# Patient Record
Sex: FEMALE | Race: WHITE | NOT HISPANIC OR LATINO
[De-identification: names, ages, dates, MRNs, and addresses within clinical notes are randomized per-mention and may not be internally consistent; named-entity substitution may affect disease eponyms.]

---

## 2017-01-09 ENCOUNTER — APPOINTMENT (OUTPATIENT)
Dept: CARDIOLOGY | Facility: CLINIC | Age: 82
End: 2017-01-09

## 2017-06-06 ENCOUNTER — OUTPATIENT (OUTPATIENT)
Dept: OUTPATIENT SERVICES | Facility: HOSPITAL | Age: 82
LOS: 1 days | End: 2017-06-06
Payer: MEDICARE

## 2017-06-06 VITALS
HEIGHT: 63 IN | HEART RATE: 68 BPM | RESPIRATION RATE: 16 BRPM | TEMPERATURE: 98 F | WEIGHT: 128.97 LBS | SYSTOLIC BLOOD PRESSURE: 154 MMHG | DIASTOLIC BLOOD PRESSURE: 77 MMHG

## 2017-06-06 DIAGNOSIS — Z98.890 OTHER SPECIFIED POSTPROCEDURAL STATES: Chronic | ICD-10-CM

## 2017-06-06 DIAGNOSIS — Z01.818 ENCOUNTER FOR OTHER PREPROCEDURAL EXAMINATION: ICD-10-CM

## 2017-06-06 DIAGNOSIS — M17.10 UNILATERAL PRIMARY OSTEOARTHRITIS, UNSPECIFIED KNEE: ICD-10-CM

## 2017-06-06 DIAGNOSIS — M17.12 UNILATERAL PRIMARY OSTEOARTHRITIS, LEFT KNEE: ICD-10-CM

## 2017-06-06 LAB
ANION GAP SERPL CALC-SCNC: 7 MMOL/L — SIGNIFICANT CHANGE UP (ref 5–17)
APPEARANCE UR: CLEAR — SIGNIFICANT CHANGE UP
APTT BLD: 38.4 SEC — HIGH (ref 27.5–37.4)
BILIRUB UR-MCNC: NEGATIVE — SIGNIFICANT CHANGE UP
BUN SERPL-MCNC: 9 MG/DL — SIGNIFICANT CHANGE UP (ref 7–23)
CALCIUM SERPL-MCNC: 9.3 MG/DL — SIGNIFICANT CHANGE UP (ref 8.5–10.1)
CHLORIDE SERPL-SCNC: 100 MMOL/L — SIGNIFICANT CHANGE UP (ref 96–108)
CO2 SERPL-SCNC: 30 MMOL/L — SIGNIFICANT CHANGE UP (ref 22–31)
COLOR SPEC: YELLOW — SIGNIFICANT CHANGE UP
CREAT SERPL-MCNC: 0.57 MG/DL — SIGNIFICANT CHANGE UP (ref 0.5–1.3)
DIFF PNL FLD: ABNORMAL
GLUCOSE SERPL-MCNC: 86 MG/DL — SIGNIFICANT CHANGE UP (ref 70–99)
GLUCOSE UR QL: NEGATIVE — SIGNIFICANT CHANGE UP
HCT VFR BLD CALC: 39.7 % — SIGNIFICANT CHANGE UP (ref 34.5–45)
HGB BLD-MCNC: 12.8 G/DL — SIGNIFICANT CHANGE UP (ref 11.5–15.5)
INR BLD: 1.3 RATIO — HIGH (ref 0.88–1.16)
KETONES UR-MCNC: NEGATIVE — SIGNIFICANT CHANGE UP
LEUKOCYTE ESTERASE UR-ACNC: ABNORMAL
MCHC RBC-ENTMCNC: 28.9 PG — SIGNIFICANT CHANGE UP (ref 27–34)
MCHC RBC-ENTMCNC: 32.4 GM/DL — SIGNIFICANT CHANGE UP (ref 32–36)
MCV RBC AUTO: 89.3 FL — SIGNIFICANT CHANGE UP (ref 80–100)
MRSA PCR RESULT.: SIGNIFICANT CHANGE UP
NITRITE UR-MCNC: NEGATIVE — SIGNIFICANT CHANGE UP
PH UR: 6.5 — SIGNIFICANT CHANGE UP (ref 5–8)
PLATELET # BLD AUTO: 279 K/UL — SIGNIFICANT CHANGE UP (ref 150–400)
POTASSIUM SERPL-MCNC: 4 MMOL/L — SIGNIFICANT CHANGE UP (ref 3.5–5.3)
POTASSIUM SERPL-SCNC: 4 MMOL/L — SIGNIFICANT CHANGE UP (ref 3.5–5.3)
PROT UR-MCNC: NEGATIVE — SIGNIFICANT CHANGE UP
PROTHROM AB SERPL-ACNC: 14.3 SEC — HIGH (ref 9.8–12.7)
RBC # BLD: 4.44 M/UL — SIGNIFICANT CHANGE UP (ref 3.8–5.2)
RBC # FLD: 13.7 % — SIGNIFICANT CHANGE UP (ref 10.3–14.5)
S AUREUS DNA NOSE QL NAA+PROBE: SIGNIFICANT CHANGE UP
SODIUM SERPL-SCNC: 137 MMOL/L — SIGNIFICANT CHANGE UP (ref 135–145)
SP GR SPEC: 1.01 — SIGNIFICANT CHANGE UP (ref 1.01–1.02)
UROBILINOGEN FLD QL: NEGATIVE — SIGNIFICANT CHANGE UP
WBC # BLD: 8.5 K/UL — SIGNIFICANT CHANGE UP (ref 3.8–10.5)
WBC # FLD AUTO: 8.5 K/UL — SIGNIFICANT CHANGE UP (ref 3.8–10.5)

## 2017-06-06 PROCEDURE — 71020: CPT | Mod: 26

## 2017-06-06 PROCEDURE — 73560 X-RAY EXAM OF KNEE 1 OR 2: CPT | Mod: 26,LT

## 2017-06-06 PROCEDURE — 93010 ELECTROCARDIOGRAM REPORT: CPT | Mod: NC

## 2017-06-06 NOTE — H&P PST ADULT - RS GEN PE MLT RESP DETAILS PC
airway patent/clear to auscultation bilaterally/good air movement/respirations non-labored/breath sounds equal/normal

## 2017-06-06 NOTE — H&P PST ADULT - NSANTHOSAYNRD_GEN_A_CORE
No. WILLIE screening performed.  STOP BANG Legend: 0-2 = LOW Risk; 3-4 = INTERMEDIATE Risk; 5-8 = HIGH Risk

## 2017-06-06 NOTE — H&P PST ADULT - HISTORY OF PRESENT ILLNESS
84 y/o female with c/o pain in the left knee for many years, diagnosed with osteoarthritis. Was seen by DR Harper, now in PST pre op left total knee replacement. pre op testing today.,

## 2017-06-06 NOTE — H&P PST ADULT - PSH
Cataract, Other  surgery   bilateral  H/O ovarian cystectomy  2013  H/O Shoulder Replacement  right shoulder  get clindamycin before surgery  Parathyroid  surgery  S/P Bunionectomy

## 2017-06-06 NOTE — H&P PST ADULT - PMH
Abdominal Mass    Afib    Constipation    Diverticulosis of the Colon    DJD (Degenerative Joint Disease)  shoulders, knees, cervical and lumbar spine  Hiatal Hernia    History of Osteoarthritis    HLD (hyperlipidemia)    HTN (Hypertension)    Hypercholesterolemia    MVP (Mitral Valve Prolapse)  last echo 2010  Thyroid Nodule

## 2017-06-06 NOTE — H&P PST ADULT - ASSESSMENT
82 y/o female ,PMH of Afib , HTN, hyperlipidemia with c/o pain in the left knee , osteoarthritis, scheduled for left total knee replacement. Pre op testing today. Medical and cardiac eval advised.  Was seen by Dr Campbell  last week , was told to have UTI, placed on [id number] tab, half tablet twice daily. Will do U/A and c/s to check if UTI is resolved.

## 2017-06-07 ENCOUNTER — APPOINTMENT (OUTPATIENT)
Dept: CARDIOLOGY | Facility: CLINIC | Age: 82
End: 2017-06-07

## 2017-06-07 ENCOUNTER — NON-APPOINTMENT (OUTPATIENT)
Age: 82
End: 2017-06-07

## 2017-06-07 VITALS
WEIGHT: 132 LBS | HEIGHT: 62 IN | DIASTOLIC BLOOD PRESSURE: 80 MMHG | OXYGEN SATURATION: 96 % | SYSTOLIC BLOOD PRESSURE: 130 MMHG | HEART RATE: 79 BPM | BODY MASS INDEX: 24.29 KG/M2

## 2017-06-07 LAB
CULTURE RESULTS: SIGNIFICANT CHANGE UP
SPECIMEN SOURCE: SIGNIFICANT CHANGE UP

## 2017-06-09 ENCOUNTER — APPOINTMENT (OUTPATIENT)
Dept: CARDIOLOGY | Facility: CLINIC | Age: 82
End: 2017-06-09

## 2017-06-16 RX ORDER — SODIUM CHLORIDE 9 MG/ML
1000 INJECTION, SOLUTION INTRAVENOUS
Qty: 0 | Refills: 0 | Status: DISCONTINUED | OUTPATIENT
Start: 2017-06-19 | End: 2017-06-19

## 2017-06-17 RX ORDER — ONDANSETRON 8 MG/1
4 TABLET, FILM COATED ORAL EVERY 6 HOURS
Qty: 0 | Refills: 0 | Status: DISCONTINUED | OUTPATIENT
Start: 2017-06-19 | End: 2017-06-22

## 2017-06-17 RX ORDER — ASCORBIC ACID 60 MG
500 TABLET,CHEWABLE ORAL
Qty: 0 | Refills: 0 | Status: DISCONTINUED | OUTPATIENT
Start: 2017-06-19 | End: 2017-06-22

## 2017-06-17 RX ORDER — RIVAROXABAN 15 MG-20MG
10 KIT ORAL DAILY
Qty: 0 | Refills: 0 | Status: COMPLETED | OUTPATIENT
Start: 2017-06-20 | End: 2017-06-20

## 2017-06-17 RX ORDER — FERROUS SULFATE 325(65) MG
325 TABLET ORAL
Qty: 0 | Refills: 0 | Status: DISCONTINUED | OUTPATIENT
Start: 2017-06-19 | End: 2017-06-22

## 2017-06-17 RX ORDER — DILTIAZEM HCL 120 MG
240 CAPSULE, EXT RELEASE 24 HR ORAL DAILY
Qty: 0 | Refills: 0 | Status: DISCONTINUED | OUTPATIENT
Start: 2017-06-19 | End: 2017-06-22

## 2017-06-17 RX ORDER — DOCUSATE SODIUM 100 MG
100 CAPSULE ORAL THREE TIMES A DAY
Qty: 0 | Refills: 0 | Status: DISCONTINUED | OUTPATIENT
Start: 2017-06-19 | End: 2017-06-22

## 2017-06-17 RX ORDER — ACETAMINOPHEN 500 MG
650 TABLET ORAL EVERY 8 HOURS
Qty: 0 | Refills: 0 | Status: DISCONTINUED | OUTPATIENT
Start: 2017-06-20 | End: 2017-06-22

## 2017-06-17 RX ORDER — SIMVASTATIN 20 MG/1
5 TABLET, FILM COATED ORAL AT BEDTIME
Qty: 0 | Refills: 0 | Status: DISCONTINUED | OUTPATIENT
Start: 2017-06-19 | End: 2017-06-22

## 2017-06-17 RX ORDER — CELECOXIB 200 MG/1
200 CAPSULE ORAL
Qty: 0 | Refills: 0 | Status: DISCONTINUED | OUTPATIENT
Start: 2017-06-19 | End: 2017-06-22

## 2017-06-17 RX ORDER — FOLIC ACID 0.8 MG
1 TABLET ORAL DAILY
Qty: 0 | Refills: 0 | Status: DISCONTINUED | OUTPATIENT
Start: 2017-06-19 | End: 2017-06-22

## 2017-06-17 RX ORDER — PANTOPRAZOLE SODIUM 20 MG/1
40 TABLET, DELAYED RELEASE ORAL
Qty: 0 | Refills: 0 | Status: DISCONTINUED | OUTPATIENT
Start: 2017-06-19 | End: 2017-06-22

## 2017-06-18 PROCEDURE — 86900 BLOOD TYPING SEROLOGIC ABO: CPT

## 2017-06-18 PROCEDURE — 81001 URINALYSIS AUTO W/SCOPE: CPT

## 2017-06-18 PROCEDURE — 80048 BASIC METABOLIC PNL TOTAL CA: CPT

## 2017-06-18 PROCEDURE — 85610 PROTHROMBIN TIME: CPT

## 2017-06-18 PROCEDURE — 71046 X-RAY EXAM CHEST 2 VIEWS: CPT

## 2017-06-18 PROCEDURE — G0463: CPT

## 2017-06-18 PROCEDURE — 86850 RBC ANTIBODY SCREEN: CPT

## 2017-06-18 PROCEDURE — 93005 ELECTROCARDIOGRAM TRACING: CPT

## 2017-06-18 PROCEDURE — 86920 COMPATIBILITY TEST SPIN: CPT

## 2017-06-18 PROCEDURE — 73560 X-RAY EXAM OF KNEE 1 OR 2: CPT

## 2017-06-18 PROCEDURE — 87641 MR-STAPH DNA AMP PROBE: CPT

## 2017-06-18 PROCEDURE — 87086 URINE CULTURE/COLONY COUNT: CPT

## 2017-06-18 PROCEDURE — 85730 THROMBOPLASTIN TIME PARTIAL: CPT

## 2017-06-18 PROCEDURE — 87640 STAPH A DNA AMP PROBE: CPT

## 2017-06-18 PROCEDURE — 85027 COMPLETE CBC AUTOMATED: CPT

## 2017-06-18 PROCEDURE — 86901 BLOOD TYPING SEROLOGIC RH(D): CPT

## 2017-06-18 RX ORDER — CELECOXIB 200 MG/1
2 CAPSULE ORAL
Qty: 0 | Refills: 0 | DISCHARGE
Start: 2017-06-18

## 2017-06-19 ENCOUNTER — INPATIENT (INPATIENT)
Facility: HOSPITAL | Age: 82
LOS: 2 days | Discharge: NURSING, REHAB OR LONG-TERM CARE | DRG: 470 | End: 2017-06-22
Attending: ORTHOPAEDIC SURGERY | Admitting: ORTHOPAEDIC SURGERY
Payer: MEDICARE

## 2017-06-19 ENCOUNTER — TRANSCRIPTION ENCOUNTER (OUTPATIENT)
Age: 82
End: 2017-06-19

## 2017-06-19 ENCOUNTER — RESULT REVIEW (OUTPATIENT)
Age: 82
End: 2017-06-19

## 2017-06-19 VITALS
TEMPERATURE: 98 F | HEART RATE: 73 BPM | HEIGHT: 63 IN | RESPIRATION RATE: 14 BRPM | OXYGEN SATURATION: 98 % | WEIGHT: 128.97 LBS | SYSTOLIC BLOOD PRESSURE: 151 MMHG | DIASTOLIC BLOOD PRESSURE: 73 MMHG

## 2017-06-19 DIAGNOSIS — I10 ESSENTIAL (PRIMARY) HYPERTENSION: ICD-10-CM

## 2017-06-19 DIAGNOSIS — E78.00 PURE HYPERCHOLESTEROLEMIA, UNSPECIFIED: ICD-10-CM

## 2017-06-19 DIAGNOSIS — I48.91 UNSPECIFIED ATRIAL FIBRILLATION: ICD-10-CM

## 2017-06-19 DIAGNOSIS — M17.12 UNILATERAL PRIMARY OSTEOARTHRITIS, LEFT KNEE: ICD-10-CM

## 2017-06-19 DIAGNOSIS — Z98.890 OTHER SPECIFIED POSTPROCEDURAL STATES: Chronic | ICD-10-CM

## 2017-06-19 LAB
ABO RH CONFIRMATION: SIGNIFICANT CHANGE UP
HCT VFR BLD CALC: 32.6 % — LOW (ref 34.5–45)
HGB BLD-MCNC: 11 G/DL — LOW (ref 11.5–15.5)
INR BLD: 1.04 RATIO — SIGNIFICANT CHANGE UP (ref 0.88–1.16)
PROTHROM AB SERPL-ACNC: 11.4 SEC — SIGNIFICANT CHANGE UP (ref 9.8–12.7)

## 2017-06-19 PROCEDURE — 20985 CPTR-ASST DIR MS PX: CPT | Mod: AS

## 2017-06-19 PROCEDURE — 73560 X-RAY EXAM OF KNEE 1 OR 2: CPT | Mod: 26,LT

## 2017-06-19 PROCEDURE — 27447 TOTAL KNEE ARTHROPLASTY: CPT | Mod: AS

## 2017-06-19 PROCEDURE — 88311 DECALCIFY TISSUE: CPT | Mod: 26

## 2017-06-19 PROCEDURE — 73562 X-RAY EXAM OF KNEE 3: CPT | Mod: 26,LT

## 2017-06-19 PROCEDURE — 88305 TISSUE EXAM BY PATHOLOGIST: CPT | Mod: 26

## 2017-06-19 RX ORDER — SODIUM CHLORIDE 9 MG/ML
1000 INJECTION, SOLUTION INTRAVENOUS
Qty: 0 | Refills: 0 | Status: DISCONTINUED | OUTPATIENT
Start: 2017-06-19 | End: 2017-06-19

## 2017-06-19 RX ORDER — ZALEPLON 10 MG
5 CAPSULE ORAL AT BEDTIME
Qty: 0 | Refills: 0 | Status: DISCONTINUED | OUTPATIENT
Start: 2017-06-19 | End: 2017-06-22

## 2017-06-19 RX ORDER — CEFAZOLIN SODIUM 1 G
2000 VIAL (EA) INJECTION ONCE
Qty: 0 | Refills: 0 | Status: DISCONTINUED | OUTPATIENT
Start: 2017-06-19 | End: 2017-06-19

## 2017-06-19 RX ORDER — ACETAMINOPHEN 500 MG
1000 TABLET ORAL ONCE
Qty: 0 | Refills: 0 | Status: COMPLETED | OUTPATIENT
Start: 2017-06-19 | End: 2017-06-19

## 2017-06-19 RX ORDER — HYDROMORPHONE HYDROCHLORIDE 2 MG/ML
0.5 INJECTION INTRAMUSCULAR; INTRAVENOUS; SUBCUTANEOUS
Qty: 0 | Refills: 0 | Status: DISCONTINUED | OUTPATIENT
Start: 2017-06-19 | End: 2017-06-19

## 2017-06-19 RX ORDER — SODIUM CHLORIDE 9 MG/ML
1000 INJECTION, SOLUTION INTRAVENOUS
Qty: 0 | Refills: 0 | Status: DISCONTINUED | OUTPATIENT
Start: 2017-06-19 | End: 2017-06-20

## 2017-06-19 RX ORDER — CEFAZOLIN SODIUM 1 G
1000 VIAL (EA) INJECTION EVERY 6 HOURS
Qty: 0 | Refills: 0 | Status: COMPLETED | OUTPATIENT
Start: 2017-06-19 | End: 2017-06-20

## 2017-06-19 RX ORDER — BUPIVACAINE 13.3 MG/ML
20 INJECTION, SUSPENSION, LIPOSOMAL INFILTRATION ONCE
Qty: 0 | Refills: 0 | Status: COMPLETED | OUTPATIENT
Start: 2017-06-19 | End: 2017-06-19

## 2017-06-19 RX ORDER — DIPHENHYDRAMINE HCL 50 MG
50 CAPSULE ORAL EVERY 4 HOURS
Qty: 0 | Refills: 0 | Status: DISCONTINUED | OUTPATIENT
Start: 2017-06-19 | End: 2017-06-22

## 2017-06-19 RX ORDER — BENZOCAINE AND MENTHOL 5; 1 G/100ML; G/100ML
1 LIQUID ORAL
Qty: 0 | Refills: 0 | Status: DISCONTINUED | OUTPATIENT
Start: 2017-06-19 | End: 2017-06-22

## 2017-06-19 RX ADMIN — HYDROMORPHONE HYDROCHLORIDE 0.5 MILLIGRAM(S): 2 INJECTION INTRAMUSCULAR; INTRAVENOUS; SUBCUTANEOUS at 14:15

## 2017-06-19 RX ADMIN — HYDROMORPHONE HYDROCHLORIDE 0.5 MILLIGRAM(S): 2 INJECTION INTRAMUSCULAR; INTRAVENOUS; SUBCUTANEOUS at 13:45

## 2017-06-19 RX ADMIN — SODIUM CHLORIDE 50 MILLILITER(S): 9 INJECTION, SOLUTION INTRAVENOUS at 08:54

## 2017-06-19 RX ADMIN — Medication 400 MILLIGRAM(S): at 18:50

## 2017-06-19 RX ADMIN — Medication 100 MILLIGRAM(S): at 22:15

## 2017-06-19 RX ADMIN — HYDROMORPHONE HYDROCHLORIDE 0.5 MILLIGRAM(S): 2 INJECTION INTRAMUSCULAR; INTRAVENOUS; SUBCUTANEOUS at 13:20

## 2017-06-19 RX ADMIN — SIMVASTATIN 5 MILLIGRAM(S): 20 TABLET, FILM COATED ORAL at 22:15

## 2017-06-19 RX ADMIN — HYDROMORPHONE HYDROCHLORIDE 0.5 MILLIGRAM(S): 2 INJECTION INTRAMUSCULAR; INTRAVENOUS; SUBCUTANEOUS at 14:00

## 2017-06-19 RX ADMIN — Medication 100 MILLIGRAM(S): at 16:41

## 2017-06-19 RX ADMIN — HYDROMORPHONE HYDROCHLORIDE 0.5 MILLIGRAM(S): 2 INJECTION INTRAMUSCULAR; INTRAVENOUS; SUBCUTANEOUS at 13:35

## 2017-06-19 RX ADMIN — ONDANSETRON 4 MILLIGRAM(S): 8 TABLET, FILM COATED ORAL at 17:10

## 2017-06-19 RX ADMIN — SODIUM CHLORIDE 100 MILLILITER(S): 9 INJECTION, SOLUTION INTRAVENOUS at 13:20

## 2017-06-19 NOTE — PROGRESS NOTE ADULT - SUBJECTIVE AND OBJECTIVE BOX
Post-operative check  Patient seen and examined. Pain controlled.    Physical exam  VS: Afebrile, vital signs stable  Gen: NAD  Left LE: Dressing clean, dry, and intact. +EHL/FHL/TA/GSC. SILT L3-S1. +Dorsalis pedis, capillary refill brisk. Compartments soft and nontender.

## 2017-06-19 NOTE — BRIEF OPERATIVE NOTE - PROCEDURE
Total knee arthroplasty  06/19/2017    Active  AMEGAS Total knee arthroplasty  06/19/2017  LEFT  Active  Juan Miguel Ibrahim

## 2017-06-19 NOTE — BRIEF OPERATIVE NOTE - PRE-OP DX
Osteoarthritis  06/19/2017    Active  Juan Miguel Ibrahim Osteoarthritis  06/19/2017  LEFT Knee  Active  Carla Christianson

## 2017-06-19 NOTE — CONSULT NOTE ADULT - SUBJECTIVE AND OBJECTIVE BOX
Patient is a 83y old  Female who presents with a chief complaint of I need left knee replacement (19 Jun 2017 08:41)      INTERVAL HPI/OVERNIGHT EVENTS: comfortable no distress  T(C): 36.6, Max: 36.7 (06-19 @ 08:29)  HR: 63 (55 - 73)  BP: 137/71 (127/56 - 157/67)  RR: 16 (14 - 17)  SpO2: 100% (98% - 100%)  Wt(kg): --  I&O's Summary    I & Os for current day (as of 19 Jun 2017 16:50)  =============================================  IN: 250 ml / OUT: 0 ml / NET: 250 ml      PAST MEDICAL & SURGICAL HISTORY:  HLD (hyperlipidemia)  Afib  Abdominal Mass  Diverticulosis of the Colon  History of Osteoarthritis  DJD (Degenerative Joint Disease): shoulders, knees, cervical and lumbar spine  Constipation  Thyroid Nodule  Hiatal Hernia  Hypercholesterolemia  MVP (Mitral Valve Prolapse): last echo 2010  HTN (Hypertension)  H/O ovarian cystectomy: 2013  Cataract, Other: surgery   bilateral  S/P Bunionectomy  Parathyroid: surgery  H/O Shoulder Replacement: right shoulder  get clindamycin before surgery      SOCIAL HISTORY  Alcohol: no tobacco  Tobacco: none  Illicit substance use: none      FAMILY HISTORY: non contrib      LABS:                        11.0   x     )-----------( x        ( 19 Jun 2017 13:49 )             32.6           PT/INR - ( 19 Jun 2017 08:40 )   PT: 11.4 sec;   INR: 1.04 ratio         PTT - ( 19 Jun 2017 08:40 )  PTT:34.7 sec      MEDICATIONS  (STANDING):  lactated ringers. 1000milliLiter(s) IV Continuous <Continuous>  celecoxib 200milliGRAM(s) Oral two times a day after meals  docusate sodium 100milliGRAM(s) Oral three times a day  ferrous    sulfate 325milliGRAM(s) Oral three times a day with meals  folic acid 1milliGRAM(s) Oral daily  multivitamin 1Tablet(s) Oral daily  ascorbic acid 500milliGRAM(s) Oral two times a day  diltiazem   CD 240milliGRAM(s) Oral daily  simvastatin 5milliGRAM(s) Oral at bedtime  pantoprazole    Tablet 40milliGRAM(s) Oral before breakfast  ceFAZolin   IVPB 1000milliGRAM(s) IV Intermittent every 6 hours  acetaminophen  IVPB. 1000milliGRAM(s) IV Intermittent once    MEDICATIONS  (PRN):  ondansetron Injectable 4milliGRAM(s) IV Push every 6 hours PRN Nausea and/or Vomiting      REVIEW OF SYSTEMS:  CONSTITUTIONAL: No fever, weight loss, or fatigue  EYES: No eye pain, visual disturbances, or discharge  ENMT:  No difficulty hearing, tinnitus, vertigo; No sinus or throat pain  NECK: No pain or stiffness  RESPIRATORY: No cough, wheezing, chills or hemoptysis; No shortness of breath  CARDIOVASCULAR: No chest pain, palpitations, dizziness, or leg swelling  GASTROINTESTINAL: No abdominal or epigastric pain. No nausea, vomiting, or hematemesis; No diarrhea or constipation. No melena or hematochezia.  GENITOURINARY: No dysuria, frequency, hematuria, or incontinence  NEUROLOGICAL: No headaches, memory loss, loss of strength, numbness, or tremors  SKIN: No itching, burning, rashes, or lesions   LYMPH NODES: No enlarged glands  ENDOCRINE: No heat or cold intolerance; No hair loss  MUSCULOSKELETAL: chronic knee pain; No muscle, back, or extremity pain  PSYCHIATRIC: No depression, anxiety, mood swings, or difficulty sleeping  HEME/LYMPH: No easy bruising, or bleeding gums  ALLERY AND IMMUNOLOGIC: No hives or eczema    RADIOLOGY & ADDITIONAL TESTS:    Imaging Personally Reviewed:  [x ] YES  [ ] NO    Consultant(s) Notes Reviewed:  [x ] YES  [ ] NO    PHYSICAL EXAM:  GENERAL: NAD, well-groomed, well-developed  HEAD:  Atraumatic, Normocephalic  EYES: EOMI, PERRLA, conjunctiva and sclera clear  ENMT: No tonsillar erythema, exudates, or enlargement; Moist mucous membranes, Good dentition, No lesions  NECK: Supple, No JVD, Normal thyroid  NERVOUS SYSTEM:  Alert & Oriented X3, Good concentration; Motor Strength 5/5 B/L upper and lower extremities; DTRs 2+ intact and symmetric  CHEST/LUNG: Clear to percussion bilaterally; No rales, rhonchi, wheezing, or rubs  HEART: Regular rate and rhythm; No murmurs, rubs, or gallops  ABDOMEN: Soft, Nontender, Nondistended; Bowel sounds present  EXTREMITIES:  2+ Peripheral Pulses, No clubbing, cyanosis, or edema  LYMPH: No lymphadenopathy noted  SKIN: No rashes or lesions    Care Discussed with Consultants/Other Providers [ ] YES  [ ] NO

## 2017-06-20 DIAGNOSIS — D50.0 IRON DEFICIENCY ANEMIA SECONDARY TO BLOOD LOSS (CHRONIC): ICD-10-CM

## 2017-06-20 LAB
ANION GAP SERPL CALC-SCNC: 7 MMOL/L — SIGNIFICANT CHANGE UP (ref 5–17)
BUN SERPL-MCNC: 5 MG/DL — LOW (ref 7–23)
CALCIUM SERPL-MCNC: 8.1 MG/DL — LOW (ref 8.5–10.1)
CHLORIDE SERPL-SCNC: 104 MMOL/L — SIGNIFICANT CHANGE UP (ref 96–108)
CO2 SERPL-SCNC: 31 MMOL/L — SIGNIFICANT CHANGE UP (ref 22–31)
CREAT SERPL-MCNC: 0.58 MG/DL — SIGNIFICANT CHANGE UP (ref 0.5–1.3)
GLUCOSE SERPL-MCNC: 102 MG/DL — HIGH (ref 70–99)
HCT VFR BLD CALC: 30.9 % — LOW (ref 34.5–45)
HGB BLD-MCNC: 10.3 G/DL — LOW (ref 11.5–15.5)
POTASSIUM SERPL-MCNC: 4 MMOL/L — SIGNIFICANT CHANGE UP (ref 3.5–5.3)
POTASSIUM SERPL-SCNC: 4 MMOL/L — SIGNIFICANT CHANGE UP (ref 3.5–5.3)
SODIUM SERPL-SCNC: 142 MMOL/L — SIGNIFICANT CHANGE UP (ref 135–145)

## 2017-06-20 PROCEDURE — 99223 1ST HOSP IP/OBS HIGH 75: CPT

## 2017-06-20 RX ORDER — POLYETHYLENE GLYCOL 3350 17 G/17G
17 POWDER, FOR SOLUTION ORAL DAILY
Qty: 0 | Refills: 0 | Status: DISCONTINUED | OUTPATIENT
Start: 2017-06-20 | End: 2017-06-22

## 2017-06-20 RX ORDER — HYDROMORPHONE HYDROCHLORIDE 2 MG/ML
4 INJECTION INTRAMUSCULAR; INTRAVENOUS; SUBCUTANEOUS EVERY 4 HOURS
Qty: 0 | Refills: 0 | Status: DISCONTINUED | OUTPATIENT
Start: 2017-06-20 | End: 2017-06-22

## 2017-06-20 RX ORDER — HYDROMORPHONE HYDROCHLORIDE 2 MG/ML
2 INJECTION INTRAMUSCULAR; INTRAVENOUS; SUBCUTANEOUS EVERY 4 HOURS
Qty: 0 | Refills: 0 | Status: DISCONTINUED | OUTPATIENT
Start: 2017-06-20 | End: 2017-06-22

## 2017-06-20 RX ADMIN — HYDROMORPHONE HYDROCHLORIDE 2 MILLIGRAM(S): 2 INJECTION INTRAMUSCULAR; INTRAVENOUS; SUBCUTANEOUS at 09:44

## 2017-06-20 RX ADMIN — Medication 1 MILLIGRAM(S): at 12:49

## 2017-06-20 RX ADMIN — Medication 650 MILLIGRAM(S): at 22:00

## 2017-06-20 RX ADMIN — Medication 100 MILLIGRAM(S): at 04:15

## 2017-06-20 RX ADMIN — Medication 100 MILLIGRAM(S): at 06:04

## 2017-06-20 RX ADMIN — Medication 240 MILLIGRAM(S): at 06:04

## 2017-06-20 RX ADMIN — HYDROMORPHONE HYDROCHLORIDE 2 MILLIGRAM(S): 2 INJECTION INTRAMUSCULAR; INTRAVENOUS; SUBCUTANEOUS at 10:44

## 2017-06-20 RX ADMIN — Medication 500 MILLIGRAM(S): at 17:40

## 2017-06-20 RX ADMIN — Medication 325 MILLIGRAM(S): at 08:49

## 2017-06-20 RX ADMIN — Medication 650 MILLIGRAM(S): at 06:05

## 2017-06-20 RX ADMIN — Medication 100 MILLIGRAM(S): at 14:15

## 2017-06-20 RX ADMIN — Medication 100 MILLIGRAM(S): at 22:00

## 2017-06-20 RX ADMIN — Medication 650 MILLIGRAM(S): at 06:30

## 2017-06-20 RX ADMIN — Medication 500 MILLIGRAM(S): at 06:04

## 2017-06-20 RX ADMIN — PANTOPRAZOLE SODIUM 40 MILLIGRAM(S): 20 TABLET, DELAYED RELEASE ORAL at 06:04

## 2017-06-20 RX ADMIN — Medication 325 MILLIGRAM(S): at 12:49

## 2017-06-20 RX ADMIN — SIMVASTATIN 5 MILLIGRAM(S): 20 TABLET, FILM COATED ORAL at 22:00

## 2017-06-20 RX ADMIN — Medication 325 MILLIGRAM(S): at 17:40

## 2017-06-20 RX ADMIN — CELECOXIB 200 MILLIGRAM(S): 200 CAPSULE ORAL at 17:39

## 2017-06-20 RX ADMIN — Medication 1 TABLET(S): at 12:49

## 2017-06-20 RX ADMIN — CELECOXIB 200 MILLIGRAM(S): 200 CAPSULE ORAL at 08:49

## 2017-06-20 RX ADMIN — RIVAROXABAN 10 MILLIGRAM(S): KIT at 12:49

## 2017-06-20 RX ADMIN — Medication 650 MILLIGRAM(S): at 14:15

## 2017-06-20 NOTE — PROGRESS NOTE ADULT - SUBJECTIVE AND OBJECTIVE BOX
T(C): 37.1, Max: 37.1 (06-20 @ 08:43)  HR: 71 (55 - 72)  BP: 110/64 (110/64 - 157/67)  RR: 17 (15 - 17)  SpO2: 98% (98% - 100%)  Wt(kg): --    Pt seen, doing well, no anesthesia complications or complaints noted or reported.   No Nausea  Pain well controlled

## 2017-06-20 NOTE — CONSULT NOTE ADULT - SUBJECTIVE AND OBJECTIVE BOX
[facility] - Ansley Morales, Jennifer Lopez, Imtiaz Dee  Office Number: [phone]    Initial Consult Note    CHIEF COMPLAINT: Patient is a 83y old  Female who presents with a chief complaint of I need left knee replacement (19 Jun 2017 08:41)      HPI:  82 y/o female with c/o pain in the left knee for many years, diagnosed with osteoarthritis. Was seen by DR Harper, now in PST pre op left total knee replacement. She has a history of PAF, and is on Xarelto for stroke risk reduction.  She also has a history of hyperlipidemia, hypertension, mitral valve prolapse.  She underwent knee replacement surgery, and tolerated the procedure well.  She does not have any chest pain, dyspnea, PND, orthopnea, palpitations, dizziness, or syncope She is clinically maintaining NSR.      PAST MEDICAL & SURGICAL HISTORY:  HLD (hyperlipidemia)  Afib  Abdominal Mass  Diverticulosis of the Colon  History of Osteoarthritis  DJD (Degenerative Joint Disease): shoulders, knees, cervical and lumbar spine  Constipation  Thyroid Nodule  Hiatal Hernia  Hypercholesterolemia  MVP (Mitral Valve Prolapse): last echo 2010  HTN (Hypertension)  H/O ovarian cystectomy: 2013  Cataract, Other: surgery   bilateral  S/P Bunionectomy  Parathyroid: surgery  H/O Shoulder Replacement: right shoulder  get clindamycin before surgery      SOCIAL HISTORY:  No tobacco, ethanol, or drug abuse.    FAMILY HISTORY:  No pertinent family history in first degree relatives    No family history of acute MI or sudden cardiac death.    MEDICATIONS  (STANDING):  acetaminophen   Tablet. 650milliGRAM(s) Oral every 8 hours  celecoxib 200milliGRAM(s) Oral two times a day after meals  docusate sodium 100milliGRAM(s) Oral three times a day  ferrous    sulfate 325milliGRAM(s) Oral three times a day with meals  folic acid 1milliGRAM(s) Oral daily  multivitamin 1Tablet(s) Oral daily  ascorbic acid 500milliGRAM(s) Oral two times a day  diltiazem   CD 240milliGRAM(s) Oral daily  simvastatin 5milliGRAM(s) Oral at bedtime  rivaroxaban 10milliGRAM(s) Oral daily  pantoprazole    Tablet 40milliGRAM(s) Oral before breakfast    MEDICATIONS  (PRN):  ondansetron Injectable 4milliGRAM(s) IV Push every 6 hours PRN Nausea and/or Vomiting  diphenhydrAMINE   Capsule 50milliGRAM(s) Oral every 4 hours PRN Rash and/or Itching  benzocaine 15 mG/menthol 3.6 mG Lozenge 1Lozenge Oral every 2 hours PRN Sore Throat  zaleplon 5milliGRAM(s) Oral at bedtime PRN Insomnia  HYDROmorphone   Tablet 2milliGRAM(s) Oral every 4 hours PRN Mild Pain (1 - 3)  HYDROmorphone   Tablet 4milliGRAM(s) Oral every 4 hours PRN Severe Pain (7 - 10)      Allergies    codeine (Other)  penicillin (Flushing (Skin); Hypotension; Other)        REVIEW OF SYSTEMS:  All other review of systems is negative unless indicated above    VITAL SIGNS:   Vital Signs Last 24 Hrs  T(C): 37.1, Max: 37.1 (06-20 @ 08:43)  T(F): 98.7, Max: 98.7 (06-20 @ 08:43)  HR: 71 (55 - 72)  BP: 110/64 (110/64 - 157/67)  BP(mean): --  RR: 17 (15 - 17)  SpO2: 98% (98% - 100%)    I&O's Summary    I & Os for current day (as of 20 Jun 2017 10:09)  =============================================  IN: 510 ml / OUT: 2300 ml / NET: -1790 ml      On Exam:    Constitutional: NAD, alert and oriented x 3  Lungs:  Non-labored, breath sounds are clear bilaterally, No wheezing, rales or rhonchi  Cardiovascular: RRR.  S1 and S2 positive.  2/6 systolic murmur.  No rubs, gallops or clicks  Gastrointestinal: Bowel Sounds present, soft, nontender.   Lymph: Minimal peripheral edema. No cervical lymphadenopathy.  Neurological: Alert, no focal deficits  Skin: No rashes or ulcers   Psych:  Mood & affect appropriate.    LABS: All Labs Reviewed:                        10.3   x     )-----------( x        ( 20 Jun 2017 04:46 )             30.9                         11.0   x     )-----------( x        ( 19 Jun 2017 13:49 )             32.6     20 Jun 2017 04:46    142    |  104    |  5      ----------------------------<  102    4.0     |  31     |  0.58     Ca    8.1        20 Jun 2017 04:46      PT/INR - ( 19 Jun 2017 08:40 )   PT: 11.4 sec;   INR: 1.04 ratio         PTT - ( 19 Jun 2017 08:40 )  PTT:34.7 sec        RADIOLOGY:    PROCEDURE DATE:  06/06/2017        INTERPRETATION:  Preop knee replacement.    PA lateral. Prior 11/25/2016.    Normal heart size with atherosclerotic aorta. No pleural-parenchymal   abnormality. Right shoulder arthroplasty. Degenerative change dorsal   spine with a marked dextroscoliosis.    Impression: No active EXAM:  CHEST PA & LAT                        disease. Stable exam    EKG:  NSR    Assessment/Plan:  83y Female with paroxysmal atrial fibrillation, HTN, chol, MVP s/p left total hip replacement:    - Tolerated well with no evidence of any cardiac complications  - NO clinical evidence of recurrent atrial fibrillation  - To get half dose Xarelto today.  Full dose Xarelto to resume tomorrow per orthopedics  - Continue diltiazem 240 QD  - Continue statin drug  - Monitor and replete electrolytes as needed  - TO follow with you

## 2017-06-20 NOTE — PROGRESS NOTE ADULT - SUBJECTIVE AND OBJECTIVE BOX
RUBENS BARKER 83y Female  MRN-[id number]     ORTHOPEDIC SURGERY / DR. COFFMAN    POD # 1    Vital Signs Last 24 Hrs  T(C): 36.8, Max: 37 (06-19 @ 20:01)  T(F): 98.2, Max: 98.6 (06-19 @ 20:01)  HR: 70 (55 - 73)  BP: 142/70 (127/56 - 157/67)  BP(mean): --  RR: 16 (14 - 17)  SpO2: 100% (98% - 100%)    LEFT KNEE :    DRESSING DRY AND INTACT  GOOD MOTOR TO LEFT LOWER EXTREMITY  NEURO-VASCULAR STATUS INTACT  NO CALF TENDERNESS    Hemoglobin: 10.3 (06-20 @ 04:46)  Hemoglobin: 11.0 (06-19 @ 13:49)    Hematocrit: 30.9 (06-20 @ 04:46)  Hematocrit: 32.6 (06-19 @ 13:49)    06-20    142  |  104  |  5<L>  ----------------------------<  102<H>  4.0   |  31  |  0.58    Ca    8.1<L>      20 Jun 2017 04:46      ASSESSMENT &  PLAN:  POD # 1 S/P LEFT TOTAL KNEE  REPLACEMENT    WEIGHT  BEARING AS TOLERATED, OOB AND AMBULATE, PT  DVT PROPHYLAXIS  MG PO BID  INCENTIVE SPIROMETRY   DISCHARGE PLANNING TO  REHAB RUBENS BARKER 83y Female  MRN-[id number]     ORTHOPEDIC SURGERY / DR. COFFMAN    POD # 1    Vital Signs Last 24 Hrs  T(C): 36.8, Max: 37 (06-19 @ 20:01)  T(F): 98.2, Max: 98.6 (06-19 @ 20:01)  HR: 70 (55 - 73)  BP: 142/70 (127/56 - 157/67)  BP(mean): --  RR: 16 (14 - 17)  SpO2: 100% (98% - 100%)    LEFT KNEE :    DRESSING DRY AND INTACT  GOOD MOTOR TO LEFT LOWER EXTREMITY  NEURO-VASCULAR STATUS INTACT  NO CALF TENDERNESS    Hemoglobin: 10.3 (06-20 @ 04:46)  Hemoglobin: 11.0 (06-19 @ 13:49)    Hematocrit: 30.9 (06-20 @ 04:46)  Hematocrit: 32.6 (06-19 @ 13:49)    06-20    142  |  104  |  5<L>  ----------------------------<  102<H>  4.0   |  31  |  0.58    Ca    8.1<L>      20 Jun 2017 04:46      ASSESSMENT &  PLAN:  POD # 1 S/P LEFT TOTAL KNEE  REPLACEMENT    WEIGHT  BEARING AS TOLERATED, OOB AND AMBULATE, PT  DVT PROPHYLAXIS  INCENTIVE SPIROMETRY    H/O AFIB ON XARELTO  XARELTO 10 MG PO TODAY      DISCHARGE PLANNING TO  REHAB

## 2017-06-20 NOTE — GOALS OF CARE CONVERSATION - PERSONAL ADVANCE DIRECTIVE - CONVERSATION DETAILS
met pt, no hcp, pt daughter Maricruz present, hcp form completed, family aware of pt directives. contact # given.

## 2017-06-20 NOTE — PROGRESS NOTE ADULT - SUBJECTIVE AND OBJECTIVE BOX
Patient is a 83y old  Female who presents with a chief complaint of I need left knee replacement (19 Jun 2017 08:41)      INTERVAL HPI/OVERNIGHT EVENTS: no complaints    MEDICATIONS  (STANDING):  acetaminophen   Tablet. 650milliGRAM(s) Oral every 8 hours  celecoxib 200milliGRAM(s) Oral two times a day after meals  docusate sodium 100milliGRAM(s) Oral three times a day  ferrous    sulfate 325milliGRAM(s) Oral three times a day with meals  folic acid 1milliGRAM(s) Oral daily  multivitamin 1Tablet(s) Oral daily  ascorbic acid 500milliGRAM(s) Oral two times a day  diltiazem   CD 240milliGRAM(s) Oral daily  simvastatin 5milliGRAM(s) Oral at bedtime  pantoprazole    Tablet 40milliGRAM(s) Oral before breakfast    MEDICATIONS  (PRN):  ondansetron Injectable 4milliGRAM(s) IV Push every 6 hours PRN Nausea and/or Vomiting  diphenhydrAMINE   Capsule 50milliGRAM(s) Oral every 4 hours PRN Rash and/or Itching  benzocaine 15 mG/menthol 3.6 mG Lozenge 1Lozenge Oral every 2 hours PRN Sore Throat  zaleplon 5milliGRAM(s) Oral at bedtime PRN Insomnia  HYDROmorphone   Tablet 2milliGRAM(s) Oral every 4 hours PRN Mild Pain (1 - 3)  HYDROmorphone   Tablet 4milliGRAM(s) Oral every 4 hours PRN Severe Pain (7 - 10)      Allergies    codeine (Other)  penicillin (Flushing (Skin); Hypotension; Other)    Intolerances        REVIEW OF SYSTEMS:  CONSTITUTIONAL: No fever, No chills,No fatigue,No myalgia,No Body ache  EYES: No eye pain, visual disturbances, or discharge  ENMT:  No ear pain, No nose bleed, No vertigo; No sinus or throat pain  NECK: No pain, No stiffness  RESPIRATORY: No cough, wheezing, No  hemoptysis; No shortness of breath  CARDIOVASCULAR: No chest pain, palpitations, leg swelling  GASTROINTESTINAL: No abdominal or epigastric pain. No nausea, No vomiting; No diarrhea or constipation. [ ] BM  GENITOURINARY: No dysuria, No frequency, No urgency, No hematuria, or incontinence  NEUROLOGICAL: alert and oriented x 3,  No headaches, No dizziness, No numbness,  SKIN:   No itching, burning, rashes, or lesions   MUSCULOSKELETAL: No joint pain or swelling; No muscle pain, No back pain, pain left knee  PSYCHIATRIC: No depression, anxiety, mood swings, or difficulty sleeping  ROS  [ ] Unable to obtain   REST OF REVIEW Of SYSTEM - [ ] Normal     Height (cm): 160 (06-19 @ 08:29), 160 (06-06 @ 12:12)  Weight (kg): 58.5 (06-19 @ 08:29), 58.5 (06-06 @ 12:12)  BMI (kg/m2): 22.9 (06-19 @ 08:29), 22.9 (06-06 @ 12:12)  BSA (m2): 1.6 (06-19 @ 08:29), 1.6 (06-06 @ 12:12)  Vital Signs Last 24 Hrs  T(C): 36.7, Max: 37.1 (06-20 @ 08:43)  T(F): 98.1, Max: 98.7 (06-20 @ 08:43)  HR: 83 (58 - 83)  BP: 105/61 (105/61 - 157/67)  BP(mean): --  RR: 15 (15 - 17)  SpO2: 97% (97% - 100%)  [ x] room air   [ ] 02    PHYSICAL EXAM:  GENERAL:  No acute distresss,  [ ] Agitated, [ ] Lethargy, [ ] confused   HEAD:  normal  ENMT: normal  NECK:  normal    NERVOUS SYSTEM:  Alert & Oriented X3, no focal deficits [ ]Confusion  [ ] Encephalopathic [ ] Sedated [ ] Other  CHEST/LUNG: Clear to auscultation bilaterally,  [ ] decreased breath sounds at bases  [ ] wheezing   [ ] rhonchi  [ ] crackles  HEART:  Regular rate and rhythm, No murmurs, rubs, or gallops,  [ ] irregular   ABDOMEN:  soft, nontender, nondistended, positive bowel sounds   [ ] obese  EXTREMITIES: No clubbing, cyanosis or edema dressing to left knee intact no calf pain  SKIN: [ ] venous stasis skin changes    LABS:                        10.3   x     )-----------( x        ( 20 Jun 2017 04:46 )             30.9     20 Jun 2017 04:46    142    |  104    |  5      ----------------------------<  102    4.0     |  31     |  0.58     Ca    8.1        20 Jun 2017 04:46      PT/INR - ( 19 Jun 2017 08:40 )   PT: 11.4 sec;   INR: 1.04 ratio         PTT - ( 19 Jun 2017 08:40 )  PTT:34.7 sec      CAPILLARY BLOOD GLUCOSE    Cultures          RADIOLOGY & ADDITIONAL TESTS:      Care Discussed with [X] Consultants  [ ] Patient  [ ] Family  [X]   /   [ ] Other; RN  DVT prophylaxis [ ] lovenox   [ ] subq heparin  [ ] coumadin  [ ] venodynes [ ] ambulating frequently at how risk for vte and no pharm         or  mechanical prophylaxis required    [ ] other   Advanced directive:    [ ]pt has hcp     [ ] pt declined to assign hcp  Discussed with pt @ bedside Patient is a 83y old  Female who presents with a chief complaint of I need left knee replacement (19 Jun 2017 08:41)      INTERVAL HPI/OVERNIGHT EVENTS: no complaints    MEDICATIONS  (STANDING):  acetaminophen   Tablet. 650milliGRAM(s) Oral every 8 hours  celecoxib 200milliGRAM(s) Oral two times a day after meals  docusate sodium 100milliGRAM(s) Oral three times a day  ferrous    sulfate 325milliGRAM(s) Oral three times a day with meals  folic acid 1milliGRAM(s) Oral daily  multivitamin 1Tablet(s) Oral daily  ascorbic acid 500milliGRAM(s) Oral two times a day  diltiazem   CD 240milliGRAM(s) Oral daily  simvastatin 5milliGRAM(s) Oral at bedtime  pantoprazole    Tablet 40milliGRAM(s) Oral before breakfast    MEDICATIONS  (PRN):  ondansetron Injectable 4milliGRAM(s) IV Push every 6 hours PRN Nausea and/or Vomiting  diphenhydrAMINE   Capsule 50milliGRAM(s) Oral every 4 hours PRN Rash and/or Itching  benzocaine 15 mG/menthol 3.6 mG Lozenge 1Lozenge Oral every 2 hours PRN Sore Throat  zaleplon 5milliGRAM(s) Oral at bedtime PRN Insomnia  HYDROmorphone   Tablet 2milliGRAM(s) Oral every 4 hours PRN Mild Pain (1 - 3)  HYDROmorphone   Tablet 4milliGRAM(s) Oral every 4 hours PRN Severe Pain (7 - 10)      Allergies    codeine (Other)  penicillin (Flushing (Skin); Hypotension; Other)    Intolerances        REVIEW OF SYSTEMS:  CONSTITUTIONAL: No fever, No chills,No fatigue,No myalgia,No Body ache  EYES: No eye pain, visual disturbances, or discharge  ENMT:  No ear pain, No nose bleed, No vertigo; No sinus or throat pain  NECK: No pain, No stiffness  RESPIRATORY: No cough, wheezing, No  hemoptysis; No shortness of breath  CARDIOVASCULAR: No chest pain, palpitations, leg swelling  GASTROINTESTINAL: No abdominal or epigastric pain. No nausea, No vomiting; No diarrhea or constipation. [ ] BM  GENITOURINARY: No dysuria, No frequency, No urgency, No hematuria, or incontinence  NEUROLOGICAL: alert and oriented x 3,  No headaches, No dizziness, No numbness,  SKIN:   No itching, burning, rashes, or lesions   MUSCULOSKELETAL: No joint pain or swelling; No muscle pain, No back pain, pain left knee  PSYCHIATRIC: No depression, anxiety, mood swings, or difficulty sleeping  ROS  [ ] Unable to obtain   REST OF REVIEW Of SYSTEM - [ ] Normal     Height (cm): 160 (06-19 @ 08:29), 160 (06-06 @ 12:12)  Weight (kg): 58.5 (06-19 @ 08:29), 58.5 (06-06 @ 12:12)  BMI (kg/m2): 22.9 (06-19 @ 08:29), 22.9 (06-06 @ 12:12)  BSA (m2): 1.6 (06-19 @ 08:29), 1.6 (06-06 @ 12:12)  Vital Signs Last 24 Hrs  T(C): 36.7, Max: 37.1 (06-20 @ 08:43)  T(F): 98.1, Max: 98.7 (06-20 @ 08:43)  HR: 83 (58 - 83)  BP: 105/61 (105/61 - 157/67)  BP(mean): --  RR: 15 (15 - 17)  SpO2: 97% (97% - 100%)  [ x] room air   [ ] 02    PHYSICAL EXAM:  GENERAL:  No acute distresss,  [ ] Agitated, [ ] Lethargy, [ ] confused   HEAD:  normal  ENMT: normal  NECK:  normal    NERVOUS SYSTEM:  Alert & Oriented X3, no focal deficits [ ]Confusion  [ ] Encephalopathic [ ] Sedated [ ] Other  CHEST/LUNG: Clear to auscultation bilaterally,  [ ] decreased breath sounds at bases  [ ] wheezing   [ ] rhonchi  [ ] crackles  HEART:  Regular rate and rhythm, No murmurs, rubs, or gallops,  [ ] irregular   ABDOMEN:  soft, nontender, nondistended, positive bowel sounds   [ ] obese  EXTREMITIES: No clubbing, cyanosis or edema dressing to left knee intact no calf pain  SKIN: [ ] venous stasis skin changes    LABS:                        10.3   x     )-----------( x        ( 20 Jun 2017 04:46 )             30.9     20 Jun 2017 04:46    142    |  104    |  5      ----------------------------<  102    4.0     |  31     |  0.58     Ca    8.1        20 Jun 2017 04:46      PT/INR - ( 19 Jun 2017 08:40 )   PT: 11.4 sec;   INR: 1.04 ratio         PTT - ( 19 Jun 2017 08:40 )  PTT:34.7 sec      CAPILLARY BLOOD GLUCOSE    Cultures          RADIOLOGY & ADDITIONAL TESTS:      Care Discussed with [X] Consultants  [ ] Patient  [ ] Family  [X]   /   [ ] Other; RN  DVT prophylaxis [ ] lovenox   [ ] subq heparin  [ ] coumadin  [ ] venodynes [ ] ambulating frequently at how risk for vte and no pharm         or  mechanical prophylaxis required    [ ] other   Advanced directive:    [ x]pt has hcp     [ ] pt declined to assign hcp  Discussed with pt @ bedside Patient is a 83y old  Female who presents with a chief complaint of I need left knee replacement (19 Jun 2017 08:41)      INTERVAL HPI/OVERNIGHT EVENTS: no complaints    MEDICATIONS  (STANDING):  acetaminophen   Tablet. 650milliGRAM(s) Oral every 8 hours  celecoxib 200milliGRAM(s) Oral two times a day after meals  docusate sodium 100milliGRAM(s) Oral three times a day  ferrous    sulfate 325milliGRAM(s) Oral three times a day with meals  folic acid 1milliGRAM(s) Oral daily  multivitamin 1Tablet(s) Oral daily  ascorbic acid 500milliGRAM(s) Oral two times a day  diltiazem   CD 240milliGRAM(s) Oral daily  simvastatin 5milliGRAM(s) Oral at bedtime  pantoprazole    Tablet 40milliGRAM(s) Oral before breakfast    MEDICATIONS  (PRN):  ondansetron Injectable 4milliGRAM(s) IV Push every 6 hours PRN Nausea and/or Vomiting  diphenhydrAMINE   Capsule 50milliGRAM(s) Oral every 4 hours PRN Rash and/or Itching  benzocaine 15 mG/menthol 3.6 mG Lozenge 1Lozenge Oral every 2 hours PRN Sore Throat  zaleplon 5milliGRAM(s) Oral at bedtime PRN Insomnia  HYDROmorphone   Tablet 2milliGRAM(s) Oral every 4 hours PRN Mild Pain (1 - 3)  HYDROmorphone   Tablet 4milliGRAM(s) Oral every 4 hours PRN Severe Pain (7 - 10)      Allergies    codeine (Other)  penicillin (Flushing (Skin); Hypotension; Other)    Intolerances        REVIEW OF SYSTEMS:  CONSTITUTIONAL: No fever, No chills,No fatigue,No myalgia,No Body ache  EYES: No eye pain, visual disturbances, or discharge  ENMT:  No ear pain, No nose bleed, No vertigo; No sinus or throat pain  NECK: No pain, No stiffness  RESPIRATORY: No cough, wheezing, No  hemoptysis; No shortness of breath  CARDIOVASCULAR: No chest pain, palpitations, leg swelling  GASTROINTESTINAL: No abdominal or epigastric pain. No nausea, No vomiting; No diarrhea or constipation. [ ] BM  GENITOURINARY: No dysuria, No frequency, No urgency, No hematuria, or incontinence  NEUROLOGICAL: alert and oriented x 3,  No headaches, No dizziness, No numbness,  SKIN:   No itching, burning, rashes, or lesions   MUSCULOSKELETAL: No joint pain or swelling; No muscle pain, No back pain, pain left knee  PSYCHIATRIC: No depression, anxiety, mood swings, or difficulty sleeping  ROS  [ ] Unable to obtain   REST OF REVIEW Of SYSTEM - [ ] Normal     Height (cm): 160 (06-19 @ 08:29), 160 (06-06 @ 12:12)  Weight (kg): 58.5 (06-19 @ 08:29), 58.5 (06-06 @ 12:12)  BMI (kg/m2): 22.9 (06-19 @ 08:29), 22.9 (06-06 @ 12:12)  BSA (m2): 1.6 (06-19 @ 08:29), 1.6 (06-06 @ 12:12)  Vital Signs Last 24 Hrs  T(C): 36.7, Max: 37.1 (06-20 @ 08:43)  T(F): 98.1, Max: 98.7 (06-20 @ 08:43)  HR: 83 (58 - 83)  BP: 105/61 (105/61 - 157/67)  BP(mean): --  RR: 15 (15 - 17)  SpO2: 97% (97% - 100%)  [ x] room air   [ ] 02    PHYSICAL EXAM:  GENERAL:  No acute distresss,  [ ] Agitated, [ ] Lethargy, [ ] confused   HEAD:  normal  ENMT: normal  NECK:  normal    NERVOUS SYSTEM:  Alert & Oriented X3, no focal deficits [ ]Confusion  [ ] Encephalopathic [ ] Sedated [ ] Other  CHEST/LUNG: Clear to auscultation bilaterally,  [ ] decreased breath sounds at bases  [ ] wheezing   [ ] rhonchi  [ ] crackles  HEART:  Regular rate and rhythm, No murmurs, rubs, or gallops,  [ ] irregular   ABDOMEN:  soft, nontender, nondistended, positive bowel sounds   [ ] obese  EXTREMITIES: No clubbing, cyanosis or edema dressing to left knee intact no calf pain  SKIN: [ ] venous stasis skin changes    LABS:                        10.3   x     )-----------( x        ( 20 Jun 2017 04:46 )             30.9     20 Jun 2017 04:46    142    |  104    |  5      ----------------------------<  102    4.0     |  31     |  0.58     Ca    8.1        20 Jun 2017 04:46      PT/INR - ( 19 Jun 2017 08:40 )   PT: 11.4 sec;   INR: 1.04 ratio         PTT - ( 19 Jun 2017 08:40 )  PTT:34.7 sec      CAPILLARY BLOOD GLUCOSE    Cultures          RADIOLOGY & ADDITIONAL TESTS:      Care Discussed with [X] Consultants  [ ] Patient  [ ] Family  [X]   /   [ ] Other; RN  DVT prophylaxis [ ] lovenox   [ ] subq heparin  [ ] coumadin  [ ] venodynes [ ] ambulating frequently at how risk for vte and no pharm         or  mechanical prophylaxis required    [ ] other xarelto  Advanced directive:    [ x]pt has hcp     [ ] pt declined to assign hcp  Discussed with pt @ bedside

## 2017-06-21 LAB
HCT VFR BLD CALC: 27.4 % — LOW (ref 34.5–45)
HGB BLD-MCNC: 9.1 G/DL — LOW (ref 11.5–15.5)
SURGICAL PATHOLOGY FINAL REPORT - CH: SIGNIFICANT CHANGE UP

## 2017-06-21 PROCEDURE — 99232 SBSQ HOSP IP/OBS MODERATE 35: CPT

## 2017-06-21 RX ORDER — LACTULOSE 10 G/15ML
10 SOLUTION ORAL
Qty: 0 | Refills: 0 | Status: DISCONTINUED | OUTPATIENT
Start: 2017-06-21 | End: 2017-06-22

## 2017-06-21 RX ORDER — RIVAROXABAN 15 MG-20MG
20 KIT ORAL
Qty: 0 | Refills: 0 | Status: DISCONTINUED | OUTPATIENT
Start: 2017-06-21 | End: 2017-06-22

## 2017-06-21 RX ORDER — RIVAROXABAN 15 MG-20MG
20 KIT ORAL DAILY
Qty: 0 | Refills: 0 | Status: DISCONTINUED | OUTPATIENT
Start: 2017-06-21 | End: 2017-06-21

## 2017-06-21 RX ORDER — PETROLATUM,WHITE
1 JELLY (GRAM) TOPICAL
Qty: 0 | Refills: 0 | Status: DISCONTINUED | OUTPATIENT
Start: 2017-06-21 | End: 2017-06-22

## 2017-06-21 RX ADMIN — Medication 100 MILLIGRAM(S): at 22:15

## 2017-06-21 RX ADMIN — Medication 100 MILLIGRAM(S): at 06:05

## 2017-06-21 RX ADMIN — SIMVASTATIN 5 MILLIGRAM(S): 20 TABLET, FILM COATED ORAL at 22:15

## 2017-06-21 RX ADMIN — Medication 100 MILLIGRAM(S): at 14:56

## 2017-06-21 RX ADMIN — LACTULOSE 10 GRAM(S): 10 SOLUTION ORAL at 14:54

## 2017-06-21 RX ADMIN — Medication 650 MILLIGRAM(S): at 23:00

## 2017-06-21 RX ADMIN — CELECOXIB 200 MILLIGRAM(S): 200 CAPSULE ORAL at 09:40

## 2017-06-21 RX ADMIN — Medication 240 MILLIGRAM(S): at 06:05

## 2017-06-21 RX ADMIN — Medication 1 MILLIGRAM(S): at 13:19

## 2017-06-21 RX ADMIN — RIVAROXABAN 20 MILLIGRAM(S): KIT at 17:53

## 2017-06-21 RX ADMIN — Medication 325 MILLIGRAM(S): at 13:20

## 2017-06-21 RX ADMIN — CELECOXIB 200 MILLIGRAM(S): 200 CAPSULE ORAL at 10:00

## 2017-06-21 RX ADMIN — Medication 500 MILLIGRAM(S): at 17:53

## 2017-06-21 RX ADMIN — HYDROMORPHONE HYDROCHLORIDE 2 MILLIGRAM(S): 2 INJECTION INTRAMUSCULAR; INTRAVENOUS; SUBCUTANEOUS at 09:40

## 2017-06-21 RX ADMIN — CELECOXIB 200 MILLIGRAM(S): 200 CAPSULE ORAL at 17:53

## 2017-06-21 RX ADMIN — Medication 500 MILLIGRAM(S): at 06:05

## 2017-06-21 RX ADMIN — Medication 1 TABLET(S): at 13:20

## 2017-06-21 RX ADMIN — Medication 650 MILLIGRAM(S): at 22:15

## 2017-06-21 RX ADMIN — PANTOPRAZOLE SODIUM 40 MILLIGRAM(S): 20 TABLET, DELAYED RELEASE ORAL at 06:05

## 2017-06-21 RX ADMIN — HYDROMORPHONE HYDROCHLORIDE 4 MILLIGRAM(S): 2 INJECTION INTRAMUSCULAR; INTRAVENOUS; SUBCUTANEOUS at 14:48

## 2017-06-21 RX ADMIN — Medication 650 MILLIGRAM(S): at 06:05

## 2017-06-21 RX ADMIN — Medication 650 MILLIGRAM(S): at 14:56

## 2017-06-21 RX ADMIN — LACTULOSE 10 GRAM(S): 10 SOLUTION ORAL at 22:15

## 2017-06-21 RX ADMIN — POLYETHYLENE GLYCOL 3350 17 GRAM(S): 17 POWDER, FOR SOLUTION ORAL at 06:26

## 2017-06-21 NOTE — DISCHARGE NOTE ADULT - HOSPITAL COURSE
THIS IS A CASE OF A 84 YO FEMALE EVALUATED IN THE OFFICE DUE TO  LEFT KNEE PAIN.    PAST MEDICAL HISTORY: AFIB, HTN, HYPERLIPIDEMIA, OA, HIATAL HERNIA, MVP    HOSPITAL COURSE: AFTER THE RISK AND BENEFITS OF SURGICAL INTERVENTION IN DETAILS WERE DISCUSSED WITH THE PATIENT, A CONSENT WAS OBTAINED. AFTER OBTAINING MEDICAL CLEARANCE AND PREOPERATIVE EVALUATION, THE PATIENT WAS TAKEN TO THE OPERATING ROOM ON 06/19/2017 AND THE PATIENT UNDERWENT A  LEFT TOTAL KNEE REPLACEMENT. POSTOPERATIVE PHASE, THE PATIENT WAS ANTICOAGULATED WITH XARELTO  AND WAS GIVEN 24 HOURS OF IV ANTIBIOTICS. A SOCIAL SERVICE CONSULT WAS OBTAINED FOR DISCHARGE PLANNING. A PHYSICAL THERAPY CONSULT WAS OBTAINED FOR  WEIGHT BEARING AS TOLERATED.   DUE TO ANEMIA OF ACUTE BLOOD LOSS POST OP  IRON SUPPLEMENT GIVEN.     DISPOSITION : REHAB AND FOLLOW UP WITH DR. COFFMAN AS OUTPATIENT.

## 2017-06-21 NOTE — DISCHARGE NOTE ADULT - MEDICATION SUMMARY - MEDICATIONS TO STOP TAKING
I will STOP taking the medications listed below when I get home from the hospital:    Percocet 5/325 325 mg-5 mg oral tablet  -- 1 tab(s) by mouth every 6 hours, As Needed MDD:4  -- Caution federal law prohibits the transfer of this drug to any person other  than the person for whom it was prescribed.  May cause drowsiness.  Alcohol may intensify this effect.  Use care when operating dangerous machinery.  This prescription cannot be refilled.  This product contains acetaminophen.  Do not use  with any other product containing acetaminophen to prevent possible liver damage.  Using more of this medication than prescribed may cause serious breathing problems.

## 2017-06-21 NOTE — PROGRESS NOTE ADULT - SUBJECTIVE AND OBJECTIVE BOX
[facility]    Ansley Morales, Jennifer Lopez, Imtiaz Dee      [phone]    CHIEF COMPLAINT: Patient is a 83y old  Female who presents with a chief complaint of I need left knee replacement (19 Jun 2017 08:41)      Follow Up: paf, s/p tkr    Interim history: The patient reports no new symptoms.  Denies chest discomfort and shortness of breath.  No abdominal pain.  No new neurologic symptoms.      MEDICATIONS  (STANDING):  acetaminophen   Tablet. 650milliGRAM(s) Oral every 8 hours  celecoxib 200milliGRAM(s) Oral two times a day after meals  docusate sodium 100milliGRAM(s) Oral three times a day  ferrous    sulfate 325milliGRAM(s) Oral three times a day with meals  folic acid 1milliGRAM(s) Oral daily  multivitamin 1Tablet(s) Oral daily  ascorbic acid 500milliGRAM(s) Oral two times a day  diltiazem   CD 240milliGRAM(s) Oral daily  simvastatin 5milliGRAM(s) Oral at bedtime  pantoprazole    Tablet 40milliGRAM(s) Oral before breakfast  rivaroxaban 20milliGRAM(s) Oral with dinner    MEDICATIONS  (PRN):  ondansetron Injectable 4milliGRAM(s) IV Push every 6 hours PRN Nausea and/or Vomiting  diphenhydrAMINE   Capsule 50milliGRAM(s) Oral every 4 hours PRN Rash and/or Itching  benzocaine 15 mG/menthol 3.6 mG Lozenge 1Lozenge Oral every 2 hours PRN Sore Throat  zaleplon 5milliGRAM(s) Oral at bedtime PRN Insomnia  HYDROmorphone   Tablet 2milliGRAM(s) Oral every 4 hours PRN Mild Pain (1 - 3)  HYDROmorphone   Tablet 4milliGRAM(s) Oral every 4 hours PRN Severe Pain (7 - 10)  polyethylene glycol 3350 17Gram(s) Oral daily PRN Constipation  petrolatum white Ointment 1Application(s) Topical every 3 hours PRN chapped lips  lactulose Syrup 10Gram(s) Oral four times a day PRN constipation      REVIEW OF SYSTEMS:  eye, ent, GI, , allergic, dermatologic, musculoskeletal and neurologic are negative except as described above    Vital Signs Last 24 Hrs  T(C): 36.8, Max: 36.9 (06-20 @ 16:38)  T(F): 98.2, Max: 98.5 (06-20 @ 16:38)  HR: 73 (69 - 79)  BP: 126/67 (108/67 - 126/67)  BP(mean): --  RR: 17 (15 - 17)  SpO2: 99% (97% - 99%)    I&O's Summary      Telemetry past 24h: off    PHYSICAL EXAM:    Constitutional: well-nourished, well-developed, NAD   HEENT:  MMM, sclerae anicteric, conjunctivae clear, no oral cyanosis.  Pulmonary: Non-labored, breath sounds are clear bilaterally, No wheezing, rales or rhonchi  Cardiovascular: Regular, S1 and S2.  2/6 ant syst murmur.  No rubs, gallops or clicks  Gastrointestinal: Bowel Sounds present, soft, nontender.   Lymph: No peripheral edema.   Neurological: Alert, no focal deficits  Skin: No rashes.  Psych:  Mood & affect appropriate    LABS: All Labs Reviewed:                        9.1    x     )-----------( x        ( 21 Jun 2017 04:40 )             27.4                         10.3   x     )-----------( x        ( 20 Jun 2017 04:46 )             30.9                         11.0   x     )-----------( x        ( 19 Jun 2017 13:49 )             32.6     20 Jun 2017 04:46    142    |  104    |  5      ----------------------------<  102    4.0     |  31     |  0.58     Ca    8.1        20 Jun 2017 04:46            Blood Culture:         RADIOLOGY:    EKG:    Echo:

## 2017-06-21 NOTE — DISCHARGE NOTE ADULT - CARE PROVIDER_API CALL
Dudley Harper), Orthopaedic Surgery  [address] [place] [address]  Phone: [phone]  Fax: [phone]

## 2017-06-21 NOTE — PROGRESS NOTE ADULT - ATTENDING COMMENTS
Pt seen, agree with plan
Pt seen, agree with plan
all of above authored by me in entirety  will follow

## 2017-06-21 NOTE — DISCHARGE NOTE ADULT - PATIENT PORTAL LINK FT
“You can access the Follow[facility] Patient Portal, offered by [facility], by registering with the following website: [email or web address]”

## 2017-06-21 NOTE — PROGRESS NOTE ADULT - SUBJECTIVE AND OBJECTIVE BOX
RUBENS BARKER 83y Female  MRN-[id number]       ORTHOPEDIC SURGERY / DR. COFFMAN    POD # 2    Vital Signs Last 24 Hrs  T(C): 36.8, Max: 37.1 (06-20 @ 08:43)  T(F): 98.3, Max: 98.7 (06-20 @ 08:43)  HR: 69 (69 - 83)  BP: 110/70 (105/61 - 140/72)  BP(mean): --  RR: 15 (15 - 17)  SpO2: 99% (97% - 100%)      LEFT KNEE :    DRESSING DRY AND INTACT  SOME EDEMA  GOOD MOTOR TO LEFT LOWER EXTREMITY  NEURO-VASCULAR STATUS INTACT  NO CALF TENDERNESS    Hemoglobin: 9.1 (06-21 @ 04:40)  Hemoglobin: 10.3 (06-20 @ 04:46)  Hemoglobin: 11.0 (06-19 @ 13:49)    Hematocrit: 27.4 (06-21 @ 04:40)  Hematocrit: 30.9 (06-20 @ 04:46)  Hematocrit: 32.6 (06-19 @ 13:49)    06-20    142  |  104  |  5<L>  ----------------------------<  102<H>  4.0   |  31  |  0.58    Ca    8.1<L>      20 Jun 2017 04:46      ASSESSMENT &  PLAN:  POD # 2  S/P LEFT TOTAL KNEE  REPLACEMENT    WEIGHT  BEARING AS TOLERATED, OOB AND AMBULATE, PT  INCENTIVE SPIROMETRY     H/O AFIB ON XARELTO,   XARELTO 10 MG GIVEN YESTERDAY  WILL RESUME FULL DOSE TODAY XARELTO  20 MG DAILY     DISCHARGE PLANNING TO REHAB RUBENS BARKER 83y Female  MRN-[id number]     ORTHOPEDIC SURGERY / DR. COFFMAN    POD # 2    Vital Signs Last 24 Hrs  T(C): 36.8, Max: 37.1 (06-20 @ 08:43)  T(F): 98.3, Max: 98.7 (06-20 @ 08:43)  HR: 69 (69 - 83)  BP: 110/70 (105/61 - 140/72)  BP(mean): --  RR: 15 (15 - 17)  SpO2: 99% (97% - 100%)    LEFT KNEE :    DRESSING DRY AND INTACT  SOME EDEMA  GOOD MOTOR TO LEFT LOWER EXTREMITY  NEURO-VASCULAR STATUS INTACT  NO CALF TENDERNESS    Hemoglobin: 9.1 (06-21 @ 04:40)  Hemoglobin: 10.3 (06-20 @ 04:46)  Hemoglobin: 11.0 (06-19 @ 13:49)    Hematocrit: 27.4 (06-21 @ 04:40)  Hematocrit: 30.9 (06-20 @ 04:46)  Hematocrit: 32.6 (06-19 @ 13:49)    06-20    142  |  104  |  5<L>  ----------------------------<  102<H>  4.0   |  31  |  0.58    Ca    8.1<L>      20 Jun 2017 04:46      ASSESSMENT &  PLAN:  POD # 2  S/P LEFT TOTAL KNEE  REPLACEMENT    WEIGHT  BEARING AS TOLERATED, OOB AND AMBULATE, PT  INCENTIVE SPIROMETRY     H/O AFIB ON XARELTO,   XARELTO 10 MG GIVEN YESTERDAY  WILL RESUME FULL DOSE TODAY XARELTO  20 MG DAILY     DISCHARGE PLANNING TO REHAB IN AM

## 2017-06-21 NOTE — DISCHARGE NOTE ADULT - PLAN OF CARE
PHYSICAL THERAPY WEIGHT BEARING AS TOLERATED.  FOLLOW UP WITH DR. COFFMAN AFTER REHAB CALL [phone] FOR AN APPOINTMENT.  KEEP KNEE AQUACEL  DRESSING  ON DRY AND CLEAN, MAY REMOVE AFTER 06/30/2017

## 2017-06-21 NOTE — DISCHARGE NOTE ADULT - CARE PLAN
Principal Discharge DX:	Osteoarthritis of left knee, unspecified osteoarthritis type  Goal:	PHYSICAL THERAPY  Instructions for follow-up, activity and diet:	WEIGHT BEARING AS TOLERATED.  FOLLOW UP WITH DR. COFFMAN AFTER REHAB CALL [phone] FOR AN APPOINTMENT.  KEEP KNEE AQUACEL  DRESSING  ON DRY AND CLEAN, MAY REMOVE AFTER 06/30/2017  Secondary Diagnosis:	Afib  Secondary Diagnosis:	HLD (hyperlipidemia)  Secondary Diagnosis:	MVP (mitral valve prolapse)  Secondary Diagnosis:	HTN (Hypertension) Principal Discharge DX:	Osteoarthritis of left knee, unspecified osteoarthritis type  Goal:	PHYSICAL THERAPY  Instructions for follow-up, activity and diet:	WEIGHT BEARING AS TOLERATED.  FOLLOW UP WITH DR. COFFMAN AFTER REHAB CALL [phone] FOR AN APPOINTMENT.  KEEP KNEE AQUACEL  DRESSING  ON DRY AND CLEAN, MAY REMOVE AFTER 06/30/2017  Secondary Diagnosis:	Afib  Secondary Diagnosis:	HLD (hyperlipidemia)  Secondary Diagnosis:	MVP (mitral valve prolapse)  Secondary Diagnosis:	HTN (Hypertension) Principal Discharge DX:	Osteoarthritis of left knee, unspecified osteoarthritis type  Goal:	PHYSICAL THERAPY  Instructions for follow-up, activity and diet:	WEIGHT BEARING AS TOLERATED.  FOLLOW UP WITH DR. COFFMAN AFTER REHAB CALL [phone] FOR AN APPOINTMENT.  KEEP KNEE AQUACEL  DRESSING  ON DRY AND CLEAN, MAY REMOVE AFTER 06/30/2017  Secondary Diagnosis:	Afib  Secondary Diagnosis:	HLD (hyperlipidemia)  Secondary Diagnosis:	MVP (mitral valve prolapse)  Secondary Diagnosis:	HTN (Hypertension) Principal Discharge DX:	Osteoarthritis of left knee, unspecified osteoarthritis type  Goal:	PHYSICAL THERAPY  Instructions for follow-up, activity and diet:	WEIGHT BEARING AS TOLERATED.  FOLLOW UP WITH DR. COFFMAN AFTER REHAB CALL [phone] FOR AN APPOINTMENT.  KEEP KNEE AQUACEL  DRESSING  ON DRY AND CLEAN, MAY REMOVE AFTER 06/30/2017  Secondary Diagnosis:	Afib  Secondary Diagnosis:	HLD (hyperlipidemia)  Secondary Diagnosis:	MVP (mitral valve prolapse)  Secondary Diagnosis:	HTN (Hypertension)

## 2017-06-21 NOTE — PROGRESS NOTE ADULT - ASSESSMENT
83y Female with paroxysmal atrial fibrillation, HTN, chol, MVP s/p left total hip replacement:    - Tolerated well with no evidence of any cardiac complications  - NO clinical evidence of recurrent atrial fibrillation  - back on ac  - Continue diltiazem 240 QD  - Continue statin drug  - Monitor and replete electrolytes as needed  - TO follow with you  - dc planning to [facility]

## 2017-06-21 NOTE — DISCHARGE NOTE ADULT - MEDICATION SUMMARY - MEDICATIONS TO TAKE
I will START or STAY ON the medications listed below when I get home from the hospital:    CeleBREX 200 mg oral capsule  -- 2  by mouth once a day (at bedtime)  -- Indication: For PAIN    HYDROmorphone 2 mg oral tablet  -- 1 tab(s) by mouth every 4 hours, As needed, Mild Pain (1 - 3)  -- Indication: For PAIN    HYDROmorphone 4 mg oral tablet  -- 1 tab(s) by mouth every 4 hours, As needed, Severe Pain (7 - 10)  -- Indication: For PAIN    DilTIAZem Hydrochloride  mg/24 hours oral capsule, extended release  -- 1 cap(s) by mouth once a day  -- Indication: For HOME MEDICATION     Xarelto 20 mg oral tablet  -- 1 tab(s) by mouth once a day (in the evening)  -- Indication: For AFIB, ALSO DVT PROPHYLAXIS POST TKR,  HOME MEDICATION     simvastatin 5 mg oral tablet  -- 1 tab(s) by mouth once a day (at bedtime)  -- Indication: For HOME MEDICATION     cranberry oral tablet  --  by mouth once daily  -- Indication: For HOME MEDICATION     FeroSul 325 mg (65 mg elemental iron) oral tablet  -- 1 tab(s) by mouth 2 times a day  -- Indication: For POST OP ANEMIA     docusate sodium 100 mg oral capsule  -- 1 cap(s) by mouth 3 times a day  -- Indication: For STOOL SOFTNER     magnesium gluconate 250 mg oral tablet  --  by mouth once daily  -- Indication: For HOME MEDICATION     pantoprazole 40 mg oral delayed release tablet  -- 1 tab(s) by mouth once a day (before a meal)  -- Indication: For GI PROTECTOR  POST TKR     methenamine mandelate 1 g oral tablet  -- 1/2 tablet twice daily.  -- Indication: For HOME MEDICATION     Centrum oral tablet  -- 1 tab(s) by mouth once a day  -- Indication: For HOME MEDICATION     Calcium 600+D 600 mg-200 intl units oral tablet  --  by mouth 2 tabs daily  -- Indication: For HOME MEDICATION     Multiple Vitamins oral tablet  -- 1 tab(s) by mouth once a day  -- Indication: For VITAMIN    ascorbic acid 500 mg oral tablet  -- 1 tab(s) by mouth 2 times a day  -- Indication: For VITAMIN    folic acid 1 mg oral tablet  -- 1 tab(s) by mouth once a day  -- Indication: For VITAMIN

## 2017-06-22 VITALS
HEART RATE: 70 BPM | DIASTOLIC BLOOD PRESSURE: 70 MMHG | OXYGEN SATURATION: 99 % | TEMPERATURE: 98 F | RESPIRATION RATE: 16 BRPM | SYSTOLIC BLOOD PRESSURE: 115 MMHG

## 2017-06-22 PROCEDURE — 86920 COMPATIBILITY TEST SPIN: CPT

## 2017-06-22 PROCEDURE — 99232 SBSQ HOSP IP/OBS MODERATE 35: CPT

## 2017-06-22 PROCEDURE — C1713: CPT

## 2017-06-22 PROCEDURE — 85610 PROTHROMBIN TIME: CPT

## 2017-06-22 PROCEDURE — 80048 BASIC METABOLIC PNL TOTAL CA: CPT

## 2017-06-22 PROCEDURE — 88311 DECALCIFY TISSUE: CPT

## 2017-06-22 PROCEDURE — C1889: CPT

## 2017-06-22 PROCEDURE — 97530 THERAPEUTIC ACTIVITIES: CPT

## 2017-06-22 PROCEDURE — C1776: CPT

## 2017-06-22 PROCEDURE — 88305 TISSUE EXAM BY PATHOLOGIST: CPT

## 2017-06-22 PROCEDURE — 85018 HEMOGLOBIN: CPT

## 2017-06-22 PROCEDURE — 85730 THROMBOPLASTIN TIME PARTIAL: CPT

## 2017-06-22 PROCEDURE — 73562 X-RAY EXAM OF KNEE 3: CPT

## 2017-06-22 PROCEDURE — 97116 GAIT TRAINING THERAPY: CPT

## 2017-06-22 PROCEDURE — 97162 PT EVAL MOD COMPLEX 30 MIN: CPT

## 2017-06-22 RX ORDER — HYDROMORPHONE HYDROCHLORIDE 2 MG/ML
1 INJECTION INTRAMUSCULAR; INTRAVENOUS; SUBCUTANEOUS
Qty: 0 | Refills: 0 | DISCHARGE
Start: 2017-06-22

## 2017-06-22 RX ORDER — FOLIC ACID 0.8 MG
1 TABLET ORAL
Qty: 0 | Refills: 0 | DISCHARGE
Start: 2017-06-22

## 2017-06-22 RX ORDER — FERROUS SULFATE 325(65) MG
1 TABLET ORAL
Qty: 0 | Refills: 0 | DISCHARGE
Start: 2017-06-22

## 2017-06-22 RX ORDER — DOCUSATE SODIUM 100 MG
1 CAPSULE ORAL
Qty: 0 | Refills: 0 | DISCHARGE
Start: 2017-06-22

## 2017-06-22 RX ORDER — ASCORBIC ACID 60 MG
1 TABLET,CHEWABLE ORAL
Qty: 0 | Refills: 0 | DISCHARGE
Start: 2017-06-22

## 2017-06-22 RX ORDER — PANTOPRAZOLE SODIUM 20 MG/1
1 TABLET, DELAYED RELEASE ORAL
Qty: 0 | Refills: 0 | DISCHARGE
Start: 2017-06-22

## 2017-06-22 RX ADMIN — CELECOXIB 200 MILLIGRAM(S): 200 CAPSULE ORAL at 08:52

## 2017-06-22 RX ADMIN — Medication 650 MILLIGRAM(S): at 07:03

## 2017-06-22 RX ADMIN — Medication 500 MILLIGRAM(S): at 06:10

## 2017-06-22 RX ADMIN — Medication 1 TABLET(S): at 12:12

## 2017-06-22 RX ADMIN — PANTOPRAZOLE SODIUM 40 MILLIGRAM(S): 20 TABLET, DELAYED RELEASE ORAL at 06:10

## 2017-06-22 RX ADMIN — Medication 650 MILLIGRAM(S): at 06:10

## 2017-06-22 RX ADMIN — Medication 100 MILLIGRAM(S): at 06:10

## 2017-06-22 RX ADMIN — LACTULOSE 10 GRAM(S): 10 SOLUTION ORAL at 10:52

## 2017-06-22 RX ADMIN — Medication 325 MILLIGRAM(S): at 08:52

## 2017-06-22 RX ADMIN — CELECOXIB 200 MILLIGRAM(S): 200 CAPSULE ORAL at 09:30

## 2017-06-22 RX ADMIN — Medication 240 MILLIGRAM(S): at 06:10

## 2017-06-22 RX ADMIN — Medication 325 MILLIGRAM(S): at 12:12

## 2017-06-22 RX ADMIN — Medication 1 MILLIGRAM(S): at 12:12

## 2017-06-22 NOTE — PROGRESS NOTE ADULT - PROBLEM SELECTOR PLAN 1
left knee oa  s/p left tkr continue pt, cont celebrex, pain meds prn  dc planning
left knee oa  s/p left tkr continue pt, cont celebrex, pain meds prn  dc planning

## 2017-06-22 NOTE — PROGRESS NOTE ADULT - SUBJECTIVE AND OBJECTIVE BOX
[facility] -- Ansley Morales Grossman, Wachsman, Pannella, Patel      Follow Up:  PAF    Subjective/Observations: Patient seen and examined. Events noted. Resting comfortably in bed. No complaints of chest pain, dyspnea, or palpitations reported. +costipated      REVIEW OF SYSTEMS: All other review of systems is negative unless indicated above    PAST MEDICAL & SURGICAL HISTORY:  HLD (hyperlipidemia)  Afib  Abdominal Mass  Diverticulosis of the Colon  History of Osteoarthritis  DJD (Degenerative Joint Disease): shoulders, knees, cervical and lumbar spine  Constipation  Thyroid Nodule  Hiatal Hernia  Hypercholesterolemia  MVP (Mitral Valve Prolapse): last echo 2010  HTN (Hypertension)  H/O ovarian cystectomy: 2013  Cataract, Other: surgery   bilateral  S/P Bunionectomy  Parathyroid: surgery  H/O Shoulder Replacement: right shoulder  get clindamycin before surgery      MEDICATIONS  (STANDING):  acetaminophen   Tablet. 650milliGRAM(s) Oral every 8 hours  celecoxib 200milliGRAM(s) Oral two times a day after meals  docusate sodium 100milliGRAM(s) Oral three times a day  ferrous    sulfate 325milliGRAM(s) Oral three times a day with meals  folic acid 1milliGRAM(s) Oral daily  multivitamin 1Tablet(s) Oral daily  ascorbic acid 500milliGRAM(s) Oral two times a day  diltiazem   CD 240milliGRAM(s) Oral daily  simvastatin 5milliGRAM(s) Oral at bedtime  pantoprazole    Tablet 40milliGRAM(s) Oral before breakfast  rivaroxaban 20milliGRAM(s) Oral with dinner    MEDICATIONS  (PRN):  ondansetron Injectable 4milliGRAM(s) IV Push every 6 hours PRN Nausea and/or Vomiting  diphenhydrAMINE   Capsule 50milliGRAM(s) Oral every 4 hours PRN Rash and/or Itching  benzocaine 15 mG/menthol 3.6 mG Lozenge 1Lozenge Oral every 2 hours PRN Sore Throat  zaleplon 5milliGRAM(s) Oral at bedtime PRN Insomnia  HYDROmorphone   Tablet 2milliGRAM(s) Oral every 4 hours PRN Mild Pain (1 - 3)  HYDROmorphone   Tablet 4milliGRAM(s) Oral every 4 hours PRN Severe Pain (7 - 10)  polyethylene glycol 3350 17Gram(s) Oral daily PRN Constipation  petrolatum white Ointment 1Application(s) Topical every 3 hours PRN chapped lips  lactulose Syrup 10Gram(s) Oral four times a day PRN constipation      Allergies    codeine (Other)  penicillin (Flushing (Skin); Hypotension; Other)    Intolerances            Vital Signs Last 24 Hrs  T(C): 36.7, Max: 36.8 (06-21 @ 17:31)  T(F): 98, Max: 98.2 (06-21 @ 17:31)  HR: 70 (68 - 78)  BP: 115/70 (112/67 - 147/76)  BP(mean): --  RR: 16 (16 - 17)  SpO2: 99% (97% - 99%)    I&O's Summary        PHYSICAL EXAM:     Constitutional: NAD, awake and alert, well-developed  HEENT: Moist Mucous Membranes, Anicteric  Pulmonary: Non-labored, breath sounds are clear bilaterally, No wheezing, rales or rhonchi  Cardiovascular: Regular, S1 and S2, No murmurs, rubs, gallops or clicks  Gastrointestinal: Bowel Sounds present, soft, nontender.   Lymph: nonpitting peripheral edema. No lymphadenopathy.  Skin: No visible rashes or ulcers.  Psych:  Mood & affect appropriate    LABS: All Labs Reviewed:                        9.1    x     )-----------( x        ( 21 Jun 2017 04:40 )             27.4                         10.3   x     )-----------( x        ( 20 Jun 2017 04:46 )             30.9                         11.0   x     )-----------( x        ( 19 Jun 2017 13:49 )             32.6     20 Jun 2017 04:46    142    |  104    |  5      ----------------------------<  102    4.0     |  31     |  0.58     Ca    8.1        20 Jun 2017 04:46

## 2017-06-22 NOTE — PROGRESS NOTE ADULT - SUBJECTIVE AND OBJECTIVE BOX
Patient is a 83y old  Female who presents with a chief complaint of LEFT KNEE PAIN  LEFT TOTAL KNEE REPLACEMENT (21 Jun 2017 18:40)      INTERVAL HPI/OVERNIGHT EVENTS: no distress, dc planning in progess  T(C): 36.7, Max: 36.8 (06-21 @ 17:31)  HR: 70 (68 - 78)  BP: 115/70 (112/67 - 147/76)  RR: 16 (16 - 17)  SpO2: 99% (97% - 99%)  Wt(kg): --  I&O's Summary      LABS:                        9.1    x     )-----------( x        ( 21 Jun 2017 04:40 )             27.4                 MEDICATIONS  (STANDING):  acetaminophen   Tablet. 650milliGRAM(s) Oral every 8 hours  celecoxib 200milliGRAM(s) Oral two times a day after meals  docusate sodium 100milliGRAM(s) Oral three times a day  ferrous    sulfate 325milliGRAM(s) Oral three times a day with meals  folic acid 1milliGRAM(s) Oral daily  multivitamin 1Tablet(s) Oral daily  ascorbic acid 500milliGRAM(s) Oral two times a day  diltiazem   CD 240milliGRAM(s) Oral daily  simvastatin 5milliGRAM(s) Oral at bedtime  pantoprazole    Tablet 40milliGRAM(s) Oral before breakfast  rivaroxaban 20milliGRAM(s) Oral with dinner    MEDICATIONS  (PRN):  ondansetron Injectable 4milliGRAM(s) IV Push every 6 hours PRN Nausea and/or Vomiting  diphenhydrAMINE   Capsule 50milliGRAM(s) Oral every 4 hours PRN Rash and/or Itching  benzocaine 15 mG/menthol 3.6 mG Lozenge 1Lozenge Oral every 2 hours PRN Sore Throat  zaleplon 5milliGRAM(s) Oral at bedtime PRN Insomnia  HYDROmorphone   Tablet 2milliGRAM(s) Oral every 4 hours PRN Mild Pain (1 - 3)  HYDROmorphone   Tablet 4milliGRAM(s) Oral every 4 hours PRN Severe Pain (7 - 10)  polyethylene glycol 3350 17Gram(s) Oral daily PRN Constipation  petrolatum white Ointment 1Application(s) Topical every 3 hours PRN chapped lips  lactulose Syrup 10Gram(s) Oral four times a day PRN constipation      REVIEW OF SYSTEMS:  CONSTITUTIONAL: No fever, weight loss, or fatigue  EYES: No eye pain, visual disturbances, or discharge  ENMT:  No difficulty hearing, tinnitus, vertigo; No sinus or throat pain  NECK: No pain or stiffness  RESPIRATORY: No cough, wheezing, chills or hemoptysis; No shortness of breath  CARDIOVASCULAR: No chest pain, palpitations, dizziness, or leg swelling  GASTROINTESTINAL: No abdominal or epigastric pain. No nausea, vomiting, or hematemesis; No diarrhea or constipation. No melena or hematochezia.  GENITOURINARY: No dysuria, frequency, hematuria, or incontinence  NEUROLOGICAL: No headaches, memory loss, loss of strength, numbness, or tremors  SKIN: No itching, burning, rashes, or lesions   LYMPH NODES: No enlarged glands  ENDOCRINE: No heat or cold intolerance; No hair loss  MUSCULOSKELETAL: le pain much improved.  denies calf pain  PSYCHIATRIC: No depression, anxiety, mood swings, or difficulty sleeping  HEME/LYMPH: No easy bruising, or bleeding gums  ALLERY AND IMMUNOLOGIC: No hives or eczema    RADIOLOGY & ADDITIONAL TESTS:    Imaging Personally Reviewed:  [ ] YES  [ ] NO    Consultant(s) Notes Reviewed:  [x ] YES  [ ] NO    PHYSICAL EXAM:  GENERAL: NAD, well-groomed, well-developed  HEAD:  Atraumatic, Normocephalic  EYES: EOMI, PERRLA, conjunctiva and sclera clear  ENMT: No tonsillar erythema, exudates, or enlargement; Moist mucous membranes, Good dentition, No lesions  NECK: Supple, No JVD, Normal thyroid  NERVOUS SYSTEM:  Alert & Oriented X3, Good concentration; Motor Strength 5/5 B/L upper and lower extremities; DTRs 2+ intact and symmetric  CHEST/LUNG: Clear to percussion bilaterally; No rales, rhonchi, wheezing, or rubs  HEART: Regular rate and rhythm; No murmurs, rubs, or gallops  ABDOMEN: Soft, Nontender, Nondistended; Bowel sounds present  EXTREMITIES:  2+ Peripheral Pulses, No clubbing, cyanosis, no calf tenderness to palp  LYMPH: No lymphadenopathy noted  SKIN: No rashes or lesions    Care Discussed with Consultants/Other Providers [x ] YES  [ ] NO

## 2017-06-22 NOTE — PROGRESS NOTE ADULT - ASSESSMENT
83y Female with paroxysmal atrial fibrillation, HTN, chol, MVP s/p left total hip replacement:  - Tolerated well with no evidence of any cardiac complications  - NO clinical evidence of recurrent atrial fibrillation  - back on ac  - Continue diltiazem 240 QD  - Continue statin drug  - Monitor and replete electrolytes as needed  - constipation rx  - TO follow with you  - dc planning to [facility]

## 2017-06-24 DIAGNOSIS — M17.12 UNILATERAL PRIMARY OSTEOARTHRITIS, LEFT KNEE: ICD-10-CM

## 2017-06-24 DIAGNOSIS — D62 ACUTE POSTHEMORRHAGIC ANEMIA: ICD-10-CM

## 2017-06-24 DIAGNOSIS — E78.5 HYPERLIPIDEMIA, UNSPECIFIED: ICD-10-CM

## 2017-06-24 DIAGNOSIS — K44.9 DIAPHRAGMATIC HERNIA WITHOUT OBSTRUCTION OR GANGRENE: ICD-10-CM

## 2017-06-24 DIAGNOSIS — Z79.01 LONG TERM (CURRENT) USE OF ANTICOAGULANTS: ICD-10-CM

## 2017-06-24 DIAGNOSIS — Z88.0 ALLERGY STATUS TO PENICILLIN: ICD-10-CM

## 2017-06-24 DIAGNOSIS — K59.00 CONSTIPATION, UNSPECIFIED: ICD-10-CM

## 2017-06-24 DIAGNOSIS — I10 ESSENTIAL (PRIMARY) HYPERTENSION: ICD-10-CM

## 2017-06-24 DIAGNOSIS — I48.91 UNSPECIFIED ATRIAL FIBRILLATION: ICD-10-CM

## 2017-09-27 ENCOUNTER — APPOINTMENT (OUTPATIENT)
Dept: CARDIOLOGY | Facility: CLINIC | Age: 82
End: 2017-09-27
Payer: MEDICARE

## 2017-09-27 VITALS
HEART RATE: 79 BPM | DIASTOLIC BLOOD PRESSURE: 76 MMHG | BODY MASS INDEX: 23.19 KG/M2 | OXYGEN SATURATION: 96 % | WEIGHT: 126 LBS | HEIGHT: 62 IN | SYSTOLIC BLOOD PRESSURE: 130 MMHG

## 2017-09-27 PROCEDURE — 99214 OFFICE O/P EST MOD 30 MIN: CPT

## 2017-10-26 ENCOUNTER — MEDICATION RENEWAL (OUTPATIENT)
Age: 82
End: 2017-10-26

## 2017-10-27 ENCOUNTER — RX RENEWAL (OUTPATIENT)
Age: 82
End: 2017-10-27

## 2017-11-20 NOTE — DISCHARGE NOTE ADULT - NS AS DC AMI YN
POD # 5    U/O  550 cc last shift    flatus +   no BM  tolerating diet  patient thinks she is distended  no N/V    Exam:  soft abd  slightly distended  incision ok  legs ok    IMP:  satisfactory progress post right colon resection  given stroke, cardiac issues, and now colon resection, I've delayed discharge   patient and daughter concerned about no BM   with a right colon there is chance she is not ready to go     I don't want her coming back if possible    PLAN:  delay discharge  mag citrate.        Vitals:    11/20/17 1533   BP: 126/60   Pulse:    Resp:    Temp:       Wt Readings from Last 1 Encounters:   11/20/17 75 kg                    CBC  WBC (K/mcL)   Date Value   11/19/2017 10.5     HGB (g/dL)   Date Value   11/19/2017 9.0 (L)      HCT (%)   Date Value   11/19/2017 27.5 (L)    PLT   Date Value   11/19/2017 305 K/mcL   03/08/2012 425 K/mcL   02/06/2012 371 K/uL        BMP  Sodium (mmol/L)   Date Value   11/19/2017 136         Potassium (mmol/L)   Date Value   11/19/2017 4.0      Chloride (mmol/L)   Date Value   11/19/2017 99         CO2 (mmol/L)   Date Value   08/24/2012 31     Carbon Dioxide (mmol/L)   Date Value   11/19/2017 28        BUN (mg/dL)   Date Value   11/19/2017 18         Creatinine (mg/dL)   Date Value   11/19/2017 0.84        Glucose (mg/dL)   Date Value   11/19/2017 160 (H)         CALCIUM (mg/dL)   Date Value   11/19/2017 9.0   07/18/2012 9.7          MAGNESIUM (mg/dL)   Date Value   11/20/2017 2.1        no

## 2017-11-27 ENCOUNTER — TRANSCRIPTION ENCOUNTER (OUTPATIENT)
Age: 82
End: 2017-11-27

## 2018-01-03 ENCOUNTER — TRANSCRIPTION ENCOUNTER (OUTPATIENT)
Age: 83
End: 2018-01-03

## 2018-01-08 ENCOUNTER — APPOINTMENT (OUTPATIENT)
Dept: CARDIOLOGY | Facility: CLINIC | Age: 83
End: 2018-01-08
Payer: MEDICARE

## 2018-01-08 VITALS
BODY MASS INDEX: 23.74 KG/M2 | SYSTOLIC BLOOD PRESSURE: 155 MMHG | OXYGEN SATURATION: 100 % | HEART RATE: 71 BPM | WEIGHT: 129 LBS | HEIGHT: 62 IN | DIASTOLIC BLOOD PRESSURE: 82 MMHG

## 2018-01-08 PROCEDURE — 99214 OFFICE O/P EST MOD 30 MIN: CPT

## 2018-02-05 ENCOUNTER — APPOINTMENT (OUTPATIENT)
Dept: CARDIOLOGY | Facility: CLINIC | Age: 83
End: 2018-02-05
Payer: MEDICARE

## 2018-02-05 PROCEDURE — 93306 TTE W/DOPPLER COMPLETE: CPT

## 2018-04-09 ENCOUNTER — APPOINTMENT (OUTPATIENT)
Dept: CARDIOLOGY | Facility: CLINIC | Age: 83
End: 2018-04-09
Payer: MEDICARE

## 2018-04-09 VITALS
HEIGHT: 62 IN | OXYGEN SATURATION: 98 % | BODY MASS INDEX: 22.82 KG/M2 | DIASTOLIC BLOOD PRESSURE: 80 MMHG | SYSTOLIC BLOOD PRESSURE: 135 MMHG | HEART RATE: 90 BPM | WEIGHT: 124 LBS

## 2018-04-09 PROCEDURE — 99214 OFFICE O/P EST MOD 30 MIN: CPT

## 2018-04-20 ENCOUNTER — MEDICATION RENEWAL (OUTPATIENT)
Age: 83
End: 2018-04-20

## 2018-09-10 ENCOUNTER — NON-APPOINTMENT (OUTPATIENT)
Age: 83
End: 2018-09-10

## 2018-09-10 ENCOUNTER — APPOINTMENT (OUTPATIENT)
Dept: CARDIOLOGY | Facility: CLINIC | Age: 83
End: 2018-09-10
Payer: MEDICARE

## 2018-09-10 VITALS
DIASTOLIC BLOOD PRESSURE: 83 MMHG | SYSTOLIC BLOOD PRESSURE: 152 MMHG | HEART RATE: 70 BPM | OXYGEN SATURATION: 99 % | BODY MASS INDEX: 23.19 KG/M2 | HEIGHT: 62 IN | WEIGHT: 126 LBS

## 2018-09-10 PROCEDURE — 93000 ELECTROCARDIOGRAM COMPLETE: CPT

## 2018-09-10 PROCEDURE — 99214 OFFICE O/P EST MOD 30 MIN: CPT

## 2018-09-25 ENCOUNTER — TRANSCRIPTION ENCOUNTER (OUTPATIENT)
Age: 83
End: 2018-09-25

## 2019-03-04 ENCOUNTER — APPOINTMENT (OUTPATIENT)
Dept: CARDIOLOGY | Facility: CLINIC | Age: 84
End: 2019-03-04
Payer: MEDICARE

## 2019-03-04 VITALS
DIASTOLIC BLOOD PRESSURE: 85 MMHG | SYSTOLIC BLOOD PRESSURE: 154 MMHG | OXYGEN SATURATION: 99 % | HEART RATE: 80 BPM | BODY MASS INDEX: 22.82 KG/M2 | HEIGHT: 62 IN | WEIGHT: 124 LBS

## 2019-03-04 PROCEDURE — 99214 OFFICE O/P EST MOD 30 MIN: CPT

## 2019-03-04 NOTE — PHYSICAL EXAM
[General Appearance - Well Developed] : well developed [Normal Appearance] : normal appearance [Well Groomed] : well groomed [General Appearance - Well Nourished] : well nourished [No Deformities] : no deformities [General Appearance - In No Acute Distress] : no acute distress [Normal Conjunctiva] : the conjunctiva exhibited no abnormalities [Normal Oral Mucosa] : normal oral mucosa [Normal Jugular Venous A Waves Present] : normal jugular venous A waves present [Normal Jugular Venous V Waves Present] : normal jugular venous V waves present [No Jugular Venous Moran A Waves] : no jugular venous moran A waves [Respiration, Rhythm And Depth] : normal respiratory rhythm and effort [Exaggerated Use Of Accessory Muscles For Inspiration] : no accessory muscle use [Auscultation Breath Sounds / Voice Sounds] : lungs were clear to auscultation bilaterally [Bowel Sounds] : normal bowel sounds [Abdomen Soft] : soft [Abdomen Tenderness] : non-tender [Abnormal Walk] : normal gait [Gait - Sufficient For Exercise Testing] : the gait was sufficient for exercise testing [Nail Clubbing] : no clubbing of the fingernails [Cyanosis, Localized] : no localized cyanosis [Skin Color & Pigmentation] : normal skin color and pigmentation [Skin Turgor] : normal skin turgor [] : no rash [Oriented To Time, Place, And Person] : oriented to person, place, and time [Impaired Insight] : insight and judgment were intact [No Anxiety] : not feeling anxious [Normal Rate] : normal [Normal S1] : normal S1 [Normal S2] : normal S2 [Click] : a ~M click was heard [II] : a grade 2 [2+] : left 2+ [No Abnormalities] : the abdominal aorta was not enlarged and no bruit was heard [___ +] : [unfilled]U+ pitting edema to L ankle [S3] : no S3 [S4] : no S4 [Right Carotid Bruit] : no bruit heard over the right carotid [Left Carotid Bruit] : no bruit heard over the left carotid [Right Femoral Bruit] : no bruit heard over the right femoral artery [Left Femoral Bruit] : no bruit heard over the left femoral artery

## 2019-03-04 NOTE — REVIEW OF SYSTEMS
[Palpitations] : palpitations [Joint Pain] : joint pain [Knee Pain] : knee pain [Negative] : Genitourinary [Fever] : no fever [Headache] : no headache [Chills] : no chills [Feeling Fatigued] : not feeling fatigued [Blurry Vision] : no blurred vision [Seeing Double (Diplopia)] : no diplopia [Earache] : no earache [Sore Throat] : no sore throat [Sinus Pressure] : no sinus pressure [Shortness Of Breath] : no shortness of breath [Chest Pain] : no chest pain [Cough] : no cough [Wheezing] : no wheezing [Skin: A Rash] : no rash: [Dizziness] : no dizziness [Depression] : no depression [Anxiety] : no anxiety [Excessive Thirst] : no polydipsia [Easy Bleeding] : no tendency for easy bleeding [Easy Bruising] : no tendency for easy bruising [FreeTextEntry2] : chronic constipation

## 2019-03-04 NOTE — HISTORY OF PRESENT ILLNESS
[FreeTextEntry1] : I saw Elsa Brunner in the office today for followup visit,. She is an 84-year-old white female with mitral valve prolapse. Echo performed 11/14 shows MAC with mild to moderate MR. The posterior mitral leaflet was not well visualized. She was in the emergency room in October 2013 with a rapid heartbeat. This seemed to occur associated with pain in her knee. She was in rapid atrial fibrillation and converted to sinus rhythm with IV Cardizem. She now is on Cardizem 180 mg once a day. Her losartan 100 mg once a day which she was taking for hypertension was discontinued. Occasionally she gets a palpitation which relieved with belching.She was started on Prilosec in case she is having heartburn. She remains on Xarelto.\par \par The patient had a stress test in 2010 that showed no ischemia.  Recent blood work off crestor  demonstrates a total cholesterol of 244, triglycerides 75, HDL 80, and   The patient had stopped the Crestor because she felt she was having some muscle pain. She has also been on and off simvastatin without a change in her aches and pains. Most recent blood work demonstrates a total cholesterol of 234, triglycerides 78, HDL 86, and . She is now on simvastatin 10 mg once a day.\par \par Patient is now taking Xarelto 20 mg once a day. She has some minimal bright red blood per rectum which has been a chronic problem. She underwent a GI evaluation Dr. Galeana. Everything was normal except the small bowel camera did show a potential small ulceration.. Patient's had no signs of bleeding. As per Dr. Galeana she is off the omeprazole.\par \par The patient had a repeat echocardiogram 2/18. This demonstrated an ejection fraction of 70%. There is mild to moderate MR, moderate left ear, and mild TR without pulmonary hypertension.. Most recent carotid Doppler performed 5/16 showed mild plaque.\par \par She presented to the emergency room at [facility] 11/25/16 with severe left shoulder pain and palpitation. She was in rapid atrial fibrillation. She was given IV Cardizem and converted to sinus rhythm. She got a cortisone shot in her shoulder and the pain is better. She has been doing well but did have a slight palpitation yesterday. The atrial fibrillation seems to be precipitated by pain.\par \par She still gets minor episodes which she perceives as atrial fibrillation only lasting for a few minutes. These do not result in any hemodynamic compromise.She had no additional episodes of falling.\par \par She brought a copy of blood work dated 1/19. Chemistries were normal. Cholesterol was 194 with an HDL of 98 and LDL of 80.\par \par A resting 12 electrocardiogram demonstrates sinus rhythm with one APC. There are no acute changes.\par \par Patient continues to fall. She is now going for balance therapy.

## 2019-03-04 NOTE — REASON FOR VISIT
[Follow-Up - Clinic] : a clinic follow-up of [Atrial Fibrillation] : atrial fibrillation [Hyperlipidemia] : hyperlipidemia [Mitral Regurgitation] : mitral regurgitation

## 2019-03-04 NOTE — DISCUSSION/SUMMARY
[FreeTextEntry1] : The patient is doing well. She has no signs or symptoms of active heart disease. On her medications her blood pressure and cholesterol are well controlled and clinically she is having no significant atrial fibrillation. No bleeding episodes on anticoagulant.\par \par She'll stay on her present medication. She'll schedule an echocardiogram. If all is well I will see her in 6 months

## 2019-05-06 ENCOUNTER — APPOINTMENT (OUTPATIENT)
Dept: CARDIOLOGY | Facility: CLINIC | Age: 84
End: 2019-05-06
Payer: MEDICARE

## 2019-05-06 PROCEDURE — 93306 TTE W/DOPPLER COMPLETE: CPT

## 2019-05-13 ENCOUNTER — RX RENEWAL (OUTPATIENT)
Age: 84
End: 2019-05-13

## 2019-07-25 ENCOUNTER — TRANSCRIPTION ENCOUNTER (OUTPATIENT)
Age: 84
End: 2019-07-25

## 2019-07-26 ENCOUNTER — RESULT REVIEW (OUTPATIENT)
Age: 84
End: 2019-07-26

## 2019-07-26 ENCOUNTER — OUTPATIENT (OUTPATIENT)
Dept: OUTPATIENT SERVICES | Facility: HOSPITAL | Age: 84
LOS: 1 days | End: 2019-07-26
Payer: MEDICARE

## 2019-07-26 DIAGNOSIS — Z98.890 OTHER SPECIFIED POSTPROCEDURAL STATES: Chronic | ICD-10-CM

## 2019-07-26 DIAGNOSIS — R19.5 OTHER FECAL ABNORMALITIES: ICD-10-CM

## 2019-07-26 DIAGNOSIS — Z80.0 FAMILY HISTORY OF MALIGNANT NEOPLASM OF DIGESTIVE ORGANS: ICD-10-CM

## 2019-07-26 PROCEDURE — 45385 COLONOSCOPY W/LESION REMOVAL: CPT

## 2019-07-26 PROCEDURE — C1889: CPT

## 2019-07-26 PROCEDURE — 88305 TISSUE EXAM BY PATHOLOGIST: CPT

## 2019-07-26 PROCEDURE — 88305 TISSUE EXAM BY PATHOLOGIST: CPT | Mod: 26

## 2019-07-30 LAB — SURGICAL PATHOLOGY STUDY: SIGNIFICANT CHANGE UP

## 2019-10-14 ENCOUNTER — NON-APPOINTMENT (OUTPATIENT)
Age: 84
End: 2019-10-14

## 2019-10-14 ENCOUNTER — APPOINTMENT (OUTPATIENT)
Dept: CARDIOLOGY | Facility: CLINIC | Age: 84
End: 2019-10-14
Payer: MEDICARE

## 2019-10-14 VITALS
BODY MASS INDEX: 22.08 KG/M2 | WEIGHT: 120 LBS | HEART RATE: 77 BPM | DIASTOLIC BLOOD PRESSURE: 80 MMHG | SYSTOLIC BLOOD PRESSURE: 156 MMHG | OXYGEN SATURATION: 99 % | HEIGHT: 62 IN

## 2019-10-14 PROCEDURE — 93000 ELECTROCARDIOGRAM COMPLETE: CPT

## 2019-10-14 PROCEDURE — 99214 OFFICE O/P EST MOD 30 MIN: CPT

## 2019-10-14 NOTE — DISCUSSION/SUMMARY
[FreeTextEntry1] : The patient is doing well. She has lost a little bit of weight and overall feeling well. I'm not certain what is causing a tight feeling in her chest but it happens very infrequently and actually feels better when she walks. Most likely this is not cardiac. If it changes the patient will be no.\par \par Her systolic blood pressure is mildly elevated today and with ahervalvular disease this could be a problem. She has an appointment to see you this Thursday. If blood pressure still elevated we may have to put her on additional blood pressure medication.\par \par If you do blood work I will appreciate your sending a copy to my office.\par \par We did discuss her echo which is unchanged. If she has additional symptoms or problems she will call me. Pending the above I would see her again in about 3 months

## 2019-10-14 NOTE — REVIEW OF SYSTEMS
[Palpitations] : palpitations [Joint Pain] : joint pain [Knee Pain] : knee pain [Negative] : Genitourinary [Headache] : no headache [Fever] : no fever [Feeling Fatigued] : not feeling fatigued [Chills] : no chills [Blurry Vision] : no blurred vision [Seeing Double (Diplopia)] : no diplopia [Sore Throat] : no sore throat [Earache] : no earache [Shortness Of Breath] : no shortness of breath [Sinus Pressure] : no sinus pressure [Cough] : no cough [Chest Pain] : no chest pain [Wheezing] : no wheezing [Skin: A Rash] : no rash: [Dizziness] : no dizziness [Depression] : no depression [Anxiety] : no anxiety [Easy Bruising] : no tendency for easy bruising [Easy Bleeding] : no tendency for easy bleeding [Excessive Thirst] : no polydipsia [FreeTextEntry2] : chronic constipation

## 2019-10-14 NOTE — PHYSICAL EXAM
We have scheduled the patient for the STAT US that Dr. Malcolm had ordered for tomorrow, 05/29/19. Should the patient be held or is she able to leave once the exam has been completed? Also should the results be placed into Epic or called to provider directly?   [General Appearance - Well Developed] : well developed [Normal Appearance] : normal appearance [Well Groomed] : well groomed [General Appearance - Well Nourished] : well nourished [No Deformities] : no deformities [General Appearance - In No Acute Distress] : no acute distress [Normal Conjunctiva] : the conjunctiva exhibited no abnormalities [Normal Oral Mucosa] : normal oral mucosa [Normal Jugular Venous A Waves Present] : normal jugular venous A waves present [Normal Jugular Venous V Waves Present] : normal jugular venous V waves present [No Jugular Venous Moran A Waves] : no jugular venous moran A waves [Respiration, Rhythm And Depth] : normal respiratory rhythm and effort [Exaggerated Use Of Accessory Muscles For Inspiration] : no accessory muscle use [Auscultation Breath Sounds / Voice Sounds] : lungs were clear to auscultation bilaterally [Bowel Sounds] : normal bowel sounds [Abdomen Soft] : soft [Abdomen Tenderness] : non-tender [Abnormal Walk] : normal gait [Gait - Sufficient For Exercise Testing] : the gait was sufficient for exercise testing [Nail Clubbing] : no clubbing of the fingernails [Cyanosis, Localized] : no localized cyanosis [Skin Color & Pigmentation] : normal skin color and pigmentation [Skin Turgor] : normal skin turgor [] : no rash [Oriented To Time, Place, And Person] : oriented to person, place, and time [Impaired Insight] : insight and judgment were intact [No Anxiety] : not feeling anxious [Normal Rate] : normal [Normal S1] : normal S1 [Normal S2] : normal S2 [Click] : a ~M click was heard [II] : a grade 2 [2+] : left 2+ [No Abnormalities] : the abdominal aorta was not enlarged and no bruit was heard [___ +] : [unfilled]U+ pitting edema to L ankle [S3] : no S3 [Right Carotid Bruit] : no bruit heard over the right carotid [Left Carotid Bruit] : no bruit heard over the left carotid [S4] : no S4 [Right Femoral Bruit] : no bruit heard over the right femoral artery [Left Femoral Bruit] : no bruit heard over the left femoral artery

## 2019-10-14 NOTE — HISTORY OF PRESENT ILLNESS
[FreeTextEntry1] : I saw Elsa Brunner in the office today for followup visit,. She is an 85-year-old white female with mitral valve prolapse. Echo performed 11/14 shows MAC with mild to moderate MR. The posterior mitral leaflet was not well visualized. She was in the emergency room in October 2013 with a rapid heartbeat. This seemed to occur associated with pain in her knee. She was in rapid atrial fibrillation and converted to sinus rhythm with IV Cardizem. She now is on Cardizem 180 mg once a day. Her losartan 100 mg once a day which she was taking for hypertension was discontinued. Occasionally she gets a palpitation which relieved with belching.She was started on Prilosec in case she is having heartburn. She remains on Xarelto.\par \par The patient had a stress test in 2010 that showed no ischemia.  Recent blood work off crestor  demonstrates a total cholesterol of 244, triglycerides 75, HDL 80, and   The patient had stopped the Crestor because she felt she was having some muscle pain. She has also been on and off simvastatin without a change in her aches and pains. Most recent blood work demonstrates a total cholesterol of 234, triglycerides 78, HDL 86, and . She is now on simvastatin 10 mg once a day.\par \par Patient is now taking Xarelto 20 mg once a day. She has some minimal bright red blood per rectum which has been a chronic problem. She underwent a GI evaluation Dr. Galeana. Everything was normal except the small bowel camera did show a potential small ulceration.. Patient's had no signs of bleeding. As per Dr. Galeana she is off the omeprazole.\par \par Repeat echocardiogram before 5/19 demonstrated an ejection fraction of 65%. There was mild to moderate MR, moderate left ear, with a peak gradient of 41 and a valve area 1.3 cm. There was mild TR with PA pressure of 30 and stage I diastolic dysfunction... Most recent carotid Doppler performed 5/16 showed mild plaque.\par \par She presented to the emergency room at [place] [facility] 11/25/16 with severe left shoulder pain and palpitation. She was in rapid atrial fibrillation. She was given IV Cardizem and converted to sinus rhythm. She got a cortisone shot in her shoulder and the pain is better. She has been doing well but did have a slight palpitation yesterday. The atrial fibrillation seems to be precipitated by pain.\par \par She still gets minor episodes which she perceives as atrial fibrillation only lasting for a few minutes. These do not result in any hemodynamic compromise.She had no additional episodes of falling.\par \par Blood work dated 1/19. Chemistries were normal. Cholesterol was 194 with an HDL of 98 and LDL of 80.\par \par A resting 12 electrocardiogram demonstrates sinus rhythm. There are no acute changes.\par \par The patient walks about 15 or 20 minutes at a time and has no symptoms. She notes that at rest, especially when she talks on the telephone and is under stress she feels slight tightness in her chest. Thiscan last one or 2 minutes and she feels better when she gets up and walks around. There are no associated shortness of breath or any other symptoms. This happens very infrequently.

## 2019-12-06 NOTE — PROGRESS NOTE ADULT - ASSESSMENT
83 female w hx afib adm for now s/p left tkr Cell[employer] Counseling:  I discussed with the patient the risks of mycophenolate mofetil including but not limited to infection/immunosuppression, GI upset, hypokalemia, hypercholesterolemia, bone marrow suppression, lymphoproliferative disorders, malignancy, GI ulceration/bleed/perforation, colitis, interstitial lung disease, kidney failure, progressive multifocal leukoencephalopathy, and birth defects.  The patient understands that monitoring is required including a baseline creatinine and regular CBC testing. In addition, patient must alert us immediately if symptoms of infection or other concerning signs are noted.

## 2019-12-24 ENCOUNTER — TRANSCRIPTION ENCOUNTER (OUTPATIENT)
Age: 84
End: 2019-12-24

## 2020-01-20 ENCOUNTER — APPOINTMENT (OUTPATIENT)
Dept: CARDIOLOGY | Facility: CLINIC | Age: 85
End: 2020-01-20
Payer: MEDICARE

## 2020-01-20 VITALS
OXYGEN SATURATION: 98 % | DIASTOLIC BLOOD PRESSURE: 82 MMHG | HEIGHT: 62 IN | BODY MASS INDEX: 21.9 KG/M2 | HEART RATE: 84 BPM | SYSTOLIC BLOOD PRESSURE: 170 MMHG | WEIGHT: 119 LBS

## 2020-01-20 PROCEDURE — 99214 OFFICE O/P EST MOD 30 MIN: CPT

## 2020-01-20 NOTE — HISTORY OF PRESENT ILLNESS
[FreeTextEntry1] : I saw Elsa Brunner in the office today for followup visit,. She is an 85-year-old white female with mitral valve prolapse. Echo performed 11/14 shows MAC with mild to moderate MR. The posterior mitral leaflet was not well visualized. She was in the emergency room in October 2013 with a rapid heartbeat. This seemed to occur associated with pain in her knee. She was in rapid atrial fibrillation and converted to sinus rhythm with IV Cardizem. She now is on Cardizem 180 mg once a day. Her losartan 100 mg once a day which she was taking for hypertension was discontinued. Occasionally she gets a palpitation which relieved with belching.She was started on Prilosec in case she is having heartburn. She remains on Xarelto.\par \par Blood work dated 1/19 demonstrates a cholesterol of 194, triglycerides 78, HDL 98, and LDL 80.. She is now on simvastatin 10 mg once a day.\par \par Patient is now taking Xarelto 20 mg once a day. She has some minimal bright red blood per rectum which has been a chronic problem. She underwent a GI evaluation Dr. Galeana. Everything was normal except the small bowel camera did show a potential small ulceration.. Patient's had no signs of bleeding. As per Dr. Galeana she is off the omeprazole.\par \par Repeat echocardiogram before 5/19 demonstrated an ejection fraction of 65%. There was mild to moderate MR, moderate left ear, with a peak gradient of 41 and a valve area 1.3 cm. There was mild TR with PA pressure of 30 and stage I diastolic dysfunction... Most recent carotid Doppler performed 5/16 showed mild plaque.\par \par She presented to the emergency room at [place] 11/25/16 with severe left shoulder pain and palpitation. She was in rapid atrial fibrillation. She was given IV Cardizem and converted to sinus rhythm. She got a cortisone shot in her shoulder and the pain is better. She has been doing well but did have a slight palpitation yesterday. The atrial fibrillation seems to be precipitated by pain.\par \par She still gets minor episodes which she perceives as atrial fibrillation only lasting for a few minutes. These do not result in any hemodynamic compromise.She had no additional episodes of falling.\par \par A resting 12 electrocardiogram demonstrates sinus rhythm. There are no acute changes.\par \par The patient walks about 15 or 20 minutes at a time and has no symptoms. She notes that at rest, especially when she talks on the telephone and is under stress she feels slight tightness in her chest. This can last one or 2 minutes and she feels better when she gets up and walks around. There are no associated shortness of breath or any other symptoms. This happens very infrequently.

## 2020-01-20 NOTE — DISCUSSION/SUMMARY
[FreeTextEntry1] : The patient is doing well without any exertional chest pain, shortness of breath, or palpitation. On her medication her blood pressure is well controlled.\par \par She'll stay on her present medication. Would appreciate your sending a copy of her most recent blood work for my review.\par \par Did discuss her carotid Doppler from 2016 and her echocardiogram from 2019. Both these tests will be repeated in May. Assuming the tests look stable and she feels well I would see her in 6 months. She knows to call the office if any problems arise.

## 2020-01-20 NOTE — PHYSICAL EXAM
[General Appearance - Well Developed] : well developed [Well Groomed] : well groomed [Normal Appearance] : normal appearance [No Deformities] : no deformities [General Appearance - Well Nourished] : well nourished [Normal Conjunctiva] : the conjunctiva exhibited no abnormalities [General Appearance - In No Acute Distress] : no acute distress [Normal Jugular Venous A Waves Present] : normal jugular venous A waves present [Normal Oral Mucosa] : normal oral mucosa [No Jugular Venous Moran A Waves] : no jugular venous moran A waves [Normal Jugular Venous V Waves Present] : normal jugular venous V waves present [Exaggerated Use Of Accessory Muscles For Inspiration] : no accessory muscle use [Respiration, Rhythm And Depth] : normal respiratory rhythm and effort [Auscultation Breath Sounds / Voice Sounds] : lungs were clear to auscultation bilaterally [Bowel Sounds] : normal bowel sounds [Abdomen Soft] : soft [Abdomen Tenderness] : non-tender [Gait - Sufficient For Exercise Testing] : the gait was sufficient for exercise testing [Abnormal Walk] : normal gait [Nail Clubbing] : no clubbing of the fingernails [Cyanosis, Localized] : no localized cyanosis [Skin Turgor] : normal skin turgor [Skin Color & Pigmentation] : normal skin color and pigmentation [] : no rash [Oriented To Time, Place, And Person] : oriented to person, place, and time [No Anxiety] : not feeling anxious [Impaired Insight] : insight and judgment were intact [Normal Rate] : normal [Normal S1] : normal S1 [Normal S2] : normal S2 [Click] : a ~M click was heard [II] : a grade 2 [No Abnormalities] : the abdominal aorta was not enlarged and no bruit was heard [2+] : left 2+ [___ +] : [unfilled]U+ pitting edema to L ankle [S3] : no S3 [Right Carotid Bruit] : no bruit heard over the right carotid [S4] : no S4 [Left Carotid Bruit] : no bruit heard over the left carotid [Right Femoral Bruit] : no bruit heard over the right femoral artery [Left Femoral Bruit] : no bruit heard over the left femoral artery

## 2020-01-20 NOTE — REASON FOR VISIT
[Follow-Up - Clinic] : a clinic follow-up of [Aortic Stenosis] : aortic stenosis [Atrial Fibrillation] : atrial fibrillation [Hyperlipidemia] : hyperlipidemia [Mitral Regurgitation] : mitral regurgitation

## 2020-01-20 NOTE — REVIEW OF SYSTEMS
[Palpitations] : palpitations [Joint Pain] : joint pain [Knee Pain] : knee pain [Negative] : Genitourinary [Chills] : no chills [Fever] : no fever [Headache] : no headache [Blurry Vision] : no blurred vision [Seeing Double (Diplopia)] : no diplopia [Feeling Fatigued] : not feeling fatigued [Sinus Pressure] : no sinus pressure [Earache] : no earache [Sore Throat] : no sore throat [Chest Pain] : no chest pain [Shortness Of Breath] : no shortness of breath [Wheezing] : no wheezing [Skin: A Rash] : no rash: [Cough] : no cough [Anxiety] : no anxiety [Dizziness] : no dizziness [Depression] : no depression [Excessive Thirst] : no polydipsia [Easy Bleeding] : no tendency for easy bleeding [Easy Bruising] : no tendency for easy bruising [FreeTextEntry2] : chronic constipation

## 2020-05-06 ENCOUNTER — RX RENEWAL (OUTPATIENT)
Age: 85
End: 2020-05-06

## 2020-06-04 ENCOUNTER — APPOINTMENT (OUTPATIENT)
Dept: CARDIOLOGY | Facility: CLINIC | Age: 85
End: 2020-06-04
Payer: MEDICARE

## 2020-06-04 PROCEDURE — 93306 TTE W/DOPPLER COMPLETE: CPT

## 2020-06-11 NOTE — H&P PST ADULT - NS PRO TALK SOMEONE YN
The pharmacy lost power yesterday so the rx that was sent electronically failed.    rx fill date and bridge prepared below please e sign.   no

## 2020-06-18 ENCOUNTER — APPOINTMENT (OUTPATIENT)
Dept: CARDIOLOGY | Facility: CLINIC | Age: 85
End: 2020-06-18
Payer: MEDICARE

## 2020-06-18 ENCOUNTER — NON-APPOINTMENT (OUTPATIENT)
Age: 85
End: 2020-06-18

## 2020-06-18 VITALS
DIASTOLIC BLOOD PRESSURE: 76 MMHG | BODY MASS INDEX: 21.71 KG/M2 | HEART RATE: 77 BPM | OXYGEN SATURATION: 99 % | HEIGHT: 62 IN | SYSTOLIC BLOOD PRESSURE: 125 MMHG | WEIGHT: 118 LBS

## 2020-06-18 PROCEDURE — 93000 ELECTROCARDIOGRAM COMPLETE: CPT

## 2020-06-18 PROCEDURE — 99214 OFFICE O/P EST MOD 30 MIN: CPT

## 2020-06-18 NOTE — HISTORY OF PRESENT ILLNESS
[FreeTextEntry1] : I saw Elsa Brunner in the office today for followup visit,. She is an 86-year-old white female with mitral valve prolapse. Echo performed 11/14 shows MAC with mild to moderate MR. The posterior mitral leaflet was not well visualized. She was in the emergency room in October 2013 with a rapid heartbeat. This seemed to occur associated with pain in her knee. She was in rapid atrial fibrillation and converted to sinus rhythm with IV Cardizem. She now is on Cardizem 180 mg once a day. Her losartan 100 mg once a day which she was taking for hypertension was discontinued. Occasionally she gets a palpitation which relieved with belching.She was started on Prilosec in case she is having heartburn. She remains on Xarelto.\par \par Blood work dated 6/20 demonstrates a cholesterol of 193, triglycerides 62, HDL 91, and LDL 90.. She is now on simvastatin 10 mg once a day.\par \par Patient is now taking Xarelto 20 mg once a day. She has some minimal bright red blood per rectum which has been a chronic problem. She underwent a GI evaluation Dr. Galeana. Everything was normal except the small bowel camera did show a potential small ulceration.. Patient's had no signs of bleeding. As per Dr. Galeana she is off the omeprazole.\par \par Echocardiogram 6/20 demonstrates an ejection fraction of 66%. There is mild to moderate MR and TR with PA pressure of 31. There is severe aortic stenosis with a peak gradient of 49, and a valve area of 0.9 cm². There is stage I diastolic dysfunction... Most recent carotid Doppler performed 5/16 showed mild plaque.\par \par She presented to the emergency room at [place] 11/25/16 with severe left shoulder pain and palpitation. She was in rapid atrial fibrillation. She was given IV Cardizem and converted to sinus rhythm. She got a cortisone shot in her shoulder and the pain is better. She has been doing well but did have a slight palpitation yesterday. The atrial fibrillation seems to be precipitated by pain.\par \par She still gets minor episodes which she perceives as atrial fibrillation only lasting for a few minutes. These do not result in any hemodynamic compromise.She had no additional episodes of falling.\par \par A resting 12 electrocardiogram demonstrates sinus rhythm. There are no acute changes.\par \par The patient walks about 15 or 20 minutes at a time and has no symptoms. She notes that at rest, especially when she talks on the telephone and is under stress she feels slight tightness in her chest. This can last one or 2 minutes and she feels better when she gets up and walks around. There are no associated shortness of breath or any other symptoms. This happens very infrequently.

## 2020-06-18 NOTE — PHYSICAL EXAM
[General Appearance - Well Developed] : well developed [Normal Appearance] : normal appearance [Well Groomed] : well groomed [General Appearance - In No Acute Distress] : no acute distress [No Deformities] : no deformities [General Appearance - Well Nourished] : well nourished [Normal Oral Mucosa] : normal oral mucosa [Normal Jugular Venous A Waves Present] : normal jugular venous A waves present [Normal Conjunctiva] : the conjunctiva exhibited no abnormalities [No Jugular Venous Moran A Waves] : no jugular venous moran A waves [Normal Jugular Venous V Waves Present] : normal jugular venous V waves present [Auscultation Breath Sounds / Voice Sounds] : lungs were clear to auscultation bilaterally [Respiration, Rhythm And Depth] : normal respiratory rhythm and effort [Exaggerated Use Of Accessory Muscles For Inspiration] : no accessory muscle use [Abdomen Tenderness] : non-tender [Abdomen Soft] : soft [Bowel Sounds] : normal bowel sounds [Abnormal Walk] : normal gait [Nail Clubbing] : no clubbing of the fingernails [Gait - Sufficient For Exercise Testing] : the gait was sufficient for exercise testing [Cyanosis, Localized] : no localized cyanosis [Skin Turgor] : normal skin turgor [Skin Color & Pigmentation] : normal skin color and pigmentation [No Anxiety] : not feeling anxious [] : no rash [Oriented To Time, Place, And Person] : oriented to person, place, and time [Impaired Insight] : insight and judgment were intact [Normal S1] : normal S1 [Normal Rate] : normal [Normal S2] : normal S2 [II] : a grade 2 [Click] : a ~M click was heard [2+] : left 2+ [___ +] : [unfilled]U+ pitting edema to L ankle [No Abnormalities] : the abdominal aorta was not enlarged and no bruit was heard [S3] : no S3 [S4] : no S4 [Left Carotid Bruit] : no bruit heard over the left carotid [Right Femoral Bruit] : no bruit heard over the right femoral artery [Right Carotid Bruit] : no bruit heard over the right carotid [Left Femoral Bruit] : no bruit heard over the left femoral artery

## 2020-06-18 NOTE — DISCUSSION/SUMMARY
[FreeTextEntry1] : The patient is doing well. She still walks on a regular basis has no chest pain, shortness of breath, lightheadedness, palpitation. We did discuss the results of the echo and went over the cardinal symptoms of aortic stenosis.\par \par Her blood pressure is good as well as her cholesterol. She'll come back for a carotid Doppler.\par \par She has any symptoms or problems she will call me. Otherwise repeat her echocardiogram in 6 months I would see her.I answered all of her questions

## 2020-06-18 NOTE — REVIEW OF SYSTEMS
[Palpitations] : palpitations [Knee Pain] : knee pain [Joint Pain] : joint pain [Negative] : Genitourinary [Headache] : no headache [Fever] : no fever [Chills] : no chills [Blurry Vision] : no blurred vision [Feeling Fatigued] : not feeling fatigued [Sore Throat] : no sore throat [Seeing Double (Diplopia)] : no diplopia [Earache] : no earache [Sinus Pressure] : no sinus pressure [Chest Pain] : no chest pain [Shortness Of Breath] : no shortness of breath [Skin: A Rash] : no rash: [Cough] : no cough [Wheezing] : no wheezing [Dizziness] : no dizziness [Depression] : no depression [Anxiety] : no anxiety [Easy Bleeding] : no tendency for easy bleeding [Easy Bruising] : no tendency for easy bruising [Excessive Thirst] : no polydipsia [FreeTextEntry2] : chronic constipation

## 2020-07-06 ENCOUNTER — APPOINTMENT (OUTPATIENT)
Dept: CARDIOLOGY | Facility: CLINIC | Age: 85
End: 2020-07-06
Payer: MEDICARE

## 2020-07-06 PROCEDURE — 93880 EXTRACRANIAL BILAT STUDY: CPT

## 2020-11-20 ENCOUNTER — TRANSCRIPTION ENCOUNTER (OUTPATIENT)
Age: 85
End: 2020-11-20

## 2020-12-31 ENCOUNTER — TRANSCRIPTION ENCOUNTER (OUTPATIENT)
Age: 85
End: 2020-12-31

## 2021-01-06 ENCOUNTER — APPOINTMENT (OUTPATIENT)
Dept: CARDIOLOGY | Facility: CLINIC | Age: 86
End: 2021-01-06
Payer: MEDICARE

## 2021-01-06 PROCEDURE — 93306 TTE W/DOPPLER COMPLETE: CPT

## 2021-01-25 ENCOUNTER — APPOINTMENT (OUTPATIENT)
Dept: CARDIOLOGY | Facility: CLINIC | Age: 86
End: 2021-01-25
Payer: MEDICARE

## 2021-01-25 VITALS
OXYGEN SATURATION: 97 % | HEIGHT: 62 IN | HEART RATE: 92 BPM | WEIGHT: 118 LBS | BODY MASS INDEX: 21.71 KG/M2 | DIASTOLIC BLOOD PRESSURE: 80 MMHG | SYSTOLIC BLOOD PRESSURE: 173 MMHG

## 2021-01-25 PROCEDURE — 99215 OFFICE O/P EST HI 40 MIN: CPT

## 2021-01-25 NOTE — HISTORY OF PRESENT ILLNESS
[FreeTextEntry1] : I saw Elsa Brunner in the office today for followup visit,. She is an 86-year-old white female with mitral valve prolapse. Echo performed 11/14 shows MAC with mild to moderate MR. The posterior mitral leaflet was not well visualized. She was in the emergency room in October 2013 with a rapid heartbeat. This seemed to occur associated with pain in her knee. She was in rapid atrial fibrillation and converted to sinus rhythm with IV Cardizem. She now is on Cardizem 180 mg once a day. Her losartan 100 mg once a day which she was taking for hypertension was discontinued. Occasionally she gets a palpitation which relieved with belching.She was started on Prilosec in case she is having heartburn. She remains on Xarelto.\par \par Blood work dated 6/20 demonstrates a cholesterol of 193, triglycerides 62, HDL 91, and LDL 90.. She is now on simvastatin 10 mg once a day.\par \par Patient is now taking Xarelto 20 mg once a day. She has some minimal bright red blood per rectum which has been a chronic problem. She underwent a GI evaluation Dr. Galeana. Everything was normal except the small bowel camera did show a potential small ulceration.. Patient's had no signs of bleeding. As per Dr. Galeana she is off the omeprazole.\par \par Echocardiogram 1/21 demonstrates an ejection fraction of 65-70%. There is mild MR and mild-mod TR with PA pressure of 44. There is severe aortic stenosis with a peak gradient of 71, and a valve area of 0.7 cm². There is stage I diastolic dysfunction... Most recent carotid Doppler performed 7/20 showed mild plaque.\par \par She presented to the emergency room at [place] 11/25/16 with severe left shoulder pain and palpitation. She was in rapid atrial fibrillation. She was given IV Cardizem and converted to sinus rhythm. She got a cortisone shot in her shoulder and the pain is better. She has been doing well but did have a slight palpitation yesterday. The atrial fibrillation seems to be precipitated by pain.\par \par She still gets minor episodes which she perceives as atrial fibrillation only lasting for a few minutes. These do not result in any hemodynamic compromise.She had no additional episodes of falling.\par \par The patient walks about 15 or 20 minutes at a time and has no symptoms. She notes that at rest, especially when she talks on the telephone and is under stress she feels slight tightness in her chest. This can last one or 2 minutes and she feels better when she gets up and walks around. There are no associated shortness of breath or any other symptoms. This happens very infrequently.

## 2021-01-25 NOTE — REASON FOR VISIT
[Follow-Up - Clinic] : a clinic follow-up of [Aortic Stenosis] : aortic stenosis [Atrial Fibrillation] : atrial fibrillation [Mitral Regurgitation] : mitral regurgitation [Hyperlipidemia] : hyperlipidemia

## 2021-01-25 NOTE — DISCUSSION/SUMMARY
[FreeTextEntry1] : The patient remains asymptomatic. I discussed the echo findings with the patient and her son. The fact that the gradient has increased significantly over a six-month period  and now there is significant LVH is a sign of potential trouble. I think that she should be evaluated for possible TAVR. We discussed the necessity of doing a cardiac catheterization and a CT scan to measure the size of the valve. We discussed the risks and benefits of the procedure and I answered all of their questions.\par \par Despite the lack of symptoms the fact that the echo has progressed would warrant further evaluation. They agree and I will have her seen by the [facility] at [place] will discuss the case with the doctor is. If they have any additional questions they will call me.

## 2021-01-25 NOTE — PHYSICAL EXAM
[General Appearance - Well Developed] : well developed [Normal Appearance] : normal appearance [Well Groomed] : well groomed [No Deformities] : no deformities [General Appearance - Well Nourished] : well nourished [General Appearance - In No Acute Distress] : no acute distress [Normal Conjunctiva] : the conjunctiva exhibited no abnormalities [Normal Oral Mucosa] : normal oral mucosa [Normal Jugular Venous A Waves Present] : normal jugular venous A waves present [Normal Jugular Venous V Waves Present] : normal jugular venous V waves present [No Jugular Venous Moran A Waves] : no jugular venous moran A waves [Respiration, Rhythm And Depth] : normal respiratory rhythm and effort [Exaggerated Use Of Accessory Muscles For Inspiration] : no accessory muscle use [Auscultation Breath Sounds / Voice Sounds] : lungs were clear to auscultation bilaterally [Bowel Sounds] : normal bowel sounds [Abdomen Soft] : soft [Abdomen Tenderness] : non-tender [Gait - Sufficient For Exercise Testing] : the gait was sufficient for exercise testing [Abnormal Walk] : normal gait [Nail Clubbing] : no clubbing of the fingernails [Cyanosis, Localized] : no localized cyanosis [Skin Color & Pigmentation] : normal skin color and pigmentation [Skin Turgor] : normal skin turgor [] : no rash [Oriented To Time, Place, And Person] : oriented to person, place, and time [No Anxiety] : not feeling anxious [Impaired Insight] : insight and judgment were intact [Normal Rate] : normal [Normal S1] : normal S1 [Normal S2] : normal S2 [Click] : a ~M click was heard [II] : a grade 2 [2+] : left 2+ [No Abnormalities] : the abdominal aorta was not enlarged and no bruit was heard [___ +] : [unfilled]U+ pitting edema to L ankle [S3] : no S3 [S4] : no S4 [Right Carotid Bruit] : no bruit heard over the right carotid [Left Carotid Bruit] : no bruit heard over the left carotid [Right Femoral Bruit] : no bruit heard over the right femoral artery [Left Femoral Bruit] : no bruit heard over the left femoral artery

## 2021-01-25 NOTE — REVIEW OF SYSTEMS
[Palpitations] : palpitations [Joint Pain] : joint pain [Knee Pain] : knee pain [Negative] : Gastrointestinal [Fever] : no fever [Headache] : no headache [Chills] : no chills [Feeling Fatigued] : not feeling fatigued [Blurry Vision] : no blurred vision [Seeing Double (Diplopia)] : no diplopia [Earache] : no earache [Sore Throat] : no sore throat [Sinus Pressure] : no sinus pressure [Shortness Of Breath] : no shortness of breath [Chest Pain] : no chest pain [Cough] : no cough [Wheezing] : no wheezing [Skin: A Rash] : no rash: [Dizziness] : no dizziness [Depression] : no depression [Anxiety] : no anxiety [Excessive Thirst] : no polydipsia [Easy Bleeding] : no tendency for easy bleeding [Easy Bruising] : no tendency for easy bruising [FreeTextEntry2] : chronic constipation

## 2021-02-09 ENCOUNTER — APPOINTMENT (OUTPATIENT)
Dept: CARDIOTHORACIC SURGERY | Facility: CLINIC | Age: 86
End: 2021-02-09
Payer: MEDICARE

## 2021-02-09 ENCOUNTER — NON-APPOINTMENT (OUTPATIENT)
Age: 86
End: 2021-02-09

## 2021-02-09 VITALS
HEIGHT: 62 IN | BODY MASS INDEX: 21.71 KG/M2 | OXYGEN SATURATION: 98 % | DIASTOLIC BLOOD PRESSURE: 67 MMHG | RESPIRATION RATE: 15 BRPM | SYSTOLIC BLOOD PRESSURE: 132 MMHG | TEMPERATURE: 97.8 F | WEIGHT: 118 LBS

## 2021-02-09 DIAGNOSIS — R09.89 OTHER SPECIFIED SYMPTOMS AND SIGNS INVOLVING THE CIRCULATORY AND RESPIRATORY SYSTEMS: ICD-10-CM

## 2021-02-09 DIAGNOSIS — R60.0 LOCALIZED EDEMA: ICD-10-CM

## 2021-02-09 PROCEDURE — 99211 OFF/OP EST MAY X REQ PHY/QHP: CPT

## 2021-02-09 PROCEDURE — 99205 OFFICE O/P NEW HI 60 MIN: CPT

## 2021-02-09 PROCEDURE — 93000 ELECTROCARDIOGRAM COMPLETE: CPT

## 2021-02-09 RX ORDER — DENOSUMAB 60 MG/ML
60 INJECTION SUBCUTANEOUS
Qty: 1 | Refills: 0 | Status: ACTIVE | COMMUNITY
Start: 2021-02-09

## 2021-02-09 NOTE — HISTORY OF PRESENT ILLNESS
[FreeTextEntry1] : Ms. Murray is an 86 year old female, referred by Dr. Morales for evaluation of aortic stenosis, with progression of AS and LVH on her most recent echocardiogram.  She is feeling well, walking daily with no decline in stamina. She reports occasional exertional dyspnea, and intermittent angina that she has been attributing to anxiety (NYHA II). She has no dyspnea, PND, or orthopnea. Her past medical history includes anemia, HTN, HLD, pAFib on Xarelto, and arthritis. She lives alone, maintains independence in all ADLs. Her appetite it good, she has occasional constipation. She has bilateral lower extremity edema for several years, worse since having a TKR, and was seen by vascular (compression stockings were recommended, she was told "my veins are good"). She has been unable to wear the compression stockings as she finds them uncomfortable.

## 2021-02-09 NOTE — PHYSICAL EXAM
[General Appearance - Alert] : alert [Sclera] : the sclera and conjunctiva were normal [General Appearance - In No Acute Distress] : in no acute distress [PERRL With Normal Accommodation] : pupils were equal in size, round, and reactive to light [Extraocular Movements] : extraocular movements were intact [Outer Ear] : the ears and nose were normal in appearance [Oropharynx] : the oropharynx was normal [Jugular Venous Distention Increased] : there was no jugular-venous distention [Respiration, Rhythm And Depth] : normal respiratory rhythm and effort [Auscultation Breath Sounds / Voice Sounds] : lungs were clear to auscultation bilaterally [II] : a grade 2 [III] : a grade 3 [___ +] : bilateral [unfilled]U+ pretibial pitting edema [Breast Appearance] : normal in appearance [Bowel Sounds] : normal bowel sounds [Abdomen Soft] : soft [Cervical Lymph Nodes Enlarged Posterior Bilaterally] : posterior cervical [Cervical Lymph Nodes Enlarged Anterior Bilaterally] : anterior cervical [No CVA Tenderness] : no ~M costovertebral angle tenderness [Involuntary Movements] : no involuntary movements were seen [No Focal Deficits] : no focal deficits [Oriented To Time, Place, And Person] : oriented to person, place, and time [Right Carotid Bruit] : no bruit heard over the right carotid [Left Carotid Bruit] : no bruit heard over the left carotid [FreeTextEntry1] : Left lower extremity wound

## 2021-02-09 NOTE — REASON FOR VISIT
[Initial Evaluation] : an initial evaluation [Family Member] : family member [FreeTextEntry1] : aortic valve stenosis

## 2021-02-09 NOTE — CONSULT LETTER
[Dear  ___] : Dear ~NATHALIE, [Courtesy Letter:] : I had the pleasure of seeing your patient, [unfilled], in my office today. [Please see my note below.] : Please see my note below. [Consult Closing:] : Thank you very much for allowing me to participate in the care of this patient.  If you have any questions, please do not hesitate to contact me. [Sincerely,] : Sincerely, [FreeTextEntry2] : Nate Morales MD \par [address] [place] [address] Dr WHITT\par [place] [id number] [FreeTextEntry3] : Francesco Wagner MD\par \par Cardiovascular & Thoracic Surgery\par Professor\par [facility]\par \par

## 2021-02-09 NOTE — DISCUSSION/SUMMARY
[Severe Aortic Stenosis] : severe aortic stenosis [Deteriorating] : deteriorating [None] : none [Aortic Valve Replacement] : aortic valve replacement [Patient] : the patient [Family] : the patient's family [FreeTextEntry1] : Elsa has severe AS with recently progressive symptoms of exertional dyspnea and possible angina. As such, she will require angiography, which is being scheduled. Her TTE demonstrates a LA abnormality of unclear etiology, originating near the pulmonary vein, which may represent a mass or thrombus (she is on Xarelto and is compliant, per her report). A cardiac structural CT may also shed some clarity on this. Once completed, all imaging will be reviewed by the Heart Team and an optimal treatment strategy recommended.

## 2021-02-09 NOTE — HISTORY OF PRESENT ILLNESS
[FreeTextEntry1] : Mrs. Murray is an 86 year old female with past medical history that includes anemia, HTN, HLD, pAFib on Xarelto, and arthritis. She was referred by Dr. Morales for evaluation of aortic stenosis with progression of LVH on her most recent echocardiogram. Echo: 01/06/2021 JOSESITO 0.7, mGr 40, pGr 72, AoV 4.2 LVEF 65-70%.  She is feeling well, walking daily with no decline in stamina. She has no dyspnea, PND,  or orthopnea. She has chronic lower extremity edema. Her son reports she was seen by vascular and support stockings were recommended. She does report intermittent chest discomfort not associated with activity that she attributes to anxiety. She lives alone and independent in all her ADLs. \par \par

## 2021-02-09 NOTE — REVIEW OF SYSTEMS
[Dyspnea on exertion] : dyspnea during exertion [Chest  Pressure] : chest pressure [Lower Ext Edema] : lower extremity edema [Memory Lapses Or Loss] : memory lapses or loss [Easy Bleeding] : a tendency for easy bleeding [Easy Bruising] : a tendency for easy bruising [Negative] : Endocrine [Fever] : no fever [Chills] : no chills [Feeling Fatigued] : not feeling fatigued [Abdominal Pain] : no abdominal pain [Nausea] : no nausea [Change in Appetite] : no change in appetite [Dizziness] : no dizziness

## 2021-02-09 NOTE — PHYSICAL EXAM
[General Appearance - Well Developed] : well developed [Normal Appearance] : normal appearance [General Appearance - Well Nourished] : well nourished [Normal Conjunctiva] : the conjunctiva exhibited no abnormalities [Normal Oral Mucosa] : normal oral mucosa [Normal Jugular Venous A Waves Present] : normal jugular venous A waves present [Normal Jugular Venous V Waves Present] : normal jugular venous V waves present [No Jugular Venous Moran A Waves] : no jugular venous moran A waves [Normal Rate] : normal [Irregularly Irregular] : irregularly irregular [II] : a grade 2 [III] : a grade 3 [___ +] : bilateral [unfilled]U+ pretibial pitting edema [Respiration, Rhythm And Depth] : normal respiratory rhythm and effort [Auscultation Breath Sounds / Voice Sounds] : lungs were clear to auscultation bilaterally [Bowel Sounds] : normal bowel sounds [Abdomen Soft] : soft [Abdomen Tenderness] : non-tender [Abnormal Walk] : normal gait [Nail Clubbing] : no clubbing of the fingernails [Skin Turgor] : normal skin turgor [Single Crust] : a single [Location: ___] : [unfilled] [] : on the left leg [Circular Shape] : circularly shaped [Oriented To Time, Place, And Person] : oriented to person, place, and time [Impaired Insight] : insight and judgment were intact [Right Carotid Bruit] : no bruit heard over the right carotid [Left Carotid Bruit] : no bruit heard over the left carotid [Rt] : no varicose veins of the right leg [Lt] : no varicose veins of the left leg [Indurated] : non-indurated

## 2021-02-09 NOTE — ASSESSMENT
[FreeTextEntry1] : Mrs. Barker is an 86 year old female with past medical history that includes anemia, HTN, HLD, pAFib on Xarelto, and arthritis. She was referred by Dr. Morales for evaluation of aortic stenosis with progression of LVH on her most recent echocardiogram. Echo: 01/06/2021 JOSESITO 0.7, mGr 40, pGr 72, AoV 4.2 LVEF 65-70%.  She is feeling well, walking daily with no decline in stamina. She has no dyspnea, PND,  or orthopnea. She has chronic lower extremity edema. Her son reports she was seen by vascular and support stockings were recommended. She does report intermittent chest discomfort not associated with activity that she attributes to anxiety. She lives alone and independent in all her ADLs. Echo also shows a mobile mass in the left atrium c/w thrombus.  \par \par I had the pleasure of evaluating your patient, Ms. RUBENS BARKER who is an 86 year old female with heart failure symptoms of fatigue and lower extremity edema (NYHA class II).\par \par Ms. RUBENS BARKER has severe AS with pG 72 mmHg and mG 40 mmHg, AoV 4.2 m/sec and an JOSESITO of 0.7 cm²). She is a low risk for surgical aortic valve replacement.  The risks and benefits of both SAVR and CHARLETTE have been explained and the patient would like to proceed with further workup and consideration for CHARLETTE by the structural heart team.  I explained the risks of vascular complication, pacemaker requirement and possible paravalvular leakage. She will require CT scan and catheterization before finalizing plans for the procedure. The patient was also made aware of the possibility of using conscious sedation or general anesthesia during the procedure.  Once completed, all imaging will be reviewed by the Heart Team and an optimal treatment strategy will be recommended.\par \par Plan:\par 1) Lab work scheduled for today CBC, CMP and Pro BNP\par 2) Structural CT scan without coronaries \par 3) Cardiac Catherization to evaluate coronary anatomy \par 4) JARROD to evaluate LA mass\par

## 2021-02-09 NOTE — REVIEW OF SYSTEMS
[Feeling Tired] : feeling tired [Lower Ext Edema] : lower extremity edema [SOB on Exertion] : shortness of breath during exertion [Joint Pain] : joint pain [Skin Lesions] : skin lesion [Easy Bleeding] : a tendency for easy bleeding [Negative] : Endocrine [Dizziness] : no dizziness

## 2021-02-14 DIAGNOSIS — Z01.818 ENCOUNTER FOR OTHER PREPROCEDURAL EXAMINATION: ICD-10-CM

## 2021-02-15 ENCOUNTER — APPOINTMENT (OUTPATIENT)
Dept: DISASTER EMERGENCY | Facility: CLINIC | Age: 86
End: 2021-02-15

## 2021-02-16 LAB — SARS-COV-2 N GENE NPH QL NAA+PROBE: NOT DETECTED

## 2021-02-18 ENCOUNTER — OUTPATIENT (OUTPATIENT)
Dept: OUTPATIENT SERVICES | Facility: HOSPITAL | Age: 86
LOS: 1 days | End: 2021-02-18
Payer: MEDICARE

## 2021-02-18 ENCOUNTER — APPOINTMENT (OUTPATIENT)
Dept: CV DIAGNOSITCS | Facility: HOSPITAL | Age: 86
End: 2021-02-18

## 2021-02-18 DIAGNOSIS — Z98.890 OTHER SPECIFIED POSTPROCEDURAL STATES: Chronic | ICD-10-CM

## 2021-02-18 DIAGNOSIS — I35.0 NONRHEUMATIC AORTIC (VALVE) STENOSIS: ICD-10-CM

## 2021-02-18 PROCEDURE — 93306 TTE W/DOPPLER COMPLETE: CPT

## 2021-02-18 PROCEDURE — 93312 ECHO TRANSESOPHAGEAL: CPT | Mod: 26

## 2021-02-18 PROCEDURE — 93306 TTE W/DOPPLER COMPLETE: CPT | Mod: 26

## 2021-02-18 PROCEDURE — 76377 3D RENDER W/INTRP POSTPROCES: CPT | Mod: 26

## 2021-02-18 PROCEDURE — 76377 3D RENDER W/INTRP POSTPROCES: CPT

## 2021-02-18 PROCEDURE — 93312 ECHO TRANSESOPHAGEAL: CPT

## 2021-02-22 ENCOUNTER — RESULT REVIEW (OUTPATIENT)
Age: 86
End: 2021-02-22

## 2021-02-22 ENCOUNTER — APPOINTMENT (OUTPATIENT)
Dept: CARDIOLOGY | Facility: CLINIC | Age: 86
End: 2021-02-22

## 2021-02-22 ENCOUNTER — OUTPATIENT (OUTPATIENT)
Dept: OUTPATIENT SERVICES | Facility: HOSPITAL | Age: 86
LOS: 1 days | End: 2021-02-22
Payer: MEDICARE

## 2021-02-22 DIAGNOSIS — Z98.890 OTHER SPECIFIED POSTPROCEDURAL STATES: Chronic | ICD-10-CM

## 2021-02-22 DIAGNOSIS — Z00.00 ENCOUNTER FOR GENERAL ADULT MEDICAL EXAMINATION WITHOUT ABNORMAL FINDINGS: ICD-10-CM

## 2021-02-22 DIAGNOSIS — I35.0 NONRHEUMATIC AORTIC (VALVE) STENOSIS: ICD-10-CM

## 2021-02-22 PROCEDURE — 75572 CT HRT W/3D IMAGE: CPT

## 2021-02-22 PROCEDURE — 75572 CT HRT W/3D IMAGE: CPT | Mod: 26,ME

## 2021-02-22 PROCEDURE — G1004: CPT

## 2021-02-23 ENCOUNTER — APPOINTMENT (OUTPATIENT)
Dept: DISASTER EMERGENCY | Facility: CLINIC | Age: 86
End: 2021-02-23

## 2021-02-24 LAB — SARS-COV-2 N GENE NPH QL NAA+PROBE: NOT DETECTED

## 2021-02-26 ENCOUNTER — OUTPATIENT (OUTPATIENT)
Dept: OUTPATIENT SERVICES | Facility: HOSPITAL | Age: 86
LOS: 1 days | End: 2021-02-26
Payer: MEDICARE

## 2021-02-26 VITALS
OXYGEN SATURATION: 97 % | DIASTOLIC BLOOD PRESSURE: 61 MMHG | RESPIRATION RATE: 16 BRPM | HEART RATE: 77 BPM | SYSTOLIC BLOOD PRESSURE: 127 MMHG

## 2021-02-26 VITALS
HEART RATE: 77 BPM | TEMPERATURE: 99 F | DIASTOLIC BLOOD PRESSURE: 65 MMHG | WEIGHT: 117.07 LBS | RESPIRATION RATE: 16 BRPM | OXYGEN SATURATION: 100 % | SYSTOLIC BLOOD PRESSURE: 136 MMHG | HEIGHT: 63 IN

## 2021-02-26 DIAGNOSIS — Z01.818 ENCOUNTER FOR OTHER PREPROCEDURAL EXAMINATION: ICD-10-CM

## 2021-02-26 DIAGNOSIS — Z98.890 OTHER SPECIFIED POSTPROCEDURAL STATES: Chronic | ICD-10-CM

## 2021-02-26 LAB
ALBUMIN SERPL ELPH-MCNC: 4.8 G/DL — SIGNIFICANT CHANGE UP (ref 3.3–5)
ALP SERPL-CCNC: 140 U/L — HIGH (ref 40–120)
ALT FLD-CCNC: 21 U/L — SIGNIFICANT CHANGE UP (ref 10–45)
ANION GAP SERPL CALC-SCNC: 13 MMOL/L — SIGNIFICANT CHANGE UP (ref 5–17)
AST SERPL-CCNC: 20 U/L — SIGNIFICANT CHANGE UP (ref 10–40)
BILIRUB SERPL-MCNC: 0.3 MG/DL — SIGNIFICANT CHANGE UP (ref 0.2–1.2)
BUN SERPL-MCNC: 16 MG/DL — SIGNIFICANT CHANGE UP (ref 7–23)
CALCIUM SERPL-MCNC: 9.4 MG/DL — SIGNIFICANT CHANGE UP (ref 8.4–10.5)
CHLORIDE SERPL-SCNC: 99 MMOL/L — SIGNIFICANT CHANGE UP (ref 96–108)
CO2 SERPL-SCNC: 25 MMOL/L — SIGNIFICANT CHANGE UP (ref 22–31)
CREAT SERPL-MCNC: 0.65 MG/DL — SIGNIFICANT CHANGE UP (ref 0.5–1.3)
GLUCOSE SERPL-MCNC: 106 MG/DL — HIGH (ref 70–99)
HCT VFR BLD CALC: 38.6 % — SIGNIFICANT CHANGE UP (ref 34.5–45)
HGB BLD-MCNC: 12.2 G/DL — SIGNIFICANT CHANGE UP (ref 11.5–15.5)
MCHC RBC-ENTMCNC: 28.4 PG — SIGNIFICANT CHANGE UP (ref 27–34)
MCHC RBC-ENTMCNC: 31.6 GM/DL — LOW (ref 32–36)
MCV RBC AUTO: 90 FL — SIGNIFICANT CHANGE UP (ref 80–100)
NRBC # BLD: 0 /100 WBCS — SIGNIFICANT CHANGE UP (ref 0–0)
PLATELET # BLD AUTO: 274 K/UL — SIGNIFICANT CHANGE UP (ref 150–400)
POTASSIUM SERPL-MCNC: 3.8 MMOL/L — SIGNIFICANT CHANGE UP (ref 3.5–5.3)
POTASSIUM SERPL-SCNC: 3.8 MMOL/L — SIGNIFICANT CHANGE UP (ref 3.5–5.3)
PROT SERPL-MCNC: 7.4 G/DL — SIGNIFICANT CHANGE UP (ref 6–8.3)
RBC # BLD: 4.29 M/UL — SIGNIFICANT CHANGE UP (ref 3.8–5.2)
RBC # FLD: 14.1 % — SIGNIFICANT CHANGE UP (ref 10.3–14.5)
SARS-COV-2 RNA SPEC QL NAA+PROBE: SIGNIFICANT CHANGE UP
SODIUM SERPL-SCNC: 137 MMOL/L — SIGNIFICANT CHANGE UP (ref 135–145)
WBC # BLD: 7.85 K/UL — SIGNIFICANT CHANGE UP (ref 3.8–10.5)
WBC # FLD AUTO: 7.85 K/UL — SIGNIFICANT CHANGE UP (ref 3.8–10.5)

## 2021-02-26 PROCEDURE — 99152 MOD SED SAME PHYS/QHP 5/>YRS: CPT

## 2021-02-26 PROCEDURE — 93454 CORONARY ARTERY ANGIO S&I: CPT | Mod: 26

## 2021-02-26 PROCEDURE — 93010 ELECTROCARDIOGRAM REPORT: CPT

## 2021-02-26 RX ORDER — METHYLPREDNISOLONE 4 MG
0 TABLET ORAL
Qty: 0 | Refills: 0 | DISCHARGE

## 2021-02-26 RX ORDER — CLOPIDOGREL BISULFATE 75 MG/1
1 TABLET, FILM COATED ORAL
Qty: 0 | Refills: 0 | DISCHARGE
Start: 2021-02-26

## 2021-02-26 RX ORDER — SODIUM CHLORIDE 9 MG/ML
3 INJECTION INTRAMUSCULAR; INTRAVENOUS; SUBCUTANEOUS EVERY 8 HOURS
Refills: 0 | Status: DISCONTINUED | OUTPATIENT
Start: 2021-02-26 | End: 2021-03-12

## 2021-02-26 RX ORDER — ASPIRIN/CALCIUM CARB/MAGNESIUM 324 MG
1 TABLET ORAL
Qty: 0 | Refills: 0 | DISCHARGE
Start: 2021-02-26

## 2021-02-26 RX ORDER — METHENAMINE MANDELATE 1 G
1 TABLET ORAL
Qty: 0 | Refills: 0 | DISCHARGE

## 2021-02-26 RX ORDER — CLOPIDOGREL BISULFATE 75 MG/1
75 TABLET, FILM COATED ORAL DAILY
Refills: 0 | Status: DISCONTINUED | OUTPATIENT
Start: 2021-02-27 | End: 2021-03-12

## 2021-02-26 RX ORDER — ASPIRIN/CALCIUM CARB/MAGNESIUM 324 MG
81 TABLET ORAL DAILY
Refills: 0 | Status: DISCONTINUED | OUTPATIENT
Start: 2021-02-26 | End: 2021-03-12

## 2021-02-26 RX ORDER — CLOPIDOGREL BISULFATE 75 MG/1
600 TABLET, FILM COATED ORAL ONCE
Refills: 0 | Status: COMPLETED | OUTPATIENT
Start: 2021-02-26 | End: 2021-02-26

## 2021-02-26 RX ADMIN — CLOPIDOGREL BISULFATE 600 MILLIGRAM(S): 75 TABLET, FILM COATED ORAL at 14:21

## 2021-02-26 RX ADMIN — Medication 81 MILLIGRAM(S): at 14:21

## 2021-02-26 NOTE — H&P CARDIOLOGY - PSH
Cataract, Other  surgery   bilateral  H/O knee surgery    H/O ovarian cystectomy  2013  H/O Shoulder Replacement  right shoulder  get clindamycin before surgery  Parathyroid  surgery  S/P Bunionectomy

## 2021-02-26 NOTE — H&P CARDIOLOGY - PMH
Abdominal Mass    Afib    Aortic valve stenosis    Constipation    Diverticulosis of the Colon    DJD (Degenerative Joint Disease)  shoulders, knees, cervical and lumbar spine  Hiatal Hernia    History of Osteoarthritis    HLD (hyperlipidemia)    HTN (Hypertension)    Hypercholesterolemia    MVP (Mitral Valve Prolapse)  last echo 2010  Thyroid Nodule

## 2021-02-26 NOTE — ASU DISCHARGE PLAN (ADULT/PEDIATRIC) - ASU DC SPECIAL INSTRUCTIONSFT
Wound Care:     The day AFTER your procedure remove bandage GENTLY, and clean using  mild soap and gentle warm, water stream, pat dry. leave OPEN to air. YOU MAY SHOWER   DO NOT apply lotions, creams, ointments, powder, perfumes to your incision site  DO NOT SOAK your site for 1 week (no baths, no pools, no tubs, etc...)  Check  your groin and /or wrist daily. A small amount of bruising, and soreness are normal    ACTIVITY: for 24 hours   - DO NOT DRIVE  - DO NOT make any important decisions or sign legal documents   - DO NOT operate heavy machineries   - you may resume sexual activity in 48 hours, unless otherwise instructed by your cardiologist     Your procedure was done through the [facility]: for the NEXT 5 DAYS  - Limit climbing stairs, DO NOT soak in bathtub or pool  - no strenuous activities, pushing, pulling, straining  - Do not lift anything 10lbs or heavier     MEDICATION:   Take your medications as explained (see discharge paperwork)   If you received a STENT, you will be taking antiplatelet medications to KEEP YOUR STENT OPEN (eg: Aspirin, Plavix, Brilinta, Effient, etc).  Take as prescribed DO NOT STOP taking them without consulting with your cardiologist first.     Follow heart healthy diet recommended by your doctor, if you smoke STOP SMOKING (may call [phone] for center of tobacco control if you need assistance)     CALL your doctor to make appointment in 2 WEEKS     ***CALL YOUR DOCTOR***  if you experience: fever, chills, body aches, or severe pain, swelling, redness, heat or yellow discharge at incision site  If you experience Bleeding or excruciating pain at the procedural site, swelling (golf ball size) at your procedural site  If you experience CHEST PAIN  If you experience extremity numbness, tingling, temperature change (of your procedural site)   If you are unable to reach your doctor, you may contact:   -Cardiology Office at [facility] at [phone] or   - [facility] [phone]  - [facility] [phone]

## 2021-02-26 NOTE — ASU DISCHARGE PLAN (ADULT/PEDIATRIC) - CARE PROVIDER_API CALL
Viktor Lopez (MD)  Cardiovascular Disease; Interventional Cardiology  [address] [place] [address]  Phone: [phone]  Fax: [phone]  Scheduled Appointment: 03/01/2021 06:00 AM

## 2021-02-26 NOTE — H&P CARDIOLOGY - HISTORY OF PRESENT ILLNESS
86 year old female with PMHX of Afib on Xarelto (last dose on 2/23), MR, AS and HLD, arthritis, osteoporosis presents for cardiac cath. Pt reports she has been f/u with Dr. Morales, and referred to Valve Clinic for aortic stenosis, with progression of AS and LVH on her most recent echocardiogram. Pt reports occasional exertional dyspnea, and intermittent angina that she has been attributing to anxiety (NYHA II). She has no dyspnea, PND, or orthopnea.  She is independence in all ADLs and has bilateral lower extremity edema for several years, worse since having a TKR, and was seen by vascular (compression stockings were recommended, she was told "my veins are good"). She has been unable to wear the compression stockings as she finds them uncomfortable.

## 2021-02-27 ENCOUNTER — TRANSCRIPTION ENCOUNTER (OUTPATIENT)
Age: 86
End: 2021-02-27

## 2021-02-27 PROBLEM — I35.0 NONRHEUMATIC AORTIC (VALVE) STENOSIS: Chronic | Status: ACTIVE | Noted: 2021-02-26

## 2021-03-01 ENCOUNTER — INPATIENT (INPATIENT)
Facility: HOSPITAL | Age: 86
LOS: 0 days | Discharge: HOME | DRG: 247 | End: 2021-03-01
Attending: INTERNAL MEDICINE | Admitting: INTERNAL MEDICINE
Payer: MEDICARE

## 2021-03-01 VITALS
OXYGEN SATURATION: 100 % | HEART RATE: 79 BPM | TEMPERATURE: 98 F | SYSTOLIC BLOOD PRESSURE: 147 MMHG | RESPIRATION RATE: 16 BRPM | DIASTOLIC BLOOD PRESSURE: 71 MMHG

## 2021-03-01 VITALS — HEIGHT: 63.5 IN | WEIGHT: 117.95 LBS

## 2021-03-01 DIAGNOSIS — Z98.890 OTHER SPECIFIED POSTPROCEDURAL STATES: Chronic | ICD-10-CM

## 2021-03-01 DIAGNOSIS — I35.0 NONRHEUMATIC AORTIC (VALVE) STENOSIS: ICD-10-CM

## 2021-03-01 LAB
ANION GAP SERPL CALC-SCNC: 13 MMOL/L — SIGNIFICANT CHANGE UP (ref 5–17)
BUN SERPL-MCNC: 20 MG/DL — SIGNIFICANT CHANGE UP (ref 7–23)
CALCIUM SERPL-MCNC: 9.2 MG/DL — SIGNIFICANT CHANGE UP (ref 8.4–10.5)
CHLORIDE SERPL-SCNC: 101 MMOL/L — SIGNIFICANT CHANGE UP (ref 96–108)
CO2 SERPL-SCNC: 25 MMOL/L — SIGNIFICANT CHANGE UP (ref 22–31)
CREAT SERPL-MCNC: 0.73 MG/DL — SIGNIFICANT CHANGE UP (ref 0.5–1.3)
GLUCOSE SERPL-MCNC: 107 MG/DL — HIGH (ref 70–99)
HCT VFR BLD CALC: 35 % — SIGNIFICANT CHANGE UP (ref 34.5–45)
HGB BLD-MCNC: 11.1 G/DL — LOW (ref 11.5–15.5)
MCHC RBC-ENTMCNC: 28.2 PG — SIGNIFICANT CHANGE UP (ref 27–34)
MCHC RBC-ENTMCNC: 31.7 GM/DL — LOW (ref 32–36)
MCV RBC AUTO: 89.1 FL — SIGNIFICANT CHANGE UP (ref 80–100)
NRBC # BLD: 0 /100 WBCS — SIGNIFICANT CHANGE UP (ref 0–0)
PLATELET # BLD AUTO: 237 K/UL — SIGNIFICANT CHANGE UP (ref 150–400)
POTASSIUM SERPL-MCNC: 4.1 MMOL/L — SIGNIFICANT CHANGE UP (ref 3.5–5.3)
POTASSIUM SERPL-SCNC: 4.1 MMOL/L — SIGNIFICANT CHANGE UP (ref 3.5–5.3)
RBC # BLD: 3.93 M/UL — SIGNIFICANT CHANGE UP (ref 3.8–5.2)
RBC # FLD: 14.1 % — SIGNIFICANT CHANGE UP (ref 10.3–14.5)
SODIUM SERPL-SCNC: 139 MMOL/L — SIGNIFICANT CHANGE UP (ref 135–145)
WBC # BLD: 5.97 K/UL — SIGNIFICANT CHANGE UP (ref 3.8–10.5)
WBC # FLD AUTO: 5.97 K/UL — SIGNIFICANT CHANGE UP (ref 3.8–10.5)

## 2021-03-01 PROCEDURE — 92933 PRQ TRLML C ATHRC ST ANGIOP1: CPT | Mod: LD

## 2021-03-01 PROCEDURE — 93010 ELECTROCARDIOGRAM REPORT: CPT | Mod: 76

## 2021-03-01 PROCEDURE — 99152 MOD SED SAME PHYS/QHP 5/>YRS: CPT

## 2021-03-01 RX ORDER — SODIUM CHLORIDE 9 MG/ML
3 INJECTION INTRAMUSCULAR; INTRAVENOUS; SUBCUTANEOUS EVERY 8 HOURS
Refills: 0 | Status: DISCONTINUED | OUTPATIENT
Start: 2021-03-01 | End: 2021-03-01

## 2021-03-01 RX ORDER — SIMVASTATIN 20 MG/1
5 TABLET, FILM COATED ORAL AT BEDTIME
Refills: 0 | Status: DISCONTINUED | OUTPATIENT
Start: 2021-03-01 | End: 2021-03-01

## 2021-03-01 RX ORDER — DILTIAZEM HCL 120 MG
1 CAPSULE, EXT RELEASE 24 HR ORAL
Qty: 0 | Refills: 0 | DISCHARGE

## 2021-03-01 RX ORDER — DILTIAZEM HCL 120 MG
240 CAPSULE, EXT RELEASE 24 HR ORAL DAILY
Refills: 0 | Status: DISCONTINUED | OUTPATIENT
Start: 2021-03-01 | End: 2021-03-01

## 2021-03-01 RX ORDER — CLOPIDOGREL BISULFATE 75 MG/1
1 TABLET, FILM COATED ORAL
Qty: 90 | Refills: 3
Start: 2021-03-01 | End: 2022-02-23

## 2021-03-01 RX ORDER — CLOPIDOGREL BISULFATE 75 MG/1
75 TABLET, FILM COATED ORAL DAILY
Refills: 0 | Status: DISCONTINUED | OUTPATIENT
Start: 2021-03-01 | End: 2021-03-01

## 2021-03-01 RX ORDER — RIVAROXABAN 15 MG-20MG
1 KIT ORAL
Qty: 0 | Refills: 0 | DISCHARGE

## 2021-03-01 RX ORDER — DILTIAZEM HCL 120 MG
1 CAPSULE, EXT RELEASE 24 HR ORAL
Qty: 0 | Refills: 0 | DISCHARGE
Start: 2021-03-01

## 2021-03-01 RX ORDER — ASPIRIN/CALCIUM CARB/MAGNESIUM 324 MG
81 TABLET ORAL DAILY
Refills: 0 | Status: DISCONTINUED | OUTPATIENT
Start: 2021-03-01 | End: 2021-03-01

## 2021-03-01 RX ADMIN — Medication 81 MILLIGRAM(S): at 07:51

## 2021-03-01 RX ADMIN — SODIUM CHLORIDE 3 MILLILITER(S): 9 INJECTION INTRAMUSCULAR; INTRAVENOUS; SUBCUTANEOUS at 13:04

## 2021-03-01 RX ADMIN — CLOPIDOGREL BISULFATE 75 MILLIGRAM(S): 75 TABLET, FILM COATED ORAL at 07:51

## 2021-03-01 NOTE — ASU PATIENT PROFILE, ADULT - BRADEN SCORE (IF 18 OR LESS ACTIVATE SKIN INJURY RISK INCREASED GUIDELINE), MLM
Bariatric Follow Up Nutrition Note    Preop  Preop Program    Type of surgery  Preop  Surgery Date: TBD  Surgeon: Dr John Guadarrama  48 y o   male  Wt 128 kg (281 lb 11 2 oz)   BMI 39 29 kg/m²     [facility]  Link Medal Equation:  2652 kcal/day= weight maintenance  1652 kcal/day= 2 lb/wk wt loss  Weight on Day of Weight Loss Surgery: 5%=14 8#= Day of surgery goal of 281 3#  Wt with BMI of 25: 178 6lbs  Pre-Op Excess Wt: 117 5lbs  14  3# wt loss since last office visit    0 4# wt loss until day of surgery goal     Review of History and Medications   Past Medical History:   Diagnosis Date    [facility]     Continuous positive airway pressure dependence     Heart disease     Hypercholesterolemia     Morbid obesity ([facility] )     NSTEMI (non-ST elevation myocardial infarction) ([facility] )     Sleep apnea     uses c pap    Umbilical hernia     Resolved 2015     Ventral hernia     Resolved 2015      Past Surgical History:   Procedure Laterality Date    SKIN SURGERY      TONSILLECTOMY      TOOTH EXTRACTION       Social History     Socioeconomic History    Marital status: /Civil Union     Spouse name: Not on file    Number of children: Not on file    Years of education: Not on file    Highest education level: Not on file   Occupational History    Not on file   Social Needs    Financial resource strain: Not on file    Food insecurity:     Worry: Not on file     Inability: Not on file    Transportation needs:     Medical: Not on file     Non-medical: Not on file   Tobacco Use    Smoking status: Former Smoker     Last attempt to quit:      Years since quittin 6    Smokeless tobacco: Former User     Quit date: 2019    Tobacco comment: cigs   Substance and Sexual Activity    Alcohol use: Not Currently     Frequency: Never    Drug use: Never    Sexual activity: Not on file   Lifestyle    Physical activity:     Days per week: Not on file     Minutes per session: Not on file    Stress: Not on file   Relationships    Social connections:     Talks on phone: Not on file     Gets together: Not on file     Attends Jewish service: Not on file     Active member of club or organization: Not on file     Attends meetings of clubs or organizations: Not on file     Relationship status: Not on file    Intimate partner violence:     Fear of current or ex partner: Not on file     Emotionally abused: Not on file     Physically abused: Not on file     Forced sexual activity: Not on file   Other Topics Concern    Not on file   Social History Narrative    Former smoker - As per [employer]    Full-time employment       Current Outpatient Medications:     aspirin (ECOTRIN LOW STRENGTH) 81 mg EC tablet, Take 1 tablet (81 mg total) by mouth daily, Disp: 30 tablet, Rfl: 0    atorvastatin (LIPITOR) 40 mg tablet, Take 1 tablet (40 mg total) by mouth daily, Disp: 90 tablet, Rfl: 3    butalbital-acetaminophen-caffeine-codeine (FIORICET WITH CODEINE) -69-30 MG per capsule, Take 1 capsule by mouth every 4 (four) hours as needed for headaches, Disp: , Rfl:     clonazePAM (KlonoPIN) 1 mg tablet, 1 mg 3 (three) times a day , Disp: , Rfl:     ergocalciferol (VITAMIN D2) 50,000 units, Take 50,000 Units by mouth once a week, Disp: , Rfl: 0    famotidine (PEPCID) 40 MG tablet, Take 1 tablet (40 mg total) by mouth daily, Disp: 30 tablet, Rfl: 3    isosorbide mononitrate (IMDUR) 30 mg 24 hr tablet, Take 1 tablet (30 mg total) by mouth daily, Disp: 30 tablet, Rfl: 3    meclizine (ANTIVERT) 25 mg tablet, Take 1 tablet (25 mg total) by mouth 3 (three) times a day as needed for dizziness, Disp: 30 tablet, Rfl: 1    metoprolol succinate (TOPROL-XL) 25 mg 24 hr tablet, Take 1 tablet (25 mg total) by mouth daily, Disp: 90 tablet, Rfl: 1    naproxen sodium (ANAPROX) 550 mg tablet, TAKE 1 TABLET BY MOUTH ONCE DAILY AS NEEDED FOR HEADACHE (MAY REPEAT 1 TIME AFTER 4 HOURS), Disp: , Rfl: 3    OXcarbazepine (TRILEPTAL) 600 mg tablet, Take 300 mg by mouth 2 (two) times a day, Disp: , Rfl:     paliperidone (INVEGA) 9 MG 24 hr tablet, Take 6 mg by mouth 2 (two) times a day , Disp: , Rfl:     rizatriptan (MAXALT) 10 MG tablet, Take 10 mg by mouth once as needed for migraine May repeat in 2 hours if needed, Disp: , Rfl:     traMADol (ULTRAM) 50 mg tablet, Take 1 tablet (50 mg total) by mouth every 6 (six) hours as needed for moderate pain, Disp: 20 tablet, Rfl: 0    traZODone (DESYREL) 300 MG tablet, Take 300 mg by mouth daily at bedtime, Disp: , Rfl:     venlafaxine (EFFEXOR-XR) 75 mg 24 hr capsule, Take 150 mg by mouth daily , Disp: , Rfl: 3    Food Intake and Lifestyle Assessment   Food Intake Assessment completed via usual diet recall  Breakfast: 9:00am:  Protein drink  Snack: 11:00am: Peanut butter and jelly sandwich on rye break  Works 3rd shift  Goes to bed at noon  Lunch: 11:00pm: banana  Snack: 3:00am: home made chili  Dinner: 5:00am:  Peaches or nectarines  Snack: none  Beverage intake: water  Diet texture/stage: regular  Protein supplement: Protein drink daily  Estimated protein intake per day: 60-90g  Estimated fluid intake per day: Pt reports he drinks three to four 40-oz water bottles at work and more fluids at home   80-100oz+ daily  Meals eaten away from home: rare  Typical meal pattern: 2 meals per day and 3 snacks per day  Eating Behaviors: Appropriate portion sizes, Does not drink with meals and waits 30-minutes after meal before resuming drinking and Frequent snacking/ grazing    Food allergies or intolerances: none  Cultural or Adventism considerations: no red meat, no added salt    Physical Assessment  Physical Activity  Types of exercise: work is very physically demanding, sedentary at home  Current physical limitations: none     Psychosocial Assessment   Support systems: spouse friend(s)  Socioeconomic factors: 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      Nutrition Diagnosis- Continued/improving  Diagnosis: Overweight / Obesity (NC-3 3) and Excessive energy intake (NI-1 5)  Related to: Physical inactivity and Excessive energy intake  As Evidenced by: BMI >25, Excessive energy intake and Unintentional weight gain     Interventions and Teaching   Patient educated on post-op nutrition guidelines  Patient educated and handouts provided  Surgical changes to stomach / GI  Capacity of post-surgery stomach  Diet progression  Adequate hydration  Sugar and fat restriction to decrease "dumping syndrome"  Expected weight loss  Exercise  Suggestions for pre-op diet  Nutrition considerations after surgery  Protein supplements  Meal planning and preparation  Appropriate carbohydrate, protein, and fat intake, and food/fluid choices to maximize safe weight loss, nutrient intake, and tolerance   Dietary and lifestyle changes  Possible problems with poor eating habits  Techniques for self monitoring and keeping daily food journal  Potential for food intolerance after surgery, and ways to deal with them including: lactose intolerance, nausea, reflux, vomiting, diarrhea, food intolerance, appetite changes, gas    Education provided to: patient    Barriers to learning: No barriers identified    Readiness to change: action    Comprehension: demonstrated understanding and verbalizes understanding     Expected Compliance: excellent    Evaluation/Monitoring   Eating pattern as discussed Tolerance of nutrition prescription Body weight Lab values Physical activity    Goals  Food journal, Exercise 30 minutes 5 times per week, Complete lession plans 1-6 and Eat 3 meals per day    Workflow:  1) Nicotine test:  Ordered for pt today  2) EGD: will be scheduled at surgeon consult this afternoon      Time Spent:   30 Minutes 22

## 2021-03-01 NOTE — ASU DISCHARGE PLAN (ADULT/PEDIATRIC) - CARE PROVIDER_API CALL
Nate Morales)  Cardiovascular Disease; Internal Medicine  [address] [place] [address]  Phone: [phone]  Fax: [phone]  Follow Up Time:

## 2021-03-01 NOTE — H&P CARDIOLOGY - PSH
Cataract, Other  surgery   bilateral  H/O knee surgery    H/O ovarian cystectomy  2013  H/O Shoulder Replacement  right shoulder  get clindamycin before surgery  Parathyroid  surgery  S/P Bunionectomy     Cataract, Other  surgery   bilateral  H/O knee surgery    H/O ovarian cystectomy  2013  H/O Shoulder Replacement  right shoulder  get clindamycin before surgery  Parathyroid  surgery  S/P Bunionectomy    S/P cardiac catheterization  2/26/21

## 2021-03-01 NOTE — ASU DISCHARGE PLAN (ADULT/PEDIATRIC) - ASU DC SPECIAL INSTRUCTIONSFT

## 2021-03-01 NOTE — H&P CARDIOLOGY - HISTORY OF PRESENT ILLNESS
86 year old female with PMHX of Afib on Xarelto (last dose on 2/23), MR, AS and HLD, arthritis, osteoporosis presents for cardiac cath. Pt reports she has been f/u with Dr. Morales, and referred to Valve Clinic for aortic stenosis, with progression of AS and LVH on her most recent echocardiogram. Pt reports occasional exertional dyspnea, and intermittent angina that she has been attributing to anxiety (NYHA II). She has no dyspnea, PND, or orthopnea.  She is independence in all ADLs and has bilateral lower extremity edema for several years, worse since having a TKR, and was seen by vascular (compression stockings were recommended, she was told "my veins are good"). She has been unable to wear the compression stockings as she finds them uncomfortable.  87 y/o female with pmh of HTN, HLD, CAD s/p diagnostic LHC via RFA with hematoma during end of procedure with pLAD 60%, mLAD 80% (heavily calcified), pCx 20%; oRCA 50%, Afib on Xarelto (last dose on 2/24), MR, severe AS, venous insufficiency, arthritis, osteoporosis, and diverticulosis followed by Dr. Morales and Dr. Jacobo, Structural heart presents for PCI of her LAD in preparation for her TAVR scheduled on 2/18/21.  Her right groin has small hematoma with old ecchymosis at the site.  She denies sob, orthopnea, palpitations with some intermittent chest tightening randomly since her cath on 2/26/21.  Her PCI was delayed 2/2 the groin complication at the end of the case and on examination  her right groin has small dime size hematoma with old ecchymosis at the site.  She denies sick contacts, fevers, chills or implantable devices and COVID PCR on 2/26.

## 2021-03-01 NOTE — ASU PATIENT PROFILE, ADULT - PRO ARRIVE FROM
I will SWITCH the dose or number of times a day I take the medications listed below when I get home from the hospital:  None home

## 2021-03-01 NOTE — H&P CARDIOLOGY - PMH
Abdominal Mass    Afib    Aortic valve stenosis    Constipation    Diverticulosis of the Colon    DJD (Degenerative Joint Disease)  shoulders, knees, cervical and lumbar spine  Hiatal Hernia    History of Osteoarthritis    HLD (hyperlipidemia)    HTN (Hypertension)    Hypercholesterolemia    MVP (Mitral Valve Prolapse)  last echo 2010  Thyroid Nodule     Abdominal Mass    Afib    Aortic valve stenosis    CAD (coronary artery disease)    Constipation    Diverticulosis of the Colon    DJD (Degenerative Joint Disease)  shoulders, knees, cervical and lumbar spine  Hiatal Hernia    History of Osteoarthritis    HLD (hyperlipidemia)    HTN (Hypertension)    Hypercholesterolemia    MVP (Mitral Valve Prolapse)  last echo 2010  Thyroid Nodule

## 2021-03-04 ENCOUNTER — EMERGENCY (EMERGENCY)
Facility: HOSPITAL | Age: 86
LOS: 1 days | Discharge: HOME | End: 2021-03-04
Payer: MEDICARE

## 2021-03-04 ENCOUNTER — TRANSCRIPTION ENCOUNTER (OUTPATIENT)
Age: 86
End: 2021-03-04

## 2021-03-04 VITALS
TEMPERATURE: 98 F | DIASTOLIC BLOOD PRESSURE: 74 MMHG | OXYGEN SATURATION: 99 % | RESPIRATION RATE: 18 BRPM | SYSTOLIC BLOOD PRESSURE: 148 MMHG | HEART RATE: 85 BPM

## 2021-03-04 VITALS
TEMPERATURE: 98 F | WEIGHT: 117.95 LBS | RESPIRATION RATE: 18 BRPM | DIASTOLIC BLOOD PRESSURE: 83 MMHG | SYSTOLIC BLOOD PRESSURE: 148 MMHG | OXYGEN SATURATION: 97 % | HEIGHT: 63 IN | HEART RATE: 87 BPM

## 2021-03-04 DIAGNOSIS — Z98.890 OTHER SPECIFIED POSTPROCEDURAL STATES: Chronic | ICD-10-CM

## 2021-03-04 PROBLEM — I25.10 ATHEROSCLEROTIC HEART DISEASE OF NATIVE CORONARY ARTERY WITHOUT ANGINA PECTORIS: Chronic | Status: ACTIVE | Noted: 2021-03-01

## 2021-03-04 PROCEDURE — 99283 EMERGENCY DEPT VISIT LOW MDM: CPT | Mod: 25

## 2021-03-04 PROCEDURE — 30901 CONTROL OF NOSEBLEED: CPT

## 2021-03-04 PROCEDURE — 30903 CONTROL OF NOSEBLEED: CPT

## 2021-03-04 NOTE — ED PROVIDER NOTE - CLINICAL SUMMARY MEDICAL DECISION MAKING FREE TEXT BOX
Pt on asa, Plavix and Xarelto refereed by urgent care for epistaxis, packing placed by UC, replace packing with rapid rhino Pt on asa, Plavix and Xarelto referred by urgent care for epistaxis, packing placed by UC Plan: replace packing with rapid rhino

## 2021-03-04 NOTE — ED PROVIDER NOTE - PATIENT PORTAL LINK FT
You can access the FollowMyHealth Patient Portal offered by [facility] by registering at the following website: [email or web address]. By joining [facility]’s FollowMyHealth portal, you will also be able to view your health information using other applications (apps) compatible with our system.

## 2021-03-04 NOTE — ED ADULT NURSE NOTE - PSH
Cataract, Other  surgery   bilateral  H/O knee surgery    H/O ovarian cystectomy  2013  H/O Shoulder Replacement  right shoulder  get clindamycin before surgery  Parathyroid  surgery  S/P Bunionectomy    S/P cardiac catheterization  2/26/21

## 2021-03-04 NOTE — ED ADULT NURSE NOTE - OBJECTIVE STATEMENT
87 yo female presents to the ED with complaints of a nose bleed , reports being on ASA, plavix and xarelto due to her cardiac procedure, bleeding started this morning pt tried to resolve bleeding on her but then bleeding continued profusely so her son took her to  which then sent her to ED as they are unable to stop bleeding. Light bleeding noted at this time, will do tongue blade maneuver to help with bleeding and continue to monitor.

## 2021-03-04 NOTE — ED ADULT NURSE NOTE - PMH
Abdominal Mass    Afib    Aortic valve stenosis    CAD (coronary artery disease)    Constipation    Diverticulosis of the Colon    DJD (Degenerative Joint Disease)  shoulders, knees, cervical and lumbar spine  Hiatal Hernia    History of Osteoarthritis    HLD (hyperlipidemia)    HTN (Hypertension)    Hypercholesterolemia    MVP (Mitral Valve Prolapse)  last echo 2010  Thyroid Nodule

## 2021-03-04 NOTE — ED PROVIDER NOTE - CARDIAC, MLM
Normal rate, regular rhythm.  Heart sounds S1, S2. Normal rate, regular rhythm.  Heart sounds S1, S2

## 2021-03-04 NOTE — ED PROVIDER NOTE - PROVIDER TOKENS
PROVIDER:[TOKEN:[[id number]],FOLLOWUP:[1-3 Days]],PROVIDER:[TOKEN:[[id number]],FOLLOWUP:[1-3 Days]],PROVIDER:[TOKEN:[[id number]],FOLLOWUP:[1-3 Days]],PROVIDER:[TOKEN:[[id number]],FOLLOWUP:[1-3 Days]],PROVIDER:[TOKEN:[[id number]],FOLLOWUP:[1-3 Days]]

## 2021-03-04 NOTE — CHART NOTE - NSCHARTNOTEFT_GEN_A_CORE
pt called to [facility] c/o nose bleeding since this am.   Right groin site: denies tenderness or palpating lump or hard feeling  Pt is on ASA, Plavix, Xarelto   s/p diagnostic LHC prior to TAVR, then f/b CSI and Stenting of the LAD disease.  STOP Aspirin and continue Plavix and Xarelto instructed after discussion with Dr. Lopez and recommended to go urgent care for nose bleeding.     Rec to f/u with Dr. Morales in 1-2 weeks.   pt understood and verbalized to go urgent care and seeing Dr. Morales. pt called to [facility] c/o nose bleeding since this am.   Right groin site: denies tenderness or palpating lump or hard feeling  Pt is on ASA, Plavix, Xarelto   s/p diagnostic LHC prior to TAVR, then f/b CSI and Stenting of the LAD disease on 3/2/21.   STOP Aspirin and continue Plavix and Xarelto instructed after discussion with Dr. Lopez and recommended to go urgent care for nose bleeding.     Rec to f/u with Dr. Morales in 1-2 weeks.   pt understood and verbalized to go urgent care and seeing Dr. Morales. pt called to [facility] c/o nose bleeding since this am.   Right groin site: denies tenderness or palpating lump or hard feeling  Pt is on ASA, Plavix, Xarelto for A-fib   s/p diagnostic LHC prior to TAVR, then f/b CSI and Stenting of the LAD disease on 3/2/21.   STOP Aspirin and continue Plavix and Xarelto instructed after discussion with Dr. Lopez and recommended to go urgent care for nose bleeding.     Rec to f/u with Dr. Morales in 1-2 weeks.   pt understood and verbalized to go urgent care and seeing Dr. Morales. pt called to [facility] c/o nose bleeding since this am.   Right groin site: denies tenderness or palpating lump or hard feeling  Pt is on ASA, Plavix, Xarelto for A-fib   s/p diagnostic LHC prior to TAVR, then f/b CSI and Stenting of the LAD disease on 3/2/21.   STOP Aspirin and continue Plavix and Xarelto instructed after discussion with Dr. Lopez and recommended to go urgent care for nose bleeding.     Rec to f/u with Dr. Morales in 1-2 weeks.   pt understood and verbalized to go urgent care and seeing Dr. Morales.    Called pt later pm now 16:55 for update.   pt's son Willie reports pt went to [place] [facility] to have nose bleeding treatment because urgent care was not able to stop bleeding.   Now pt has package on her nose has a f/u appointment with ENT on Saturday (3/6).   Advised to take Tylenol 2 tabs prn for discomfort.   pt contacted with Dr. Morales as well.

## 2021-03-04 NOTE — ED PROVIDER NOTE - CARE PROVIDER_API CALL
Derian Reid)  Facial Plastic and Reconstructive Surgery; Otolaryngology  [address] [place] [address]  Phone: [phone]  Fax: [phone]  Follow Up Time: 1-3 Days    Chicho Shelton  OTOLARYNGOLOGY  [address] [place] [address], [address]  [place] [address]  Phone: [phone]  Fax: [phone]  Follow Up Time: 1-3 Days    Malvin Samuel  OTOLARYNGOLOGY  [address]  [place] [address]  Phone: [phone]  Fax: [phone]  Follow Up Time: 1-3 Days    Devon Hanley)  Otolaryngology  [address], [address]  [place] [address]  Phone: [phone]  Fax: [phone]  Follow Up Time: 1-3 Days    Familia Aguero  OTOLARYNGOLOGY  [address] [place] [address], [address]  [place], [place] [id number]  Phone: [phone]  Fax: [phone]  Follow Up Time: 1-3 Days

## 2021-03-04 NOTE — ED PROVIDER NOTE - ENMT, MLM
Airway patent, Nasal mucosa clear. Mouth with normal mucosa. Throat has no vesicles, no oropharyngeal exudates and uvula is midline. Airway patent. + mersalyl packing right nostril, no blood left nostril. Mouth with normal mucosa. Throat has no vesicles, no oropharyngeal exudates and uvula is midline. Airway patent. + Merocel packing right nostril, no blood left nostril. Mouth with normal mucosa. Throat has no vesicles, no oropharyngeal exudates and uvula is midline.

## 2021-03-04 NOTE — ED ADULT TRIAGE NOTE - CHIEF COMPLAINT QUOTE
Patient presents complaining of nose bleed since this morning. Patient recently had stent placed and was put on asa and plavix after procedure. Patient was seen at urgent care and was sent to ED.

## 2021-03-04 NOTE — ED PROVIDER NOTE - OBJECTIVE STATEMENT
86 year old female with a PMhx of CAD s/p stents, on ASA, Xarelto and Plavix, recent stent placement 3 days ago, Afib, HTN, HLD presents to the ED for nose bleed from right nostril since this morning. Pt states that the bleeding started with a little blood, but has not stopped bleeding. Pt was seen at urgent care PTA, had a packing placed but the nose continued to bleed so pt was told to come in to the ED. Denies chest pain, SOB, dizziness, n/v. 86 year old female with a PMhx of CAD s/p stents, on ASA, Xarelto and Plavix, recent stent placement 3 days ago, Afib, HTN, HLD presents to the ED for nose bleed from right nostril since this morning. Pt states that the bleeding started with a little blood, but has not stopped bleeding. Pt was seen at urgent care PTA, had a packing place, bleeding initially stopped, but then the nose began to bleed again so pt was told to come in to the ED. Denies chest pain, SOB, dizziness, n/v. pt relates bleeding stopped PTA in ER.  ENT - none

## 2021-03-04 NOTE — ED PROVIDER NOTE - SKIN, MLM
Skin normal color for race, warm, dry and intact. No evidence of rash. Skin normal color for race, warm, dry

## 2021-03-04 NOTE — ED ADULT NURSE NOTE - NSIMPLEMENTINTERV_GEN_ALL_ED
Implemented All Fall with Harm Risk Interventions:  [place] to call system. Call bell, personal items and telephone within reach. Instruct patient to call for assistance. Room bathroom lighting operational. Non-slip footwear when patient is off stretcher. Physically safe environment: no spills, clutter or unnecessary equipment. Stretcher in lowest position, wheels locked, appropriate side rails in place. Provide visual cue, wrist band, yellow gown, etc. Monitor gait and stability. Monitor for mental status changes and reorient to person, place, and time. Review medications for side effects contributing to fall risk. Reinforce activity limits and safety measures with patient and family. Provide visual clues: red socks.

## 2021-03-08 ENCOUNTER — NON-APPOINTMENT (OUTPATIENT)
Age: 86
End: 2021-03-08

## 2021-03-16 ENCOUNTER — INPATIENT (INPATIENT)
Facility: HOSPITAL | Age: 86
LOS: 9 days | Discharge: HOME | DRG: 266 | End: 2021-03-26
Attending: THORACIC SURGERY (CARDIOTHORACIC VASCULAR SURGERY) | Admitting: THORACIC SURGERY (CARDIOTHORACIC VASCULAR SURGERY)
Payer: MEDICARE

## 2021-03-16 ENCOUNTER — OUTPATIENT (OUTPATIENT)
Dept: OUTPATIENT SERVICES | Facility: HOSPITAL | Age: 86
LOS: 1 days | End: 2021-03-16

## 2021-03-16 ENCOUNTER — APPOINTMENT (OUTPATIENT)
Dept: ULTRASOUND IMAGING | Facility: HOSPITAL | Age: 86
End: 2021-03-16

## 2021-03-16 ENCOUNTER — OUTPATIENT (OUTPATIENT)
Dept: OUTPATIENT SERVICES | Facility: HOSPITAL | Age: 86
LOS: 1 days | End: 2021-03-16
Payer: MEDICARE

## 2021-03-16 ENCOUNTER — RESULT REVIEW (OUTPATIENT)
Age: 86
End: 2021-03-16

## 2021-03-16 VITALS
SYSTOLIC BLOOD PRESSURE: 137 MMHG | DIASTOLIC BLOOD PRESSURE: 64 MMHG | OXYGEN SATURATION: 100 % | HEART RATE: 84 BPM | WEIGHT: 123.02 LBS | HEIGHT: 63 IN | RESPIRATION RATE: 18 BRPM | TEMPERATURE: 98 F

## 2021-03-16 VITALS
SYSTOLIC BLOOD PRESSURE: 134 MMHG | HEIGHT: 63 IN | HEART RATE: 79 BPM | RESPIRATION RATE: 14 BRPM | OXYGEN SATURATION: 100 % | TEMPERATURE: 98 F | DIASTOLIC BLOOD PRESSURE: 73 MMHG | WEIGHT: 119.93 LBS

## 2021-03-16 DIAGNOSIS — Z98.890 OTHER SPECIFIED POSTPROCEDURAL STATES: Chronic | ICD-10-CM

## 2021-03-16 DIAGNOSIS — I35.0 NONRHEUMATIC AORTIC (VALVE) STENOSIS: ICD-10-CM

## 2021-03-16 DIAGNOSIS — Z29.9 ENCOUNTER FOR PROPHYLACTIC MEASURES, UNSPECIFIED: ICD-10-CM

## 2021-03-16 DIAGNOSIS — Z11.52 ENCOUNTER FOR SCREENING FOR COVID-19: ICD-10-CM

## 2021-03-16 LAB
ANION GAP SERPL CALC-SCNC: 11 MMOL/L — SIGNIFICANT CHANGE UP (ref 5–17)
APPEARANCE UR: ABNORMAL
BACTERIA # UR AUTO: ABNORMAL
BILIRUB UR-MCNC: NEGATIVE — SIGNIFICANT CHANGE UP
BUN SERPL-MCNC: 20 MG/DL — SIGNIFICANT CHANGE UP (ref 7–23)
CALCIUM SERPL-MCNC: 9.4 MG/DL — SIGNIFICANT CHANGE UP (ref 8.4–10.5)
CHLORIDE SERPL-SCNC: 102 MMOL/L — SIGNIFICANT CHANGE UP (ref 96–108)
CO2 SERPL-SCNC: 24 MMOL/L — SIGNIFICANT CHANGE UP (ref 22–31)
COLOR SPEC: SIGNIFICANT CHANGE UP
CREAT SERPL-MCNC: 0.68 MG/DL — SIGNIFICANT CHANGE UP (ref 0.5–1.3)
DIFF PNL FLD: ABNORMAL
EPI CELLS # UR: 0 /HPF — SIGNIFICANT CHANGE UP
GLUCOSE SERPL-MCNC: 92 MG/DL — SIGNIFICANT CHANGE UP (ref 70–99)
GLUCOSE UR QL: NEGATIVE — SIGNIFICANT CHANGE UP
HCT VFR BLD CALC: 21.6 % — LOW (ref 34.5–45)
HGB BLD-MCNC: 6.8 G/DL — CRITICAL LOW (ref 11.5–15.5)
HYALINE CASTS # UR AUTO: 1 /LPF — SIGNIFICANT CHANGE UP (ref 0–2)
KETONES UR-MCNC: NEGATIVE — SIGNIFICANT CHANGE UP
LEUKOCYTE ESTERASE UR-ACNC: ABNORMAL
MCHC RBC-ENTMCNC: 29.8 PG — SIGNIFICANT CHANGE UP (ref 27–34)
MCHC RBC-ENTMCNC: 31.5 GM/DL — LOW (ref 32–36)
MCV RBC AUTO: 94.7 FL — SIGNIFICANT CHANGE UP (ref 80–100)
MRSA PCR RESULT.: SIGNIFICANT CHANGE UP
NITRITE UR-MCNC: POSITIVE
NRBC # BLD: 0 /100 WBCS — SIGNIFICANT CHANGE UP (ref 0–0)
PH UR: 6.5 — SIGNIFICANT CHANGE UP (ref 5–8)
PLATELET # BLD AUTO: 294 K/UL — SIGNIFICANT CHANGE UP (ref 150–400)
POTASSIUM SERPL-MCNC: 4 MMOL/L — SIGNIFICANT CHANGE UP (ref 3.5–5.3)
POTASSIUM SERPL-SCNC: 4 MMOL/L — SIGNIFICANT CHANGE UP (ref 3.5–5.3)
PROT UR-MCNC: NEGATIVE — SIGNIFICANT CHANGE UP
RBC # BLD: 2.28 M/UL — LOW (ref 3.8–5.2)
RBC # FLD: 17.2 % — HIGH (ref 10.3–14.5)
RBC CASTS # UR COMP ASSIST: 2 /HPF — SIGNIFICANT CHANGE UP (ref 0–4)
S AUREUS DNA NOSE QL NAA+PROBE: SIGNIFICANT CHANGE UP
SARS-COV-2 RNA SPEC QL NAA+PROBE: SIGNIFICANT CHANGE UP
SODIUM SERPL-SCNC: 137 MMOL/L — SIGNIFICANT CHANGE UP (ref 135–145)
SP GR SPEC: 1.01 — SIGNIFICANT CHANGE UP (ref 1.01–1.02)
UROBILINOGEN FLD QL: NEGATIVE — SIGNIFICANT CHANGE UP
WBC # BLD: 8.34 K/UL — SIGNIFICANT CHANGE UP (ref 3.8–10.5)
WBC # FLD AUTO: 8.34 K/UL — SIGNIFICANT CHANGE UP (ref 3.8–10.5)
WBC UR QL: 193 /HPF — HIGH (ref 0–5)

## 2021-03-16 PROCEDURE — C9602: CPT

## 2021-03-16 PROCEDURE — 93460 R&L HRT ART/VENTRICLE ANGIO: CPT

## 2021-03-16 PROCEDURE — 74176 CT ABD & PELVIS W/O CONTRAST: CPT | Mod: 26

## 2021-03-16 PROCEDURE — U0003: CPT

## 2021-03-16 PROCEDURE — C1874: CPT

## 2021-03-16 PROCEDURE — 99153 MOD SED SAME PHYS/QHP EA: CPT

## 2021-03-16 PROCEDURE — C1769: CPT

## 2021-03-16 PROCEDURE — 93005 ELECTROCARDIOGRAM TRACING: CPT

## 2021-03-16 PROCEDURE — C1760: CPT

## 2021-03-16 PROCEDURE — 93010 ELECTROCARDIOGRAM REPORT: CPT

## 2021-03-16 PROCEDURE — 80053 COMPREHEN METABOLIC PANEL: CPT

## 2021-03-16 PROCEDURE — 99222 1ST HOSP IP/OBS MODERATE 55: CPT

## 2021-03-16 PROCEDURE — C9603: CPT

## 2021-03-16 PROCEDURE — 85027 COMPLETE CBC AUTOMATED: CPT

## 2021-03-16 PROCEDURE — C1887: CPT

## 2021-03-16 PROCEDURE — C1894: CPT

## 2021-03-16 PROCEDURE — 80048 BASIC METABOLIC PNL TOTAL CA: CPT

## 2021-03-16 PROCEDURE — U0005: CPT

## 2021-03-16 PROCEDURE — C1724: CPT

## 2021-03-16 PROCEDURE — 99152 MOD SED SAME PHYS/QHP 5/>YRS: CPT

## 2021-03-16 PROCEDURE — 93880 EXTRACRANIAL BILAT STUDY: CPT | Mod: 26

## 2021-03-16 PROCEDURE — 93454 CORONARY ARTERY ANGIO S&I: CPT

## 2021-03-16 PROCEDURE — C1725: CPT

## 2021-03-16 RX ORDER — DILTIAZEM HCL 120 MG
240 CAPSULE, EXT RELEASE 24 HR ORAL DAILY
Refills: 0 | Status: DISCONTINUED | OUTPATIENT
Start: 2021-03-16 | End: 2021-03-24

## 2021-03-16 RX ORDER — PANTOPRAZOLE SODIUM 20 MG/1
40 TABLET, DELAYED RELEASE ORAL
Refills: 0 | Status: DISCONTINUED | OUTPATIENT
Start: 2021-03-16 | End: 2021-03-19

## 2021-03-16 RX ORDER — ATORVASTATIN CALCIUM 80 MG/1
10 TABLET, FILM COATED ORAL AT BEDTIME
Refills: 0 | Status: DISCONTINUED | OUTPATIENT
Start: 2021-03-16 | End: 2021-03-24

## 2021-03-16 RX ORDER — CLOPIDOGREL BISULFATE 75 MG/1
75 TABLET, FILM COATED ORAL DAILY
Refills: 0 | Status: DISCONTINUED | OUTPATIENT
Start: 2021-03-16 | End: 2021-03-24

## 2021-03-16 RX ORDER — RIVAROXABAN 15 MG-20MG
1 KIT ORAL
Qty: 0 | Refills: 0 | DISCHARGE

## 2021-03-16 RX ADMIN — ATORVASTATIN CALCIUM 10 MILLIGRAM(S): 80 TABLET, FILM COATED ORAL at 21:16

## 2021-03-16 NOTE — H&P PST ADULT - PULMONARY EMBOLUS
A1C goal: <7%  Fasting/premeal blood glucose goal:   2 hour post-meal blood glucose goal: less than 180      Discussed starting topical metformin. However, pt is reluctant because of GI s/e in the past on it.   Increase glipizide to 5 mg before breakfast and 5 mg before dinner.   Discussed risk of hypoglycemia with glipizide. Skip glipizide if not eating a meal.   Monitor glucose at least 2x/day - fasting and again 2 hours after a meal.   Follow up in 3 months with labs prior.    no

## 2021-03-16 NOTE — H&P PST ADULT - ASSESSMENT
DARIUSZI VTE 2.0 SCORE [CLOT updated 2019]    AGE RELATED RISK FACTORS                                                       MOBILITY RELATED FACTORS  [ ] Age 41-60 years                                            (1 Point)                    [ ] Bed rest                                                        (1 Point)  [ ] Age: 61-74 years                                           (2 Points)                  [ ] Plaster cast                                                   (2 Points)  [ ] Age= 75 years                                              (3 Points)                    [ ] Bed bound for more than 72 hours                 (2 Points)    DISEASE RELATED RISK FACTORS                                               GENDER SPECIFIC FACTORS  [ ] Edema in the lower extremities                       (1 Point)              [ ] Pregnancy                                                     (1 Point)  [ ] Varicose veins                                               (1 Point)                     [ ] Post-partum < 6 weeks                                   (1 Point)             [ ] BMI > 25 Kg/m2                                            (1 Point)                     [ ] Hormonal therapy  or oral contraception          (1 Point)                 [ ] Sepsis (in the previous month)                        (1 Point)               [ ] History of pregnancy complications                 (1 point)  [ ] Pneumonia or serious lung disease                                               [ ] Unexplained or recurrent                     (1 Point)           (in the previous month)                               (1 Point)  [ ] Abnormal pulmonary function test                     (1 Point)                 SURGERY RELATED RISK FACTORS  [ ] Acute myocardial infarction                              (1 Point)               [ ]  Section                                             (1 Point)  [ ] Congestive heart failure (in the previous month)  (1 Point)      [ ] Minor surgery                                                  (1 Point)   [ ] Inflammatory bowel disease                             (1 Point)               [ ] Arthroscopic surgery                                        (2 Points)  [ ] Central venous access                                      (2 Points)                [ ] General surgery lasting more than 45 minutes (2 points)  [ ] Malignancy- Present or previous                   (2 Points)                [ ] Elective arthroplasty                                         (5 points)    [ ] Stroke (in the previous month)                          (5 Points)                                                                                                                                                           HEMATOLOGY RELATED FACTORS                                                 TRAUMA RELATED RISK FACTORS  [ ] Prior episodes of VTE                                     (3 Points)                [ ] Fracture of the hip, pelvis, or leg                       (5 Points)  [ ] Positive family history for VTE                         (3 Points)             [ ] Acute spinal cord injury (in the previous month)  (5 Points)  [ ] Prothrombin 94573 A                                     (3 Points)               [ ] Paralysis  (less than 1 month)                             (5 Points)  [ ] Factor V Leiden                                             (3 Points)                  [ ] Multiple Trauma within 1 month                        (5 Points)  [ ] Lupus anticoagulants                                     (3 Points)                                                           [ ] Anticardiolipin antibodies                               (3 Points)                                                       [ ] High homocysteine in the blood                      (3 Points)                                             [ ] Other congenital or acquired thrombophilia      (3 Points)                                                [ ] Heparin induced thrombocytopenia                  (3 Points)                                     Total Score [     6     ]

## 2021-03-16 NOTE — H&P PST ADULT - HISTORY OF PRESENT ILLNESS
85 y/o female with PMHx of HTN, HLD, CAD, Afib on Xarelto (last taken 3/14/21), MR, severe AS, venous insufficiency, arthritis, osteoporosis, and diverticulosis.  S/p cardiac cath on 3/1/21 with stent placement. Scheduled for replacement of aortic valva perq femoral artery approach on 3/18/21. No new symptoms reported today.   Advised to take ASA after stent on 3/1/21. "Stopped it 2/2 nose bleed as per cardiologist"  Denies Covid illness, sick contact or recent travel.     Covid swab done today  Covid vaccine x 1 taken and due for 2nd dose today     87 y/o female with PMHx of HTN, HLD, CAD, Afib on Xarelto (last taken 3/14/21), MR, severe AS, venous insufficiency, arthritis, osteoporosis, and diverticulosis.  S/p cardiac cath on 3/1/21 with stent placement. Scheduled for replacement of aortic valva perq femoral artery approach on 3/18/21. No new symptoms reported today.   Advised to take ASA after stent on 3/1/21. "Stopped it 2/2 nose bleed as per cardiologist"  Denies Covid illness, sick contact or recent travel.     Covid swab done today  Covid vaccine x 1 taken and due for 2nd dose today    ** lab called with critical value H&H 6.8/21.6. BONIFACIO Hu made aware, he will discuss with the TAVR team and treat as needed.       85 y/o female with PMHx of HTN, HLD, CAD, Afib on Xarelto (last taken 3/14/21), MR, severe AS, venous insufficiency, arthritis, osteoporosis, and diverticulosis.  S/p cardiac cath on 3/1/21 with stent placement. Scheduled for replacement of aortic valva perq femoral artery approach on 3/18/21. No new symptoms reported today.   Advised to take ASA after stent on 3/1/21. "Stopped it 2/2 nose bleed as per cardiologist"  PST done today> Hgb 6.8, decision was made to admit patient and obtain CT abd/pelvis  R/O  RP bleed, Serial CBC, poss transfusion

## 2021-03-16 NOTE — H&P PST ADULT - RESPIRATORY
10/13/2020    TELEHEALTH EVALUATION -- Audio/Visual (During [facility]-04 public health emergency)    HPI: Pain    Caffie Free (:  1980) has requested an audio/video evaluation for the following concern(s):    Neuropathic pain continues but is controlled by Lyrica. Increasing her physical activity gradually without problem    Review of Systems   Constitutional: Negative for appetite change, fatigue and unexpected weight change. HENT: Negative for congestion, ear pain, hearing loss, sinus pain, sore throat and trouble swallowing. Eyes: Negative for photophobia, pain, discharge and redness. Respiratory: Negative for cough, chest tightness and shortness of breath. Cardiovascular: Negative for chest pain, palpitations and leg swelling. Gastrointestinal: Negative for abdominal pain, blood in stool, diarrhea, nausea and vomiting. Endocrine: Negative. Genitourinary: Negative for dysuria, flank pain, frequency and hematuria. Musculoskeletal: Negative for arthralgias, back pain, joint swelling and myalgias. Skin: Negative. Allergic/Immunologic: Negative. Neurological: Positive for numbness. Negative for dizziness, seizures, syncope, weakness, light-headedness and headaches. Hematological: Negative for adenopathy. Does not bruise/bleed easily. Psychiatric/Behavioral: The patient is nervous/anxious. Prior to Visit Medications    Medication Sig Taking?  Authorizing Provider   topiramate (TOPAMAX) 100 MG tablet TAKE 1 TABLET BY MOUTH THREE TIMES A DAY Yes Historical Provider, MD   cloNIDine (CATAPRES) 0.1 MG tablet QD HS Yes Historical Provider, MD   AMITIZA 24 MCG capsule TAKE 1 CAPSULE BY MOUTH TWICE A DAY Yes Historical Provider, MD   hydrOXYzine (VISTARIL) 50 MG capsule TAKE 1 CAPSULE BY MOUTH TOD AS NEEDED Yes Historical Provider, MD   ARIPiprazole (ABILIFY) 10 MG tablet Take 10 mg by mouth daily Yes Historical Provider, MD   pregabalin (LYRICA) 50 MG capsule Take 1 capsule by gait with no signs of ataxia         [x] Normal range of motion of neck        [] Abnormal-       Neurological:        [x] No Facial Asymmetry (Cranial nerve 7 motor function) (limited exam to video visit)          [x] No gaze palsy        [] Abnormal-         Skin:        [x] No significant exanthematous lesions or discoloration noted on facial skin         [] Abnormal-            Psychiatric:       [x] Normal Affect [x] No Hallucinations        [] Abnormal-     Other pertinent observable physical exam findings-     ASSESSMENT/PLAN:  1. Pain in both lower extremities  - pregabalin (LYRICA) 50 MG capsule; Take 1 capsule by mouth 2 times daily for 180 days. Dispense: 180 capsule; Refill: 1    2. Generalized anxiety disorder  Well controlled with current medications    3. Major depressive disorder, recurrent episode, mild (HCC)  Stable      Return in about 5 months (around 3/13/2021). Selina Titus is a 44 y.o. female being evaluated by a Virtual Visit (video visit) encounter to address concerns as mentioned above. A caregiver was present when appropriate. Due to this being a TeleHealth encounter (During [facility], [place]-08 public health emergency), evaluation of the following organ systems was limited: Vitals/Constitutional/EENT/Resp/CV/GI//MS/Neuro/Skin/Heme-Lymph-Imm. Pursuant to the emergency declaration under the [address] [place] [address] authority and the [employer] Act, this Virtual Visit was conducted with patient's (and/or legal guardian's) consent, to reduce the patient's risk of exposure to COVID-19 and provide necessary medical care. The patient (and/or legal guardian) has also been advised to contact this office for worsening conditions or problems, and seek emergency medical treatment and/or call 911 if deemed necessary.      Patient identification was verified at the start of the visit: Yes    Total time spent on this encounter: Not billed by time    Services were provided through a video synchronous discussion virtually to substitute for in-person clinic visit. Patient and provider were located at their individual homes. --Mandy Macdonald DO on 10/13/2020 at 12:18 PM    An electronic signature was used to authenticate this note. Breath Sounds equal & clear to percussion & auscultation, no accessory muscle use

## 2021-03-16 NOTE — H&P PST ADULT - NSICDXPASTSURGICALHX_GEN_ALL_CORE_FT
PAST SURGICAL HISTORY:  Cataract, Other surgery   bilateral    H/O knee surgery     H/O ovarian cystectomy 2013    H/O Shoulder Replacement right shoulder  get clindamycin before surgery    Parathyroid surgery    S/P Bunionectomy     S/P cardiac catheterization 2/26/21

## 2021-03-16 NOTE — H&P PST ADULT - NSICDXPASTMEDICALHX_GEN_ALL_CORE_FT
PAST MEDICAL HISTORY:  Abdominal Mass     Afib     Aortic valve stenosis     CAD (coronary artery disease)     Constipation     Diverticulosis of the Colon     DJD (Degenerative Joint Disease) shoulders, knees, cervical and lumbar spine    Hiatal Hernia     History of Osteoarthritis     HLD (hyperlipidemia)     HTN (Hypertension)     Hypercholesterolemia     MVP (Mitral Valve Prolapse) last echo 2010    Osteopenia     Thyroid Nodule

## 2021-03-16 NOTE — H&P PST ADULT - NSICDXPROBLEM_GEN_ALL_CORE_FT
PROBLEM DIAGNOSES  Problem: Need for prophylactic measure  Assessment and Plan: The Caprini score indicates that this patient is at high risk for a VTE event (score 6 or greater). Surgical patients in this group will benefit from both pharmacologic prophylaxis and intermittent compression devices.  The surgical team will determine the balance between VTE risk and bleeding risk, and other clinical considerations     Problem: Nonrheumatic aortic valve stenosis  Assessment and Plan: Scheduled for Aortic valve replacement on 3/18.  Xarelto (last taken 3/14/21), instructed to continue with Plavix

## 2021-03-17 DIAGNOSIS — I35.0 NONRHEUMATIC AORTIC (VALVE) STENOSIS: ICD-10-CM

## 2021-03-17 DIAGNOSIS — D64.9 ANEMIA, UNSPECIFIED: ICD-10-CM

## 2021-03-17 LAB
A1C WITH ESTIMATED AVERAGE GLUCOSE RESULT: 4.7 % — SIGNIFICANT CHANGE UP (ref 4–5.6)
A1C WITH ESTIMATED AVERAGE GLUCOSE RESULT: 4.7 % — SIGNIFICANT CHANGE UP (ref 4–5.6)
ALBUMIN SERPL ELPH-MCNC: 3.2 G/DL — LOW (ref 3.3–5)
ALP SERPL-CCNC: 99 U/L — SIGNIFICANT CHANGE UP (ref 40–120)
ALT FLD-CCNC: 14 U/L — SIGNIFICANT CHANGE UP (ref 10–45)
ANION GAP SERPL CALC-SCNC: 10 MMOL/L — SIGNIFICANT CHANGE UP (ref 5–17)
AST SERPL-CCNC: 17 U/L — SIGNIFICANT CHANGE UP (ref 10–40)
BILIRUB SERPL-MCNC: 0.4 MG/DL — SIGNIFICANT CHANGE UP (ref 0.2–1.2)
BLD GP AB SCN SERPL QL: NEGATIVE — SIGNIFICANT CHANGE UP
BUN SERPL-MCNC: 14 MG/DL — SIGNIFICANT CHANGE UP (ref 7–23)
CALCIUM SERPL-MCNC: 8.8 MG/DL — SIGNIFICANT CHANGE UP (ref 8.4–10.5)
CHLORIDE SERPL-SCNC: 108 MMOL/L — SIGNIFICANT CHANGE UP (ref 96–108)
CO2 SERPL-SCNC: 23 MMOL/L — SIGNIFICANT CHANGE UP (ref 22–31)
CREAT SERPL-MCNC: 0.68 MG/DL — SIGNIFICANT CHANGE UP (ref 0.5–1.3)
ESTIMATED AVERAGE GLUCOSE: 88 MG/DL — SIGNIFICANT CHANGE UP (ref 68–114)
ESTIMATED AVERAGE GLUCOSE: 88 MG/DL — SIGNIFICANT CHANGE UP (ref 68–114)
GLUCOSE SERPL-MCNC: 93 MG/DL — SIGNIFICANT CHANGE UP (ref 70–99)
HCT VFR BLD CALC: 19.6 % — CRITICAL LOW (ref 34.5–45)
HCT VFR BLD CALC: 25.4 % — LOW (ref 34.5–45)
HGB BLD-MCNC: 6.1 G/DL — CRITICAL LOW (ref 11.5–15.5)
HGB BLD-MCNC: 8.1 G/DL — LOW (ref 11.5–15.5)
MCHC RBC-ENTMCNC: 29.1 PG — SIGNIFICANT CHANGE UP (ref 27–34)
MCHC RBC-ENTMCNC: 29.5 PG — SIGNIFICANT CHANGE UP (ref 27–34)
MCHC RBC-ENTMCNC: 31.1 GM/DL — LOW (ref 32–36)
MCHC RBC-ENTMCNC: 31.9 GM/DL — LOW (ref 32–36)
MCV RBC AUTO: 91.4 FL — SIGNIFICANT CHANGE UP (ref 80–100)
MCV RBC AUTO: 94.7 FL — SIGNIFICANT CHANGE UP (ref 80–100)
NRBC # BLD: 0 /100 WBCS — SIGNIFICANT CHANGE UP (ref 0–0)
NRBC # BLD: 0 /100 WBCS — SIGNIFICANT CHANGE UP (ref 0–0)
PLATELET # BLD AUTO: 217 K/UL — SIGNIFICANT CHANGE UP (ref 150–400)
PLATELET # BLD AUTO: 247 K/UL — SIGNIFICANT CHANGE UP (ref 150–400)
POTASSIUM SERPL-MCNC: 4.2 MMOL/L — SIGNIFICANT CHANGE UP (ref 3.5–5.3)
POTASSIUM SERPL-SCNC: 4.2 MMOL/L — SIGNIFICANT CHANGE UP (ref 3.5–5.3)
PROT SERPL-MCNC: 5.1 G/DL — LOW (ref 6–8.3)
RBC # BLD: 2.07 M/UL — LOW (ref 3.8–5.2)
RBC # BLD: 2.78 M/UL — LOW (ref 3.8–5.2)
RBC # FLD: 16 % — HIGH (ref 10.3–14.5)
RBC # FLD: 17.1 % — HIGH (ref 10.3–14.5)
RH IG SCN BLD-IMP: POSITIVE — SIGNIFICANT CHANGE UP
SODIUM SERPL-SCNC: 141 MMOL/L — SIGNIFICANT CHANGE UP (ref 135–145)
TSH SERPL-MCNC: 1.78 UIU/ML — SIGNIFICANT CHANGE UP (ref 0.27–4.2)
WBC # BLD: 7.5 K/UL — SIGNIFICANT CHANGE UP (ref 3.8–10.5)
WBC # BLD: 7.93 K/UL — SIGNIFICANT CHANGE UP (ref 3.8–10.5)
WBC # FLD AUTO: 7.5 K/UL — SIGNIFICANT CHANGE UP (ref 3.8–10.5)
WBC # FLD AUTO: 7.93 K/UL — SIGNIFICANT CHANGE UP (ref 3.8–10.5)

## 2021-03-17 PROCEDURE — 99223 1ST HOSP IP/OBS HIGH 75: CPT

## 2021-03-17 PROCEDURE — 71045 X-RAY EXAM CHEST 1 VIEW: CPT | Mod: 26

## 2021-03-17 PROCEDURE — 99222 1ST HOSP IP/OBS MODERATE 55: CPT

## 2021-03-17 PROCEDURE — 99232 SBSQ HOSP IP/OBS MODERATE 35: CPT

## 2021-03-17 RX ORDER — FERROUS SULFATE 325(65) MG
325 TABLET ORAL DAILY
Refills: 0 | Status: DISCONTINUED | OUTPATIENT
Start: 2021-03-17 | End: 2021-03-24

## 2021-03-17 RX ORDER — POLYETHYLENE GLYCOL 3350 17 G/17G
17 POWDER, FOR SOLUTION ORAL ONCE
Refills: 0 | Status: COMPLETED | OUTPATIENT
Start: 2021-03-17 | End: 2021-03-17

## 2021-03-17 RX ORDER — THIAMINE MONONITRATE (VIT B1) 100 MG
100 TABLET ORAL DAILY
Refills: 0 | Status: DISCONTINUED | OUTPATIENT
Start: 2021-03-17 | End: 2021-03-24

## 2021-03-17 RX ORDER — ASCORBIC ACID 60 MG
500 TABLET,CHEWABLE ORAL DAILY
Refills: 0 | Status: DISCONTINUED | OUTPATIENT
Start: 2021-03-17 | End: 2021-03-24

## 2021-03-17 RX ORDER — CIPROFLOXACIN LACTATE 400MG/40ML
250 VIAL (ML) INTRAVENOUS
Refills: 0 | Status: COMPLETED | OUTPATIENT
Start: 2021-03-17 | End: 2021-03-20

## 2021-03-17 RX ORDER — FOLIC ACID 0.8 MG
1 TABLET ORAL DAILY
Refills: 0 | Status: DISCONTINUED | OUTPATIENT
Start: 2021-03-17 | End: 2021-03-24

## 2021-03-17 RX ADMIN — ATORVASTATIN CALCIUM 10 MILLIGRAM(S): 80 TABLET, FILM COATED ORAL at 20:56

## 2021-03-17 RX ADMIN — Medication 240 MILLIGRAM(S): at 06:46

## 2021-03-17 RX ADMIN — Medication 1 MILLIGRAM(S): at 12:46

## 2021-03-17 RX ADMIN — Medication 500 MILLIGRAM(S): at 12:46

## 2021-03-17 RX ADMIN — Medication 250 MILLIGRAM(S): at 17:58

## 2021-03-17 RX ADMIN — Medication 325 MILLIGRAM(S): at 12:46

## 2021-03-17 RX ADMIN — POLYETHYLENE GLYCOL 3350 17 GRAM(S): 17 POWDER, FOR SOLUTION ORAL at 10:02

## 2021-03-17 RX ADMIN — CLOPIDOGREL BISULFATE 75 MILLIGRAM(S): 75 TABLET, FILM COATED ORAL at 12:46

## 2021-03-17 RX ADMIN — Medication 100 MILLIGRAM(S): at 12:47

## 2021-03-17 RX ADMIN — PANTOPRAZOLE SODIUM 40 MILLIGRAM(S): 20 TABLET, DELAYED RELEASE ORAL at 06:47

## 2021-03-17 RX ADMIN — Medication 250 MILLIGRAM(S): at 06:46

## 2021-03-17 NOTE — CONSULT NOTE ADULT - ASSESSMENT
86f seen in our office by Dr. Morales.  She has hx of HTN, HLD, CAD, paf on Xarelto (last taken 3/14/21), severe AS, venous insufficiency, arthritis, osteoporosis, and diverticulosis.  S/p cardiac cath on 3/1/21 with pci of the lad performed.  She was taking asa, plavix and Xarelto post pci, and was planned for TAVR via femoral artery approach on 3/18/21. About 1 week ago she was told that she should not take asa because of severe epistaxis.  She has been taking her other meds as prescribed.  Though the severe epistaxis resolved she has not been certain that the bleeding has stopped.  She may sometimes spit blood, in small amounts.  She was at [facility] yesterday and was called back because of severe anemia. CT revealed no rpb. She was admitted.    The patient reports no new symptoms.  Denies chest discomfort and shortness of breath.  No abdominal pain.  No new neurologic symptoms.        -there is no evidence of acute ischemia.  -s/p recent pci lad  -cont plavix, and ideally would remain on dapt  -reasonable to hold given severe life threatening anemia  -cont statin    -there is no evidence of significant arrhythmia.  -known paf in sr  -cont diltiazem  -ac on hold    -there is no evidence for meaningful  volume overload.     -severe anemia, unclear source  -await fobt  -cont ppi  -may have gi or ent source and may be worth investigating both  -further mgmt per cts/structural heart

## 2021-03-17 NOTE — CONSULT NOTE ADULT - SUBJECTIVE AND OBJECTIVE BOX
[facility]         Ansley Morales, Jessica, Jennifer, Luiz, Imtiaz, Albino        [phone] (office)    CHIEF COMPLAINT: Patient is a 86y old  Female who presents with a chief complaint of Replacement of Aortic valve (16 Mar 2021 11:11)      HPI:  86f seen in our office by Dr. Morales.  She has hx of HTN, HLD, CAD, paf on Xarelto (last taken 3/14/21), MR, severe AS, venous insufficiency, arthritis, osteoporosis, and diverticulosis.  S/p cardiac cath on 3/1/21 with pci of the lad performed.  She was taking asa, plavix and Xarelto post pci, and was planned for TAVR via femoral artery approach on 3/18/21. About 1 week ago she was told that she should not take asa because of severe epistaxis.  She has been taking her other meds as prescribed.  Though the severe epistaxis resolved she has not been certain that the bleeding has stopped.  She may sometimes spit blood, in small amounts.  She was at [facility] yesterday and was called back because of severe anemia. CT revealed no rpb. She was admitted.    The patient reports no new symptoms.  Denies chest discomfort and shortness of breath.  No abdominal pain.  No new neurologic symptoms.            PAST MEDICAL & SURGICAL HISTORY:  Osteopenia    CAD (coronary artery disease)    Aortic valve stenosis    HLD (hyperlipidemia)    Afib    Abdominal Mass    Diverticulosis of the Colon    History of Osteoarthritis    DJD (Degenerative Joint Disease)  shoulders, knees, cervical and lumbar spine    Constipation    Thyroid Nodule    Hiatal Hernia    Hypercholesterolemia    MVP (Mitral Valve Prolapse)  last echo     HTN (Hypertension)    S/P cardiac catheterization  21    H/O knee surgery    H/O ovarian cystectomy      Cataract, Other  surgery   bilateral    S/P Bunionectomy    Parathyroid  surgery    H/O Shoulder Replacement  right shoulder  get clindamycin before surgery        SOCIAL HISTORY: No active tobacco, alcohol or illicit drug use    FAMILY HISTORY:   No pertinent family history of CAD    Outpatient medications:    MEDICATIONS  (STANDING):  ascorbic acid 500 milliGRAM(s) Oral daily  atorvastatin 10 milliGRAM(s) Oral at bedtime  ciprofloxacin     Tablet 250 milliGRAM(s) Oral two times a day  clopidogrel Tablet 75 milliGRAM(s) Oral daily  diltiazem    milliGRAM(s) Oral daily  ferrous    sulfate 325 milliGRAM(s) Oral daily  folic acid 1 milliGRAM(s) Oral daily  pantoprazole    Tablet 40 milliGRAM(s) Oral before breakfast  thiamine 100 milliGRAM(s) Oral daily    MEDICATIONS  (PRN):      Allergies    codeine (Other)  penicillin (Flushing; Hypotension; Other)    Intolerances        REVIEW OF SYSTEMS: Is negative for eye, ENT, GI, , allergic, dermatologic, musculoskeletal and neurologic, except as described above.    VITAL SIGNS:   Vital Signs Last 24 Hrs  T(C): 36.6 (17 Mar 2021 10:28), Max: 36.7 (16 Mar 2021 11:11)  T(F): 97.8 (17 Mar 2021 10:), Max: 98 (16 Mar 2021 11:11)  HR: 84 (17 Mar 2021 10:) (76 - 84)  BP: 108/48 (17 Mar 2021 10:28) (108/48 - 137/64)  BP(mean): 88 (16 Mar 2021 17:55) (88 - 93)  RR: 18 (17 Mar 2021 10:) (14 - 18)  SpO2: 94% (17 Mar 2021 10:28) (94% - 100%)    I&O's Summary    16 Mar 2021 07:  -  17 Mar 2021 07:00  --------------------------------------------------------  IN: 0 mL / OUT: 600 mL / NET: -600 mL    17 Mar 2021 07:01  -  17 Mar 2021 10:43  --------------------------------------------------------  IN: 0 mL / OUT: 350 mL / NET: -350 mL        PHYSICAL EXAM:    Constitutional: NAD, awake and alert, well-developed  Eyes:  EOMI, no oral cyanosis, conjunctivae clear, anicteric.  Pulmonary: Non-labored, breath sounds are clear bilaterally, no wheezing, rales or rhonchi  Cardiovascular:  regular S1 and S2. 3/6 sys murmur.  No rubs, gallops or clicks  Gastrointestinal: Bowel Sounds present, soft, nontender.   Lymph: No peripheral edema.   Neurological: Alert, strength and sensitivity are grossly intact  Skin: No obvious lesions/rashes.   Psych:  Mood & affect appropriate .    LABS: All Labs Reviewed:                        6.1    7.93  )-----------( 247      ( 17 Mar 2021 06:13 )             19.6                         6.8    8.34  )-----------( 294      ( 16 Mar 2021 12:49 )             21.6     17 Mar 2021 06:17    141    |  108    |  14     ----------------------------<  93     4.2     |  23     |  0.68   16 Mar 2021 12:49    137    |  102    |  20     ----------------------------<  92     4.0     |  24     |  0.68     Ca    8.8        17 Mar 2021 06:17  Ca    9.4        16 Mar 2021 12:49    TPro  5.1    /  Alb  3.2    /  TBili  0.4    /  DBili  x      /  AST  17     /  ALT  14     /  AlkPhos  99     17 Mar 2021 06:17          Blood Culture:         RADIOLOGY:    < from: Transesophageal Echocardiogram (21 @ 09:07) >    Patient name: RUBENS BARKER  YOB: 1934   Age: 86 (F)   MR#: [id number]  Study Date: 2021  Location: O/PSonographer: Shweta Wallace M.D.  Study quality: Technically good  Referring Physician: Jp Jacobo MD / Francesco Wagner MD  Blood Pressure: 125/70 mmHg  Height: 160 cm  Weight: 54 kg  BSA: 1.6 m2  Heart Rate: 75 mmHg  ------------------------------------------------------------------------  PROCEDURE: Transesophageal and transthoracic  echocardiograms with 2-D, M-Mode and complete spectral and  color flow Doppler were performed.  Informed consent was  first obtained for JARROD. The patient was sedated - see  anesthesia record.  The procedure was monitored with  automatic blood pressure monitoring, ECG tracings and pulse  oximetry.  The transesophageal probe was placed in the  esophagus posterior to the heart without complications.  Real-time and reconstructed 3-dimensional imaging was  performed with Workstation. Color Doppler analysis was  carried out using both 2D and 3D mapping.  INDICATION: Nonrheumatic aortic (valve) stenosis (I35.0)  ------------------------------------------------------------------------  Dimensions:    Normal Values:  LA:            2.0 - 4.0 cm  Ao:     3.2    2.0 - 3.8 cm  SEPTUM:   0.6 - 1.2 cm  PWT:           0.6 - 1.1 cm  LVIDd:         3.0 - 5.6 cm  LVIDs:         1.8 - 4.0 cm  EF (Visual Estimate): 60-65 %  Doppler Peak Velocity (m/sec): AoV=3.8  ------------------------------------------------------------------------  Observations:  Mitral Valve: There is posterior mitral annular  calcification. Mild mitral regurgitation.  Aortic Valve/Aorta: Severely calcified trileaflet aortic  valve with reduced systolic excursion.  The JOSESITO by 3D  reconstruction= 0.96cm2. Peak transaortic valve gradient  equals 58 mm Hg, mean transaortic valve gradient equals 35  mm Hg, estimated aortic valve area equals 0.95 sqcm (by  continuity equation), aortic valve velocity time integral  equals 95 cm, DVI= 0.21, consistent with severe aortic  stenosis. No aortic valve regurgitation seen. Peak left  ventricular outflow tract gradient equals 3 mm Hg, LVOT  velocity time integral equals 20 cm.  Normal size aortic root: 3.2 cm.  Normal size proximal ascending aorta: 3.2 cm.  The more distal ascending thoracic aorta= 3.7 by 3.9cm  (mildly dilated)  There is grade 3 atheromatous disease in the visualized  portions of the descending thoracic aorta and aortic arch.  Left Atrium: No left atrial or left atrial appendage  thrombus.  There is a very prominent hypermobile coumadin  ridge which is a normal variant.  Left Ventricle: Normal left ventricular systolic function.  No segmental wall motion abnormalities.  The LVEF= 60-65%.  Normal left ventricular size.  Right Heart: There is nothrombus in the right atrium.  Normal right ventricular size and function. Normal  tricuspid valve. Minimal tricuspid regurgitation. Normal  pulmonic valve. Minimal pulmonic regurgitation.  Pericardium/Pleura: There is a trace pericardial effusion  with fluid in the transverse sinus.  Hemodynamic: Contrast injection demonstrates no evidence of  a patent foramen ovale.  There is lipomatous hypertrophy of the inter-atrial septum.  ------------------------------------------------------------------------  Conclusions:  1. No left atrial or left atrial appendage thrombus.  There  is a very prominent and hypermobile coumadin ridge which is  a normal variant.  2. Severe aortic stenosis.  ------------------------------------------------------------------------  Confirmed on  2021 - 10:24:36 by Shweta Wallace M.D.  ------------------------------------------------------------------------    < end of copied text >    EKG: sr st abn      < from: Cardiac Cath Lab - Adult (21 @ 07:58) >    [facility]  Department of Cardiology  [address] [place] [id number]  [phone]  Cath Lab Report -- Comprehensive Report  Patient: RUBENS BARKER  Study date: 2021  Account number: [id number]  MR number: [id number]  : 1934  Gender: Female  Race: W  Case Physician(s):  Viktor Lopez M.D.  Fellow:  Chip Dowd M.D.  Referring Physician:  VICTOR HUGO Pinon M.D.  INDICATIONS: Unstable angina - CCS3. Severe Aortic valve stenosis. Pre-TAVR  HISTORY: Aortic valve: history of severe stenosis. The patient has  hypertension and medication-treated dyslipidemia. PRIOR CARDIOVASCULAR  PROCEDURES: None.  PROCEDURE:  --  Sonosite - Interventional.  --  Coronary Orbital Atherectomy.  --  Hemostasis with Angioseal-Intervention.  --  Intervention on mid LAD: rotational atherectomy, balloon angioplasty,  stent.  TECHNIQUE: The risks and alternatives of the procedures and conscious  sedation were explained to the patient andinformed consent was obtained.  Coronary intervention performed electively.  Local anesthetic given. Left femoral artery access. A 6FR SHEATH PINNACLE  was inserted in the vessel. RADIATION EXPOSURE: 26.8 min. A rotational  atherectomy with balloon angioplasty and stent was performed on the 80 %  lesion in the mid LAD. Following intervention there was an excellent  angiographic appearance with a 1 % residual stenosis. Vessel setup was  performed. A 6FR JL4 LAUNCHER guiding catheter was used to intubate the  vessel. Vessel setup was performed. A ANGE [id number] wire was used to  cross the lesion. Balloon angioplasty was performed, using a 3.0 X 15  EUPHORA balloon, with 3 inflations and a maximum inflation pressure of 12  kirk. A Atherectomy wasperformed. Device used: CSI. A 3.50 X 38 CARLEE  drug-eluting stent was placed across the lesion and deployed at a maximum  inflation pressure of 14 kirk. A 3.50 X 15 CARLEE drug-eluting stent was  placed across the lesion and deployed at a maximum inflation pressure of 9  kirk. Balloon angioplasty was performed, using a 3.0 X 12 EUPHORA balloon,  with 2 inflations and a maximum inflation pressure of 12 kirk. Balloon  angioplasty was performed, using a 3.50 X 20 EUPHORA NC balloon, with 3  inflations and a maximum inflation pressure of 25 kirk. Sonosite -  Interventional. Coronary Orbital Atherectomy. Hemostasis with  Angioseal-Intervention.  CONTRAST GIVEN: Omnipaque 82 ml.  MEDICATIONS GIVEN: Midazolam, 0.5 mg, IV. Fentanyl, 25 mcg, IV.  Nitroglycerin, 100 mcg, intracoronary. Nicardipine (Cardene), 250 mcg,  intracoronary. Nitroglycerin, 100 mcg, intracoronary. Nicardipine  (Cardene), 250 mcg, intracoronary. Heparin, 5000 units, IV.  VENTRICLES: No left ventriculogram was performed.  VALVES: AORTIC VALVE: The aortic valve leaflets exhibited severe  calcification.  CORONARY VESSELS:  LAD:   --  Mid LAD: There was a diffuse 80 % stenosis.  COMPLICATIONS: There were no complications.  INTERVENTIONAL IMPRESSIONS: Successful CSI and Stenting of the LAD disease.  INTERVENTIONAL RECOMMENDATIONS: Aspirin Plavix and Xarelto. May stop the  aspirin after 2 weeks.  Prepared and signed by  Viktor Lopez M.D.  Signed 2021 16:04:43  HEMODYNAMIC TABLES  Pressures:  Fluid Challenge  Pressures:  -HR: 65  Pressures:  - Rhythm:  Pressures:  -- Aortic Pressure (S/D/M): 134/56/84  Pressures:  Intervention  Pressures:  - HR: 55  Pressures:  - Rhythm:  Pressures:  -- Aortic Pressure (S/D/M): 114/52/65  Outputs:  Baseline  Outputs:  -- CALCULATIONS[age]  Outputs:  -- CALCULATIONS: Body Surface Area: 1.54  Outputs:  -- CALCULATIONS: Height in cm: 160.00  Outputs:  -- CALCULATIONS: Sex: Female  Outputs:  -- CALCULATIONS: Weight in k.50    < end of copied text >

## 2021-03-17 NOTE — PROGRESS NOTE ADULT - ASSESSMENT
85 y/o female with PMHx of HTN, HLD, CAD, Afib on Xarelto (last taken 3/14/21), MR, severe AS, venous insufficiency, arthritis, osteoporosis, and diverticulosis.  S/p cardiac cath on 3/1/21 with stent placement. Scheduled for replacement of aortic valva perq femoral artery approach on 3/18/21. No new symptoms reported today.   Advised to take ASA after stent on 3/1/21. "Stopped it 2/2 nose bleed as per cardiologist"  PST done today> Hgb 6.8, decision was made to admit patient and obtain CT abd/pelvis  R/O  RP bleed, Serial CBC, poss transfusion  3/17  PRBC x 2 units  GI consulted Digital unsuccessful> no stool vault, will obtain w/ BM  TAVR cancelled for 3/18

## 2021-03-18 LAB
ALBUMIN SERPL ELPH-MCNC: 3.2 G/DL — LOW (ref 3.3–5)
ALP SERPL-CCNC: 118 U/L — SIGNIFICANT CHANGE UP (ref 40–120)
ALT FLD-CCNC: 18 U/L — SIGNIFICANT CHANGE UP (ref 10–45)
ANION GAP SERPL CALC-SCNC: 10 MMOL/L — SIGNIFICANT CHANGE UP (ref 5–17)
AST SERPL-CCNC: 20 U/L — SIGNIFICANT CHANGE UP (ref 10–40)
BILIRUB SERPL-MCNC: 0.3 MG/DL — SIGNIFICANT CHANGE UP (ref 0.2–1.2)
BUN SERPL-MCNC: 16 MG/DL — SIGNIFICANT CHANGE UP (ref 7–23)
CALCIUM SERPL-MCNC: 8.4 MG/DL — SIGNIFICANT CHANGE UP (ref 8.4–10.5)
CHLORIDE SERPL-SCNC: 107 MMOL/L — SIGNIFICANT CHANGE UP (ref 96–108)
CO2 SERPL-SCNC: 21 MMOL/L — LOW (ref 22–31)
CREAT SERPL-MCNC: 0.73 MG/DL — SIGNIFICANT CHANGE UP (ref 0.5–1.3)
GLUCOSE SERPL-MCNC: 85 MG/DL — SIGNIFICANT CHANGE UP (ref 70–99)
HCT VFR BLD CALC: 27.7 % — LOW (ref 34.5–45)
HGB BLD-MCNC: 8.7 G/DL — LOW (ref 11.5–15.5)
MCHC RBC-ENTMCNC: 29 PG — SIGNIFICANT CHANGE UP (ref 27–34)
MCHC RBC-ENTMCNC: 31.4 GM/DL — LOW (ref 32–36)
MCV RBC AUTO: 92.3 FL — SIGNIFICANT CHANGE UP (ref 80–100)
NRBC # BLD: 0 /100 WBCS — SIGNIFICANT CHANGE UP (ref 0–0)
OB PNL STL: POSITIVE
PLATELET # BLD AUTO: 227 K/UL — SIGNIFICANT CHANGE UP (ref 150–400)
POTASSIUM SERPL-MCNC: 4 MMOL/L — SIGNIFICANT CHANGE UP (ref 3.5–5.3)
POTASSIUM SERPL-SCNC: 4 MMOL/L — SIGNIFICANT CHANGE UP (ref 3.5–5.3)
PROT SERPL-MCNC: 5.2 G/DL — LOW (ref 6–8.3)
RBC # BLD: 3 M/UL — LOW (ref 3.8–5.2)
RBC # FLD: 16.5 % — HIGH (ref 10.3–14.5)
SODIUM SERPL-SCNC: 138 MMOL/L — SIGNIFICANT CHANGE UP (ref 135–145)
WBC # BLD: 7.12 K/UL — SIGNIFICANT CHANGE UP (ref 3.8–10.5)
WBC # FLD AUTO: 7.12 K/UL — SIGNIFICANT CHANGE UP (ref 3.8–10.5)

## 2021-03-18 PROCEDURE — 99232 SBSQ HOSP IP/OBS MODERATE 35: CPT

## 2021-03-18 PROCEDURE — 99233 SBSQ HOSP IP/OBS HIGH 50: CPT

## 2021-03-18 RX ORDER — SORBITOL SOLUTION 70 %
15 SOLUTION, ORAL MISCELLANEOUS ONCE
Refills: 0 | Status: COMPLETED | OUTPATIENT
Start: 2021-03-18 | End: 2021-03-18

## 2021-03-18 RX ADMIN — Medication 250 MILLIGRAM(S): at 05:37

## 2021-03-18 RX ADMIN — Medication 1 MILLIGRAM(S): at 12:35

## 2021-03-18 RX ADMIN — Medication 240 MILLIGRAM(S): at 05:37

## 2021-03-18 RX ADMIN — Medication 500 MILLIGRAM(S): at 12:35

## 2021-03-18 RX ADMIN — Medication 100 MILLIGRAM(S): at 12:34

## 2021-03-18 RX ADMIN — ATORVASTATIN CALCIUM 10 MILLIGRAM(S): 80 TABLET, FILM COATED ORAL at 20:29

## 2021-03-18 RX ADMIN — Medication 250 MILLIGRAM(S): at 17:05

## 2021-03-18 RX ADMIN — CLOPIDOGREL BISULFATE 75 MILLIGRAM(S): 75 TABLET, FILM COATED ORAL at 12:35

## 2021-03-18 RX ADMIN — Medication 15 MILLILITER(S): at 10:44

## 2021-03-18 RX ADMIN — Medication 325 MILLIGRAM(S): at 12:35

## 2021-03-18 RX ADMIN — PANTOPRAZOLE SODIUM 40 MILLIGRAM(S): 20 TABLET, DELAYED RELEASE ORAL at 05:38

## 2021-03-18 NOTE — CONSULT NOTE ADULT - ASSESSMENT
anemia    occult positive  having brown stool   proton pump inhibitor bid  regular diet  if hgb remains stable on proton pump inhibitor then proceed with TAVR  will follow

## 2021-03-18 NOTE — PROGRESS NOTE ADULT - ASSESSMENT
86f seen in our office by Dr. Morales.  She has hx of HTN, HLD, CAD, paf on Xarelto (last taken 3/14/21), severe AS, venous insufficiency, arthritis, osteoporosis, and diverticulosis.  S/p cardiac cath on 3/1/21 with pci of the lad performed.  She was taking asa, plavix and Xarelto post pci, and was planned for TAVR via femoral artery approach on 3/18/21. About 1 week ago she was told that she should not take asa because of severe epistaxis.  She has been taking her other meds as prescribed.  Though the severe epistaxis resolved she has not been certain that the bleeding has stopped.  She may sometimes spit blood, in small amounts.  She was at [facility] yesterday and was called back because of severe anemia. CT revealed no rpb. She was admitted.    The patient reports no new symptoms.  Denies chest discomfort and shortness of breath.  No abdominal pain.  No new neurologic symptoms.        -there is no evidence of acute ischemia.  -s/p recent pci lad  -cont plavix, and ideally would remain on dapt  -reasonable to hold given severe life threatening anemia  -cont statin    -there is no evidence of significant arrhythmia.  -known paf in sr  -cont diltiazem  -ac on hold given anemia    -there is no evidence for meaningful  volume overload.     -severe anemia, unclear source  -await fobt  -cont ppi  - would get gi eval  - transfuse  prbcs as needed   - monitor for vol ol  -further mgmt per cts/structural heart

## 2021-03-18 NOTE — PROGRESS NOTE ADULT - ASSESSMENT
85 y/o female with PMHx of HTN, HLD, CAD, Afib on Xarelto (last taken 3/14/21), MR, severe AS, venous insufficiency, arthritis, osteoporosis, and diverticulosis.  S/p cardiac cath on 3/1/21 with stent placement. Scheduled for replacement of aortic valva perq femoral artery approach on 3/18/21. No new symptoms reported today.   Advised to take ASA after stent on 3/1/21. "Stopped it 2/2 nose bleed as per cardiologist"  PST done today> Hgb 6.8, decision was made to admit patient and obtain CT abd/pelvis  R/O  RP bleed, Serial CBC, poss transfusion  3/17  PRBC x 2 units  GI consulted Digital unsuccessful> no stool vault, will obtain w/ BM  TAVR cancelled for 3/18  3/19 hct 27.7 this am - vss - pt reports black stool at home d/t "drinking prune juice everyday".  will attempt to obtain guaiac sample - no stool in vault yesterday

## 2021-03-18 NOTE — CONSULT NOTE ADULT - SUBJECTIVE AND OBJECTIVE BOX
[facility]  Heber Collins PA-C  [address] [place] [address]  [phone]      Chief Complaint:  Patient is a 86y old  Female who presents with a chief complaint of anemia (18 Mar 2021 12:39)      HPI:86f seen in our office by Dr. Morales.  She has hx of HTN, HLD, CAD, paf on Xarelto (last taken 3/14/21), MR, severe AS, venous insufficiency, arthritis, osteoporosis, and diverticulosis.  S/p cardiac cath on 3/1/21 with pci of the lad performed.  She was taking asa, plavix and Xarelto post pci, and was planned for TAVR via femoral artery approach on 3/18/21. About 1 week ago she was told that she should not take asa because of severe epistaxis.  She has been taking her other meds as prescribed.  Though the severe epistaxis resolved she has not been certain that the bleeding has stopped.  She may sometimes spit blood, in small amounts.  She was at [facility] yesterday and was called back because of severe anemia. CT revealed no rpb. She was admitted.    she noted dark stool in recent past, attributed to prunes  last egd/colon was 3 years ago, philip black, reportedly normal    Allergies:  codeine (Other)  penicillin (Flushing; Hypotension; Other)      Medications:  ascorbic acid 500 milliGRAM(s) Oral daily  atorvastatin 10 milliGRAM(s) Oral at bedtime  ciprofloxacin     Tablet 250 milliGRAM(s) Oral two times a day  clopidogrel Tablet 75 milliGRAM(s) Oral daily  diltiazem    milliGRAM(s) Oral daily  ferrous    sulfate 325 milliGRAM(s) Oral daily  folic acid 1 milliGRAM(s) Oral daily  pantoprazole    Tablet 40 milliGRAM(s) Oral before breakfast  thiamine 100 milliGRAM(s) Oral daily      PMHX/PSHX:  Osteopenia    CAD (coronary artery disease)    Aortic valve stenosis    HLD (hyperlipidemia)    Afib    Abdominal Mass    Diverticulosis of the Colon    History of Osteoarthritis    DJD (Degenerative Joint Disease)    Constipation    Thyroid Nodule    Hiatal Hernia    Hypercholesterolemia    MVP (Mitral Valve Prolapse)    HTN (Hypertension)    S/P cardiac catheterization    H/O knee surgery    H/O ovarian cystectomy    Cataract, Other    S/P Bunionectomy    Parathyroid    H/O Shoulder Replacement        Family history:  No pertinent family history in first degree relatives        Social History:     ROS:     General:  No wt loss, fevers, chills, night sweats, fatigue,   Eyes:  Good vision, no reported pain  ENT:  No sore throat, pain, runny nose, dysphagia  CV:  No pain, palpitations, hypo/hypertension  Resp:  No dyspnea, cough, tachypnea, wheezing  GI:  No pain, No nausea, No vomiting, No diarrhea, No constipation, No weight loss, No fever, No pruritis, No rectal bleeding, No tarry stools, No dysphagia,  :  No pain, bleeding, incontinence, nocturia  Muscle:  No pain, weakness  Neuro:  No weakness, tingling, memory problems  Psych:  No fatigue, insomnia, mood problems, depression  Endocrine:  No polyuria, polydipsia, cold/heat intolerance  Heme:  No petechiae, ecchymosis, easy bruisability  Skin:  No rash, tattoos, scars, edema      PHYSICAL EXAM:   Vital Signs:  Vital Signs Last 24 Hrs  T(C): 37 (18 Mar 2021 14:33), Max: 37 (18 Mar 2021 14:33)  T(F): 98.6 (18 Mar 2021 14:33), Max: 98.6 (18 Mar 2021 14:33)  HR: 74 (18 Mar 2021 14:33) (72 - 82)  BP: 113/69 (18 Mar 2021 14:33) (113/69 - 118/63)  BP(mean): --  RR: 18 (18 Mar 2021 14:33) (17 - 18)  SpO2: 98% (18 Mar 2021 14:33) (97% - 98%)  Daily     Daily Weight in k.8 (18 Mar 2021 08:40)    GENERAL:  Appears stated age,   HEENT:  NC/AT,    CHEST:  Full & symmetric excursion,   HEART:  Regular rhythm  ABDOMEN:  Soft, non-tender, non-distended,   EXTEREMITIES:  no cyanosis,clubbing or edema  SKIN:  No rash  NEURO:  Alert,    LABS:                        8.7    7.12  )-----------( 227      ( 18 Mar 2021 07:20 )             27.7     03-18    138  |  107  |  16  ----------------------------<  85  4.0   |  21<L>  |  0.73    Ca    8.4      18 Mar 2021 07:13    TPro  5.2<L>  /  Alb  3.2<L>  /  TBili  0.3  /  DBili  x   /  AST  20  /  ALT  18  /  AlkPhos  118  03-18    LIVER FUNCTIONS - ( 18 Mar 2021 07:13 )  Alb: 3.2 g/dL / Pro: 5.2 g/dL / ALK PHOS: 118 U/L / ALT: 18 U/L / AST: 20 U/L / GGT: x             Urinalysis Basic - ( 16 Mar 2021 21:34 )    Color: Light Yellow / Appearance: Slightly Turbid / S.010 / pH: x  Gluc: x / Ketone: Negative  / Bili: Negative / Urobili: Negative   Blood: x / Protein: Negative / Nitrite: Positive   Leuk Esterase: Large / RBC: 2 /hpf /  /HPF   Sq Epi: x / Non Sq Epi: 0 /hpf / Bacteria: Many          Imaging:

## 2021-03-19 PROCEDURE — 99233 SBSQ HOSP IP/OBS HIGH 50: CPT

## 2021-03-19 PROCEDURE — 99232 SBSQ HOSP IP/OBS MODERATE 35: CPT

## 2021-03-19 PROCEDURE — 93010 ELECTROCARDIOGRAM REPORT: CPT

## 2021-03-19 RX ORDER — DILTIAZEM HCL 120 MG
2.5 CAPSULE, EXT RELEASE 24 HR ORAL ONCE
Refills: 0 | Status: COMPLETED | OUTPATIENT
Start: 2021-03-19 | End: 2021-03-19

## 2021-03-19 RX ORDER — PANTOPRAZOLE SODIUM 20 MG/1
40 TABLET, DELAYED RELEASE ORAL
Refills: 0 | Status: DISCONTINUED | OUTPATIENT
Start: 2021-03-19 | End: 2021-03-24

## 2021-03-19 RX ADMIN — CLOPIDOGREL BISULFATE 75 MILLIGRAM(S): 75 TABLET, FILM COATED ORAL at 12:53

## 2021-03-19 RX ADMIN — Medication 2.5 MILLIGRAM(S): at 08:50

## 2021-03-19 RX ADMIN — Medication 240 MILLIGRAM(S): at 05:12

## 2021-03-19 RX ADMIN — Medication 325 MILLIGRAM(S): at 12:54

## 2021-03-19 RX ADMIN — PANTOPRAZOLE SODIUM 40 MILLIGRAM(S): 20 TABLET, DELAYED RELEASE ORAL at 05:11

## 2021-03-19 RX ADMIN — Medication 250 MILLIGRAM(S): at 05:12

## 2021-03-19 RX ADMIN — ATORVASTATIN CALCIUM 10 MILLIGRAM(S): 80 TABLET, FILM COATED ORAL at 22:34

## 2021-03-19 RX ADMIN — Medication 100 MILLIGRAM(S): at 12:54

## 2021-03-19 RX ADMIN — Medication 250 MILLIGRAM(S): at 17:41

## 2021-03-19 RX ADMIN — Medication 500 MILLIGRAM(S): at 12:53

## 2021-03-19 RX ADMIN — Medication 1 MILLIGRAM(S): at 12:53

## 2021-03-19 RX ADMIN — PANTOPRAZOLE SODIUM 40 MILLIGRAM(S): 20 TABLET, DELAYED RELEASE ORAL at 17:41

## 2021-03-19 NOTE — PROGRESS NOTE ADULT - ASSESSMENT
85 y/o female with PMHx of HTN, HLD, CAD, Afib on Xarelto (last taken 3/14/21), MR, severe AS, venous insufficiency, arthritis, osteoporosis, and diverticulosis.  S/p cardiac cath on 3/1/21 with stent placement. Scheduled for replacement of aortic valva perq femoral artery approach on 3/18/21. No new symptoms reported today.   Advised to take ASA after stent on 3/1/21. "Stopped it 2/2 nose bleed as per cardiologist"  PST done today> Hgb 6.8, decision was made to admit patient and obtain CT abd/pelvis  R/O  RP bleed, Serial CBC, poss transfusion  3/17  PRBC x 2 units  GI consulted Digital unsuccessful> no stool vault, will obtain w/ BM  TAVR cancelled for 3/18  3/19 hct 27.7 this am - vss - pt reports black stool at home d/t "drinking prune juice everyday".  will attempt to obtain guaiac sample - no stool in vault yesterday  3/19 Cardizem 5IV given for DEMIAN 130's H/H stable not acute distress

## 2021-03-19 NOTE — PROVIDER CONTACT NOTE (CHANGE IN STATUS NOTIFICATION) - BACKGROUND
Patient is a TAVR workup, H/H 6.8 on 3/16 with 2 units of PRBC, GI Consult, + UTI (Cipro), 3/18 Occult Blood (+), PMH: HTN, HLD, CAD, Afib in past controlled with Xarelto, Arthritis and venous insufficiency

## 2021-03-19 NOTE — PROGRESS NOTE ADULT - ASSESSMENT
86f seen in our office by Dr. Morales.  She has hx of HTN, HLD, CAD, paf on Xarelto (last taken 3/14/21), severe AS, venous insufficiency, arthritis, osteoporosis, and diverticulosis.  S/p cardiac cath on 3/1/21 with pci of the lad performed.  She was taking asa, plavix and Xarelto post pci, and was planned for TAVR via femoral artery approach on 3/18/21. About 1 week ago she was told that she should not take asa because of severe epistaxis.  She has been taking her other meds as prescribed.  Though the severe epistaxis resolved she has not been certain that the bleeding has stopped.  She may sometimes spit blood, in small amounts.  She was at [facility] yesterday and was called back because of severe anemia. CT revealed no rpb. She was admitted.    The patient reports no new symptoms.  Denies chest discomfort and shortness of breath.  No abdominal pain.  No new neurologic symptoms.        -there is no evidence of acute ischemia.  -s/p recent pci lad  -cont plavix, and ideally would remain on dapt  -reasonable to hold given severe life threatening anemia  -cont statin    -there is no evidence of significant arrhythmia.  -known paf in sr  -cont diltiazem  -ac on hold given anemia    -there is no evidence for meaningful  volume overload.     -severe anemia, unclear source  -fobt neg.  brown stool.  gi eval noted  -cont ppi  - transfuse  prbcs as needed   - monitor for vol ol  -further mgmt per cts/structural heart

## 2021-03-19 NOTE — PROVIDER CONTACT NOTE (CHANGE IN STATUS NOTIFICATION) - ACTION/TREATMENT ORDERED:
Dawson Lowe NP ordered 2.5mg Cardizem IVP. Re-check VS after administration. Will continue to monitor.

## 2021-03-20 LAB
ALBUMIN SERPL ELPH-MCNC: 3.3 G/DL — SIGNIFICANT CHANGE UP (ref 3.3–5)
ALP SERPL-CCNC: 154 U/L — HIGH (ref 40–120)
ALT FLD-CCNC: 21 U/L — SIGNIFICANT CHANGE UP (ref 10–45)
ANION GAP SERPL CALC-SCNC: 10 MMOL/L — SIGNIFICANT CHANGE UP (ref 5–17)
AST SERPL-CCNC: 22 U/L — SIGNIFICANT CHANGE UP (ref 10–40)
BILIRUB SERPL-MCNC: 0.3 MG/DL — SIGNIFICANT CHANGE UP (ref 0.2–1.2)
BLD GP AB SCN SERPL QL: NEGATIVE — SIGNIFICANT CHANGE UP
BUN SERPL-MCNC: 14 MG/DL — SIGNIFICANT CHANGE UP (ref 7–23)
CALCIUM SERPL-MCNC: 8.2 MG/DL — LOW (ref 8.4–10.5)
CHLORIDE SERPL-SCNC: 109 MMOL/L — HIGH (ref 96–108)
CO2 SERPL-SCNC: 21 MMOL/L — LOW (ref 22–31)
CREAT SERPL-MCNC: 0.62 MG/DL — SIGNIFICANT CHANGE UP (ref 0.5–1.3)
GLUCOSE SERPL-MCNC: 95 MG/DL — SIGNIFICANT CHANGE UP (ref 70–99)
HCT VFR BLD CALC: 30.5 % — LOW (ref 34.5–45)
HGB BLD-MCNC: 9.5 G/DL — LOW (ref 11.5–15.5)
MCHC RBC-ENTMCNC: 28.9 PG — SIGNIFICANT CHANGE UP (ref 27–34)
MCHC RBC-ENTMCNC: 31.1 GM/DL — LOW (ref 32–36)
MCV RBC AUTO: 92.7 FL — SIGNIFICANT CHANGE UP (ref 80–100)
NRBC # BLD: 0 /100 WBCS — SIGNIFICANT CHANGE UP (ref 0–0)
PLATELET # BLD AUTO: 225 K/UL — SIGNIFICANT CHANGE UP (ref 150–400)
POTASSIUM SERPL-MCNC: 3.8 MMOL/L — SIGNIFICANT CHANGE UP (ref 3.5–5.3)
POTASSIUM SERPL-SCNC: 3.8 MMOL/L — SIGNIFICANT CHANGE UP (ref 3.5–5.3)
PROT SERPL-MCNC: 5.2 G/DL — LOW (ref 6–8.3)
RBC # BLD: 3.29 M/UL — LOW (ref 3.8–5.2)
RBC # FLD: 16.4 % — HIGH (ref 10.3–14.5)
RH IG SCN BLD-IMP: POSITIVE — SIGNIFICANT CHANGE UP
SODIUM SERPL-SCNC: 140 MMOL/L — SIGNIFICANT CHANGE UP (ref 135–145)
WBC # BLD: 6 K/UL — SIGNIFICANT CHANGE UP (ref 3.8–10.5)
WBC # FLD AUTO: 6 K/UL — SIGNIFICANT CHANGE UP (ref 3.8–10.5)

## 2021-03-20 PROCEDURE — 99232 SBSQ HOSP IP/OBS MODERATE 35: CPT

## 2021-03-20 RX ADMIN — ATORVASTATIN CALCIUM 10 MILLIGRAM(S): 80 TABLET, FILM COATED ORAL at 22:23

## 2021-03-20 RX ADMIN — Medication 325 MILLIGRAM(S): at 12:36

## 2021-03-20 RX ADMIN — Medication 250 MILLIGRAM(S): at 06:28

## 2021-03-20 RX ADMIN — Medication 100 MILLIGRAM(S): at 12:37

## 2021-03-20 RX ADMIN — PANTOPRAZOLE SODIUM 40 MILLIGRAM(S): 20 TABLET, DELAYED RELEASE ORAL at 06:27

## 2021-03-20 RX ADMIN — Medication 1 MILLIGRAM(S): at 12:36

## 2021-03-20 RX ADMIN — Medication 500 MILLIGRAM(S): at 12:37

## 2021-03-20 RX ADMIN — PANTOPRAZOLE SODIUM 40 MILLIGRAM(S): 20 TABLET, DELAYED RELEASE ORAL at 17:26

## 2021-03-20 RX ADMIN — Medication 240 MILLIGRAM(S): at 06:28

## 2021-03-20 RX ADMIN — CLOPIDOGREL BISULFATE 75 MILLIGRAM(S): 75 TABLET, FILM COATED ORAL at 12:37

## 2021-03-20 NOTE — PROGRESS NOTE ADULT - ASSESSMENT
·	anemia  ·	occult positive      having brown stool   proton pump inhibitor bid  regular diet  if hgb remains stable on proton pump inhibitor then proceed with TAVR    Advanced care planning was discussed with patient and family.  Advanced care planning forms were reviewed and discussed.  Risks, benefits and alternatives of gastroenterologic procedures were discussed in detail and all questions were answered.

## 2021-03-20 NOTE — PROGRESS NOTE ADULT - ASSESSMENT
85 y/o female with PMHx of HTN, HLD, CAD, Afib on Xarelto (last taken 3/14/21), MR, severe AS, venous insufficiency, arthritis, osteoporosis, and diverticulosis.  S/p cardiac cath on 3/1/21 with stent placement. Scheduled for replacement of aortic valva perq femoral artery approach on 3/18/21. No new symptoms reported today.   Advised to take ASA after stent on 3/1/21. "Stopped it 2/2 nose bleed as per cardiologist"  PST done today> Hgb 6.8, decision was made to admit patient and obtain CT abd/pelvis  R/O  RP bleed, Serial CBC, poss transfusion.     Hospital Course:   3/17  PRBC x 2 units  GI consulted Digital unsuccessful> no stool vault, will obtain w/ BM  TAVR cancelled for 3/18  3/19 hct 27.7 this am - vss - pt reports black stool at home d/t "drinking prune juice everyday".  will attempt to obtain guaiac sample - no stool in vault yesterday  3/19 Cardizem 5 IV given for DEMIAN 130's H/H stable no acute distress.   3/20 VVS; H&H 9/30. Continue with current medication regimen.  Plan for TAVR next week tentatively for Wednesday 3/24/21 with Dr. Wagner.

## 2021-03-21 LAB
ALBUMIN SERPL ELPH-MCNC: 3.3 G/DL — SIGNIFICANT CHANGE UP (ref 3.3–5)
ALP SERPL-CCNC: 151 U/L — HIGH (ref 40–120)
ALT FLD-CCNC: 23 U/L — SIGNIFICANT CHANGE UP (ref 10–45)
ANION GAP SERPL CALC-SCNC: 10 MMOL/L — SIGNIFICANT CHANGE UP (ref 5–17)
AST SERPL-CCNC: 24 U/L — SIGNIFICANT CHANGE UP (ref 10–40)
BILIRUB SERPL-MCNC: 0.2 MG/DL — SIGNIFICANT CHANGE UP (ref 0.2–1.2)
BUN SERPL-MCNC: 15 MG/DL — SIGNIFICANT CHANGE UP (ref 7–23)
CALCIUM SERPL-MCNC: 8.2 MG/DL — LOW (ref 8.4–10.5)
CHLORIDE SERPL-SCNC: 109 MMOL/L — HIGH (ref 96–108)
CO2 SERPL-SCNC: 21 MMOL/L — LOW (ref 22–31)
CREAT SERPL-MCNC: 0.71 MG/DL — SIGNIFICANT CHANGE UP (ref 0.5–1.3)
GLUCOSE BLDC GLUCOMTR-MCNC: 91 MG/DL — SIGNIFICANT CHANGE UP (ref 70–99)
GLUCOSE SERPL-MCNC: 88 MG/DL — SIGNIFICANT CHANGE UP (ref 70–99)
HCT VFR BLD CALC: 29.9 % — LOW (ref 34.5–45)
HGB BLD-MCNC: 9.3 G/DL — LOW (ref 11.5–15.5)
MCHC RBC-ENTMCNC: 28.9 PG — SIGNIFICANT CHANGE UP (ref 27–34)
MCHC RBC-ENTMCNC: 31.1 GM/DL — LOW (ref 32–36)
MCV RBC AUTO: 92.9 FL — SIGNIFICANT CHANGE UP (ref 80–100)
NRBC # BLD: 0 /100 WBCS — SIGNIFICANT CHANGE UP (ref 0–0)
PLATELET # BLD AUTO: 231 K/UL — SIGNIFICANT CHANGE UP (ref 150–400)
POTASSIUM SERPL-MCNC: 3.9 MMOL/L — SIGNIFICANT CHANGE UP (ref 3.5–5.3)
POTASSIUM SERPL-SCNC: 3.9 MMOL/L — SIGNIFICANT CHANGE UP (ref 3.5–5.3)
PROT SERPL-MCNC: 5.1 G/DL — LOW (ref 6–8.3)
RBC # BLD: 3.22 M/UL — LOW (ref 3.8–5.2)
RBC # FLD: 16.2 % — HIGH (ref 10.3–14.5)
SODIUM SERPL-SCNC: 140 MMOL/L — SIGNIFICANT CHANGE UP (ref 135–145)
WBC # BLD: 6 K/UL — SIGNIFICANT CHANGE UP (ref 3.8–10.5)
WBC # FLD AUTO: 6 K/UL — SIGNIFICANT CHANGE UP (ref 3.8–10.5)

## 2021-03-21 PROCEDURE — 99232 SBSQ HOSP IP/OBS MODERATE 35: CPT

## 2021-03-21 RX ADMIN — Medication 1 MILLIGRAM(S): at 09:23

## 2021-03-21 RX ADMIN — CLOPIDOGREL BISULFATE 75 MILLIGRAM(S): 75 TABLET, FILM COATED ORAL at 09:24

## 2021-03-21 RX ADMIN — Medication 100 MILLIGRAM(S): at 09:23

## 2021-03-21 RX ADMIN — Medication 500 MILLIGRAM(S): at 09:23

## 2021-03-21 RX ADMIN — PANTOPRAZOLE SODIUM 40 MILLIGRAM(S): 20 TABLET, DELAYED RELEASE ORAL at 17:40

## 2021-03-21 RX ADMIN — Medication 325 MILLIGRAM(S): at 09:23

## 2021-03-21 RX ADMIN — ATORVASTATIN CALCIUM 10 MILLIGRAM(S): 80 TABLET, FILM COATED ORAL at 21:09

## 2021-03-21 RX ADMIN — PANTOPRAZOLE SODIUM 40 MILLIGRAM(S): 20 TABLET, DELAYED RELEASE ORAL at 05:08

## 2021-03-21 RX ADMIN — Medication 240 MILLIGRAM(S): at 05:08

## 2021-03-21 NOTE — PROGRESS NOTE ADULT - ASSESSMENT
85 y/o female with PMHx of HTN, HLD, CAD, Afib on Xarelto (last taken 3/14/21), MR, severe AS, venous insufficiency, arthritis, osteoporosis, and diverticulosis.  S/p cardiac cath on 3/1/21 with stent placement. Scheduled for replacement of aortic valva perq femoral artery approach on 3/18/21. No new symptoms reported today.   Advised to take ASA after stent on 3/1/21. "Stopped it 2/2 nose bleed as per cardiologist"  PST done today> Hgb 6.8, decision was made to admit patient and obtain CT abd/pelvis  R/O  RP bleed, Serial CBC, poss transfusion.     Hospital Course:   3/17  PRBC x 2 units  GI consulted Digital unsuccessful> no stool vault, will obtain w/ BM  TAVR cancelled for 3/18  3/19 hct 27.7 this am - vss - pt reports black stool at home d/t "drinking prune juice everyday".  will attempt to obtain guaiac sample - no stool in vault yesterday  3/19 Cardizem 5 IV given for DEMIAN 130's H/H stable no acute distress.   3/20 VVS; H&H 9/30. Continue with current medication regimen.  Plan for TAVR next week tentatively for Wednesday 3/24/21 with Dr. Wagner.   3/21 VSS stable h/h  for TAVR on Wednesday.

## 2021-03-22 LAB
ANION GAP SERPL CALC-SCNC: 11 MMOL/L — SIGNIFICANT CHANGE UP (ref 5–17)
BUN SERPL-MCNC: 14 MG/DL — SIGNIFICANT CHANGE UP (ref 7–23)
CALCIUM SERPL-MCNC: 8.3 MG/DL — LOW (ref 8.4–10.5)
CHLORIDE SERPL-SCNC: 108 MMOL/L — SIGNIFICANT CHANGE UP (ref 96–108)
CO2 SERPL-SCNC: 21 MMOL/L — LOW (ref 22–31)
CREAT SERPL-MCNC: 0.62 MG/DL — SIGNIFICANT CHANGE UP (ref 0.5–1.3)
GLUCOSE SERPL-MCNC: 94 MG/DL — SIGNIFICANT CHANGE UP (ref 70–99)
HCT VFR BLD CALC: 31.2 % — LOW (ref 34.5–45)
HGB BLD-MCNC: 9.5 G/DL — LOW (ref 11.5–15.5)
MCHC RBC-ENTMCNC: 28.3 PG — SIGNIFICANT CHANGE UP (ref 27–34)
MCHC RBC-ENTMCNC: 30.4 GM/DL — LOW (ref 32–36)
MCV RBC AUTO: 92.9 FL — SIGNIFICANT CHANGE UP (ref 80–100)
NRBC # BLD: 0 /100 WBCS — SIGNIFICANT CHANGE UP (ref 0–0)
PLATELET # BLD AUTO: 217 K/UL — SIGNIFICANT CHANGE UP (ref 150–400)
POTASSIUM SERPL-MCNC: 3.8 MMOL/L — SIGNIFICANT CHANGE UP (ref 3.5–5.3)
POTASSIUM SERPL-SCNC: 3.8 MMOL/L — SIGNIFICANT CHANGE UP (ref 3.5–5.3)
RBC # BLD: 3.36 M/UL — LOW (ref 3.8–5.2)
RBC # FLD: 16.1 % — HIGH (ref 10.3–14.5)
SODIUM SERPL-SCNC: 140 MMOL/L — SIGNIFICANT CHANGE UP (ref 135–145)
WBC # BLD: 5.54 K/UL — SIGNIFICANT CHANGE UP (ref 3.8–10.5)
WBC # FLD AUTO: 5.54 K/UL — SIGNIFICANT CHANGE UP (ref 3.8–10.5)

## 2021-03-22 PROCEDURE — 99232 SBSQ HOSP IP/OBS MODERATE 35: CPT

## 2021-03-22 PROCEDURE — 99233 SBSQ HOSP IP/OBS HIGH 50: CPT

## 2021-03-22 RX ADMIN — CLOPIDOGREL BISULFATE 75 MILLIGRAM(S): 75 TABLET, FILM COATED ORAL at 08:21

## 2021-03-22 RX ADMIN — ATORVASTATIN CALCIUM 10 MILLIGRAM(S): 80 TABLET, FILM COATED ORAL at 21:13

## 2021-03-22 RX ADMIN — PANTOPRAZOLE SODIUM 40 MILLIGRAM(S): 20 TABLET, DELAYED RELEASE ORAL at 05:43

## 2021-03-22 RX ADMIN — Medication 500 MILLIGRAM(S): at 08:21

## 2021-03-22 RX ADMIN — PANTOPRAZOLE SODIUM 40 MILLIGRAM(S): 20 TABLET, DELAYED RELEASE ORAL at 17:00

## 2021-03-22 RX ADMIN — Medication 100 MILLIGRAM(S): at 08:21

## 2021-03-22 RX ADMIN — Medication 240 MILLIGRAM(S): at 05:42

## 2021-03-22 RX ADMIN — Medication 1 MILLIGRAM(S): at 08:21

## 2021-03-22 RX ADMIN — Medication 325 MILLIGRAM(S): at 08:21

## 2021-03-22 NOTE — DIETITIAN INITIAL EVALUATION ADULT. - ADD RECOMMEND
Recommend continue current regular liberalized diet to promote optimal po intake. WIll add 4oz. Mightyshake to each meal for additional 660 kcal and 18 grams protein, stewed prunes on dinner trays to promote bowel regularity. Food preferences obtained and honored on menu.

## 2021-03-22 NOTE — DIETITIAN INITIAL EVALUATION ADULT. - NS FNS CHANGE IN WEIGHT
Loss I personally performed the service described in the documentation recorded by the scribe in my presence, and it accurately and completely records my words and actions.

## 2021-03-22 NOTE — DIETITIAN INITIAL EVALUATION ADULT. - ORAL INTAKE PTA/DIET HISTORY
Pt reports good po intake/appetite PTA. Typical breakfast intake of oatmeal and toast. Lunch often a turkey sandwich. Dinner often prepared by pt chicken cutlet with sweet potato and vegetable. She takes a vitamin D and 1 a day MVI at home. Denies food allergies or intolerance.

## 2021-03-22 NOTE — DIETITIAN NUTRITION RISK NOTIFICATION - TREATMENT: THE FOLLOWING DIET HAS BEEN RECOMMENDED
Diet, Regular (03-22-21 @ 08:25) [Active]  Diet, NPO:   Except Medications  With Ice Chips/Sips of Water     Special Instructions for Nursing:  Except Medications, With Ice Chips/Sips of Water (03-16-21 @ 12:27) [Hold]

## 2021-03-22 NOTE — PROGRESS NOTE ADULT - ASSESSMENT
85 y/o female with PMHx of HTN, HLD, CAD, Afib on Xarelto (last taken 3/14/21), MR, severe AS, venous insufficiency, arthritis, osteoporosis, and diverticulosis.  S/p cardiac cath on 3/1/21 with stent placement. Scheduled for replacement of aortic valva perq femoral artery approach on 3/18/21. No new symptoms reported today.   Advised to take ASA after stent on 3/1/21. "Stopped it 2/2 nose bleed as per cardiologist"  PST done today> Hgb 6.8, decision was made to admit patient and obtain CT abd/pelvis  R/O  RP bleed, Serial CBC, poss transfusion.     Hospital Course:   3/17  PRBC x 2 units  GI consulted Digital unsuccessful> no stool vault, will obtain w/ BM  TAVR cancelled for 3/18  3/19 hct 27.7 this am - vss - pt reports black stool at home d/t "drinking prune juice everyday".  will attempt to obtain guaiac sample - no stool in vault yesterday  3/19 Cardizem 5 IV given for DEMIAN 130's H/H stable no acute distress.   3/20 VVS; H&H 9/30. Continue with current medication regimen.  Plan for TAVR next week tentatively for Wednesday 3/24/21 with Dr. Wagner.   3/21 VSS stable h/h  for TAVR on Wednesday.  3/22 HD stable, offer no complaints, TAVR for wednesday, Cleared by GI for TAVR h/h stable 9/31. Type and cross and COVID ordered for AM     PLAN: TAVR then resume low dose xarelto in conjunction with plavix

## 2021-03-22 NOTE — PROGRESS NOTE ADULT - ASSESSMENT
86 year old female with cardiac dysfunction and anemia.   ·	anemia  ·	occult positive  ·	CAD      -No evidence of bleeding   - Continue proton pump inhibitor bid  regular diet  -Proceed with TAVR as per CT team     Advanced care planning was discussed with patient and family.  Advanced care planning forms were reviewed and discussed.  Risks, benefits and alternatives of gastroenterologic procedures were discussed in detail and all questions were answered.    I was physically present for the key portions of the evaluation and management (E/M) service provided.  The patient was personally seen and examined at bedside.  I have edited the note as appropriate.   Thank you for your consultation and allowing  me to participate in the care of your patients. If you have further questions please contact me at [phone].     Sam Rios M.D.       _________________________________________________________________________________________________  [facility]  [address] [place] [address]  Office: [phone]    Heber Collins PA-C  ___________________________________________________________________________________________________

## 2021-03-22 NOTE — DIETITIAN INITIAL EVALUATION ADULT. - CHIEF COMPLAINT
Pt is an 86 year old female with PMHx of HTN, HLD, CAD, Afib on Xarelto, MR, severe AS, venous insufficiency, arthritis, osteoporosis, and diverticulosis. S/p cardiac cath on 3/1/21 with stent placement. TAVR planned for Wednesday 3/34/21. Skin: No pressure injuries noted.

## 2021-03-22 NOTE — DIETITIAN INITIAL EVALUATION ADULT. - OTHER INFO
Pt reports good po intake/appetite at this time. Denies GI distress no N/V/diarrhea; admits to chronic constipation, she usually used prune juice at home, requests stewed prunes in-house. She denies recent changes in appetite. Endorses weight has been stable at around 120-125 pounds, however current weight is 117 today.

## 2021-03-22 NOTE — PROGRESS NOTE ADULT - ASSESSMENT
86f seen in our office by Dr. Morales.  She has hx of HTN, HLD, CAD, paf on Xarelto (last taken 3/14/21), severe AS, venous insufficiency, arthritis, osteoporosis, and diverticulosis.  S/p cardiac cath on 3/1/21 with pci of the lad performed.  She was taking asa, plavix and Xarelto post pci, and was planned for TAVR via femoral artery approach on 3/18/21. About 1 week ago she was told that she should not take asa because of severe epistaxis.  She has been taking her other meds as prescribed.  Though the severe epistaxis resolved she has not been certain that the bleeding has stopped.  She may sometimes spit blood, in small amounts.  She was at [facility] yesterday and was called back because of severe anemia. CT revealed no rpb. She was admitted.    The patient reports no new symptoms.  Denies chest discomfort and shortness of breath.  No abdominal pain.  No new neurologic symptoms.        -there is no evidence of acute ischemia.  -s/p recent pci lad  -cont plavix, and ideally would remain on dapt  -reasonable to hold given severe life threatening anemia  -cont statin    -there is no evidence of significant arrhythmia.  -known paf in sr  -cont diltiazem  -ac on hold given anemia    -there is no evidence for meaningful  volume overload.     -severe anemia, unclear source  -fobt neg.  brown stool.  gi eval noted  -cont ppi  - transfuse  prbcs as needed   - monitor for vol ol  -tentative plan for TAVR wednesday

## 2021-03-23 PROBLEM — M85.80 OTHER SPECIFIED DISORDERS OF BONE DENSITY AND STRUCTURE, UNSPECIFIED SITE: Chronic | Status: ACTIVE | Noted: 2021-03-16

## 2021-03-23 LAB — SARS-COV-2 RNA SPEC QL NAA+PROBE: SIGNIFICANT CHANGE UP

## 2021-03-23 PROCEDURE — 99233 SBSQ HOSP IP/OBS HIGH 50: CPT

## 2021-03-23 PROCEDURE — 99232 SBSQ HOSP IP/OBS MODERATE 35: CPT

## 2021-03-23 RX ORDER — CHLORHEXIDINE GLUCONATE 213 G/1000ML
1 SOLUTION TOPICAL
Refills: 0 | Status: COMPLETED | OUTPATIENT
Start: 2021-03-23 | End: 2021-03-24

## 2021-03-23 RX ORDER — VANCOMYCIN HCL 1 G
750 VIAL (EA) INTRAVENOUS ONCE
Refills: 0 | Status: DISCONTINUED | OUTPATIENT
Start: 2021-03-23 | End: 2021-03-24

## 2021-03-23 RX ORDER — CHLORHEXIDINE GLUCONATE 213 G/1000ML
15 SOLUTION TOPICAL
Refills: 0 | Status: COMPLETED | OUTPATIENT
Start: 2021-03-23 | End: 2021-03-24

## 2021-03-23 RX ADMIN — ATORVASTATIN CALCIUM 10 MILLIGRAM(S): 80 TABLET, FILM COATED ORAL at 21:32

## 2021-03-23 RX ADMIN — PANTOPRAZOLE SODIUM 40 MILLIGRAM(S): 20 TABLET, DELAYED RELEASE ORAL at 17:32

## 2021-03-23 RX ADMIN — Medication 1 MILLIGRAM(S): at 11:59

## 2021-03-23 RX ADMIN — Medication 240 MILLIGRAM(S): at 05:19

## 2021-03-23 RX ADMIN — PANTOPRAZOLE SODIUM 40 MILLIGRAM(S): 20 TABLET, DELAYED RELEASE ORAL at 06:05

## 2021-03-23 RX ADMIN — Medication 100 MILLIGRAM(S): at 12:03

## 2021-03-23 RX ADMIN — Medication 325 MILLIGRAM(S): at 11:59

## 2021-03-23 RX ADMIN — Medication 500 MILLIGRAM(S): at 11:59

## 2021-03-23 RX ADMIN — CLOPIDOGREL BISULFATE 75 MILLIGRAM(S): 75 TABLET, FILM COATED ORAL at 11:59

## 2021-03-23 RX ADMIN — CHLORHEXIDINE GLUCONATE 15 MILLILITER(S): 213 SOLUTION TOPICAL at 20:35

## 2021-03-23 NOTE — PROGRESS NOTE ADULT - ASSESSMENT
86 year old female with cardiac dysfunction and anemia.   ·	anemia  ·	occult positive  ·	CAD      -No evidence of bleeding   - Continue proton pump inhibitor bid  regular diet  -Proceed with TAVR as per CT team     Advanced care planning was discussed with patient and family.  Advanced care planning forms were reviewed and discussed.  Risks, benefits and alternatives of gastroenterologic procedures were discussed in detail and all questions were answered.    I was physically present for the key portions of the evaluation and management (E/M) service provided.  The patient was personally seen and examined at bedside.  I have edited the note as appropriate.   Thank you for your consultation and allowing  me to participate in the care of your patients. If you have further questions please contact me at [phone].     Sam Rios M.D.       _________________________________________________________________________________________________  [facility]  [address] [place] [address]  Office: [phone]    Heber Collins PA-C  ___________________________________________________________________________________________________

## 2021-03-23 NOTE — PROGRESS NOTE ADULT - ATTENDING COMMENTS
Physical Therapy Screening:  Services are not indicated at this time. An InBasket screening referral was triggered for physical therapy based on results obtained during the nursing admission assessment. The patients chart was reviewed and the patient is not appropriate for a skilled therapy evaluation at this time. Please consult physical therapy if any therapy needs arise. Thank you.     Karli Hester, PT
All questions and concerns of the patient were addressed.    I was physically present for the key portions of the evaluation and management (E/M) service provided.  I agree with the above history, physical and plan which I have reviewed and edited where appropriate.    65 minutes spent on total encounter; more than 50% of the visit was spent counseling and/or coordinating care by the attending physician    Plan discussed with medicine team, renal team cardiology, cardiac surgery and structural heart team.
Reviewed with patient laboratory results.  Discussed with patient indications and details of the TAVR procedure.  Benefits and risks were gone over.      All questions and concerns were addressed.
Discussed with patient GI recommendations  Will continue to monitor CBC  Based upon CBC and clinical status (signs/symptoms) will determine appropriate timing of TAVR procedure    All questions and concerns were addressed
Will need to closely monitor the patients H&H and assess for signs or symptom of bleeding.  She is not going to the bathroom since yesterday.  She has converted into atrial fibrillation/flutter for which she was previously on Xarelto.  Patient is aware that she is an increased risk for thromboembolic event while she is off of anticoagulation therapy.  Endoscopy/colonoscopy as per GI service.  Currently scheduled for TAVR next week/Wednesday    EKG, laboratory studies and imaging studies were personally reviewed.    All questions and concerns of the patient were addressed.    I was physically present for the key portions of the evaluation and management service provided.  I agree with the above history, physical and plan which I reviewed and edited where appropriate.    35 minutes mental encounter: More than 50% of the visit was spent counseling and/or coordinating care by the attending physician.    Plan discussed with cardiac surgery and structural heart team.
Details, indications and pros/cons of the TAVR procedure were gone over.      All questions and concerns were addressed.

## 2021-03-23 NOTE — PROGRESS NOTE ADULT - ASSESSMENT
87 y/o female with PMHx of HTN, HLD, CAD, Afib on Xarelto (last taken 3/14/21), MR, severe AS, venous insufficiency, arthritis, osteoporosis, and diverticulosis.  S/p cardiac cath on 3/1/21 with stent placement. Scheduled for replacement of aortic valva perq femoral artery approach on 3/18/21. No new symptoms reported today.   Advised to take ASA after stent on 3/1/21. "Stopped it 2/2 nose bleed as per cardiologist"  PST done today> Hgb 6.8, decision was made to admit patient and obtain CT abd/pelvis  R/O  RP bleed, Serial CBC, poss transfusion.     Hospital Course:   3/17  PRBC x 2 units  GI consulted Digital unsuccessful> no stool vault, will obtain w/ BM  TAVR cancelled for 3/18  3/19 hct 27.7 this am - vss - pt reports black stool at home d/t "drinking prune juice everyday".  will attempt to obtain guaiac sample - no stool in vault yesterday  3/19 Cardizem 5 IV given for DEMIAN 130's H/H stable no acute distress.   3/20 VVS; H&H 9/30. Continue with current medication regimen.  Plan for TAVR next week tentatively for Wednesday 3/24/21 with Dr. Wagnre.   3/21 VSS stable h/h  for TAVR on Wednesday.  3/22 HD stable, offer no complaints, TAVR for wednesday, Cleared by GI for TAVR h/h stable 9/31. Type and cross and COVID ordered for AM     PLAN: TAVR then resume low dose xarelto in conjunction with plavix

## 2021-03-23 NOTE — PROGRESS NOTE ADULT - ASSESSMENT
86f seen in our office by Dr. Morales.  She has hx of HTN, HLD, CAD, paf on Xarelto (last taken 3/14/21), severe AS, venous insufficiency, arthritis, osteoporosis, and diverticulosis.  S/p cardiac cath on 3/1/21 with pci of the lad performed.  She was taking asa, plavix and Xarelto post pci, and was planned for TAVR via femoral artery approach on 3/18/21. About 1 week ago she was told that she should not take asa because of severe epistaxis.  She has been taking her other meds as prescribed.  Though the severe epistaxis resolved she has not been certain that the bleeding has stopped.  She may sometimes spit blood, in small amounts.  She was at [facility] yesterday and was called back because of severe anemia. CT revealed no rpb. She was admitted.    The patient reports no new symptoms.  Denies chest discomfort and shortness of breath.  No abdominal pain.  No new neurologic symptoms.        -there is no evidence of acute ischemia.  -s/p recent pci lad  -cont plavix, and ideally would remain on dapt  -reasonable to hold given severe life threatening anemia  -cont statin    -there is no evidence of significant arrhythmia.  -known paf in sr  -cont diltiazem  -ac on hold given anemia    -there is no evidence for meaningful  volume overload.     -severe anemia, unclear source  -fobt neg.  brown stool.  gi eval noted  -hgb stable  -cont ppi  - transfuse  prbcs as needed   - monitor for vol ol  -tentative plan for TAVR 3/24, pending clinical course

## 2021-03-24 ENCOUNTER — APPOINTMENT (OUTPATIENT)
Dept: CARDIOTHORACIC SURGERY | Facility: HOSPITAL | Age: 86
End: 2021-03-24

## 2021-03-24 LAB
ANION GAP SERPL CALC-SCNC: 10 MMOL/L — SIGNIFICANT CHANGE UP (ref 5–17)
APTT BLD: 35.2 SEC — SIGNIFICANT CHANGE UP (ref 27.5–35.5)
BLD GP AB SCN SERPL QL: NEGATIVE — SIGNIFICANT CHANGE UP
BUN SERPL-MCNC: 17 MG/DL — SIGNIFICANT CHANGE UP (ref 7–23)
CALCIUM SERPL-MCNC: 7.9 MG/DL — LOW (ref 8.4–10.5)
CHLORIDE SERPL-SCNC: 108 MMOL/L — SIGNIFICANT CHANGE UP (ref 96–108)
CO2 SERPL-SCNC: 24 MMOL/L — SIGNIFICANT CHANGE UP (ref 22–31)
CREAT SERPL-MCNC: 0.54 MG/DL — SIGNIFICANT CHANGE UP (ref 0.5–1.3)
GLUCOSE SERPL-MCNC: 106 MG/DL — HIGH (ref 70–99)
HCT VFR BLD CALC: 28.6 % — LOW (ref 34.5–45)
HGB BLD-MCNC: 9 G/DL — LOW (ref 11.5–15.5)
INR BLD: 1.03 RATIO — SIGNIFICANT CHANGE UP (ref 0.88–1.16)
MCHC RBC-ENTMCNC: 29 PG — SIGNIFICANT CHANGE UP (ref 27–34)
MCHC RBC-ENTMCNC: 31.5 GM/DL — LOW (ref 32–36)
MCV RBC AUTO: 92.3 FL — SIGNIFICANT CHANGE UP (ref 80–100)
NRBC # BLD: 0 /100 WBCS — SIGNIFICANT CHANGE UP (ref 0–0)
PLATELET # BLD AUTO: 175 K/UL — SIGNIFICANT CHANGE UP (ref 150–400)
POTASSIUM SERPL-MCNC: 3.9 MMOL/L — SIGNIFICANT CHANGE UP (ref 3.5–5.3)
POTASSIUM SERPL-SCNC: 3.9 MMOL/L — SIGNIFICANT CHANGE UP (ref 3.5–5.3)
PROTHROM AB SERPL-ACNC: 12.3 SEC — SIGNIFICANT CHANGE UP (ref 10.6–13.6)
RBC # BLD: 3.1 M/UL — LOW (ref 3.8–5.2)
RBC # FLD: 16 % — HIGH (ref 10.3–14.5)
RH IG SCN BLD-IMP: POSITIVE — SIGNIFICANT CHANGE UP
SODIUM SERPL-SCNC: 142 MMOL/L — SIGNIFICANT CHANGE UP (ref 135–145)
WBC # BLD: 5.19 K/UL — SIGNIFICANT CHANGE UP (ref 3.8–10.5)
WBC # FLD AUTO: 5.19 K/UL — SIGNIFICANT CHANGE UP (ref 3.8–10.5)

## 2021-03-24 PROCEDURE — 93306 TTE W/DOPPLER COMPLETE: CPT | Mod: 26

## 2021-03-24 PROCEDURE — 93010 ELECTROCARDIOGRAM REPORT: CPT

## 2021-03-24 PROCEDURE — 33361 REPLACE AORTIC VALVE PERQ: CPT | Mod: Q0,62

## 2021-03-24 PROCEDURE — 33361 REPLACE AORTIC VALVE PERQ: CPT | Mod: 62,Q0

## 2021-03-24 RX ORDER — CEFUROXIME AXETIL 250 MG
1500 TABLET ORAL EVERY 8 HOURS
Refills: 0 | Status: COMPLETED | OUTPATIENT
Start: 2021-03-24 | End: 2021-03-24

## 2021-03-24 RX ORDER — ASPIRIN/CALCIUM CARB/MAGNESIUM 324 MG
81 TABLET ORAL DAILY
Refills: 0 | Status: DISCONTINUED | OUTPATIENT
Start: 2021-03-24 | End: 2021-03-24

## 2021-03-24 RX ORDER — CLOPIDOGREL BISULFATE 75 MG/1
75 TABLET, FILM COATED ORAL DAILY
Refills: 0 | Status: DISCONTINUED | OUTPATIENT
Start: 2021-03-24 | End: 2021-03-26

## 2021-03-24 RX ADMIN — Medication 240 MILLIGRAM(S): at 05:34

## 2021-03-24 RX ADMIN — Medication 100 MILLIGRAM(S): at 15:55

## 2021-03-24 RX ADMIN — CLOPIDOGREL BISULFATE 75 MILLIGRAM(S): 75 TABLET, FILM COATED ORAL at 15:54

## 2021-03-24 RX ADMIN — Medication 100 MILLIGRAM(S): at 21:49

## 2021-03-24 RX ADMIN — CHLORHEXIDINE GLUCONATE 15 MILLILITER(S): 213 SOLUTION TOPICAL at 05:33

## 2021-03-24 RX ADMIN — PANTOPRAZOLE SODIUM 40 MILLIGRAM(S): 20 TABLET, DELAYED RELEASE ORAL at 05:34

## 2021-03-24 RX ADMIN — CHLORHEXIDINE GLUCONATE 1 APPLICATION(S): 213 SOLUTION TOPICAL at 05:31

## 2021-03-24 NOTE — PROGRESS NOTE ADULT - PROBLEM SELECTOR PLAN 2
PRBC x 2 units  ck Guaic w/ BM  Supplements  GI consulted
PRBC x 2 units  ck Guaic w/ BM  Supplements  GI consulted  cleared by GI  h/h stable   no obv gi bleed
PRBC x 2 units  ck Guaic w/ BM  Supplements  GI consulted  cleared by GI  h/h stable   no obv gi bleed
PRBC x 2 units  Guaic w/ BM  Supplements  GI consulted
PRBC x 2 units  ck Guaic w/ BM  Supplements  GI consulted
PRBC x 2 units  ck Guaic w/ BM  Supplements  GI consulted  cleared by GI  h/h stable   no obv gi bleed

## 2021-03-24 NOTE — PROGRESS NOTE ADULT - ASSESSMENT
87 y/o female with PMHx of HTN, HLD, CAD, Afib on Xarelto (last taken 3/14/21), MR, severe AS, venous insufficiency, arthritis, osteoporosis, and diverticulosis.  S/p cardiac cath on 3/1/21 with stent placement. Scheduled for replacement of aortic valva perq femoral artery approach on 3/18/21. No new symptoms reported today.   Advised to take ASA after stent on 3/1/21. "Stopped it 2/2 nose bleed as per cardiologist"  PST done today> Hgb 6.8, decision was made to admit patient and obtain CT abd/pelvis  R/O  RP bleed, Serial CBC, poss transfusion.     Hospital Course:   3/17  PRBC x 2 units  GI consulted Digital unsuccessful> no stool vault, will obtain w/ BM  TAVR cancelled for 3/18  3/19 hct 27.7 this am - vss - pt reports black stool at home d/t "drinking prune juice everyday".  will attempt to obtain guaiac sample - no stool in vault yesterday  3/19 Cardizem 5 IV given for DEMIAN 130's H/H stable no acute distress.   3/20 VVS; H&H 9/30. Continue with current medication regimen.  Plan for TAVR next week tentatively for Wednesday 3/24/21 with Dr. Wagner.   3/21 VSS stable h/h  for TAVR on Wednesday.  3/22 HD stable, offer no complaints, TAVR for wednesday, Cleared by GI for TAVR h/h stable 9/31. Type and cross and COVID ordered for AM   3/24 R transfemoral TAVR #26 John performed. No evidence of arrythmia. On plavix.    PLAN: TAVR then resume low dose xarelto in conjunction with plavix

## 2021-03-24 NOTE — PROGRESS NOTE ADULT - PROBLEM SELECTOR PROBLEM 1
Aortic valve stenosis

## 2021-03-24 NOTE — PROGRESS NOTE ADULT - ASSESSMENT
86 year old female with cardiac dysfunction and anemia.   ·	anemia  ·	occult positive  ·	CAD      -No evidence of bleeding   - Continue proton pump inhibitor bid  regular diet  -Proceed with TAVR as per CT team     Advanced care planning was discussed with patient and family.  Advanced care planning forms were reviewed and discussed.  Risks, benefits and alternatives of gastroenterologic procedures were discussed in detail and all questions were answered.    I was physically present for the key portions of the evaluation and management (E/M) service provided.  The patient was personally seen and examined at bedside.  I have edited the note as appropriate.   Thank you for your consultation and allowing  me to participate in the care of your patients. If you have further questions please contact me at [phone].     Sam Rios M.D.       _________________________________________________________________________________________________  [facility]  [address] [place] [address]  Office: [phone]    Heber Collins PA-C  ___________________________________________________________________________________________________

## 2021-03-24 NOTE — PROGRESS NOTE ADULT - PROBLEM SELECTOR PLAN 1
Preop complete for TAVR  Delayed w/ new anemia  Hold ASA,xarelto  Maintain Plavix w/ recent stent
Preop complete for TAVR  Delayed w/ new anemia  Hold ASA and Xarelto  Maintain Plavix w/ recent stent.  Plan for TAVR next week tentatively for Wednesday 3/24/21 with Dr. Wagner
NPO after midnight for TAVR  Delayed w/ new anemia  Hold ASA and Xarelto  Maintain Plavix w/ recent stent.  Plan for TAVR next week tentatively for Wednesday 3/24/21 with Dr. Wagner
Preop complete for TAVR  Delayed w/ new anemia  Hold ASA and Xarelto  Maintain Plavix w/ recent stent.  Plan for TAVR next week tentatively for Wednesday 3/24/21 with Dr. Wagner
Preop complete for TAVR  Delayed w/ new anemia  Hold ASA and Xarelto  Maintain Plavix w/ recent stent.  Plan for TAVR next week tentatively for Wednesday 3/24/21 with Dr. Wagner
Preop complete for TAVR  Delayed w/ new anemia  Hold ASA,xarelto  Maintain Plavix w/ recent stent
Preop complete for TAVR  Delayed w/ new anemia  Hold ASA,xarelto  Maintain Plavix w/ recent stent
NPO after midnight for TAVR  Delayed w/ new anemia  Hold ASA and Xarelto  Maintain Plavix w/ recent stent.  Plan for TAVR next week tentatively for Wednesday 3/24/21 with Dr. Wagner

## 2021-03-25 ENCOUNTER — FORM ENCOUNTER (OUTPATIENT)
Age: 86
End: 2021-03-25

## 2021-03-25 LAB
ALBUMIN SERPL ELPH-MCNC: 3.5 G/DL — SIGNIFICANT CHANGE UP (ref 3.3–5)
ALP SERPL-CCNC: 174 U/L — HIGH (ref 40–120)
ALT FLD-CCNC: 31 U/L — SIGNIFICANT CHANGE UP (ref 10–45)
ANION GAP SERPL CALC-SCNC: 11 MMOL/L — SIGNIFICANT CHANGE UP (ref 5–17)
AST SERPL-CCNC: 35 U/L — SIGNIFICANT CHANGE UP (ref 10–40)
BASOPHILS # BLD AUTO: 0.03 K/UL — SIGNIFICANT CHANGE UP (ref 0–0.2)
BASOPHILS NFR BLD AUTO: 0.4 % — SIGNIFICANT CHANGE UP (ref 0–2)
BILIRUB SERPL-MCNC: 0.3 MG/DL — SIGNIFICANT CHANGE UP (ref 0.2–1.2)
BUN SERPL-MCNC: 16 MG/DL — SIGNIFICANT CHANGE UP (ref 7–23)
CALCIUM SERPL-MCNC: 8.3 MG/DL — LOW (ref 8.4–10.5)
CHLORIDE SERPL-SCNC: 107 MMOL/L — SIGNIFICANT CHANGE UP (ref 96–108)
CO2 SERPL-SCNC: 22 MMOL/L — SIGNIFICANT CHANGE UP (ref 22–31)
CREAT SERPL-MCNC: 0.53 MG/DL — SIGNIFICANT CHANGE UP (ref 0.5–1.3)
EOSINOPHIL # BLD AUTO: 0.16 K/UL — SIGNIFICANT CHANGE UP (ref 0–0.5)
EOSINOPHIL NFR BLD AUTO: 2.1 % — SIGNIFICANT CHANGE UP (ref 0–6)
GLUCOSE SERPL-MCNC: 93 MG/DL — SIGNIFICANT CHANGE UP (ref 70–99)
HCT VFR BLD CALC: 30.8 % — LOW (ref 34.5–45)
HGB BLD-MCNC: 9.4 G/DL — LOW (ref 11.5–15.5)
IMM GRANULOCYTES NFR BLD AUTO: 0.4 % — SIGNIFICANT CHANGE UP (ref 0–1.5)
LYMPHOCYTES # BLD AUTO: 0.66 K/UL — LOW (ref 1–3.3)
LYMPHOCYTES # BLD AUTO: 8.8 % — LOW (ref 13–44)
MCHC RBC-ENTMCNC: 28.8 PG — SIGNIFICANT CHANGE UP (ref 27–34)
MCHC RBC-ENTMCNC: 30.5 GM/DL — LOW (ref 32–36)
MCV RBC AUTO: 94.5 FL — SIGNIFICANT CHANGE UP (ref 80–100)
MONOCYTES # BLD AUTO: 0.75 K/UL — SIGNIFICANT CHANGE UP (ref 0–0.9)
MONOCYTES NFR BLD AUTO: 10 % — SIGNIFICANT CHANGE UP (ref 2–14)
NEUTROPHILS # BLD AUTO: 5.9 K/UL — SIGNIFICANT CHANGE UP (ref 1.8–7.4)
NEUTROPHILS NFR BLD AUTO: 78.3 % — HIGH (ref 43–77)
NRBC # BLD: 0 /100 WBCS — SIGNIFICANT CHANGE UP (ref 0–0)
PLATELET # BLD AUTO: 189 K/UL — SIGNIFICANT CHANGE UP (ref 150–400)
POTASSIUM SERPL-MCNC: 3.8 MMOL/L — SIGNIFICANT CHANGE UP (ref 3.5–5.3)
POTASSIUM SERPL-SCNC: 3.8 MMOL/L — SIGNIFICANT CHANGE UP (ref 3.5–5.3)
PROT SERPL-MCNC: 5.5 G/DL — LOW (ref 6–8.3)
RBC # BLD: 3.26 M/UL — LOW (ref 3.8–5.2)
RBC # FLD: 15.9 % — HIGH (ref 10.3–14.5)
SODIUM SERPL-SCNC: 140 MMOL/L — SIGNIFICANT CHANGE UP (ref 135–145)
WBC # BLD: 7.53 K/UL — SIGNIFICANT CHANGE UP (ref 3.8–10.5)
WBC # FLD AUTO: 7.53 K/UL — SIGNIFICANT CHANGE UP (ref 3.8–10.5)

## 2021-03-25 PROCEDURE — 99232 SBSQ HOSP IP/OBS MODERATE 35: CPT

## 2021-03-25 PROCEDURE — 93010 ELECTROCARDIOGRAM REPORT: CPT

## 2021-03-25 PROCEDURE — 93306 TTE W/DOPPLER COMPLETE: CPT | Mod: 26

## 2021-03-25 RX ORDER — PANTOPRAZOLE SODIUM 20 MG/1
40 TABLET, DELAYED RELEASE ORAL EVERY 12 HOURS
Refills: 0 | Status: DISCONTINUED | OUTPATIENT
Start: 2021-03-25 | End: 2021-03-26

## 2021-03-25 RX ORDER — DILTIAZEM HCL 120 MG
240 CAPSULE, EXT RELEASE 24 HR ORAL DAILY
Refills: 0 | Status: DISCONTINUED | OUTPATIENT
Start: 2021-03-25 | End: 2021-03-25

## 2021-03-25 RX ORDER — POTASSIUM CHLORIDE 20 MEQ
40 PACKET (EA) ORAL ONCE
Refills: 0 | Status: COMPLETED | OUTPATIENT
Start: 2021-03-25 | End: 2021-03-25

## 2021-03-25 RX ORDER — DILTIAZEM HCL 120 MG
10 CAPSULE, EXT RELEASE 24 HR ORAL ONCE
Refills: 0 | Status: COMPLETED | OUTPATIENT
Start: 2021-03-25 | End: 2021-03-25

## 2021-03-25 RX ORDER — DILTIAZEM HCL 120 MG
240 CAPSULE, EXT RELEASE 24 HR ORAL DAILY
Refills: 0 | Status: DISCONTINUED | OUTPATIENT
Start: 2021-03-26 | End: 2021-03-26

## 2021-03-25 RX ADMIN — PANTOPRAZOLE SODIUM 40 MILLIGRAM(S): 20 TABLET, DELAYED RELEASE ORAL at 11:03

## 2021-03-25 RX ADMIN — Medication 40 MILLIEQUIVALENT(S): at 12:15

## 2021-03-25 RX ADMIN — CLOPIDOGREL BISULFATE 75 MILLIGRAM(S): 75 TABLET, FILM COATED ORAL at 11:03

## 2021-03-25 RX ADMIN — PANTOPRAZOLE SODIUM 40 MILLIGRAM(S): 20 TABLET, DELAYED RELEASE ORAL at 18:07

## 2021-03-25 RX ADMIN — Medication 10 MILLIGRAM(S): at 16:24

## 2021-03-25 NOTE — PROGRESS NOTE ADULT - ASSESSMENT
86f seen in our office by Dr. Morales.  She has hx of HTN, HLD, CAD, paf on Xarelto (last taken 3/14/21), severe AS, venous insufficiency, arthritis, osteoporosis, and diverticulosis.  S/p cardiac cath on 3/1/21 with pci of the lad performed.  She was taking asa, plavix and Xarelto post pci, and was planned for TAVR via femoral artery approach on 3/18/21. About 1 week ago she was told that she should not take asa because of severe epistaxis.  She has been taking her other meds as prescribed.  Though the severe epistaxis resolved she has not been certain that the bleeding has stopped.  She may sometimes spit blood, in small amounts.  She was at [facility] yesterday and was called back because of severe anemia. CT revealed no rpb.     She is s/p TAVR yesterday and feeling well this morning.    - there is no evidence of acute ischemia.  - s/p recent pci lad  - cont plavix  - restart statin    -brief paf overnight, now in sr  -can restart diltiazem  -xarelto has been on hold in setting of anemia    -there is no evidence for meaningful volume overload.   -post procedure echo pending  -mild chronic edema    -will follow with you

## 2021-03-25 NOTE — PROGRESS NOTE ADULT - ASSESSMENT
87 y/o female with PMHx of HTN, HLD, CAD, Afib on Xarelto (last taken 3/14/21), MR, severe AS, venous insufficiency, arthritis, osteoporosis, and diverticulosis.  S/p cardiac cath on 3/1/21 with stent placement.  Advised to take ASA after stent on 3/1/21. "Stopped it 2/2 nose bleed as per cardiologist".  Has also had a GIB in the past.  Brought in electively for TAVR 3/18/21.  Pre-op labs showed Hgb 6.8, decision was made to admit patient and obtain CT abd/pelvis to R/O RP bleed and to trend CBC.      Hospital Course:   3/17  PRBC x 2 units  GI consulted Digital unsuccessful> no stool vault, will obtain w/ BM  TAVR cancelled for 3/18  3/19 hct 27.7 this am - vss - pt reports black stool at home d/t "drinking prune juice everyday".  will attempt to obtain guaiac sample - no stool in vault yesterday  3/19 Cardizem 5 IV given for DEMIAN 130's H/H stable no acute distress.   3/20 VVS; H&H 9/30. Continue with current medication regimen.  Plan for TAVR next week tentatively for Wednesday 3/24/21 with Dr. Wagner.   3/21 VSS stable h/h  for TAVR on Wednesday.  3/22 HD stable, offer no complaints, TAVR for wednesday, Cleared by GI for TAVR h/h stable 9/31. Type and cross and COVID ordered for AM   3/24 R transfemoral TAVR #26 John performed. No evidence of arrythmia. On plavix.  3/25 Protonix started BID per GI.  Plavix only due to GIB & epistaxis.  No ASA, no Xarelto.  ?Resume home Cardizem 240, awaiting SHD recs.  Pt to F/U with GI out-pt       Nutritional Assessment:  · Nutritional Assessment	This patient has been assessed with a concern for Malnutrition and has been determined to have a diagnosis/diagnoses of Severe protein-calorie malnutrition.    This patient is being managed with:   Diet NPO-  Entered: Mar 24 2021 10:06AM     Problem/Plan - 1:  ·  Problem: Aortic valve stenosis.    POD 1 from TF TAVR, John valve.   Initial procedure delayed a few days due to anemia/GI work-up.    Holding ASA and Xarelto  Maintain Plavix w/ recent stent.  Echo done today, results above.    Home Friday.      Problem/Plan - 2:  ·  Problem: Anemia.  Plan: PRBC x 2 units  ck Guaic w/ BM  Supplements  GI consulted- rec BID Protonix (ordered)  h/h stable today at 9/30.   no overt signs of GI bleed or epistaxis.      Home tomorrow.  Ambulate and IS today.

## 2021-03-25 NOTE — PROGRESS NOTE ADULT - NUTRITIONAL ASSESSMENT
This patient has been assessed with a concern for Malnutrition and has been determined to have a diagnosis/diagnoses of Severe protein-calorie malnutrition.    This patient is being managed with:   Diet Regular-  Entered: Mar 24 2021 12:18PM    
This patient has been assessed with a concern for Malnutrition and has been determined to have a diagnosis/diagnoses of Severe protein-calorie malnutrition.    This patient is being managed with:   Diet NPO-  Entered: Mar 24 2021 10:06AM    
This patient has been assessed with a concern for Malnutrition and has been determined to have a diagnosis/diagnoses of Severe protein-calorie malnutrition.    This patient is being managed with:   Diet Regular-  Entered: Mar 24 2021 12:18PM    
This patient has been assessed with a concern for Malnutrition and has been determined to have a diagnosis/diagnoses of Severe protein-calorie malnutrition.    This patient is being managed with:   Diet NPO-  Entered: Mar 24 2021 10:06AM    
This patient has been assessed with a concern for Malnutrition and has been determined to have a diagnosis/diagnoses of Severe protein-calorie malnutrition.    This patient is being managed with:   Diet NPO after Midnight-     NPO Start Date: 23-Mar-2021   NPO Start Time: 23:59  Entered: Mar 23 2021  7:44AM    Diet Regular-  Entered: Mar 22 2021  8:25AM

## 2021-03-25 NOTE — PROGRESS NOTE ADULT - REASON FOR ADMISSION
anemia
anemia
severe AS, anemia
AS
AS
AS  Anemia
Severe AS
sob
sob
bleed and aortic valve stenosis
AS
SOB/ANEMIA
sob

## 2021-03-25 NOTE — CHART NOTE - NSCHARTNOTEFT_GEN_A_CORE
Patient's HR up to 130's-150's (as high as 170 at one point), 's/70's.  Pt takes 240mg Cardizem PO daily at home.  SHD just cleared her to resume her home dose, but will give her Cardizem 10mg IV now in light of her elevated HR.  Will start the 240mg PO tomorrow.

## 2021-03-25 NOTE — PROGRESS NOTE ADULT - ASSESSMENT
86 year old female with cardiac dysfunction and anemia.   ·	anemia  ·	occult positive  ·	CAD      -No evidence of bleeding , CBC stable  - Continue proton pump inhibitor bid  regular diet  -Proceed with TAVR as per CT team               _________________________________________________________________________________________________  [facility]  [address] [place] [address]  Office: [phone]    Heber Collins PA-C  ___________________________________________________________________________________________________     86 year old female with cardiac dysfunction and anemia.   ·	anemia  ·	occult positive  ·	CAD  ·	s/p TAVR 3/24      -No evidence of bleeding , CBC stable  - Continue proton pump inhibitor bid  - advance to regular diet              _________________________________________________________________________________________________  [facility]  [address] [facility] [address]  Office: [phone]    Heber Collins PA-C  ___________________________________________________________________________________________________

## 2021-03-25 NOTE — CHART NOTE - NSCHARTNOTEFT_GEN_A_CORE
Nutrition Follow Up Note  Patient seen for: initial malnutrition follow up. Chart reviewed and events noted.     85 y/o female with PMHx of HTN, HLD, CAD, Afib on Xarelto (last taken 3/14/21), MR, severe AS, venous insufficiency, arthritis, osteoporosis, and diverticulosis.  S/p cardiac cath on 3/1/21 with stent placement.  Advised to take ASA after stent on 3/1/21. "Stopped it 2/2 nose bleed as per cardiologist".  Has also had a GIB in the past. s/p R transfemoral TAVR 3/24.     Source:   Medical record and pt    Diet :   Regular (21 @ 12:18)    Patient reports: that appetite and PO intake are good; pt eating >75% meals. Pt is taking about 1/2 of Mighty Shakes at each meal. Pt with no known recent N/V, constipation, or diarrhea. Last BM 3/24. RD reinforced need for adequate protein-energy intake. Provided verbal education on heart healthy nutrition. Emphasis on lean protein sources; limiting sodium intake; limiting saturated fat + red meat intake. Pt made aware RD to remain available for any questions.      PO intake :  >75% meals     Source for PO intake:  Pt    Daily Weight in k.8 (), 53.4 (-), 56 ()  Dosing wt 55.8 kG  Wt fluctuations noted and likely related to interoperative fluid shifts. RD will continue to trend as new wts available/able.     Pertinent Medications: MEDICATIONS  (STANDING):  clopidogrel Tablet 75 milliGRAM(s) Oral daily  pantoprazole    Tablet 40 milliGRAM(s) Oral every 12 hours    Pertinent Labs:  @ 06:49: Na 140, BUN 16, Cr 0.53, BG 93, K+ 3.8, Alk Phos 174<H>, ALT/SGPT 31, AST/SGOT 35    Skin per nursing documentation: No pressure injuries noted.  Edema per nursing documentation: +3 Campbell. leg    Estimated Needs:   [x] no change since previous assessment  Based on Upper IBW wt 57.6 kG  Estimated Energy Needs: (25-30 kcals/kG) 1028-8840 kcals  Estimated Protein Needs: (1.2-1.4 gm/kG) 69-81 gm  Defer fluid needs to medical team    Previous Nutrition Diagnosis: Malnutrition Severe chronic  Nutrition Diagnosis is: ongoing, care plan in place, being addressed with encouraged PO intake and recommended nutrition supplements.     New Nutrition Diagnosis: N/A    Recommend  1) Continue current Regular diet to promote optimal PO intake 2/2 malnutrition. RD remains available for diet changes as needed/able.   2) RD to continue to provide Mighty Shakes x3 daily to optimize nutrient intake.  3) Reinforce nutrition education as able.     Monitoring and Evaluation:   Continue to monitor Nutritional intake, Tolerance to diet prescription, weights, labs, skin integrity    RD remains available upon request and will follow up per protocol  Huma Hubbard, MS, RD, CDN Pager #[phone]

## 2021-03-26 ENCOUNTER — TRANSCRIPTION ENCOUNTER (OUTPATIENT)
Age: 86
End: 2021-03-26

## 2021-03-26 VITALS — WEIGHT: 117.51 LBS

## 2021-03-26 LAB
ANION GAP SERPL CALC-SCNC: 11 MMOL/L — SIGNIFICANT CHANGE UP (ref 5–17)
BUN SERPL-MCNC: 18 MG/DL — SIGNIFICANT CHANGE UP (ref 7–23)
CALCIUM SERPL-MCNC: 8.6 MG/DL — SIGNIFICANT CHANGE UP (ref 8.4–10.5)
CHLORIDE SERPL-SCNC: 107 MMOL/L — SIGNIFICANT CHANGE UP (ref 96–108)
CO2 SERPL-SCNC: 22 MMOL/L — SIGNIFICANT CHANGE UP (ref 22–31)
CREAT SERPL-MCNC: 0.51 MG/DL — SIGNIFICANT CHANGE UP (ref 0.5–1.3)
GLUCOSE SERPL-MCNC: 92 MG/DL — SIGNIFICANT CHANGE UP (ref 70–99)
HCT VFR BLD CALC: 30.4 % — LOW (ref 34.5–45)
HGB BLD-MCNC: 9.3 G/DL — LOW (ref 11.5–15.5)
MCHC RBC-ENTMCNC: 28.5 PG — SIGNIFICANT CHANGE UP (ref 27–34)
MCHC RBC-ENTMCNC: 30.6 GM/DL — LOW (ref 32–36)
MCV RBC AUTO: 93.3 FL — SIGNIFICANT CHANGE UP (ref 80–100)
NRBC # BLD: 0 /100 WBCS — SIGNIFICANT CHANGE UP (ref 0–0)
PLATELET # BLD AUTO: 164 K/UL — SIGNIFICANT CHANGE UP (ref 150–400)
POTASSIUM SERPL-MCNC: 4 MMOL/L — SIGNIFICANT CHANGE UP (ref 3.5–5.3)
POTASSIUM SERPL-SCNC: 4 MMOL/L — SIGNIFICANT CHANGE UP (ref 3.5–5.3)
RBC # BLD: 3.26 M/UL — LOW (ref 3.8–5.2)
RBC # FLD: 15.9 % — HIGH (ref 10.3–14.5)
SODIUM SERPL-SCNC: 140 MMOL/L — SIGNIFICANT CHANGE UP (ref 135–145)
WBC # BLD: 6.62 K/UL — SIGNIFICANT CHANGE UP (ref 3.8–10.5)
WBC # FLD AUTO: 6.62 K/UL — SIGNIFICANT CHANGE UP (ref 3.8–10.5)

## 2021-03-26 PROCEDURE — 93306 TTE W/DOPPLER COMPLETE: CPT

## 2021-03-26 PROCEDURE — 87640 STAPH A DNA AMP PROBE: CPT

## 2021-03-26 PROCEDURE — 84443 ASSAY THYROID STIM HORMONE: CPT

## 2021-03-26 PROCEDURE — C1760: CPT

## 2021-03-26 PROCEDURE — 99232 SBSQ HOSP IP/OBS MODERATE 35: CPT

## 2021-03-26 PROCEDURE — C1769: CPT

## 2021-03-26 PROCEDURE — 80048 BASIC METABOLIC PNL TOTAL CA: CPT

## 2021-03-26 PROCEDURE — 85610 PROTHROMBIN TIME: CPT

## 2021-03-26 PROCEDURE — 83036 HEMOGLOBIN GLYCOSYLATED A1C: CPT

## 2021-03-26 PROCEDURE — 85730 THROMBOPLASTIN TIME PARTIAL: CPT

## 2021-03-26 PROCEDURE — 86850 RBC ANTIBODY SCREEN: CPT

## 2021-03-26 PROCEDURE — P9016: CPT

## 2021-03-26 PROCEDURE — P9045: CPT

## 2021-03-26 PROCEDURE — 74176 CT ABD & PELVIS W/O CONTRAST: CPT

## 2021-03-26 PROCEDURE — C1889: CPT

## 2021-03-26 PROCEDURE — G0463: CPT

## 2021-03-26 PROCEDURE — 85025 COMPLETE CBC W/AUTO DIFF WBC: CPT

## 2021-03-26 PROCEDURE — U0003: CPT

## 2021-03-26 PROCEDURE — 87641 MR-STAPH DNA AMP PROBE: CPT

## 2021-03-26 PROCEDURE — 80053 COMPREHEN METABOLIC PANEL: CPT

## 2021-03-26 PROCEDURE — 86900 BLOOD TYPING SEROLOGIC ABO: CPT

## 2021-03-26 PROCEDURE — 82962 GLUCOSE BLOOD TEST: CPT

## 2021-03-26 PROCEDURE — 93010 ELECTROCARDIOGRAM REPORT: CPT

## 2021-03-26 PROCEDURE — C9803: CPT

## 2021-03-26 PROCEDURE — C1887: CPT

## 2021-03-26 PROCEDURE — C1894: CPT

## 2021-03-26 PROCEDURE — 71045 X-RAY EXAM CHEST 1 VIEW: CPT

## 2021-03-26 PROCEDURE — 82272 OCCULT BLD FECES 1-3 TESTS: CPT

## 2021-03-26 PROCEDURE — 76000 FLUOROSCOPY <1 HR PHYS/QHP: CPT

## 2021-03-26 PROCEDURE — 86901 BLOOD TYPING SEROLOGIC RH(D): CPT

## 2021-03-26 PROCEDURE — U0005: CPT

## 2021-03-26 PROCEDURE — 36430 TRANSFUSION BLD/BLD COMPNT: CPT

## 2021-03-26 PROCEDURE — 99238 HOSP IP/OBS DSCHRG MGMT 30/<: CPT

## 2021-03-26 PROCEDURE — 85027 COMPLETE CBC AUTOMATED: CPT

## 2021-03-26 PROCEDURE — 81001 URINALYSIS AUTO W/SCOPE: CPT

## 2021-03-26 PROCEDURE — 86923 COMPATIBILITY TEST ELECTRIC: CPT

## 2021-03-26 PROCEDURE — 93880 EXTRACRANIAL BILAT STUDY: CPT

## 2021-03-26 PROCEDURE — 93005 ELECTROCARDIOGRAM TRACING: CPT

## 2021-03-26 RX ORDER — PANTOPRAZOLE SODIUM 20 MG/1
1 TABLET, DELAYED RELEASE ORAL
Qty: 60 | Refills: 0
Start: 2021-03-26 | End: 2021-04-24

## 2021-03-26 RX ORDER — CLOPIDOGREL BISULFATE 75 MG/1
1 TABLET, FILM COATED ORAL
Qty: 30 | Refills: 0
Start: 2021-03-26 | End: 2021-04-24

## 2021-03-26 RX ADMIN — PANTOPRAZOLE SODIUM 40 MILLIGRAM(S): 20 TABLET, DELAYED RELEASE ORAL at 05:54

## 2021-03-26 RX ADMIN — CLOPIDOGREL BISULFATE 75 MILLIGRAM(S): 75 TABLET, FILM COATED ORAL at 12:42

## 2021-03-26 RX ADMIN — Medication 240 MILLIGRAM(S): at 05:54

## 2021-03-26 NOTE — DISCHARGE NOTE PROVIDER - HOSPITAL COURSE
85 y/o female with PMHx of HTN, HLD, CAD, Afib on Xarelto (last taken 3/14/21), MR, severe AS, venous insufficiency, arthritis, osteoporosis, and diverticulosis.  S/p cardiac cath on 3/1/21 with stent placement.  Advised to take ASA after stent on 3/1/21. "Stopped it 2/2 nose bleed as per cardiologist".  Has also had a GIB in the past.  Brought in electively for TAVR 3/18/21.  Pre-op labs showed Hgb 6.8, decision was made to admit patient and obtain CT abd/pelvis to R/O RP bleed and to trend CBC.      Hospital Course:   3/17  PRBC x 2 units  GI consulted Digital unsuccessful> no stool vault, will obtain w/ BM  TAVR cancelled for 3/18  3/19 hct 27.7 this am - vss - pt reports black stool at home d/t "drinking prune juice everyday".  will attempt to obtain guaiac sample - no stool in vault yesterday  3/19 Cardizem 5 IV given for DEMIAN 130's H/H stable no acute distress.   3/20 VVS; H&H 9/30. Continue with current medication regimen.  Plan for TAVR next week tentatively for Wednesday 3/24/21 with Dr. Wagner.   3/21 VSS stable h/h  for TAVR on Wednesday.  3/22 HD stable, offer no complaints, TAVR for wednesday, Cleared by GI for TAVR h/h stable 9/31. Type and cross and COVID ordered for AM   3/24 R transfemoral TAVR #26 John performed. No evidence of arrythmia. On plavix.  3/25 Protonix started BID per GI.  Plavix only due to GIB & epistaxis.  No ASA, no Xarelto.  Resume home Cardizem 240,  Pt to F/U with GI out-pt; hold xarelto due to recent GI bleed   3/26 VSS: EKG RSR 80's - reviewed in am rounds; discharge pt home with [facility] today as per Dr. Wagner

## 2021-03-26 NOTE — DISCHARGE NOTE PROVIDER - NSDCCPCAREPLAN_GEN_ALL_CORE_FT
PRINCIPAL DISCHARGE DIAGNOSIS  Diagnosis: S/P TAVR (transcatheter aortic valve replacement)  Assessment and Plan of Treatment: shower daily  weigh yourself daily  continue current prescriptions as ordered  increase activity as tolerated   no added salt; low fat; low cholesterol, low salt diet.   follow up with Cardiologist, Dr. Rodriguez, in 1-2 weeks. Call to schedule appointment.  follow up with cardiac surgeon, Dr. Wagner on Tuesday March 20th  follow up in 30 days at Dr. Wagner's office for repeat echocardiogram and results of cardiac monitor ( [facility])   antibiotic prophylaxis prior to any invasive procedure including dental work          PRINCIPAL DISCHARGE DIAGNOSIS  Diagnosis: S/P TAVR (transcatheter aortic valve replacement)  Assessment and Plan of Treatment: shower daily  weigh yourself daily  continue current prescriptions as ordered  increase activity as tolerated   no added salt; low fat; low cholesterol, low salt diet.   follow up with Cardiologist, Dr. Rodriguez, in 1-2 weeks. Call to schedule appointment.  follow up with cardiac surgeon, Dr. Wagner on Tuesday March 30th 8:00 am. Call to confirm appointment. [phone]  follow up in 30 days at Dr. Wagner's office for repeat echocardiogram and results of cardiac monitor ( [facility])   antibiotic prophylaxis prior to any invasive procedure including dental work

## 2021-03-26 NOTE — DISCHARGE NOTE PROVIDER - DETAILS OF MALNUTRITION DIAGNOSIS/DIAGNOSES
This patient has been assessed with a concern for Malnutrition and was treated during this hospitalization for the following Nutrition diagnosis/diagnoses:     -  03/22/2021: Severe protein-calorie malnutrition   This patient has been assessed with a concern for Malnutrition and was treated during this hospitalization for the following Nutrition diagnosis/diagnoses:     -  03/22/2021: Severe protein-calorie malnutrition    This patient has been assessed with a concern for Malnutrition and was treated during this hospitalization for the following Nutrition diagnosis/diagnoses:     -  03/22/2021: Severe protein-calorie malnutrition

## 2021-03-26 NOTE — DISCHARGE NOTE PROVIDER - NSDCFUADDINST_GEN_ALL_CORE_FT
call Dr. Wagner for fever > 101  follow up echocardiogram and cardiac monitor results ([facility]) in 30 days with Dr. Wagner  antibiotic prophylaxis prior to any invasive procedure including dental work

## 2021-03-26 NOTE — PROGRESS NOTE ADULT - ASSESSMENT
86f seen in our office by Dr. Morales.  She has hx of HTN, HLD, CAD, paf on Xarelto (last taken 3/14/21), severe AS, venous insufficiency, arthritis, osteoporosis, and diverticulosis.  S/p cardiac cath on 3/1/21 with pci of the lad performed.  She was taking asa, plavix and Xarelto post pci, and was planned for TAVR via femoral artery approach on 3/18/21. About 1 week ago she was told that she should not take asa because of severe epistaxis.  She has been taking her other meds as prescribed.  Though the severe epistaxis resolved she has not been certain that the bleeding has stopped.  She may sometimes spit blood, in small amounts.  She was at [facility] yesterday and was called back because of severe anemia. CT revealed no rpb.     She is s/p TAVR yesterday and feeling well this morning.    - there is no evidence of acute ischemia.  - s/p recent pci lad  - cont plavix  - restart statin    -brief paf overnight, now in sr  -may consider increasing dilt to 360  -xarelto has been on hold in setting of anemia    -there is no evidence for meaningful volume overload.   -post procedure echo with hyperdynamic lv with well seated tavr and mild para leak   -mild chronic edema    -will follow with you  - DC planning per primary team.

## 2021-03-26 NOTE — DISCHARGE NOTE PROVIDER - PROVIDER TOKENS
PROVIDER:[TOKEN:[[id number]]],PROVIDER:[TOKEN:[[id number]]],PROVIDER:[TOKEN:[[id number]]]

## 2021-03-26 NOTE — DISCHARGE NOTE PROVIDER - NSDCMRMEDTOKEN_GEN_ALL_CORE_FT
Calcium 600+D 600 mg-200 intl units oral tablet:  orally 2 tabs daily  Centrum oral tablet: 1 tab(s) orally once a day  clopidogrel 75 mg oral tablet: 1 tab(s) orally once a day  dilTIAZem 240 mg/24 hours oral capsule, extended release: 1 cap(s) orally once a day  methenamine hippurate 1 g oral tablet: 1 tab(s) orally once a day  Prolia 60 mg/mL subcutaneous solution: 1 dose(s) subcutaneous every 6 months  Protonix 40 mg oral delayed release tablet: 1 tab(s) orally 2 times a day   simvastatin 10 mg oral tablet: 1 tab(s) orally once a day (at bedtime)

## 2021-03-26 NOTE — DISCHARGE NOTE PROVIDER - NSDCPNSUBOBJ_GEN_ALL_CORE
VSS  tele: RSR 70-90  audible S1 S2  lungs clear, RR easy unlabored  +bs nt nd + bm; neg n/v/d  LE: neg calf tenderness, neg edema, ppp bilaterally, bilateral groin sites cdi maryuri - neg hematoma    Discharge pt home today 3/26 as per Dr. Wagner - EKG reviewed in am rounds- RSR 80s

## 2021-03-26 NOTE — PROGRESS NOTE ADULT - PROVIDER SPECIALTY LIST ADULT
Cardiology
Gastroenterology
Structural Heart
Structural Heart
CT Surgery
CT Surgery
Cardiology
Gastroenterology
Structural Heart
Cardiology
Cardiology
Gastroenterology
Structural Heart
CT Surgery

## 2021-03-26 NOTE — DISCHARGE NOTE NURSING/CASE MANAGEMENT/SOCIAL WORK - NSDCPETBCESMAN_GEN_ALL_CORE
If you are a smoker, it is important for your health to stop smoking. Please be aware that second hand smoke is also harmful.
oriented to person, place, time and situation

## 2021-03-26 NOTE — DISCHARGE NOTE NURSING/CASE MANAGEMENT/SOCIAL WORK - PATIENT PORTAL LINK FT
You can access the FollowMyHealth Patient Portal offered by [facility] by registering at the following website: [email or web address]. By joining [employer]’s FollowMyHealth portal, you will also be able to view your health information using other applications (apps) compatible with our system.

## 2021-03-26 NOTE — DISCHARGE NOTE PROVIDER - CARE PROVIDER_API CALL
Francesco Wagner (MD)  Thoracic and Cardiac Surgery  [address] [place] [address]  Phone: [phone]  Fax: [phone]  Follow Up Time:     Phillip Rodriguez)  Cardiovascular Disease  [address] [place] [address]  Phone: [phone]  Fax: [phone]  Follow Up Time:     Germain Reece (DO)  Gastroenterology; Internal Medicine  [address] [place] [address]  Phone: [phone]  Fax: [phone]  Follow Up Time:

## 2021-03-26 NOTE — PROGRESS NOTE ADULT - ASSESSMENT
86 year old female with cardiac dysfunction and anemia.   ·	anemia  ·	occult positive  ·	CAD  ·	s/p TAVR 3/24      -No evidence of bleeding , CBC stable  - Continue proton pump inhibitor bid  - advance to regular diet  -Discharge planning           _________________________________________________________________________________________________  [facility]  [address] [place] [address]  Office: [phone]    Heber Collins PA-C  ___________________________________________________________________________________________________

## 2021-03-26 NOTE — PROGRESS NOTE ADULT - SUBJECTIVE AND OBJECTIVE BOX
[facility]  Heber Collins [place] [address]  [phone]      INTERVAL HPI/OVERNIGHT EVENTS:    no new events     MEDICATIONS  (STANDING):  ascorbic acid 500 milliGRAM(s) Oral daily  atorvastatin 10 milliGRAM(s) Oral at bedtime  ciprofloxacin     Tablet 250 milliGRAM(s) Oral two times a day  clopidogrel Tablet 75 milliGRAM(s) Oral daily  diltiazem    milliGRAM(s) Oral daily  ferrous    sulfate 325 milliGRAM(s) Oral daily  folic acid 1 milliGRAM(s) Oral daily  pantoprazole    Tablet 40 milliGRAM(s) Oral two times a day  thiamine 100 milliGRAM(s) Oral daily    MEDICATIONS  (PRN):      Allergies    codeine (Other)  penicillin (Flushing; Hypotension; Other)    Intolerances        ROS:   General:  No wt loss, fevers, chills, night sweats, fatigue,   Eyes:  Good vision, no reported pain  ENT:  No sore throat, pain, runny nose, dysphagia  CV:  No pain, palpitations, hypo/hypertension  Resp:  No dyspnea, cough, tachypnea, wheezing  GI:  No pain, No nausea, No vomiting, No diarrhea, No constipation, No weight loss, No fever, No pruritis, No rectal bleeding, No tarry stools, No dysphagia,  :  No pain, bleeding, incontinence, nocturia  Muscle:  No pain, weakness  Neuro:  No weakness, tingling, memory problems  Psych:  No fatigue, insomnia, mood problems, depression  Endocrine:  No polyuria, polydipsia, cold/heat intolerance  Heme:  No petechiae, ecchymosis, easy bruisability  Skin:  No rash, tattoos, scars, edema      PHYSICAL EXAM:   Vital Signs:  Vital Signs Last 24 Hrs  T(C): 36.7 (19 Mar 2021 12:36), Max: 36.8 (18 Mar 2021 19:40)  T(F): 98 (19 Mar 2021 12:36), Max: 98.2 (18 Mar 2021 19:40)  HR: 99 (19 Mar 2021 09:04) (72 - 125)  BP: 101/69 (19 Mar 2021 12:36) (97/65 - 134/72)  BP(mean): --  RR: 18 (19 Mar 2021 12:36) (18 - 18)  SpO2: 98% (19 Mar 2021 12:36) (97% - 98%)  Daily     Daily Weight in k (19 Mar 2021 12:36)    GENERAL:  Appears stated age,   HEENT:  NC/AT,    CHEST:  Full & symmetric excursion,   HEART:  Regular rhythm,  ABDOMEN:  Soft, non-tender, non-distended,  EXTEREMITIES:  no cyanosis  SKIN:  No rash  NEURO:  Alert,       LABS:                        8.7    7.12  )-----------( 227      ( 18 Mar 2021 07:20 )             27.7     03-18    138  |  107  |  16  ----------------------------<  85  4.0   |  21<L>  |  0.73    Ca    8.4      18 Mar 2021 07:13    TPro  5.2<L>  /  Alb  3.2<L>  /  TBili  0.3  /  DBili  x   /  AST  20  /  ALT  18  /  AlkPhos  118  03-18          RADIOLOGY & ADDITIONAL TESTS:  
This patient has been implanted with a Transcatheter Aortic Valve Implant under the following NCT/TWILA:    TAVR:    Commercial Implant NCT [id number], TWILA N/A and will be placed into the TVT Registry.    BONIFACIO Augustin  [id number]
*****Structural Heart Team*****    Subjective:  The patient is resting comfortably in bed, offering no complaints. She is pre-op for TAVR, but was admitted to the hospital yesterday after we received a call for an H/H of 6.8/21.6. Repeat on admission was 6.1/19.6. In talking to the patient, she had noted that over the past few days, she has had dark stools, slightly tarry. She also had a PCI 3/1/21 which was complicated by an access site hematoma. She lives at home alone, but states other than feeling a little tired, she hasn't had any CP or SOB.      PAST MEDICAL & SURGICAL HISTORY:  Osteopenia    CAD (coronary artery disease)    Aortic valve stenosis    HLD (hyperlipidemia)    Afib    Abdominal Mass    Diverticulosis of the Colon    History of Osteoarthritis    DJD (Degenerative Joint Disease)  shoulders, knees, cervical and lumbar spine    Constipation    Thyroid Nodule    Hiatal Hernia    Hypercholesterolemia    MVP (Mitral Valve Prolapse)  last echo 2010    HTN (Hypertension)    S/P cardiac catheterization  2/26/21    H/O knee surgery    H/O ovarian cystectomy  2013    Cataract, Other  surgery   bilateral    S/P Bunionectomy    Parathyroid  surgery    H/O Shoulder Replacement  right shoulder  get clindamycin before surgery          T(C): 36.7 (03-17-21 @ 04:41), Max: 36.7 (03-16-21 @ 11:11)  HR: 80 (03-17-21 @ 04:41) (76 - 84)  BP: 113/64 (03-17-21 @ 04:41) (113/64 - 137/64)  RR: 18 (03-17-21 @ 04:41) (14 - 18)  SpO2: 97% (03-17-21 @ 04:41) (97% - 100%)  Wt(kg): --  03-16 @ 07:01  -  03-17 @ 07:00  --------------------------------------------------------  IN: 0 mL / OUT: 600 mL / NET: -600 mL      MEDICATIONS  (STANDING):  ascorbic acid 500 milliGRAM(s) Oral daily  atorvastatin 10 milliGRAM(s) Oral at bedtime  ciprofloxacin     Tablet 250 milliGRAM(s) Oral two times a day  clopidogrel Tablet 75 milliGRAM(s) Oral daily  diltiazem    milliGRAM(s) Oral daily  ferrous    sulfate 325 milliGRAM(s) Oral daily  folic acid 1 milliGRAM(s) Oral daily  pantoprazole    Tablet 40 milliGRAM(s) Oral before breakfast  polyethylene glycol 3350 17 Gram(s) Oral once  thiamine 100 milliGRAM(s) Oral daily    MEDICATIONS  (PRN):      Review of Symptoms:  Constitutional: Awake, Alert, Follows commands  Respiratory: Mild SOB  Cardiac: Denies CP, Denies Palpitations  Gastrointestinal: Denies Pain, Denies N/V, tolerating po intake, Dark stools  Vascular: Negative  Extremities: + Edema, No joint pain or swelling  Neurological: Negative  Endocrine: No heat or cold intolerance, No excessive thirst  Heme: Anemia    Exam:  General: A/Ox3, NIKOLAS, NAD  HEENT: Supple, No JVD, Trachea midline, no masses  Pulmonary: CTAB, = Chest Excursion, no accessory muscle use  Cor: S1S2, RRR, III/VI AAKASH  ECG: SR  Groin/Wound: R groin, Soft, NT, small deep hematoma  Gastrointestinal: Soft, NT/ND, + Bowel Sounds  Neuro: = motor and sensory B/L, No focal deficits  Vascular: 2+ non pitting edema  Extremities: No joint pain or swelling  Skin: Warm/Dry/Normal color, Normal turgor, no rashes                          6.1    7.93  )-----------( 247      ( 17 Mar 2021 06:13 )             19.6   03-17    141  |  108  |  14  ----------------------------<  93  4.2   |  23  |  0.68    Ca    8.8      17 Mar 2021 06:17    TPro  5.1<L>  /  Alb  3.2<L>  /  TBili  0.4  /  DBili  x   /  AST  17  /  ALT  14  /  AlkPhos  99  03-17      Imaging Reviewed:        Assesment/Plan:  86 Female With Severe Aortic Stenosis, Acute Blood Loss Anemia    1.) Aortic Stenosis: Patient scheduled for TAVR 3/18/21, will discuss with Structural Heart Team whether to proceed as planned based on anemia workup.  2.) Acute blood loss anemia: Monitor CBC. Will transfuse 2 units PRBC, with a dose of lasix 20 mg IV in between. Stool Guaiac will be sent after digital exam. Patient had non contrast last night with no evidence of RP Bleed or groin hematoma as the source of blood loss. Will discuss possibly getting GI involved, pending result of guaiac.  3.) Follow up CXR    BONIFACIO Ford  [id number]
[facility] -- Ansley Morales, Jennifer Lopez, Imtiaz Dee Savella  Office # [phone]      Follow Up:  AS, anemia    Subjective/Observations:   Patient seen and examined. Events noted. Resting comfortably in bed. No complaints of chest pain, dyspnea, or palpitations reported. No signs of orthopnea or PND. Now s/p 2 PRBCs. notes that she has black stools chronically    REVIEW OF SYSTEMS: All other review of systems is negative unless indicated above    PAST MEDICAL & SURGICAL HISTORY:  Osteopenia    CAD (coronary artery disease)    Aortic valve stenosis    HLD (hyperlipidemia)    Afib    Abdominal Mass    Diverticulosis of the Colon    History of Osteoarthritis    DJD (Degenerative Joint Disease)  shoulders, knees, cervical and lumbar spine    Constipation    Thyroid Nodule    Hiatal Hernia    Hypercholesterolemia    MVP (Mitral Valve Prolapse)  last echo 2010    HTN (Hypertension)    S/P cardiac catheterization  2/26/21    H/O knee surgery    H/O ovarian cystectomy  2013    Cataract, Other  surgery   bilateral    S/P Bunionectomy    Parathyroid  surgery    H/O Shoulder Replacement  right shoulder  get clindamycin before surgery        MEDICATIONS  (STANDING):  ascorbic acid 500 milliGRAM(s) Oral daily  atorvastatin 10 milliGRAM(s) Oral at bedtime  ciprofloxacin     Tablet 250 milliGRAM(s) Oral two times a day  clopidogrel Tablet 75 milliGRAM(s) Oral daily  diltiazem    milliGRAM(s) Oral daily  ferrous    sulfate 325 milliGRAM(s) Oral daily  folic acid 1 milliGRAM(s) Oral daily  pantoprazole    Tablet 40 milliGRAM(s) Oral before breakfast  thiamine 100 milliGRAM(s) Oral daily    MEDICATIONS  (PRN):      Allergies    codeine (Other)  penicillin (Flushing; Hypotension; Other)    Intolerances            Vital Signs Last 24 Hrs  T(C): 36.6 (18 Mar 2021 05:35), Max: 37.1 (17 Mar 2021 14:00)  T(F): 97.9 (18 Mar 2021 05:35), Max: 98.7 (17 Mar 2021 14:00)  HR: 72 (18 Mar 2021 05:35) (72 - 84)  BP: 114/67 (18 Mar 2021 05:35) (106/57 - 118/63)  BP(mean): --  RR: 17 (18 Mar 2021 05:35) (17 - 18)  SpO2: 97% (18 Mar 2021 05:35) (94% - 99%)    I&O's Summary    17 Mar 2021 07:01  -  18 Mar 2021 07:00  --------------------------------------------------------  IN: 1280 mL / OUT: 2050 mL / NET: -770 mL    18 Mar 2021 07:01  -  18 Mar 2021 08:23  --------------------------------------------------------  IN: 240 mL / OUT: 0 mL / NET: 240 mL          PHYSICAL EXAM:  TELE:   Constitutional: NAD, awake    HEENT: Moist Mucous Membranes, Anicteric  Pulmonary: Decreased breath sounds b/l. No rales, crackles or wheeze appreciated.   Cardiovascular: Regular, S1 and soft s2 3/6 SM  Gastrointestinal: Bowel Sounds present, soft, nontender.   Lymph: No peripheral edema. No lymphadenopathy.  Skin: No visible rashes or ulcers.  Psych:  Mood & affect appropriate for situation    LABS: All Labs Reviewed:                        8.7    7.12  )-----------( 227      ( 18 Mar 2021 07:20 )             27.7                         8.1    7.50  )-----------( 217      ( 17 Mar 2021 19:25 )             25.4                         6.1    7.93  )-----------( 247      ( 17 Mar 2021 06:13 )             19.6     18 Mar 2021 07:13    138    |  107    |  16     ----------------------------<  85     4.0     |  21     |  0.73   17 Mar 2021 06:17    141    |  108    |  14     ----------------------------<  93     4.2     |  23     |  0.68   16 Mar 2021 12:49    137    |  102    |  20     ----------------------------<  92     4.0     |  24     |  0.68     Ca    8.4        18 Mar 2021 07:13  Ca    8.8        17 Mar 2021 06:17  Ca    9.4        16 Mar 2021 12:49    TPro  5.2    /  Alb  3.2    /  TBili  0.3    /  DBili  x      /  AST  20     /  ALT  18     /  AlkPhos  118    18 Mar 2021 07:13  TPro  5.1    /  Alb  3.2    /  TBili  0.4    /  DBili  x      /  AST  17     /  ALT  14     /  AlkPhos  99     17 Mar 2021 06:17                     
[facility] - Ansley Morales, Jessica, Jennifer, Luiz, Albnio Kitchen  Office Number:  [phone]    Patient resting comfortably in bed in NAD.  Laying flat with no respiratory distress.  No complaints of chest pain, dyspnea, palpitations, PND, or orthopnea.    F/U for:  AS    Telemetry: SR     MEDICATIONS  (STANDING):  ascorbic acid 500 milliGRAM(s) Oral daily  atorvastatin 10 milliGRAM(s) Oral at bedtime  clopidogrel Tablet 75 milliGRAM(s) Oral daily  diltiazem    milliGRAM(s) Oral daily  ferrous    sulfate 325 milliGRAM(s) Oral daily  folic acid 1 milliGRAM(s) Oral daily  pantoprazole    Tablet 40 milliGRAM(s) Oral two times a day  thiamine 100 milliGRAM(s) Oral daily    MEDICATIONS  (PRN):      Allergies    codeine (Other)  penicillin (Flushing; Hypotension; Other)    Intolerances        Vital Signs Last 24 Hrs  T(C): 36.6 (22 Mar 2021 05:42), Max: 36.6 (21 Mar 2021 19:08)  T(F): 97.9 (22 Mar 2021 05:42), Max: 97.9 (22 Mar 2021 05:42)  HR: 70 (22 Mar 2021 05:42) (70 - 74)  BP: 130/69 (22 Mar 2021 05:42) (122/75 - 131/80)  BP(mean): 97 (21 Mar 2021 19:08) (97 - 97)  RR: 18 (22 Mar 2021 05:42) (18 - 18)  SpO2: 97% (22 Mar 2021 05:42) (97% - 99%)    I&O's Summary    21 Mar 2021 07:01  -  22 Mar 2021 07:00  --------------------------------------------------------  IN: 1080 mL / OUT: 2300 mL / NET: -1220 mL        ON EXAM:    Constitutional: NAD, awake    HEENT: Moist Mucous Membranes, Anicteric  Pulmonary: Decreased breath sounds b/l. No rales, crackles or wheeze appreciated.   Cardiovascular: Regular, S1 and soft s2 3/6 SM  Gastrointestinal: Bowel Sounds present, soft, nontender.   Lymph: No peripheral edema. No lymphadenopathy.  Skin: No visible rashes or ulcers.  Psych:  Mood & affect appropriate for situation  LABS: All Labs Reviewed:                        9.5    5.54  )-----------( 217      ( 22 Mar 2021 06:05 )             31.2                         9.3    6.00  )-----------( 231      ( 21 Mar 2021 04:55 )             29.9                         9.5    6.00  )-----------( 225      ( 20 Mar 2021 06:35 )             30.5     22 Mar 2021 06:05    140    |  108    |  14     ----------------------------<  94     3.8     |  21     |  0.62   21 Mar 2021 04:55    140    |  109    |  15     ----------------------------<  88     3.9     |  21     |  0.71   20 Mar 2021 06:35    140    |  109    |  14     ----------------------------<  95     3.8     |  21     |  0.62     Ca    8.3        22 Mar 2021 06:05  Ca    8.2        21 Mar 2021 04:55  Ca    8.2        20 Mar 2021 06:35    TPro  5.1    /  Alb  3.3    /  TBili  0.2    /  DBili  x      /  AST  24     /  ALT  23     /  AlkPhos  151    21 Mar 2021 04:55  TPro  5.2    /  Alb  3.3    /  TBili  0.3    /  DBili  x      /  AST  22     /  ALT  21     /  AlkPhos  154    20 Mar 2021 06:35            
Structural Heart Team    Ms Murray is sitting up in bed without complaints.  She says she feels well and denies chest pain/pressure, sob and dizziness.  She reports that, following days of constipation, she had diarrhea yesterday.  It was not bloody or dark.         REVIEW OF SYSTEMS:    CONSTITUTIONAL: No weakness, fevers or chills  EYES/ENT: No visual changes;  No vertigo or throat pain   NECK: No pain or stiffness  RESPIRATORY: No cough, wheezing, hemoptysis; No shortness of breath  CARDIOVASCULAR: No chest pain or palpitations  GASTROINTESTINAL: No abdominal or epigastric pain. No nausea, vomiting, or hematemesis; No diarrhea or constipation. No melena or hematochezia.  GENITOURINARY: No dysuria, frequency or hematuria  NEUROLOGICAL: No numbness or weakness  SKIN: No itching, rashes      Allergies    codeine (Other)  penicillin (Flushing; Hypotension; Other)    Intolerances      Vital Signs Last 24 Hrs  T(C): 36.6 (22 Mar 2021 05:42), Max: 36.6 (21 Mar 2021 19:08)  T(F): 97.9 (22 Mar 2021 05:42), Max: 97.9 (22 Mar 2021 05:42)  HR: 70 (22 Mar 2021 05:42) (70 - 74)  BP: 130/69 (22 Mar 2021 05:42) (122/75 - 131/80)  BP(mean): 97 (21 Mar 2021 19:08) (97 - 97)  RR: 18 (22 Mar 2021 05:42) (18 - 18)  SpO2: 97% (22 Mar 2021 05:42) (97% - 99%)    MEDICATIONS  (STANDING):  ascorbic acid 500 milliGRAM(s) Oral daily  atorvastatin 10 milliGRAM(s) Oral at bedtime  clopidogrel Tablet 75 milliGRAM(s) Oral daily  diltiazem    milliGRAM(s) Oral daily  ferrous    sulfate 325 milliGRAM(s) Oral daily  folic acid 1 milliGRAM(s) Oral daily  pantoprazole    Tablet 40 milliGRAM(s) Oral two times a day  thiamine 100 milliGRAM(s) Oral daily      Exam-  General: NAD, WDWN, appropriate affect  Cor: s1s2, RRR, III/VI systolic murmur   Tele: SR 60-80, PAC's  Pulm: Clear, no wheezes, rales, or rhonchi, no use of accessory muscles  Gastrointestinal: soft, nontender, nondistended, +bowel sounds  Extremities: 2+ nonpitting edema, 1+ DP pulses  Neuro: A&Ox3, nonfocal                          9.5    5.54  )-----------( 217      ( 22 Mar 2021 06:05 )             31.2   03-22    140  |  108  |  14  ----------------------------<  94  3.8   |  21<L>  |  0.62    Ca    8.3<L>      22 Mar 2021 06:05    TPro  5.1<L>  /  Alb  3.3  /  TBili  0.2  /  DBili  x   /  AST  24  /  ALT  23  /  AlkPhos  151<H>  03-21    I&O's Summary    21 Mar 2021 07:01  -  22 Mar 2021 07:00  --------------------------------------------------------  IN: 1080 mL / OUT: 2300 mL / NET: -1220 mL    22 Mar 2021 07:01  -  22 Mar 2021 10:54  --------------------------------------------------------  IN: 300 mL / OUT: 400 mL / NET: -100 mL              Assessment/Plan:  Ms Murray is an 85y/o female With Severe Aortic Stenosis admitted with anemia  - no further GI bleed  - Hgb/HCT stable  - as per GI, no testing necessary prior to TAVR (given stable Hgb/HCT and no further evidence of bleeding)  - on schedule for TAVR on Wednesday 3/24/2021  - discussed plan, including post op MCOT placement, and answered all her questions    BONIFACIO Rodriguez  [phone]    
*****Structural Heart Team*****    Subjective:    The patient is resting comfortably in a chair, currently offering no complaints. She has been ambulating around the unit without any issues.      PAST MEDICAL & SURGICAL HISTORY:  Osteopenia    CAD (coronary artery disease)    Aortic valve stenosis    HLD (hyperlipidemia)    Afib    Abdominal Mass    Diverticulosis of the Colon    History of Osteoarthritis    DJD (Degenerative Joint Disease)  shoulders, knees, cervical and lumbar spine    Constipation    Thyroid Nodule    Hiatal Hernia    Hypercholesterolemia    MVP (Mitral Valve Prolapse)  last echo 2010    HTN (Hypertension)    S/P cardiac catheterization  2/26/21    H/O knee surgery    H/O ovarian cystectomy  2013    Cataract, Other  surgery   bilateral    S/P Bunionectomy    Parathyroid  surgery    H/O Shoulder Replacement  right shoulder  get clindamycin before surgery          T(C): 36.9 (03-25-21 @ 06:00), Max: 37.1 (03-24-21 @ 19:36)  HR: 68 (03-25-21 @ 07:10) (57 - 73)  BP: 129/65 (03-25-21 @ 06:00) (111/54 - 129/65)  RR: 18 (03-25-21 @ 06:00) (15 - 18)  SpO2: 96% (03-25-21 @ 06:00) (96% - 99%)  Wt(kg): --  03-24 @ 07:01  -  03-25 @ 07:00  --------------------------------------------------------  IN: 470 mL / OUT: 700 mL / NET: -230 mL    03-25 @ 07:01  -  03-25 @ 10:39  --------------------------------------------------------  IN: 260 mL / OUT: 0 mL / NET: 260 mL      MEDICATIONS  (STANDING):  clopidogrel Tablet 75 milliGRAM(s) Oral daily  pantoprazole    Tablet 40 milliGRAM(s) Oral every 12 hours    MEDICATIONS  (PRN):      Review of Symptoms:  Constitutional: Awake, Alert, Follows commands  Respiratory: Currently Denies  Cardiac: Denies CP, Denies Palpitations  Gastrointestinal: Denies Pain, Denies N/V, tolerating po intake  Vascular: Negative  Extremities: No Edema, No joint pain or swelling  Neurological: Negative  Endocrine: No heat or cold intolerance, No excessive thirst  Heme/Onc: Negative    Exam:  General: A/Ox3, NAD, NIKOLAS  HEENT: Supple, No JVD, Trachea midline, no masses  Pulmonary: CTAB, = Chest Excursion, no accessory muscle use  Cor: S1S2, RRR, No Murmur or rub noted  ECG: SR  Groin/Wound: B/L Soft, No Hematoma, No Ecchymosis  Gastrointestinal: Soft, NT/ND, + Bowel Sounds  Neuro: = motor and sensory B/L, No focal deficits  Vascular: 1+ Pulses B/L, No Edema  Extremities: No joint pain or swelling  Skin: Warm/Dry/Normal color, Normal turgor, no rashes                          9.4    7.53  )-----------( 189      ( 25 Mar 2021 06:51 )             30.8   03-25    140  |  107  |  16  ----------------------------<  93  3.8   |  22  |  0.53    Ca    8.3<L>      25 Mar 2021 06:49    TPro  5.5<L>  /  Alb  3.5  /  TBili  0.3  /  DBili  x   /  AST  35  /  ALT  31  /  AlkPhos  174<H>  03-25  PT/INR - ( 24 Mar 2021 10:28 )   PT: 12.3 sec;   INR: 1.03 ratio         PTT - ( 24 Mar 2021 10:28 )  PTT:35.2 sec    Imaging Reviewed:    Post Op TTE: Results Pending      Assesment/Plan:    86 Female, S/P TAVR for Severe Aortic Stenosis, Acute GI Bleeding, Paroxysmal AFib    1.) S/P TAVR: No ASA due to recent GI Bleed and Epistaxis. Plavix 75 mg po daily, Continue to Monitor Groins. Ambulate as tolerated.   2.) Paroxysmal AFib: Per Dr. Wagner, we will not restart her anticoagulation at this time. Will discuss restarting her Cardizem 240 mg po daily  3.) Patient will be discharged on an MCOT for a period of 30days to monitor for rhythm changes post TAVR.  4.) Acute GI Bleed: Patient with no further evidence of GIB. Will continue to follow up Dr. Jaeger. Continue Protonix every 12 hours  5.) Discharge Plan: Patient is to follow up with Dr. Wagner in 1 week post discharge. She should then follow up with the [facility]  in 30 days, with a repeat Transthoracic Echo to be done at that visit.    BONIFACIO Ford  [id number]
[facility]  Heber Collins [place] [address]  [phone]      INTERVAL HPI/OVERNIGHT EVENTS:    no new events     MEDICATIONS  (STANDING):  ascorbic acid 500 milliGRAM(s) Oral daily  atorvastatin 10 milliGRAM(s) Oral at bedtime  ciprofloxacin     Tablet 250 milliGRAM(s) Oral two times a day  clopidogrel Tablet 75 milliGRAM(s) Oral daily  diltiazem    milliGRAM(s) Oral daily  ferrous    sulfate 325 milliGRAM(s) Oral daily  folic acid 1 milliGRAM(s) Oral daily  pantoprazole    Tablet 40 milliGRAM(s) Oral two times a day  thiamine 100 milliGRAM(s) Oral daily    MEDICATIONS  (PRN):      Allergies    codeine (Other)  penicillin (Flushing; Hypotension; Other)    Intolerances        ROS:   General:  No wt loss, fevers, chills, night sweats, fatigue,   Eyes:  Good vision, no reported pain  ENT:  No sore throat, pain, runny nose, dysphagia  CV:  No pain, palpitations, hypo/hypertension  Resp:  No dyspnea, cough, tachypnea, wheezing  GI:  No pain, No nausea, No vomiting, No diarrhea, No constipation, No weight loss, No fever, No pruritis, No rectal bleeding, No tarry stools, No dysphagia,  :  No pain, bleeding, incontinence, nocturia  Muscle:  No pain, weakness  Neuro:  No weakness, tingling, memory problems  Psych:  No fatigue, insomnia, mood problems, depression  Endocrine:  No polyuria, polydipsia, cold/heat intolerance  Heme:  No petechiae, ecchymosis, easy bruisability  Skin:  No rash, tattoos, scars, edema      PHYSICAL EXAM:   Vital Signs:  Vital Signs Last 24 Hrs  T(C): 36.7 (19 Mar 2021 12:36), Max: 36.8 (18 Mar 2021 19:40)  T(F): 98 (19 Mar 2021 12:36), Max: 98.2 (18 Mar 2021 19:40)  HR: 99 (19 Mar 2021 09:04) (72 - 125)  BP: 101/69 (19 Mar 2021 12:36) (97/65 - 134/72)  BP(mean): --  RR: 18 (19 Mar 2021 12:36) (18 - 18)  SpO2: 98% (19 Mar 2021 12:36) (97% - 98%)  Daily     Daily Weight in k (19 Mar 2021 12:36)    GENERAL:  Appears stated age,   HEENT:  NC/AT,    CHEST:  Full & symmetric excursion,   HEART:  Regular rhythm,  ABDOMEN:  Soft, non-tender, non-distended,  EXTEREMITIES:  no cyanosis  SKIN:  No rash  NEURO:  Alert,       LABS:                        8.7    7.12  )-----------( 227      ( 18 Mar 2021 07:20 )             27.7     03-18    138  |  107  |  16  ----------------------------<  85  4.0   |  21<L>  |  0.73    Ca    8.4      18 Mar 2021 07:13    TPro  5.2<L>  /  Alb  3.2<L>  /  TBili  0.3  /  DBili  x   /  AST  20  /  ALT  18  /  AlkPhos  118  03-18          RADIOLOGY & ADDITIONAL TESTS:  
[facility] -- Ansley Morales, Jessica, Jennifer, Imtiaz Dee Savella  Office # [phone]      Follow Up:  AF, TAVR    Subjective/Observations: Patient seen and examined. Events noted. Resting comfortably in bed. No complaints of chest pain, dyspnea, or palpitations reported. No signs of orthopnea or PND. Had bouts of AF last night       REVIEW OF SYSTEMS: All other review of systems is negative unless indicated above    PAST MEDICAL & SURGICAL HISTORY:  Osteopenia    CAD (coronary artery disease)    Aortic valve stenosis    HLD (hyperlipidemia)    Afib    Abdominal Mass    Diverticulosis of the Colon    History of Osteoarthritis    DJD (Degenerative Joint Disease)  shoulders, knees, cervical and lumbar spine    Constipation    Thyroid Nodule    Hiatal Hernia    Hypercholesterolemia    MVP (Mitral Valve Prolapse)  last echo 2010    HTN (Hypertension)    S/P cardiac catheterization  2/26/21    H/O knee surgery    H/O ovarian cystectomy  2013    Cataract, Other  surgery   bilateral    S/P Bunionectomy    Parathyroid  surgery    H/O Shoulder Replacement  right shoulder  get clindamycin before surgery        MEDICATIONS  (STANDING):  clopidogrel Tablet 75 milliGRAM(s) Oral daily  diltiazem    milliGRAM(s) Oral daily  pantoprazole    Tablet 40 milliGRAM(s) Oral every 12 hours    MEDICATIONS  (PRN):      Allergies    codeine (Other)  penicillin (Flushing; Hypotension; Other)    Intolerances            Vital Signs Last 24 Hrs  T(C): 36.7 (26 Mar 2021 05:05), Max: 37.2 (25 Mar 2021 20:29)  T(F): 98.1 (26 Mar 2021 05:05), Max: 98.9 (25 Mar 2021 20:29)  HR: 69 (26 Mar 2021 05:05) (69 - 166)  BP: 149/77 (26 Mar 2021 05:05) (112/67 - 151/72)  BP(mean): 89 (25 Mar 2021 20:29) (89 - 97)  RR: 18 (26 Mar 2021 05:05) (18 - 18)  SpO2: 96% (26 Mar 2021 05:05) (96% - 100%)    I&O's Summary    25 Mar 2021 07:01  -  26 Mar 2021 07:00  --------------------------------------------------------  IN: 940 mL / OUT: 1400 mL / NET: -460 mL    26 Mar 2021 07:01  -  26 Mar 2021 08:56  --------------------------------------------------------  IN: 360 mL / OUT: 400 mL / NET: -40 mL          PHYSICAL EXAM:  TELE: SR --> AF upto 160 for 3 hours now SR  Constitutional: NAD, awake    HEENT: Moist Mucous Membranes, Anicteric  Pulmonary: Decreased breath sounds b/l. No rales, crackles or wheeze appreciated.   Cardiovascular: Regular, S1 and S2, 1/6 SM  Gastrointestinal: Bowel Sounds present, soft, nontender.   Lymph: trace peripheral edema. No lymphadenopathy.  Skin: No visible rashes or ulcers.  Psych:  Mood & affect appropriate for situation    LABS: All Labs Reviewed:                        9.3    6.62  )-----------( 164      ( 26 Mar 2021 06:09 )             30.4                         9.4    7.53  )-----------( 189      ( 25 Mar 2021 06:51 )             30.8                         9.0    5.19  )-----------( 175      ( 24 Mar 2021 09:48 )             28.6     26 Mar 2021 06:09    140    |  107    |  18     ----------------------------<  92     4.0     |  22     |  0.51   25 Mar 2021 06:49    140    |  107    |  16     ----------------------------<  93     3.8     |  22     |  0.53   24 Mar 2021 09:48    142    |  108    |  17     ----------------------------<  106    3.9     |  24     |  0.54     Ca    8.6        26 Mar 2021 06:09  Ca    8.3        25 Mar 2021 06:49  Ca    7.9        24 Mar 2021 09:48    TPro  5.5    /  Alb  3.5    /  TBili  0.3    /  DBili  x      /  AST  35     /  ALT  31     /  AlkPhos  174    25 Mar 2021 06:49    PT/INR - ( 24 Mar 2021 10:28 )   PT: 12.3 sec;   INR: 1.03 ratio         PTT - ( 24 Mar 2021 10:28 )  PTT:35.2 sec       e  < from: Transthoracic Echocardiogram (03.25.21 @ 09:12) >    Patient name: RUBENS BARKER  YOB: 1934   Age: 86 (F)   MR#: [id number]  Study Date: 3/25/2021  Location: [address] [place], [place] [id number]: Mague AlmazanVIKAS  Study quality: Technically good  Referring Physician: Francesco Wagner MD  Blood Pressure: 129/65 mmHg  Height: 160 cm  Weight: 54 kg  BSA: 1.6 m2  ------------------------------------------------------------------------  PROCEDURE: Transthoracic echocardiogram with 2-D, M-Mode  and complete spectral and color flow Doppler.  INDICATION: Nonrheumatic aortic (valve) stenosis (I35.0)  ------------------------------------------------------------------------  Dimensions:    Normal Values:  LA:     3.6    2.0 - 4.0 cm  Ao:     3.5    2.0 - 3.8 cm  SEPTUM: 0.9    0.6 - 1.2 cm  PWT:   1.0    0.6 - 1.1 cm  LVIDd:  4.5    3.0 - 5.6 cm  LVIDs:  2.6    1.8 - 4.0 cm  Derived variables:  LVMI: 92 g/m2  RWT: 0.44  Fractional short: 42 %  EF (Visual Estimate): 75 %  Doppler Peak Velocity (m/sec): AoV=2.2  ------------------------------------------------------------------------  Observations:  Mitral Valve: Mitral annular calcification, otherwise  normal mitral valve. Mild-moderate mitral regurgitation.  Aortic Valve/Aorta: Transcatheter aortic valve replacement.  Peak transaortic valve gradient equals 19 mm Hg, mean  transaortic valve gradient equals 9 mm Hg, which is  probably normal in the presence of a transcatheter aortic  valve replacement. Dimensionless Index=0.48. Mild  paravalvular aortic regurgitation. Peak left ventricular  outflow tract gradient equals 5 mm Hg, mean gradient is  equal to 3 mm Hg, LVOT velocity time integral equals 21 cm.  Aortic Root: 3.5 cm.  LVOT diameter: 2.1 cm.  Left Atrium: Moderately dilated left atrium.  LA volume  index = 45 cc/m2.  LeftVentricle: Hyperdynamic left ventricular systolic  function. Basal septal bulge. Basal septal thicknessabout  1.6 cm. Otherwise normal left ventricular internal  dimensions and wall thicknesses. Moderate diastolic  dysfunction (Stage II).  Right Heart: Normal right atrium. Normal right ventricular  size and function. Normal tricuspid valve. Mild-moderate  tricuspid regurgitation. Normal pulmonic valve. Mild  pulmonic regurgitation.  Pericardium/Pleura: Normal pericardium with no pericardial  effusion.  Hemodynamic: Estimated right atrial pressure is 8 mm Hg.  Estimated right ventricular systolic pressure equals 33 mm  Hg, assuming right atrial pressure equals 8 mm Hg,  consistent with normal pulmonary pressures.  ------------------------------------------------------------------------  Conclusions:  1. Mitral annular calcification, otherwise normal mitral  valve. Mild-moderate mitral regurgitation.  2. Transcatheter aortic valve replacement. Peak transaortic  valve gradient equals 19 mm Hg, mean transaortic valve  gradient equals 9 mm Hg, which is probably normal in the  presence of a transcatheter aortic valve replacement.  Dimensionless Index=0.48. Mild paravalvular aortic  regurgitation.  3. Hyperdynamic left ventricular systolic function.  4. Normal right ventricular size and function.  *** Compared with echocardiogram of 3/24/2021, results are  similar compared to images post-TAVR placement.  ------------------------------------------------------------------------  Confirmed on  3/25/2021 - 10:47:32 by Cyn Sanders M.D.  ------------------------------------------------------------------------    < end of copied text >          
[facility] - Ansley Morales, Jessica, Jennifer, Luiz, Imtiaz Rupertgerardo  Office Number:  [phone]    Patient resting comfortably in bed in NAD.  Laying flat with no respiratory distress.  Offers no complaints.  No overnight events.    F/U for:  AS    Telemetry: SR.  Brief PAT     MEDICATIONS  (STANDING):  ascorbic acid 500 milliGRAM(s) Oral daily  atorvastatin 10 milliGRAM(s) Oral at bedtime  ciprofloxacin     Tablet 250 milliGRAM(s) Oral two times a day  clopidogrel Tablet 75 milliGRAM(s) Oral daily  diltiazem    milliGRAM(s) Oral daily  ferrous    sulfate 325 milliGRAM(s) Oral daily  folic acid 1 milliGRAM(s) Oral daily  pantoprazole    Tablet 40 milliGRAM(s) Oral two times a day  thiamine 100 milliGRAM(s) Oral daily    MEDICATIONS  (PRN):      Allergies    codeine (Other)  penicillin (Flushing; Hypotension; Other)    Intolerances        Vital Signs Last 24 Hrs  T(C): 36.6 (19 Mar 2021 05:01), Max: 37 (18 Mar 2021 14:33)  T(F): 97.9 (19 Mar 2021 05:01), Max: 98.6 (18 Mar 2021 14:33)  HR: 72 (19 Mar 2021 05:01) (72 - 74)  BP: 134/72 (19 Mar 2021 05:01) (113/69 - 134/72)  BP(mean): --  RR: 18 (19 Mar 2021 05:01) (18 - 18)  SpO2: 98% (19 Mar 2021 05:01) (97% - 98%)    I&O's Summary    18 Mar 2021 07:01  -  19 Mar 2021 07:00  --------------------------------------------------------  IN: 1140 mL / OUT: 600 mL / NET: 540 mL        ON EXAM:    Constitutional: NAD, awake    HEENT: Moist Mucous Membranes, Anicteric  Pulmonary: Decreased breath sounds b/l. No rales, crackles or wheeze appreciated.   Cardiovascular: Regular, S1 and soft s2 3/6 SM  Gastrointestinal: Bowel Sounds present, soft, nontender.   Lymph: No peripheral edema. No lymphadenopathy.  Skin: No visible rashes or ulcers.  Psych:  Mood & affect appropriate for situation    LABS: All Labs Reviewed:                        8.7    7.12  )-----------( 227      ( 18 Mar 2021 07:20 )             27.7                         8.1    7.50  )-----------( 217      ( 17 Mar 2021 19:25 )             25.4                         6.1    7.93  )-----------( 247      ( 17 Mar 2021 06:13 )             19.6     18 Mar 2021 07:13    138    |  107    |  16     ----------------------------<  85     4.0     |  21     |  0.73   17 Mar 2021 06:17    141    |  108    |  14     ----------------------------<  93     4.2     |  23     |  0.68   16 Mar 2021 12:49    137    |  102    |  20     ----------------------------<  92     4.0     |  24     |  0.68     Ca    8.4        18 Mar 2021 07:13  Ca    8.8        17 Mar 2021 06:17  Ca    9.4        16 Mar 2021 12:49    TPro  5.2    /  Alb  3.2    /  TBili  0.3    /  DBili  x      /  AST  20     /  ALT  18     /  AlkPhos  118    18 Mar 2021 07:13  TPro  5.1    /  Alb  3.2    /  TBili  0.4    /  DBili  x      /  AST  17     /  ALT  14     /  AlkPhos  99     17 Mar 2021 06:17          Blood Culture:     03-17 @ 13:14  TSH: 1.78    
INTERVAL HPI/OVERNIGHT EVENTS:    No new overnight event.  No N/V/D.  Tolerating diet.     MEDICATIONS  (STANDING):  cefuroxime  IVPB 1500 milliGRAM(s) IV Intermittent every 8 hours  clopidogrel Tablet 75 milliGRAM(s) Oral daily    MEDICATIONS  (PRN):      Allergies    codeine (Other)  penicillin (Flushing; Hypotension; Other)    Intolerances        Review of Systems:    General:  No wt loss, fevers, chills, night sweats,fatigue,   Eyes:  Good vision, no reported pain  ENT:  No sore throat, pain, runny nose, dysphagia  CV:  No pain, palpitatioins, hypo/hypertension  Resp:  No dyspnea, cough, tachypnea, wheezing  GI:  No pain, No nausea, No vomiting, No diarrhea, No constipatiion, No weight loss, No fever, No pruritis, No rectal bleeding, No tarry stools, No dysphagia,  :  No pain, bleeding, incontinence, nocturia  Muscle:  No pain, weakness  Neuro:  No weakness, tingling, memory problems  Psych:  No fatigue, insomnia, mood problems, depression  Endocrine:  No polyuria, polydypsia, cold/heat intolerance  Heme:  No petechiae, ecchymosis, easy bruisability  Skin:  No rash, tattoos, scars, edema      Vital Signs Last 24 Hrs  T(C): 36.4 (24 Mar 2021 11:50), Max: 36.7 (23 Mar 2021 19:29)  T(F): 97.5 (24 Mar 2021 11:50), Max: 98.1 (24 Mar 2021 09:29)  HR: 65 (24 Mar 2021 11:50) (57 - 104)  BP: 117/58 (24 Mar 2021 11:50) (89/50 - 127/71)  BP(mean): 77 (24 Mar 2021 11:50) (77 - 90)  RR: 18 (24 Mar 2021 11:50) (14 - 18)  SpO2: 99% (24 Mar 2021 11:50) (95% - 100%)    PHYSICAL EXAM:    Constitutional: NAD, well-developed  HEENT: EOMI, throat clear  Neck: No LAD, supple  Respiratory: CTA and P  Cardiovascular: S1 and S2, RRR, no M  Gastrointestinal: BS+, soft, NT/ND, neg HSM,  Extremities: No peripheral edema, neg clubing, cyanosis  Vascular: 2+ peripheral pulses  Neurological: A/O x 3, no focal deficits  Psychiatric: Normal mood, normal affect  Skin: No rashes      LABS:                        9.0    5.19  )-----------( 175      ( 24 Mar 2021 09:48 )             28.6     03-24    142  |  108  |  17  ----------------------------<  106<H>  3.9   |  24  |  0.54    Ca    7.9<L>      24 Mar 2021 09:48      PT/INR - ( 24 Mar 2021 10:28 )   PT: 12.3 sec;   INR: 1.03 ratio         PTT - ( 24 Mar 2021 10:28 )  PTT:35.2 sec      RADIOLOGY & ADDITIONAL TESTS:  
INTERVAL HPI/OVERNIGHT EVENTS:    no overnight events    MEDICATIONS  (STANDING):  cefuroxime  IVPB 1500 milliGRAM(s) IV Intermittent every 8 hours  clopidogrel Tablet 75 milliGRAM(s) Oral daily    MEDICATIONS  (PRN):      Allergies    codeine (Other)  penicillin (Flushing; Hypotension; Other)    Intolerances        Review of Systems:    General:  No wt loss, fevers, chills, night sweats,fatigue,   Eyes:  Good vision, no reported pain  ENT:  No sore throat, pain, runny nose, dysphagia  CV:  No pain, palpitatioins, hypo/hypertension  Resp:  No dyspnea, cough, tachypnea, wheezing  GI:  No pain, No nausea, No vomiting, No diarrhea, No constipatiion, No weight loss, No fever, No pruritis, No rectal bleeding, No tarry stools, No dysphagia,  :  No pain, bleeding, incontinence, nocturia  Muscle:  No pain, weakness  Neuro:  No weakness, tingling, memory problems  Psych:  No fatigue, insomnia, mood problems, depression  Endocrine:  No polyuria, polydypsia, cold/heat intolerance  Heme:  No petechiae, ecchymosis, easy bruisability  Skin:  No rash, tattoos, scars, edema      Vital Signs Last 24 Hrs  Vital Signs Last 24 Hrs  T(C): 36.9 (25 Mar 2021 06:00), Max: 37.1 (24 Mar 2021 19:36)  T(F): 98.4 (25 Mar 2021 06:00), Max: 98.8 (24 Mar 2021 19:36)  HR: 68 (25 Mar 2021 07:10) (57 - 73)  BP: 129/65 (25 Mar 2021 06:00) (111/54 - 129/65)  BP(mean): 86 (25 Mar 2021 06:00) (77 - 86)  RR: 18 (25 Mar 2021 06:00) (15 - 18)  SpO2: 96% (25 Mar 2021 06:00) (96% - 99%)  T(C): 36.4 (24 Mar 2021 11:50), Max: 36.7 (23 Mar 2021 19:29)  T(F): 97.5 (24 Mar 2021 11:50), Max: 98.1 (24 Mar 2021 09:29)  HR: 65 (24 Mar 2021 11:50) (57 - 104)  BP: 117/58 (24 Mar 2021 11:50) (89/50 - 127/71)  BP(mean): 77 (24 Mar 2021 11:50) (77 - 90)  RR: 18 (24 Mar 2021 11:50) (14 - 18)  SpO2: 99% (24 Mar 2021 11:50) (95% - 100%)    PHYSICAL EXAM:    Constitutional: NAD, well-developed  HEENT: EOMI, throat clear  Neck: No LAD, supple  Respiratory: CTA and P  Cardiovascular: S1 and S2, RRR, no M  Gastrointestinal: BS+, soft, NT/ND, neg HSM,  Extremities: No peripheral edema, neg clubing, cyanosis  Vascular: 2+ peripheral pulses  Neurological: A/O x 3, no focal deficits  Psychiatric: Normal mood, normal affect  Skin: No rashes    LABS:                        9.4    7.53  )-----------( 189      ( 25 Mar 2021 06:51 )             30.8     03-25    140  |  107  |  16  ----------------------------<  93  3.8   |  22  |  0.53    Ca    8.3<L>      25 Mar 2021 06:49    TPro  5.5<L>  /  Alb  3.5  /  TBili  0.3  /  DBili  x   /  AST  35  /  ALT  31  /  AlkPhos  174<H>  03-25    LIVER FUNCTIONS - ( 25 Mar 2021 06:49 )  Alb: 3.5 g/dL / Pro: 5.5 g/dL / ALK PHOS: 174 U/L / ALT: 31 U/L / AST: 35 U/L / GGT: x           PT/INR - ( 24 Mar 2021 10:28 )   PT: 12.3 sec;   INR: 1.03 ratio         PTT - ( 24 Mar 2021 10:28 )  PTT:35.2 sec                LABS:                        9.0    5.19  )-----------( 175      ( 24 Mar 2021 09:48 )             28.6     03-24    142  |  108  |  17  ----------------------------<  106<H>  3.9   |  24  |  0.54    Ca    7.9<L>      24 Mar 2021 09:48      PT/INR - ( 24 Mar 2021 10:28 )   PT: 12.3 sec;   INR: 1.03 ratio         PTT - ( 24 Mar 2021 10:28 )  PTT:35.2 sec      RADIOLOGY & ADDITIONAL TESTS:  
INTERVAL HPI/OVERNIGHT EVENTS:    no overnight events    MEDICATIONS  (STANDING):  cefuroxime  IVPB 1500 milliGRAM(s) IV Intermittent every 8 hours  clopidogrel Tablet 75 milliGRAM(s) Oral daily    MEDICATIONS  (PRN):      Allergies    codeine (Other)  penicillin (Flushing; Hypotension; Other)    Intolerances        Review of Systems:    General:  No wt loss, fevers, chills, night sweats,fatigue,   Eyes:  Good vision, no reported pain  ENT:  No sore throat, pain, runny nose, dysphagia  CV:  No pain, palpitatioins, hypo/hypertension  Resp:  No dyspnea, cough, tachypnea, wheezing  GI:  No pain, No nausea, No vomiting, No diarrhea, No constipatiion, No weight loss, No fever, No pruritis, No rectal bleeding, No tarry stools, No dysphagia,  :  No pain, bleeding, incontinence, nocturia  Muscle:  No pain, weakness  Neuro:  No weakness, tingling, memory problems  Psych:  No fatigue, insomnia, mood problems, depression  Endocrine:  No polyuria, polydypsia, cold/heat intolerance  Heme:  No petechiae, ecchymosis, easy bruisability  Skin:  No rash, tattoos, scars, edema      Vital Signs Last 24 Hrs  Vital Signs Last 24 Hrs  T(C): 36.9 (25 Mar 2021 06:00), Max: 37.1 (24 Mar 2021 19:36)  T(F): 98.4 (25 Mar 2021 06:00), Max: 98.8 (24 Mar 2021 19:36)  HR: 68 (25 Mar 2021 07:10) (57 - 73)  BP: 129/65 (25 Mar 2021 06:00) (111/54 - 129/65)  BP(mean): 86 (25 Mar 2021 06:00) (77 - 86)  RR: 18 (25 Mar 2021 06:00) (15 - 18)  SpO2: 96% (25 Mar 2021 06:00) (96% - 99%)  T(C): 36.4 (24 Mar 2021 11:50), Max: 36.7 (23 Mar 2021 19:29)  T(F): 97.5 (24 Mar 2021 11:50), Max: 98.1 (24 Mar 2021 09:29)  HR: 65 (24 Mar 2021 11:50) (57 - 104)  BP: 117/58 (24 Mar 2021 11:50) (89/50 - 127/71)  BP(mean): 77 (24 Mar 2021 11:50) (77 - 90)  RR: 18 (24 Mar 2021 11:50) (14 - 18)  SpO2: 99% (24 Mar 2021 11:50) (95% - 100%)    PHYSICAL EXAM:    Constitutional: NAD, well-developed  HEENT: EOMI, throat clear  Neck: No LAD, supple  Respiratory: CTA and P  Cardiovascular: S1 and S2, RRR, no M  Gastrointestinal: BS+, soft, NT/ND, neg HSM,  Extremities: No peripheral edema, neg clubing, cyanosis  Vascular: 2+ peripheral pulses  Neurological: A/O x 3, no focal deficits  Psychiatric: Normal mood, normal affect  Skin: No rashes    LABS:                        9.4    7.53  )-----------( 189      ( 25 Mar 2021 06:51 )             30.8     03-25    140  |  107  |  16  ----------------------------<  93  3.8   |  22  |  0.53    Ca    8.3<L>      25 Mar 2021 06:49    TPro  5.5<L>  /  Alb  3.5  /  TBili  0.3  /  DBili  x   /  AST  35  /  ALT  31  /  AlkPhos  174<H>  03-25    LIVER FUNCTIONS - ( 25 Mar 2021 06:49 )  Alb: 3.5 g/dL / Pro: 5.5 g/dL / ALK PHOS: 174 U/L / ALT: 31 U/L / AST: 35 U/L / GGT: x           PT/INR - ( 24 Mar 2021 10:28 )   PT: 12.3 sec;   INR: 1.03 ratio         PTT - ( 24 Mar 2021 10:28 )  PTT:35.2 sec                LABS:                        9.0    5.19  )-----------( 175      ( 24 Mar 2021 09:48 )             28.6     03-24    142  |  108  |  17  ----------------------------<  106<H>  3.9   |  24  |  0.54    Ca    7.9<L>      24 Mar 2021 09:48      PT/INR - ( 24 Mar 2021 10:28 )   PT: 12.3 sec;   INR: 1.03 ratio         PTT - ( 24 Mar 2021 10:28 )  PTT:35.2 sec      RADIOLOGY & ADDITIONAL TESTS:  
INTERVAL HPI/OVERNIGHT EVENTS:  no new events    MEDICATIONS  (STANDING):  ascorbic acid 500 milliGRAM(s) Oral daily  atorvastatin 10 milliGRAM(s) Oral at bedtime  chlorhexidine 0.12% Liquid 15 milliLiter(s) Swish and Spit two times a day  chlorhexidine 4% Liquid 1 Application(s) Topical two times a day  clopidogrel Tablet 75 milliGRAM(s) Oral daily  diltiazem    milliGRAM(s) Oral daily  ferrous    sulfate 325 milliGRAM(s) Oral daily  folic acid 1 milliGRAM(s) Oral daily  pantoprazole    Tablet 40 milliGRAM(s) Oral two times a day  thiamine 100 milliGRAM(s) Oral daily  vancomycin  IVPB 750 milliGRAM(s) IV Intermittent once    MEDICATIONS  (PRN):      Allergies    codeine (Other)  penicillin (Flushing; Hypotension; Other)    Intolerances        Review of Systems:    General:  No wt loss, fevers, chills, night sweats,fatigue,   Eyes:  Good vision, no reported pain  ENT:  No sore throat, pain, runny nose, dysphagia  CV:  No pain, palpitatioins, hypo/hypertension  Resp:  No dyspnea, cough, tachypnea, wheezing  GI:  No pain, No nausea, No vomiting, No diarrhea, No constipatiion, No weight loss, No fever, No pruritis, No rectal bleeding, No tarry stools, No dysphagia,  :  No pain, bleeding, incontinence, nocturia  Muscle:  No pain, weakness  Neuro:  No weakness, tingling, memory problems  Psych:  No fatigue, insomnia, mood problems, depression  Endocrine:  No polyuria, polydypsia, cold/heat intolerance  Heme:  No petechiae, ecchymosis, easy bruisability  Skin:  No rash, tattoos, scars, edema      Vital Signs Last 24 Hrs  T(C): 36.8 (23 Mar 2021 12:04), Max: 37.1 (22 Mar 2021 18:51)  T(F): 98.2 (23 Mar 2021 12:04), Max: 98.7 (22 Mar 2021 18:51)  HR: 72 (23 Mar 2021 12:04) (70 - 73)  BP: 148/63 (23 Mar 2021 12:04) (124/68 - 148/63)  BP(mean): 91 (23 Mar 2021 12:04) (87 - 91)  RR: 18 (23 Mar 2021 12:04) (18 - 18)  SpO2: 98% (23 Mar 2021 12:04) (96% - 98%)    PHYSICAL EXAM:    Constitutional: NAD, well-developed  HEENT: EOMI, throat clear  Neck: No LAD, supple  Respiratory: CTA and P  Cardiovascular: S1 and S2, RRR, no M  Gastrointestinal: BS+, soft, NT/ND, neg HSM,  Extremities: No peripheral edema, neg clubing, cyanosis  Vascular: 2+ peripheral pulses  Neurological: A/O x 3, no focal deficits  Psychiatric: Normal mood, normal affect  Skin: No rashes      LABS:                        9.5    5.54  )-----------( 217      ( 22 Mar 2021 06:05 )             31.2     03-22    140  |  108  |  14  ----------------------------<  94  3.8   |  21<L>  |  0.62    Ca    8.3<L>      22 Mar 2021 06:05            RADIOLOGY & ADDITIONAL TESTS:  
[facility]  Heber Collins [place] [address]  [phone]      INTERVAL HPI/OVERNIGHT EVENTS:    no new events     MEDICATIONS  (STANDING):  ascorbic acid 500 milliGRAM(s) Oral daily  atorvastatin 10 milliGRAM(s) Oral at bedtime  ciprofloxacin     Tablet 250 milliGRAM(s) Oral two times a day  clopidogrel Tablet 75 milliGRAM(s) Oral daily  diltiazem    milliGRAM(s) Oral daily  ferrous    sulfate 325 milliGRAM(s) Oral daily  folic acid 1 milliGRAM(s) Oral daily  pantoprazole    Tablet 40 milliGRAM(s) Oral two times a day  thiamine 100 milliGRAM(s) Oral daily    MEDICATIONS  (PRN):      Allergies    codeine (Other)  penicillin (Flushing; Hypotension; Other)    Intolerances        ROS:   General:  No wt loss, fevers, chills, night sweats, fatigue,   Eyes:  Good vision, no reported pain  ENT:  No sore throat, pain, runny nose, dysphagia  CV:  No pain, palpitations, hypo/hypertension  Resp:  No dyspnea, cough, tachypnea, wheezing  GI:  No pain, No nausea, No vomiting, No diarrhea, No constipation, No weight loss, No fever, No pruritis, No rectal bleeding, No tarry stools, No dysphagia,  :  No pain, bleeding, incontinence, nocturia  Muscle:  No pain, weakness  Neuro:  No weakness, tingling, memory problems  Psych:  No fatigue, insomnia, mood problems, depression  Endocrine:  No polyuria, polydipsia, cold/heat intolerance  Heme:  No petechiae, ecchymosis, easy bruisability  Skin:  No rash, tattoos, scars, edema      PHYSICAL EXAM:   Vital Signs:  Vital Signs Last 24 Hrs  T(C): 36.7 (19 Mar 2021 12:36), Max: 36.8 (18 Mar 2021 19:40)  T(F): 98 (19 Mar 2021 12:36), Max: 98.2 (18 Mar 2021 19:40)  HR: 99 (19 Mar 2021 09:04) (72 - 125)  BP: 101/69 (19 Mar 2021 12:36) (97/65 - 134/72)  BP(mean): --  RR: 18 (19 Mar 2021 12:36) (18 - 18)  SpO2: 98% (19 Mar 2021 12:36) (97% - 98%)  Daily     Daily Weight in k (19 Mar 2021 12:36)    GENERAL:  Appears stated age,   HEENT:  NC/AT,    CHEST:  Full & symmetric excursion,   HEART:  Regular rhythm,  ABDOMEN:  Soft, non-tender, non-distended,  EXTEREMITIES:  no cyanosis  SKIN:  No rash  NEURO:  Alert,       LABS:                        8.7    7.12  )-----------( 227      ( 18 Mar 2021 07:20 )             27.7     03-18    138  |  107  |  16  ----------------------------<  85  4.0   |  21<L>  |  0.73    Ca    8.4      18 Mar 2021 07:13    TPro  5.2<L>  /  Alb  3.2<L>  /  TBili  0.3  /  DBili  x   /  AST  20  /  ALT  18  /  AlkPhos  118  03-18          RADIOLOGY & ADDITIONAL TESTS:  
[facility]  Heber Collins [place] [address]  [phone]      INTERVAL HPI/OVERNIGHT EVENTS:    no new events     MEDICATIONS  (STANDING):  ascorbic acid 500 milliGRAM(s) Oral daily  atorvastatin 10 milliGRAM(s) Oral at bedtime  ciprofloxacin     Tablet 250 milliGRAM(s) Oral two times a day  clopidogrel Tablet 75 milliGRAM(s) Oral daily  diltiazem    milliGRAM(s) Oral daily  ferrous    sulfate 325 milliGRAM(s) Oral daily  folic acid 1 milliGRAM(s) Oral daily  pantoprazole    Tablet 40 milliGRAM(s) Oral two times a day  thiamine 100 milliGRAM(s) Oral daily    MEDICATIONS  (PRN):      Allergies    codeine (Other)  penicillin (Flushing; Hypotension; Other)    Intolerances        ROS:   General:  No wt loss, fevers, chills, night sweats, fatigue,   Eyes:  Good vision, no reported pain  ENT:  No sore throat, pain, runny nose, dysphagia  CV:  No pain, palpitations, hypo/hypertension  Resp:  No dyspnea, cough, tachypnea, wheezing  GI:  No pain, No nausea, No vomiting, No diarrhea, No constipation, No weight loss, No fever, No pruritis, No rectal bleeding, No tarry stools, No dysphagia,  :  No pain, bleeding, incontinence, nocturia  Muscle:  No pain, weakness  Neuro:  No weakness, tingling, memory problems  Psych:  No fatigue, insomnia, mood problems, depression  Endocrine:  No polyuria, polydipsia, cold/heat intolerance  Heme:  No petechiae, ecchymosis, easy bruisability  Skin:  No rash, tattoos, scars, edema      PHYSICAL EXAM:   Vital Signs:  Vital Signs Last 24 Hrs  T(C): 36.7 (19 Mar 2021 12:36), Max: 36.8 (18 Mar 2021 19:40)  T(F): 98 (19 Mar 2021 12:36), Max: 98.2 (18 Mar 2021 19:40)  HR: 99 (19 Mar 2021 09:04) (72 - 125)  BP: 101/69 (19 Mar 2021 12:36) (97/65 - 134/72)  BP(mean): --  RR: 18 (19 Mar 2021 12:36) (18 - 18)  SpO2: 98% (19 Mar 2021 12:36) (97% - 98%)  Daily     Daily Weight in k (19 Mar 2021 12:36)    GENERAL:  Appears stated age,   HEENT:  NC/AT,    CHEST:  Full & symmetric excursion,   HEART:  Regular rhythm,  ABDOMEN:  Soft, non-tender, non-distended,  EXTEREMITIES:  no cyanosis  SKIN:  No rash  NEURO:  Alert,       LABS:                        8.7    7.12  )-----------( 227      ( 18 Mar 2021 07:20 )             27.7     03-18    138  |  107  |  16  ----------------------------<  85  4.0   |  21<L>  |  0.73    Ca    8.4      18 Mar 2021 07:13    TPro  5.2<L>  /  Alb  3.2<L>  /  TBili  0.3  /  DBili  x   /  AST  20  /  ALT  18  /  AlkPhos  118  03-18          RADIOLOGY & ADDITIONAL TESTS:  
Patient discussed on morning rounds with Dr. Wagner    Operation / Date: 3/24/21 Transfemoral TAVR, John valve    SUBJECTIVE ASSESSMENT:  Patient feels well today. Seen at 8:00am, stated that she hadn't walked yet but was about to.  She has good PO appetite, no c/o this am.  Denies SOB, CP, dizziness, N/V/D, fever/chills or cough.        Vital Signs Last 24 Hrs  T(C): 36.9 (25 Mar 2021 06:00), Max: 37.1 (24 Mar 2021 19:36)  T(F): 98.4 (25 Mar 2021 06:00), Max: 98.8 (24 Mar 2021 19:36)  HR: 68 (25 Mar 2021 07:10) (65 - 73)  BP: 129/65 (25 Mar 2021 06:00) (112/63 - 129/65)  BP(mean): 86 (25 Mar 2021 06:00) (77 - 86)  RR: 18 (25 Mar 2021 06:00) (18 - 18)  SpO2: 96% (25 Mar 2021 06:00) (96% - 99%)  I&O's Detail    24 Mar 2021 07:01  -  25 Mar 2021 07:00  --------------------------------------------------------  IN:    IV PiggyBack: 50 mL    Oral Fluid: 420 mL  Total IN: 470 mL    OUT:    Voided (mL): 700 mL  Total OUT: 700 mL    Total NET: -230 mL      25 Mar 2021 07:01  -  25 Mar 2021 11:28  --------------------------------------------------------  IN:    Oral Fluid: 260 mL  Total IN: 260 mL    OUT:  Total OUT: 0 mL    Total NET: 260 mL      PHYSICAL EXAM:    GEN: NAD, looks comfortable  Psych: Mood appropriate  Neuro: A&Ox3.  No focal deficits.  Moving all extremities.   HEENT: No obvious abnormalities  CV: S1S2, regular, +faint AAKASH heard throughout precordium.  No carotid bruits.  No JVD  Lungs: Clear B/L.  No wheezing, rales or rhonchi  ABD: Soft, non-tender, non-distended.  +Bowel sounds  EXT: Warm and well perfused.  2+ pitting edema lower legs (from mid shin to feet) with B/L CVS changes/discoloration.    Musculoskeletal: Moving all extremities with normal ROM, no joint swelling  PV: Pedal pulses palpable  Incision Sites: Groin sites stable, soft, no hematoma.  Dressings intact, not saturated    LABS:                        9.4    7.53  )-----------( 189      ( 25 Mar 2021 06:51 )             30.8       PT/INR - ( 24 Mar 2021 10:28 )   PT: 12.3 sec;   INR: 1.03 ratio         PTT - ( 24 Mar 2021 10:28 )  PTT:35.2 sec    03-25    140  |  107  |  16  ----------------------------<  93  3.8   |  22  |  0.53    Ca    8.3<L>      25 Mar 2021 06:49    TPro  5.5<L>  /  Alb  3.5  /  TBili  0.3  /  DBili  x   /  AST  35  /  ALT  31  /  AlkPhos  174<H>  03-25          MEDICATIONS  (STANDING):  clopidogrel Tablet 75 milliGRAM(s) Oral daily  pantoprazole    Tablet 40 milliGRAM(s) Oral every 12 hours    MEDICATIONS  (PRN):        RADIOLOGY & ADDITIONAL TESTS:    < from: Xray Chest 1 View- PORTABLE-Routine (Xray Chest 1 View- PORTABLE-Routine .) (03.17.21 @ 08:31) >    The heart is mildly enlarged. Lungs are grossly clear. The apices and hemidiaphragms are unremarkable. Degenerative changes. Prior right humeral head replacement.    < end of copied text >    < from: Transthoracic Echocardiogram (03.25.21 @ 09:12) >  ------------------------------------------------------------------------  Conclusions:  1. Mitral annular calcification, otherwise normal mitral  valve. Mild-moderate mitral regurgitation.  2. Transcatheter aortic valve replacement. Peak transaortic  valve gradient equals 19 mm Hg, mean transaortic valve  gradient equals 9 mm Hg, which is probably normal in the  presence of a transcatheter aortic valve replacement.  Dimensionless Index=0.48. Mild paravalvular aortic  regurgitation.  3. Hyperdynamic left ventricular systolic function.  4. Normal right ventricular size and function.  *** Compared with echocardiogram of 3/24/2021, results are  similar compared to images post-TAVR placement.    < end of copied text >    
[facility]    Ansley Morales, Jessica, Jennifer, Luiz, Imtiaz, Albino      [phone]    CHIEF COMPLAINT: Patient is a 86y old  Female who presents with a chief complaint of AS (23 Mar 2021 07:49)      Follow Up: AS, anemia    Interim history: The patient reports no new symptoms.  Denies chest discomfort and shortness of breath.  No abdominal pain.  No new neurologic symptoms.      MEDICATIONS  (STANDING):  ascorbic acid 500 milliGRAM(s) Oral daily  atorvastatin 10 milliGRAM(s) Oral at bedtime  chlorhexidine 0.12% Liquid 15 milliLiter(s) Swish and Spit two times a day  chlorhexidine 4% Liquid 1 Application(s) Topical two times a day  clopidogrel Tablet 75 milliGRAM(s) Oral daily  diltiazem    milliGRAM(s) Oral daily  ferrous    sulfate 325 milliGRAM(s) Oral daily  folic acid 1 milliGRAM(s) Oral daily  pantoprazole    Tablet 40 milliGRAM(s) Oral two times a day  thiamine 100 milliGRAM(s) Oral daily  vancomycin  IVPB 750 milliGRAM(s) IV Intermittent once    MEDICATIONS  (PRN):      REVIEW OF SYSTEMS:  eye, ent, GI, , allergic, dermatologic, musculoskeletal and neurologic are negative except as described above    Vital Signs Last 24 Hrs  T(C): 36.8 (23 Mar 2021 05:15), Max: 37.1 (22 Mar 2021 12:10)  T(F): 98.2 (23 Mar 2021 05:15), Max: 98.8 (22 Mar 2021 12:10)  HR: 73 (23 Mar 2021 05:15) (70 - 78)  BP: 124/68 (23 Mar 2021 05:15) (124/68 - 156/76)  BP(mean): 87 (23 Mar 2021 05:15) (87 - 102)  RR: 18 (23 Mar 2021 05:15) (18 - 18)  SpO2: 96% (23 Mar 2021 05:15) (96% - 98%)    I&O's Summary    22 Mar 2021 07:01  -  23 Mar 2021 07:00  --------------------------------------------------------  IN: 960 mL / OUT: 2000 mL / NET: -1040 mL    23 Mar 2021 07:01  -  23 Mar 2021 09:35  --------------------------------------------------------  IN: 260 mL / OUT: 300 mL / NET: -40 mL        Telemetry past 24h: sr    PHYSICAL EXAM:    Constitutional: well-nourished, well-developed, NAD   HEENT:  MMM, sclerae anicteric, conjunctivae clear, no oral cyanosis.  Pulmonary: Non-labored, breath sounds are clear bilaterally, No wheezing, rales or rhonchi  Cardiovascular: Regular, S1 and S2.  3/6 sys murmur.  No rubs, gallops or clicks  Gastrointestinal: Bowel Sounds present, soft, nontender.   Lymph: mild L>R peripheral edema.   Neurological: Alert, no focal deficits  Skin: No rashes.  Psych:  Mood & affect appropriate    LABS: All Labs Reviewed:                        9.5    5.54  )-----------( 217      ( 22 Mar 2021 06:05 )             31.2                         9.3    6.00  )-----------( 231      ( 21 Mar 2021 04:55 )             29.9     22 Mar 2021 06:05    140    |  108    |  14     ----------------------------<  94     3.8     |  21     |  0.62   21 Mar 2021 04:55    140    |  109    |  15     ----------------------------<  88     3.9     |  21     |  0.71     Ca    8.3        22 Mar 2021 06:05  Ca    8.2        21 Mar 2021 04:55    TPro  5.1    /  Alb  3.3    /  TBili  0.2    /  DBili  x      /  AST  24     /  ALT  23     /  AlkPhos  151    21 Mar 2021 04:55          Blood Culture:         RADIOLOGY:    EKG:    Echo:    
Structural Heart Team    Ms Murray has no complaints and denies chest pain/pressure, sob and dizziness.  She has not had a bowel movement since admission.  There were no acute events overnight.       REVIEW OF SYSTEMS:    CONSTITUTIONAL: No weakness, fevers or chills  EYES/ENT: No visual changes;  No vertigo or throat pain   NECK: No pain or stiffness  RESPIRATORY: No cough, wheezing, hemoptysis; No shortness of breath  CARDIOVASCULAR: No chest pain or palpitations  GASTROINTESTINAL: No abdominal or epigastric pain. No nausea, vomiting, or hematemesis; No diarrhea or constipation. No melena or hematochezia.  GENITOURINARY: No dysuria, frequency or hematuria  NEUROLOGICAL: No numbness or weakness  SKIN: No itching, rashes      Allergies    codeine (Other)  penicillin (Flushing; Hypotension; Other)    Intolerances      Vital Signs Last 24 Hrs  T(C): 36.6 (18 Mar 2021 05:35), Max: 37.1 (17 Mar 2021 14:00)  T(F): 97.9 (18 Mar 2021 05:35), Max: 98.7 (17 Mar 2021 14:00)  HR: 72 (18 Mar 2021 05:35) (72 - 82)  BP: 114/67 (18 Mar 2021 05:35) (114/67 - 118/63)  BP(mean): --  RR: 17 (18 Mar 2021 05:35) (17 - 18)  SpO2: 97% (18 Mar 2021 05:35) (97% - 99%)    MEDICATIONS  (STANDING):  ascorbic acid 500 milliGRAM(s) Oral daily  atorvastatin 10 milliGRAM(s) Oral at bedtime  ciprofloxacin     Tablet 250 milliGRAM(s) Oral two times a day  clopidogrel Tablet 75 milliGRAM(s) Oral daily  diltiazem    milliGRAM(s) Oral daily  ferrous    sulfate 325 milliGRAM(s) Oral daily  folic acid 1 milliGRAM(s) Oral daily  pantoprazole    Tablet 40 milliGRAM(s) Oral before breakfast  thiamine 100 milliGRAM(s) Oral daily      Exam-  General: NAD, WDWN, appropriate affect  Cor: s1s2, RRR, III/VI systolic murmur   Tele: SR 60-80, PAT to 140's for 2.4sec   Pulm: Clear, no wheezes, rales, or rhonchi, no use of accessory muscles  Gastrointestinal: soft, nontender, nondistended, +bowel sounds  Extremities: 2+ nonpitting edema, 1+ DP pulses  Neuro: A&Ox3, nonfocal                          8.7    7.12  )-----------( 227      ( 18 Mar 2021 07:20 )             27.7   03-18    138  |  107  |  16  ----------------------------<  85  4.0   |  21<L>  |  0.73    Ca    8.4      18 Mar 2021 07:13    TPro  5.2<L>  /  Alb  3.2<L>  /  TBili  0.3  /  DBili  x   /  AST  20  /  ALT  18  /  AlkPhos  118  03-18    I&O's Summary    17 Mar 2021 07:01  -  18 Mar 2021 07:00  --------------------------------------------------------  IN: 1280 mL / OUT: 2050 mL / NET: -770 mL    18 Mar 2021 07:01  -  18 Mar 2021 12:39  --------------------------------------------------------  IN: 240 mL / OUT: 0 mL / NET: 240 mL              Assessment/Plan:  Ms Murray is an 87y/o female With Severe Aortic Stenosis admitted with anemia  - Hgb/HCT improved after 2u PRBC's  - CT scan negative for retroperitoneal bleed  - she reported dark stools prior to admission but has yet to have a bowel movement   - please send [facility] stool  - GI consult pending    BONIFACIO Rodriguez  [phone]    
Structural Heart Team    Ms Murray is seen lying in bed.  She is comfortable and denies chest pain/pressure, sob and dizziness. She reports having had a small bowel movement, but remains constipated overall.  There were no acute events overnight.         REVIEW OF SYSTEMS:    CONSTITUTIONAL: No weakness, fevers or chills  EYES/ENT: No visual changes;  No vertigo or throat pain   NECK: No pain or stiffness  RESPIRATORY: No cough, wheezing, hemoptysis; No shortness of breath  CARDIOVASCULAR: No chest pain or palpitations  GASTROINTESTINAL: No abdominal or epigastric pain. No nausea, vomiting, or hematemesis; No diarrhea or constipation. No melena or hematochezia.  GENITOURINARY: No dysuria, frequency or hematuria  NEUROLOGICAL: No numbness or weakness  SKIN: No itching, rashes      Allergies    codeine (Other)  penicillin (Flushing; Hypotension; Other)    Intolerances      Vital Signs Last 24 Hrs  T(C): 36.6 (19 Mar 2021 05:01), Max: 37 (18 Mar 2021 14:33)  T(F): 97.9 (19 Mar 2021 05:01), Max: 98.6 (18 Mar 2021 14:33)  HR: 99 (19 Mar 2021 09:04) (72 - 125)  BP: 97/65 (19 Mar 2021 09:04) (97/65 - 134/72)  BP(mean): --  RR: 18 (19 Mar 2021 09:04) (18 - 18)  SpO2: 97% (19 Mar 2021 09:04) (97% - 98%)    MEDICATIONS  (STANDING):  ascorbic acid 500 milliGRAM(s) Oral daily  atorvastatin 10 milliGRAM(s) Oral at bedtime  ciprofloxacin     Tablet 250 milliGRAM(s) Oral two times a day  clopidogrel Tablet 75 milliGRAM(s) Oral daily  diltiazem    milliGRAM(s) Oral daily  ferrous    sulfate 325 milliGRAM(s) Oral daily  folic acid 1 milliGRAM(s) Oral daily  pantoprazole    Tablet 40 milliGRAM(s) Oral two times a day  thiamine 100 milliGRAM(s) Oral daily      Exam-  General: NAD, WDWN, appropriate affect  Cor: s1s2, RRR, III/VI systolic murmur   Tele: SR which converted to Afib   Pulm: Clear, no wheezes, rales, or rhonchi, no use of accessory muscles  Gastrointestinal: soft, nontender, nondistended, +bowel sounds  Extremities: 2+ nonpitting edema, 1+ DP pulses  Neuro: A&Ox3, nonfocal                          8.7    7.12  )-----------( 227      ( 18 Mar 2021 07:20 )             27.7   03-18    138  |  107  |  16  ----------------------------<  85  4.0   |  21<L>  |  0.73    Ca    8.4      18 Mar 2021 07:13    TPro  5.2<L>  /  Alb  3.2<L>  /  TBili  0.3  /  DBili  x   /  AST  20  /  ALT  18  /  AlkPhos  118  03-18    I&O's Summary    18 Mar 2021 07:01  -  19 Mar 2021 07:00  --------------------------------------------------------  IN: 1140 mL / OUT: 600 mL / NET: 540 mL    19 Mar 2021 07:01  -  19 Mar 2021 11:09  --------------------------------------------------------  IN: 240 mL / OUT: 0 mL / NET: 240 mL              Assessment/Plan:  Ms Murray is an 87y/o female With Severe Aortic Stenosis admitted with anemia  - now Guiac positive, hgb/HCT pending  - GI to decide +/- scope to assess GI bleed  - currently in Afib, which she has had in the past  -- was on Xarelto, currently on hold for anemia/GI bleed  - scheduled for TAVR next Wednesday pending hgb/hct    BONIFACIO Rodriguez  [phone]    
Structural Heart Team    Ms Murray was seen lying in bed comfortably and without complaints.  She denies chest pain/pressure, sob and dizziness and is ambulating without difficulty.         REVIEW OF SYSTEMS:    CONSTITUTIONAL: No weakness, fevers or chills  EYES/ENT: No visual changes;  No vertigo or throat pain   NECK: No pain or stiffness  RESPIRATORY: No cough, wheezing, hemoptysis; No shortness of breath  CARDIOVASCULAR: No chest pain or palpitations  GASTROINTESTINAL: No abdominal or epigastric pain. No nausea, vomiting, or hematemesis; No diarrhea or constipation. No melena or hematochezia.  GENITOURINARY: No dysuria, frequency or hematuria  NEUROLOGICAL: No numbness or weakness  SKIN: No itching, rashes      Allergies    codeine (Other)  penicillin (Flushing; Hypotension; Other)    Intolerances      Vital Signs Last 24 Hrs  T(C): 36.8 (23 Mar 2021 12:04), Max: 37.1 (22 Mar 2021 18:51)  T(F): 98.2 (23 Mar 2021 12:04), Max: 98.7 (22 Mar 2021 18:51)  HR: 72 (23 Mar 2021 12:04) (70 - 73)  BP: 148/63 (23 Mar 2021 12:04) (124/68 - 148/63)  BP(mean): 91 (23 Mar 2021 12:04) (87 - 91)  RR: 18 (23 Mar 2021 12:04) (18 - 18)  SpO2: 98% (23 Mar 2021 12:04) (96% - 98%)    MEDICATIONS  (STANDING):  ascorbic acid 500 milliGRAM(s) Oral daily  atorvastatin 10 milliGRAM(s) Oral at bedtime  chlorhexidine 0.12% Liquid 15 milliLiter(s) Swish and Spit two times a day  chlorhexidine 4% Liquid 1 Application(s) Topical two times a day  clopidogrel Tablet 75 milliGRAM(s) Oral daily  diltiazem    milliGRAM(s) Oral daily  ferrous    sulfate 325 milliGRAM(s) Oral daily  folic acid 1 milliGRAM(s) Oral daily  pantoprazole    Tablet 40 milliGRAM(s) Oral two times a day  thiamine 100 milliGRAM(s) Oral daily  vancomycin  IVPB 750 milliGRAM(s) IV Intermittent once      Exam-  General: NAD, WDWN, appropriate affect  Cor: s1s2, RRR, III/VI systolic murmur   Tele: SR 60-80  Pulm: Clear, no wheezes, rales, or rhonchi, no use of accessory muscles  Gastrointestinal: soft, nontender, nondistended, +bowel sounds  Extremities: 2+ nonpitting edema, 1+ DP pulses  Neuro: A&Ox3, nonfocal                          9.5    5.54  )-----------( 217      ( 22 Mar 2021 06:05 )             31.2   03-22    140  |  108  |  14  ----------------------------<  94  3.8   |  21<L>  |  0.62    Ca    8.3<L>      22 Mar 2021 06:05      I&O's Summary    22 Mar 2021 07:01  -  23 Mar 2021 07:00  --------------------------------------------------------  IN: 960 mL / OUT: 2000 mL / NET: -1040 mL    23 Mar 2021 07:01  -  23 Mar 2021 13:43  --------------------------------------------------------  IN: 560 mL / OUT: 750 mL / NET: -190 mL              Assessment/Plan:  Ms Murray is an 87y/o female With Severe Aortic Stenosis admitted with anemia  - Hgb/HCT stable  - plan for TAVR tomorrow      BONIFACIO Rodriguez  [phone]    
[facility] - Ansley Morales, Jessica, Jennifer, Luiz, Albino Kitchen  Office Number:  [phone]    Patient resting comfortably in bed in NAD.  Laying flat with no respiratory distress.  No complaints of chest pain, dyspnea, palpitations, PND, or orthopnea.    ROS: negative unless otherwise mentioned.    Telemetry:  sr, brief paf yesterday    MEDICATIONS  (STANDING):  clopidogrel Tablet 75 milliGRAM(s) Oral daily    MEDICATIONS  (PRN):      Allergies    codeine (Other)  penicillin (Flushing; Hypotension; Other)    Intolerances        Vital Signs Last 24 Hrs  T(C): 36.9 (25 Mar 2021 06:00), Max: 37.1 (24 Mar 2021 19:36)  T(F): 98.4 (25 Mar 2021 06:00), Max: 98.8 (24 Mar 2021 19:36)  HR: 73 (25 Mar 2021 06:00) (57 - 75)  BP: 129/65 (25 Mar 2021 06:00) (97/57 - 129/65)  BP(mean): 86 (25 Mar 2021 06:00) (77 - 86)  RR: 18 (25 Mar 2021 06:00) (14 - 18)  SpO2: 96% (25 Mar 2021 06:00) (96% - 100%)    I&O's Summary    24 Mar 2021 07:01  -  25 Mar 2021 07:00  --------------------------------------------------------  IN: 470 mL / OUT: 700 mL / NET: -230 mL    25 Mar 2021 07:01  -  25 Mar 2021 09:40  --------------------------------------------------------  IN: 260 mL / OUT: 0 mL / NET: 260 mL        ON EXAM:    Constitutional: well-nourished, well-developed, NAD   HEENT:  MMM, sclerae anicteric, conjunctivae clear, no oral cyanosis.  Pulmonary: Non-labored, breath sounds are clear bilaterally, No wheezing, rales or rhonchi  Cardiovascular: Regular, S1 and S2.  mild 1-2/6 sm,  No rubs, gallops or clicks  Gastrointestinal: Bowel Sounds present, soft, nontender.   Lymph: mild L>R peripheral edema.   Neurological: Alert, no focal deficits  Skin: No rashes.  Psych:  Mood & affect appropriate    LABS: All Labs Reviewed:                        9.4    7.53  )-----------( 189      ( 25 Mar 2021 06:51 )             30.8                         9.0    5.19  )-----------( 175      ( 24 Mar 2021 09:48 )             28.6     25 Mar 2021 06:49    140    |  107    |  16     ----------------------------<  93     3.8     |  22     |  0.53   24 Mar 2021 09:48    142    |  108    |  17     ----------------------------<  106    3.9     |  24     |  0.54     Ca    8.3        25 Mar 2021 06:49  Ca    7.9        24 Mar 2021 09:48    TPro  5.5    /  Alb  3.5    /  TBili  0.3    /  DBili  x      /  AST  35     /  ALT  31     /  AlkPhos  174    25 Mar 2021 06:49    PT/INR - ( 24 Mar 2021 10:28 )   PT: 12.3 sec;   INR: 1.03 ratio         PTT - ( 24 Mar 2021 10:28 )  PTT:35.2 sec      Blood Culture:       
VITAL SIGNS    Telemetry:  SR 55  Vital Signs Last 24 Hrs  T(C): 36.4 (03-24-21 @ 11:50), Max: 36.8 (03-23-21 @ 12:04)  T(F): 97.5 (03-24-21 @ 11:50), Max: 98.2 (03-23-21 @ 12:04)  HR: 65 (03-24-21 @ 11:50) (57 - 104)  BP: 117/58 (03-24-21 @ 11:50) (89/50 - 148/63)  RR: 18 (03-24-21 @ 11:50) (14 - 18)  SpO2: 99% (03-24-21 @ 11:50) (95% - 100%)            03-23 @ 07:01  -  03-24 @ 07:00  --------------------------------------------------------  IN: 798 mL / OUT: 1425 mL / NET: -627 mL       Daily Height in cm: 160.02 (24 Mar 2021 07:18)    Daily   Admit Wt: Drug Dosing Weight  Height (cm): 160 (24 Mar 2021 07:18)  Weight (kg): 55.8 (24 Mar 2021 07:18)  BMI (kg/m2): 21.8 (24 Mar 2021 07:18)  BSA (m2): 1.57 (24 Mar 2021 07:18)      CAPILLARY BLOOD GLUCOSE              MEDICATIONS  cefuroxime  IVPB 1500 milliGRAM(s) IV Intermittent every 8 hours  clopidogrel Tablet 75 milliGRAM(s) Oral daily      >>> <<<  PHYSICAL EXAM  Subjective: NAD "feeling okay"  Neurology: alert and oriented x 3, nonfocal, no gross deficits  CV : s1s2  Lungs: CTA b/l  Abdomen: soft, NT,ND, (- )BM b/l groin no hematoma left groin dressing scant sanguinous drainage  :  voiding primafit secured to lws  Extremities:   -c/c/e +pedal pulses b/l    LABS  03-24    142  |  108  |  17  ----------------------------<  106<H>  3.9   |  24  |  0.54    Ca    7.9<L>      24 Mar 2021 09:48                                   9.0    5.19  )-----------( 175      ( 24 Mar 2021 09:48 )             28.6          PT/INR - ( 24 Mar 2021 10:28 )   PT: 12.3 sec;   INR: 1.03 ratio         PTT - ( 24 Mar 2021 10:28 )  PTT:35.2 sec       PAST MEDICAL & SURGICAL HISTORY:  Osteopenia    CAD (coronary artery disease)    Aortic valve stenosis    HLD (hyperlipidemia)    Afib    Abdominal Mass    Diverticulosis of the Colon    History of Osteoarthritis    DJD (Degenerative Joint Disease)  shoulders, knees, cervical and lumbar spine    Constipation    Thyroid Nodule    Hiatal Hernia    Hypercholesterolemia    MVP (Mitral Valve Prolapse)  last echo 2010    HTN (Hypertension)    S/P cardiac catheterization  2/26/21    H/O knee surgery    H/O ovarian cystectomy  2013    Cataract, Other  surgery   bilateral    S/P Bunionectomy    Parathyroid  surgery    H/O Shoulder Replacement  right shoulder  get clindamycin before surgery         
VITAL SIGNS-Telemetry:  SR 60-80  Vital Signs Last 24 Hrs  T(C): 36.6 (21 @ 05:35), Max: 37.1 (21 @ 14:00)  T(F): 97.9 (21 @ 05:35), Max: 98.7 (21 @ 14:00)  HR: 72 (21 @ 05:35) (72 - 82)  BP: 114/67 (21 @ 05:35) (114/67 - 118/63)  RR: 17 (21 @ 05:35) (17 - 18)  SpO2: 97% (21 @ 05:35) (97% - 99%)          @ 07:01  -   @ 07:00  --------------------------------------------------------  IN: 1280 mL / OUT: 2050 mL / NET: -770 mL     @ 07:01  -   @ 12:12  --------------------------------------------------------  IN: 240 mL / OUT: 0 mL / NET: 240 mL    Daily     Daily Weight in k.8 (18 Mar 2021 08:40)       PHYSICAL EXAM:  Neurology: alert and oriented x 3, nonfocal, no gross deficits  CV : S1S2 + sys murmur  Lungs: cta  Abdomen: soft, nontender, nondistended, positive bowel sounds, last bowel movement       pre admission  Extremities:     +edema b/l no calf tenderness    ascorbic acid 500 milliGRAM(s) Oral daily  atorvastatin 10 milliGRAM(s) Oral at bedtime  ciprofloxacin     Tablet 250 milliGRAM(s) Oral two times a day  clopidogrel Tablet 75 milliGRAM(s) Oral daily  diltiazem    milliGRAM(s) Oral daily  ferrous    sulfate 325 milliGRAM(s) Oral daily  folic acid 1 milliGRAM(s) Oral daily  pantoprazole    Tablet 40 milliGRAM(s) Oral before breakfast  thiamine 100 milliGRAM(s) Oral daily    Physical Therapy Rec:   Home  [x  ]   Home w/ PT  [  ]  Rehab  [  ]  Discussed with Cardiothoracic Team at AM rounds.
Interval Hx; Events Overnight:  SUBJECTIVE: "I am doing okay   "    LABS:                9.5                  140  | 21   | 14           5.54  >-----------< 217     ------------------------< 94                    31.2                 3.8  | 108  | 0.62                                         Ca 8.3   Mg x     Ph x              VITAL SIGNS    Telemetry: sr 80s     Daily     Daily Weight in k.4 (22 Mar 2021 07:18)      Vital Signs Last 24 Hrs  T(C): 37.1 (21 @ 12:10), Max: 37.1 (21 @ 12:10)  T(F): 98.8 (21 @ 12:10), Max: 98.8 (21 @ 12:10)  HR: 78 (21 @ 12:10) (70 - 78)  BP: 156/76 (21 @ 12:10) (130/69 - 156/76)  RR: 18 (21 @ 12:10) (18 - 18)  SpO2: 98% (21 @ 12:10) (97% - 98%)             I&O's Detail    21 Mar 2021 07:  -  22 Mar 2021 07:00  --------------------------------------------------------  IN:    Oral Fluid: 1080 mL  Total IN: 1080 mL    OUT:    Voided (mL): 2300 mL  Total OUT: 2300 mL    Total NET: -1220 mL      22 Mar 2021 07:01  -  22 Mar 2021 14:19  --------------------------------------------------------  IN:    Oral Fluid: 560 mL  Total IN: 560 mL    OUT:    Voided (mL): 900 mL  Total OUT: 900 mL    Total NET: -340 mL                    GLUCOSE  CAPILLARY BLOOD GLUCOSE      POCT Blood Glucose.: 91 mg/dL (21 Mar 2021 21:49)                    PHYSICAL EXAM      General: NAD, well appearing, in no distress  Neurology: A&O x3, non focal, no neuro deficits. Moves all extremities to command.  CV : s1 s2 RRR, systolic murmurs, gallops, clicks.   Lungs: clear to auscultation  Abdomen: soft, nontender, nondistended, positive bowel sounds, +BM  :    voiding / sunshine          Extremities:    no  edema. + pedal pulses      Skin: intact, no lesions        MEDICATIONS  ascorbic acid 500 milliGRAM(s) Oral daily  atorvastatin 10 milliGRAM(s) Oral at bedtime  clopidogrel Tablet 75 milliGRAM(s) Oral daily  diltiazem    milliGRAM(s) Oral daily  ferrous    sulfate 325 milliGRAM(s) Oral daily  folic acid 1 milliGRAM(s) Oral daily  pantoprazole    Tablet 40 milliGRAM(s) Oral two times a day  thiamine 100 milliGRAM(s) Oral daily        
    VITAL SIGNS    Subjective: "I'm feeling ok." Denies CP, palpitation, SOB, BRITO, HA, dizziness, N/V/D, fever or chills.  No acute event noted overnight.     Telemetry: NSR 66     Vital Signs Last 24 Hrs  T(C): 36.6 (21 @ 12:19), Max: 36.7 (21 @ 19:32)  T(F): 97.8 (21 @ 12:19), Max: 98.1 (21 @ 06:24)  HR: 68 (21 @ 12:19) (67 - 71)  BP: 119/62 (21 @ 12:19) (118/71 - 125/81)  RR: 18 (21 @ 12:19) (18 - 18)  SpO2: 100% (21 @ 12:19) (97% - 100%)            @ 07:01  -   @ 07:00  --------------------------------------------------------  IN: 1240 mL / OUT: 1300 mL / NET: -60 mL     @ 07:01  -   @ 16:15  --------------------------------------------------------  IN: 600 mL / OUT: 700 mL / NET: -100 mL    Daily     Daily Weight in k.9 (20 Mar 2021 08:42)    PHYSICAL EXAM    Neurology: alert and oriented x 3, nonfocal, no gross deficits    CV: (+) Systolic Murmur     Lungs: CTA B/L     Abdomen: soft, nontender, nondistended, positive bowel sounds, (+) Flatus; (+) BM     :  Voiding               Extremities:  B/L LE (+) 1-2 pitting edema; negative calf tenderness; (+) 2 DP palpable        ascorbic acid 500 milliGRAM(s) Oral daily  atorvastatin 10 milliGRAM(s) Oral at bedtime  clopidogrel Tablet 75 milliGRAM(s) Oral daily  diltiazem  milliGRAM(s) Oral daily  ferrous sulfate 325 milliGRAM(s) Oral daily  folic acid 1 milliGRAM(s) Oral daily  pantoprazole Tablet 40 milliGRAM(s) Oral two times a day  thiamine 100 milliGRAM(s) Oral daily    Physical Therapy Rec:   Home  [  ]   Home w/ PT  [ X ]  Rehab  [  ]    Discussed with Cardiothoracic Team at AM rounds.
  Subjective:  "Im ok,, disappointed no surgery happening yet"  Receiving transfusion currently    Tele:  SR  70s                              T(C): 36.9 (03-17-21 @ 10:50), Max: 36.9 (03-17-21 @ 10:50)  HR: 80 (03-17-21 @ 10:50) (76 - 84)  BP: 106/57 (03-17-21 @ 10:50) (106/57 - 137/64)  RR: 18 (03-17-21 @ 10:50) (18 - 18)  SpO2: 98% (03-17-21 @ 10:50) (94% - 100%)        03-17    141  |  108  |  14  ----------------------------<  93  4.2   |  23  |  0.68    Ca    8.8      17 Mar 2021 06:17    TPro  5.1<L>  /  Alb  3.2<L>  /  TBili  0.4  /  DBili  x   /  AST  17  /  ALT  14  /  AlkPhos  99  03-17                               6.1    7.93  )-----------( 247      ( 17 Mar 2021 06:13 )             19.6            Assessment    Neuro: alert, no deficits    Pulm: essentially clear    CV: S1  S2  RRR    Abd: soft, non tender reports BM 3/16    Extremities: 1+ edema B/L legs below knees,chronic pigment changes      MEDICATIONS  (STANDING):  ascorbic acid 500 milliGRAM(s) Oral daily  atorvastatin 10 milliGRAM(s) Oral at bedtime  ciprofloxacin     Tablet 250 milliGRAM(s) Oral two times a day  clopidogrel Tablet 75 milliGRAM(s) Oral daily  diltiazem    milliGRAM(s) Oral daily  ferrous    sulfate 325 milliGRAM(s) Oral daily  folic acid 1 milliGRAM(s) Oral daily  pantoprazole    Tablet 40 milliGRAM(s) Oral before breakfast  thiamine 100 milliGRAM(s) Oral daily       PAST MEDICAL & SURGICAL HISTORY:  Osteopenia    CAD (coronary artery disease)    Aortic valve stenosis    HLD (hyperlipidemia)    Afib    Abdominal Mass    Diverticulosis of the Colon    History of Osteoarthritis    DJD (Degenerative Joint Disease)  shoulders, knees, cervical and lumbar spine    Constipation    Thyroid Nodule    Hiatal Hernia    Hypercholesterolemia    MVP (Mitral Valve Prolapse)  last echo 2010    HTN (Hypertension)    S/P cardiac catheterization  2/26/21    H/O knee surgery    H/O ovarian cystectomy  2013    Cataract, Other  surgery   bilateral    S/P Bunionectomy    Parathyroid  surgery    H/O Shoulder Replacement  right shoulder  get clindamycin before surgery          
Cardiac Surgery Pre-op Note:    CC: Patient is a 86y old  Female who presents with a chief complaint of sob (22 Mar 2021 14:59)      Referring Physician: Dr Rodriguez                                                                                                           Surgeon: Dr Wagner    Procedure: (Date) (Procedure)    Allergies    codeine (Other)  penicillin (Flushing; Hypotension; Other)    Intolerances        HPI:  87 y/o female with PMHx of HTN, HLD, CAD, Afib on Xarelto (last taken 3/14/21), MR, severe AS, venous insufficiency, arthritis, osteoporosis, and diverticulosis.  S/p cardiac cath on 3/1/21 with stent placement. Scheduled for replacement of aortic valva perq femoral artery approach on 3/18/21. No new symptoms reported today.   Advised to take ASA after stent on 3/1/21. "Stopped it 2/2 nose bleed as per cardiologist"  PST done today> Hgb 6.8, decision was made to admit patient and obtain CT abd/pelvis  R/O  RP bleed, Serial CBC, poss transfusion           (16 Mar 2021 11:11)      PAST MEDICAL & SURGICAL HISTORY:  Osteopenia    CAD (coronary artery disease)    Aortic valve stenosis    HLD (hyperlipidemia)    Afib    Abdominal Mass    Diverticulosis of the Colon    History of Osteoarthritis    DJD (Degenerative Joint Disease)  shoulders, knees, cervical and lumbar spine    Constipation    Thyroid Nodule    Hiatal Hernia    Hypercholesterolemia    MVP (Mitral Valve Prolapse)  last echo 2010    HTN (Hypertension)    S/P cardiac catheterization  2/26/21    H/O knee surgery    H/O ovarian cystectomy  2013    Cataract, Other  surgery   bilateral    S/P Bunionectomy    Parathyroid  surgery    H/O Shoulder Replacement  right shoulder  get clindamycin before surgery        MEDICATIONS  (STANDING):  ascorbic acid 500 milliGRAM(s) Oral daily  atorvastatin 10 milliGRAM(s) Oral at bedtime  chlorhexidine 0.12% Liquid 15 milliLiter(s) Swish and Spit two times a day  chlorhexidine 4% Liquid 1 Application(s) Topical two times a day  clopidogrel Tablet 75 milliGRAM(s) Oral daily  diltiazem    milliGRAM(s) Oral daily  ferrous    sulfate 325 milliGRAM(s) Oral daily  folic acid 1 milliGRAM(s) Oral daily  pantoprazole    Tablet 40 milliGRAM(s) Oral two times a day  thiamine 100 milliGRAM(s) Oral daily  vancomycin  IVPB 750 milliGRAM(s) IV Intermittent once    MEDICATIONS  (PRN):        Labs:                        9.5    5.54  )-----------( 217      ( 22 Mar 2021 06:05 )             31.2     03-22    140  |  108  |  14  ----------------------------<  94  3.8   |  21<L>  |  0.62    Ca    8.3<L>      22 Mar 2021 06:05          Blood Type: ABO Interpretation: O (03-20 @ 10:01)    HGB A1C: 4.7  Prealbumin:   Pro-BNP:   Thyroid Panel: 03-17 @ 13:14/1.78thecThyroid Stimulating Hormone, Serum in AM (03.17.21 @ 13:14)   Thyroid Stimulating Hormone, Serum: 1.78 uIU/mL   --/--/--    MRSA:  / MSSA:   neg    CXR: clear    EKG:< from: 12 Lead ECG (03.19.21 @ 08:05) >  T Axis 30 degrees    Diagnosis Line ATRIAL FIBRILLATION WITH RAPID VENTRICULAR RESPONSE  ABNORMALECG  WHEN COMPARED WITH ECG OF 16-MAR-2021 18:29,  SIGNIFICANT CHANGES HAVE OCCURRED  new AF    < end of copied text >      Carotid Duplex:      PFT's:    Echocardiogram: < from: Transesophageal Echocardiogram (02.18.21 @ 09:07) >  EF (Visual Estimate): 60-65 %  Doppler Peak Velocity (m/sec): AoV=3.8  ------------------------------------------------------------------------  Observations:  Mitral Valve: There is posterior mitral annular  calcification. Mild mitral regurgitation.  Aortic Valve/Aorta: Severely calcified trileaflet aortic  valve with reduced systolic excursion.  The JOSESITO by 3D  reconstruction= 0.96cm2. Peak transaortic valve gradient  equals 58 mm Hg, mean transaortic valve gradient equals 35  mm Hg, estimated aortic valve area equals 0.95 sqcm (by  continuity equation), aortic valve velocity time integral  equals 95 cm, DVI= 0.21, consistent with severe aortic  stenosis. No aortic valve regurgitation seen. Peak left  ventricular outflow tract gradient equals 3 mm Hg, LVOT  velocity time integral equals 20 cm.  Normal size aortic root: 3.2 cm.  Normal size proximal ascending aorta: 3.2 cm.  The more distal ascending thoracic aorta= 3.7 by 3.9cm  (mildly dilated)  There is grade 3 atheromatous disease in the visualized  portions of the descending thoracic aorta and aortic arch.  Left Atrium: No left atrial or left atrial appendage  thrombus.  There is a very prominent hypermobile coumadin  ridge which is a normal variant.  Left Ventricle: Normal left ventricular systolic function.  No segmental wall motion abnormalities.  The LVEF= 60-65%.  Normal left ventricular size.  Right Heart: There is nothrombus in the right atrium.  Normal right ventricular size and function. Normal  tricuspid valve. Minimal tricuspid regurgitation. Normal  pulmonic valve. Minimal pulmonic regurgitation.  Pericardium/Pleura: There is a trace pericardial effusion  with fluid in the transverse sinus.  Hemodynamic: Contrast injection demonstrates no evidence of  a patent foramen ovale.  There is lipomatous hypertrophy of the inter-atrial septum.  ------------------------------------------------------------------------  Conclusions:  1. No left atrial or left atrial appendage thrombus.  There  is a very prominent and hypermobile coumadin ridge which is  a normal variant.  2. Severe aortic stenosis.  ------------------------------------------------------------------------  Confirmed on  2/19/2021 - 10:24:36 by Shweta Wallace M.D.    < end of copied text >      Cardiac catheterization:    Vein Mapping:    Gen: WN/WD NAD  Neuro: AAOx3, nonfocal  Pulm: CTA B/L  CV: RRR, S1S2  Abd: Soft, NT, ND +BS  Ext: No edema, + peripheral pulses      Pt has AICD/PPM [ ] Yes  [xx ] No             Brand Name:  Pre-op Beta Blocker ordered within 24 hrs of surgery (CABG ONLY)?  [ ] Yes  [ ] No  If not, Why?  Type & Cross  [ ] Yes  [ x] No  NPO after Midnight [x ] Yes  [ ] No  Pre-op ABX ordered, to be taped on chart:  [x ] Yes  [ ] No     Hibiclens/Peridex ordered [x ] Yes  [ ] No  Intraop on Hold: PRBCs, CXR, JARROD [x ]   Consent obtained  [x ] Yes  [ ] No  
VITAL SIGNS    Telemetry: SR 70's   Vital Signs Last 24 Hrs  T(C): 36.7 (21 @ 12:36), Max: 37 (21 @ 14:33)  T(F): 98 (21 @ 12:36), Max: 98.6 (21 @ 14:33)  HR: 99 (21 @ 09:04) (72 - 125)  BP: 101/69 (21 @ 12:36) (97/65 - 134/72)  RR: 18 (21 @ 12:36) (18 - 18)  SpO2: 98% (21 @ 12:36) (97% - 98%)             @ 07:01  -   @ 07:00  --------------------------------------------------------  IN: 1140 mL / OUT: 600 mL / NET: 540 mL     @ 07:01  -   @ 14:01  --------------------------------------------------------  IN: 240 mL / OUT: 0 mL / NET: 240 mL       Daily     Daily Weight in k (19 Mar 2021 12:36)  Admit Wt: Drug Dosing Weight  Height (cm): 160 (16 Mar 2021 17:55)  Weight (kg): 55.8 (16 Mar 2021 17:55)  BMI (kg/m2): 21.8 (16 Mar 2021 17:55)  BSA (m2): 1.57 (16 Mar 2021 17:55)      CAPILLARY BLOOD GLUCOSE              MEDICATIONS  ascorbic acid 500 milliGRAM(s) Oral daily  atorvastatin 10 milliGRAM(s) Oral at bedtime  ciprofloxacin     Tablet 250 milliGRAM(s) Oral two times a day  clopidogrel Tablet 75 milliGRAM(s) Oral daily  diltiazem    milliGRAM(s) Oral daily  ferrous    sulfate 325 milliGRAM(s) Oral daily  folic acid 1 milliGRAM(s) Oral daily  pantoprazole    Tablet 40 milliGRAM(s) Oral two times a day  thiamine 100 milliGRAM(s) Oral daily      >>> <<<  PHYSICAL EXAM  Subjective: NAD  Neurology: alert and oriented x 3, nonfocal, no gross deficits  CV :s1s2  Lungs: CTA b/l  Abdomen: soft, NT,ND, (+ )BM  :  voiding  Extremities: -c/c/e       LABS      138  |  107  |  16  ----------------------------<  85  4.0   |  21<L>  |  0.73    Ca    8.4      18 Mar 2021 07:13    TPro  5.2<L>  /  Alb  3.2<L>  /  TBili  0.3  /  DBili  x   /  AST  20  /  ALT  18  /  AlkPhos  118                                   8.7    7.12  )-----------( 227      ( 18 Mar 2021 07:20 )             27.7                 PAST MEDICAL & SURGICAL HISTORY:  Osteopenia    CAD (coronary artery disease)    Aortic valve stenosis    HLD (hyperlipidemia)    Afib    Abdominal Mass    Diverticulosis of the Colon    History of Osteoarthritis    DJD (Degenerative Joint Disease)  shoulders, knees, cervical and lumbar spine    Constipation    Thyroid Nodule    Hiatal Hernia    Hypercholesterolemia    MVP (Mitral Valve Prolapse)  last echo     HTN (Hypertension)    S/P cardiac catheterization  21    H/O knee surgery    H/O ovarian cystectomy  2013    Cataract, Other  surgery   bilateral    S/P Bunionectomy    Parathyroid  surgery    H/O Shoulder Replacement  right shoulder  get clindamycin before surgery         
VITAL SIGNS-Telemetry:  SR   Vital Signs Last 24 Hrs  T(C): 36.8 (03-21-21 @ 04:32), Max: 36.8 (03-21-21 @ 04:32)  T(F): 98.3 (03-21-21 @ 04:32), Max: 98.3 (03-21-21 @ 04:32)  HR: 69 (03-21-21 @ 04:32) (68 - 75)  BP: 131/72 (03-21-21 @ 04:32) (119/62 - 131/72)  RR: 18 (03-21-21 @ 04:32) (18 - 18)  SpO2: 95% (03-21-21 @ 04:32) (95% - 100%)         03-20 @ 07:01  -  03-21 @ 07:00  --------------------------------------------------------  IN: 840 mL / OUT: 1810 mL / NET: -970 mL    Daily     Daily       PHYSICAL EXAM:  Neurology: alert and oriented x 3, nonfocal, no gross deficits  CV : S1S2 +systolic murmur  Lungs: CTA  Abdomen: soft, nontender, nondistended, positive bowel sounds, last bowel movement  3/20       Extremities:     + edema, no calf tenderness    ascorbic acid 500 milliGRAM(s) Oral daily  atorvastatin 10 milliGRAM(s) Oral at bedtime  clopidogrel Tablet 75 milliGRAM(s) Oral daily  diltiazem    milliGRAM(s) Oral daily  ferrous    sulfate 325 milliGRAM(s) Oral daily  folic acid 1 milliGRAM(s) Oral daily  pantoprazole    Tablet 40 milliGRAM(s) Oral two times a day  thiamine 100 milliGRAM(s) Oral daily    Physical Therapy Rec:   Home  [  ]   Home w/ PT  [  x]  Rehab  [  ]  Discussed with Cardiothoracic Team at AM rounds.

## 2021-03-26 NOTE — DISCHARGE NOTE PROVIDER - CARE PROVIDERS DIRECT ADDRESSES
,[email or web address]

## 2021-03-27 ENCOUNTER — TRANSCRIPTION ENCOUNTER (OUTPATIENT)
Age: 86
End: 2021-03-27

## 2021-03-30 ENCOUNTER — NON-APPOINTMENT (OUTPATIENT)
Age: 86
End: 2021-03-30

## 2021-03-30 ENCOUNTER — APPOINTMENT (OUTPATIENT)
Dept: CARDIOTHORACIC SURGERY | Facility: CLINIC | Age: 86
End: 2021-03-30
Payer: MEDICARE

## 2021-03-30 VITALS
TEMPERATURE: 97.3 F | HEART RATE: 78 BPM | SYSTOLIC BLOOD PRESSURE: 126 MMHG | OXYGEN SATURATION: 97 % | HEIGHT: 62 IN | BODY MASS INDEX: 21.71 KG/M2 | DIASTOLIC BLOOD PRESSURE: 68 MMHG | RESPIRATION RATE: 15 BRPM | WEIGHT: 118 LBS

## 2021-03-30 PROCEDURE — 99214 OFFICE O/P EST MOD 30 MIN: CPT

## 2021-04-02 ENCOUNTER — NON-APPOINTMENT (OUTPATIENT)
Age: 86
End: 2021-04-02

## 2021-04-04 ENCOUNTER — NON-APPOINTMENT (OUTPATIENT)
Age: 86
End: 2021-04-04

## 2021-04-06 ENCOUNTER — RX RENEWAL (OUTPATIENT)
Age: 86
End: 2021-04-06

## 2021-04-08 ENCOUNTER — APPOINTMENT (OUTPATIENT)
Dept: CARDIOLOGY | Facility: CLINIC | Age: 86
End: 2021-04-08
Payer: MEDICARE

## 2021-04-08 VITALS
HEIGHT: 62 IN | DIASTOLIC BLOOD PRESSURE: 70 MMHG | BODY MASS INDEX: 21.9 KG/M2 | WEIGHT: 119 LBS | OXYGEN SATURATION: 98 % | HEART RATE: 70 BPM | SYSTOLIC BLOOD PRESSURE: 160 MMHG

## 2021-04-08 PROCEDURE — 99214 OFFICE O/P EST MOD 30 MIN: CPT

## 2021-04-26 ENCOUNTER — TRANSCRIPTION ENCOUNTER (OUTPATIENT)
Age: 86
End: 2021-04-26

## 2021-05-06 ENCOUNTER — OUTPATIENT (OUTPATIENT)
Dept: OUTPATIENT SERVICES | Facility: HOSPITAL | Age: 86
LOS: 1 days | End: 2021-05-06
Payer: MEDICARE

## 2021-05-06 ENCOUNTER — APPOINTMENT (OUTPATIENT)
Dept: CARDIOTHORACIC SURGERY | Facility: CLINIC | Age: 86
End: 2021-05-06
Payer: MEDICARE

## 2021-05-06 ENCOUNTER — NON-APPOINTMENT (OUTPATIENT)
Age: 86
End: 2021-05-06

## 2021-05-06 VITALS
HEIGHT: 62 IN | WEIGHT: 117 LBS | RESPIRATION RATE: 16 BRPM | HEART RATE: 56 BPM | SYSTOLIC BLOOD PRESSURE: 166 MMHG | BODY MASS INDEX: 21.53 KG/M2 | OXYGEN SATURATION: 100 % | DIASTOLIC BLOOD PRESSURE: 75 MMHG

## 2021-05-06 DIAGNOSIS — Z98.890 OTHER SPECIFIED POSTPROCEDURAL STATES: Chronic | ICD-10-CM

## 2021-05-06 DIAGNOSIS — I34.0 NONRHEUMATIC MITRAL (VALVE) INSUFFICIENCY: ICD-10-CM

## 2021-05-06 DIAGNOSIS — Z09 ENCOUNTER FOR FOLLOW-UP EXAMINATION AFTER COMPLETED TREATMENT FOR CONDITIONS OTHER THAN MALIGNANT NEOPLASM: ICD-10-CM

## 2021-05-06 LAB
ALBUMIN SERPL ELPH-MCNC: 4.5 G/DL
ALP BLD-CCNC: 128 U/L
ALT SERPL-CCNC: 21 U/L
ANION GAP SERPL CALC-SCNC: 11 MMOL/L
AST SERPL-CCNC: 24 U/L
BASOPHILS # BLD AUTO: 0.04 K/UL
BASOPHILS NFR BLD AUTO: 0.5 %
BILIRUB SERPL-MCNC: 0.3 MG/DL
BUN SERPL-MCNC: 18 MG/DL
CALCIUM SERPL-MCNC: 10 MG/DL
CHLORIDE SERPL-SCNC: 100 MMOL/L
CO2 SERPL-SCNC: 26 MMOL/L
CREAT SERPL-MCNC: 0.63 MG/DL
EOSINOPHIL # BLD AUTO: 0.05 K/UL
EOSINOPHIL NFR BLD AUTO: 0.7 %
GLUCOSE SERPL-MCNC: 81 MG/DL
HCT VFR BLD CALC: 35 %
HGB BLD-MCNC: 11.3 G/DL
IMM GRANULOCYTES NFR BLD AUTO: 0.4 %
LYMPHOCYTES # BLD AUTO: 1.14 K/UL
LYMPHOCYTES NFR BLD AUTO: 15 %
MAN DIFF?: NORMAL
MCHC RBC-ENTMCNC: 28.9 PG
MCHC RBC-ENTMCNC: 32.3 GM/DL
MCV RBC AUTO: 89.5 FL
MONOCYTES # BLD AUTO: 0.78 K/UL
MONOCYTES NFR BLD AUTO: 10.2 %
NEUTROPHILS # BLD AUTO: 5.57 K/UL
NEUTROPHILS NFR BLD AUTO: 73.2 %
NT-PROBNP SERPL-MCNC: 311 PG/ML
PLATELET # BLD AUTO: 248 K/UL
POTASSIUM SERPL-SCNC: 4.3 MMOL/L
PROT SERPL-MCNC: 6.7 G/DL
RBC # BLD: 3.91 M/UL
RBC # FLD: 14.2 %
SODIUM SERPL-SCNC: 137 MMOL/L
WBC # FLD AUTO: 7.61 K/UL

## 2021-05-06 PROCEDURE — 93306 TTE W/DOPPLER COMPLETE: CPT

## 2021-05-06 PROCEDURE — 93306 TTE W/DOPPLER COMPLETE: CPT | Mod: 26

## 2021-05-06 PROCEDURE — 93000 ELECTROCARDIOGRAM COMPLETE: CPT

## 2021-05-06 PROCEDURE — 99213 OFFICE O/P EST LOW 20 MIN: CPT

## 2021-05-06 NOTE — DATA REVIEWED
[FreeTextEntry1] : Echo: 3/25/21, mild-mod MR, TR, PAP=33, mild AI, II DD LVEF 75%. \par Cardiac Cath: 2/21, 60% prox, 80% mid LAD, 20% Circ, 50% osteal RCA \par Stent: 3/1/21, YAMILKA to LAD \par Cardiac Surg: 3/24/21, TAVR

## 2021-05-06 NOTE — ASSESSMENT
[FreeTextEntry1] : Ms. Murray is recovering well from her TAVR, increasing her activity while having no symptoms. She remains off her xarelto due to GI bleed. I will reach out to Dr. Morales to see if ok to restart it. She should continue to increase her activity as tolerated. Her TTE,MCOT and ECG were reviewed. She will continue to follow up with Dr. Morales, and will see us in one year, with a repeat TTE at that time.

## 2021-05-06 NOTE — PHYSICAL EXAM
[Sclera] : the sclera and conjunctiva were normal [PERRL With Normal Accommodation] : pupils were equal in size, round, and reactive to light [Neck Appearance] : the appearance of the neck was normal [Jugular Venous Distention Increased] : there was no jugular-venous distention [Exaggerated Use Of Accessory Muscles For Inspiration] : no accessory muscle use [Auscultation Breath Sounds / Voice Sounds] : lungs were clear to auscultation bilaterally [Apical Impulse] : the apical impulse was normal [Heart Sounds] : normal S1 and S2 [Murmurs] : no murmurs [Bowel Sounds] : normal bowel sounds [Abnormal Walk] : normal gait [Skin Color & Pigmentation] : normal skin color and pigmentation [Skin Turgor] : normal skin turgor [] : no rash [Sensation] : the sensory exam was normal to light touch and pinprick [Motor Exam] : the motor exam was normal [No Focal Deficits] : no focal deficits [Oriented To Time, Place, And Person] : oriented to person, place, and time

## 2021-05-06 NOTE — HISTORY OF PRESENT ILLNESS
[Heart Failure within 2 Weeks] : Heart Failure in last 2 weeks [Class I] : Class I [FreeTextEntry1] : Elsa is an 87 year old female who was referred to Structural heart team for evaluation of her aortic stenosis. Cardiac catheterization 2/21 demonstrated a 60% proximal and 80% mid LAD. 20% circumflex, and 50% ostial RCA. 3/1/2020 she underwent stent to the LAD. On 3/24/2020 she underwent TAVR. Echo 3/25/2021 demonstrated ejection fraction of 75%. There was mild to moderate MR and TR with a PA pressure of 33. Mild AI, with stage II diastolic dysfunction. On 4/1/2021 she had A. fib with GI bleeding and aspirin and Xarelto were placed on hold. She was seen in follow up by Dr. Morales on 4/8/2021. She also had an appointment to see Dr. Jc (GI) next week to evaluate if it is safe to restart Xarelto.\par \par Today she presents with her son, stating she feels ok. She denies any SOB, CP, dizziness or othopnea. She uses two pillows to sleep. She has daily pedal edema B/L. Her appetite has been ok, and her sleep has been good. She tries to take daily walks around [place] housing development, and she states she has no CP or SOB with that. She will  use a walker when she is walking by herself, but when walking with other people, she does not use it.\par \par NYHA: I\par 5 meter Gait  5.4/ 5.3/5.7

## 2021-05-06 NOTE — REASON FOR VISIT
[Family Member] : family member [FreeTextEntry1] : Urgent CHARLETTE using a 26 mm John III ultra bioprosthesis on 3/24/2021

## 2021-05-06 NOTE — REVIEW OF SYSTEMS
[Lower Ext Edema] : lower extremity edema [Joint Pain] : joint pain [Joint Stiffness] : joint stiffness [Negative] : Psychiatric [Fever] : no fever [Chills] : no chills [Feeling Poorly] : not feeling poorly [Feeling Tired] : not feeling tired [Heart Rate Is Slow] : the heart rate was not slow [Heart Rate Is Fast] : the heart rate was not fast [Chest Pain] : no chest pain [Palpitations] : no palpitations [FreeTextEntry7] : Recent GIB, No melena [FreeTextEntry9] : B/L knees, and shoulders

## 2021-05-06 NOTE — CONSULT LETTER
[FreeTextEntry2] : \par Nate Morales MD \par [address]\par [place] [id number]

## 2021-05-19 NOTE — DISCUSSION/SUMMARY
[FreeTextEntry1] : This is an 87-year-old white female who is status post stent to the LAD 3/1, and TAVR 3/24.  History of paroxysmal atrial fibrillation and had been on Xarelto.  Following the stent she was put on aspirin and Plavix.  This combination of triple blood thinners she developed nosebleed and positive blood in her stool.  The aspirin and the Xarelto was stopped and she now has a monitor and is being watched at [facility].  Today she is in sinus rhythm.\par \par She has an appointment to see Dr. Jc next week.  He is the gastroenterologist who knows her.  We will see if he thinks that it would be safe to put her back on Xarelto.  Blood pressure and heart rate are good and she will stay on her other medication as prescribed.\par \par We went over her hospitalization, her procedures, and I answered her question.  She has an appointment at [facility] the beginning of next month.

## 2021-05-19 NOTE — HISTORY OF PRESENT ILLNESS
[FreeTextEntry1] : I saw Elsa Brunner in the office today for followup visit,. She is an 87-year-old white female with mitral valve prolapse. Echo performed 11/14 shows MAC with mild to moderate MR. The posterior mitral leaflet was not well visualized. She was in the emergency room in October 2013 with a rapid heartbeat. This seemed to occur associated with pain in her knee. She was in rapid atrial fibrillation and converted to sinus rhythm with IV Cardizem. She now is on Cardizem 180 mg once a day. Her losartan 100 mg once a day which she was taking for hypertension was discontinued. Occasionally she gets a palpitation which relieved with belching.She was started on Prilosec in case she is having heartburn. She remains on Xarelto.\par \par Blood work dated 6/20 demonstrates a cholesterol of 193, triglycerides 62, HDL 91, and LDL 90.. She is now on simvastatin 10 mg once a day.\par \par Patient is now taking Xarelto 20 mg once a day. She has some minimal bright red blood per rectum which has been a chronic problem. She underwent a GI evaluation Dr. Galeana. Everything was normal except the small bowel camera did show a potential small ulceration.. Patient's had no signs of bleeding. As per Dr. Galeana she is off the omeprazole.\par \par Echocardiogram 1/21 demonstrates an ejection fraction of 65-70%. There is mild MR and mild-mod TR with PA pressure of 44. There is severe aortic stenosis with a peak gradient of 71, and a valve area of 0.7 cm². There is stage I diastolic dysfunction... Most recent carotid Doppler performed 7/20 showed mild plaque.\par \par She presented to the emergency room at [facility] 11/25/16 with severe left shoulder pain and palpitation. She was in rapid atrial fibrillation. She was given IV Cardizem and converted to sinus rhythm. She got a cortisone shot in her shoulder and the pain is better. She has been doing well but did have a slight palpitation yesterday. The atrial fibrillation seems to be precipitated by pain\par \par She was referred to the structural heart center at [place] for evaluation of her aortic stenosis.  Cardiac catheterization 2/21 demonstrated a 60% proximal and 80% mid LAD.  20% circumflex, and 50% ostial RCA.  3/1/2020 she underwent stent to the LAD.  3/24/2020  she underwent TAVR.  Echo 3/25/2021 demonstrated ejection fraction of 75%.  There was mild to moderate MR and TR with a PA pressure of 33.  Mild AI, with stage II diastolic dysfunction.  4/1/2021 she had A. fib with GI bleeding. Has an appointment to see Dr. Jc next week. .\par

## 2021-05-21 ENCOUNTER — NON-APPOINTMENT (OUTPATIENT)
Age: 86
End: 2021-05-21

## 2021-06-11 LAB
ANION GAP SERPL CALC-SCNC: 13 MMOL/L
BASOPHILS # BLD AUTO: 0.03 K/UL
BASOPHILS NFR BLD AUTO: 0.4 %
BUN SERPL-MCNC: 18 MG/DL
CALCIUM SERPL-MCNC: 9.3 MG/DL
CHLORIDE SERPL-SCNC: 101 MMOL/L
CO2 SERPL-SCNC: 24 MMOL/L
CREAT SERPL-MCNC: 0.64 MG/DL
EOSINOPHIL # BLD AUTO: 0.1 K/UL
EOSINOPHIL NFR BLD AUTO: 1.4 %
GLUCOSE SERPL-MCNC: 82 MG/DL
HCT VFR BLD CALC: 34.9 %
HGB BLD-MCNC: 11 G/DL
IMM GRANULOCYTES NFR BLD AUTO: 0.5 %
LYMPHOCYTES # BLD AUTO: 0.83 K/UL
LYMPHOCYTES NFR BLD AUTO: 11.3 %
MAN DIFF?: NORMAL
MCHC RBC-ENTMCNC: 27.4 PG
MCHC RBC-ENTMCNC: 31.5 GM/DL
MCV RBC AUTO: 87 FL
MONOCYTES # BLD AUTO: 0.75 K/UL
MONOCYTES NFR BLD AUTO: 10.2 %
NEUTROPHILS # BLD AUTO: 5.6 K/UL
NEUTROPHILS NFR BLD AUTO: 76.2 %
PLATELET # BLD AUTO: 250 K/UL
POTASSIUM SERPL-SCNC: 4.4 MMOL/L
RBC # BLD: 4.01 M/UL
RBC # FLD: 14.6 %
SODIUM SERPL-SCNC: 138 MMOL/L
WBC # FLD AUTO: 7.35 K/UL

## 2021-06-14 NOTE — PROGRESS NOTE ADULT - SUBJECTIVE AND OBJECTIVE BOX
MAXWELLNELLARUBENS YU 83y Female  MRN-[id number]     ORTHOPEDIC SURGERY / DR. COFFMAN    POD # 3    Vital Signs Last 24 Hrs  T(C): 36.8, Max: 36.8 (06-21 @ 07:55)  T(F): 98.2, Max: 98.2 (06-21 @ 07:55)  HR: 71 (71 - 73)  BP: 147/76 (126/67 - 147/76)  BP(mean): --  RR: 17 (17 - 17)  SpO2: 99% (99% - 99%)    LEFT KNEE :    WOUND DRY AND INTACT  SOME EDEMA  GOOD MOTOR TO LEFT LOWER EXTREMITY  NEURO-VASCULAR STATUS INTACT  NO CALF TENDERNESS    Hemoglobin: 9.1 (06-21 @ 04:40)  Hemoglobin: 10.3 (06-20 @ 04:46)  Hemoglobin: 11.0 (06-19 @ 13:49)    Hematocrit: 27.4 (06-21 @ 04:40)  Hematocrit: 30.9 (06-20 @ 04:46)  Hematocrit: 32.6 (06-19 @ 13:49)    ASSESSMENT &  PLAN:  POD # 3 S/P LEFT TOTAL KNEE  REPLACEMENT    WEIGHT  BEARING AS TOLERATED, OOB AND AMBULATE, PHYSICAL THERAPY   H/O AFIB ON XARELTO 20 MG DAILY   INCENTIVE SPIROMETRY   DISCHARGE PLANNING TO  REHAB TODAY  NEW AQUACEL DRESSING APPLIED Detail Level: Zone

## 2021-06-16 NOTE — ASU PREOP CHECKLIST - BP NONINVASIVE DIASTOLIC (MM HG)
Andre Saenz is a 4 y.o. female who is being evaluated via a billable telephone visit.      What phone number would you like to be contacted at? [phone]  How would you like to obtain your AVS? AVS Preference: Mail a copy.    Assessment & Plan   Exposure to COVID-19 virus - two classroom contacts within the past week. Family just found out today.  - Asymptomatic COVID-19 Virus (CORONAVIRUS) PCR  - Follow up if she develops worrisome signs/symptoms    {Provider  Link to [facility] Help Grid :[id number]]      Follow Up  Return in about 7 months (around 11/6/2021) for [facility].    Tracey Hart MD        Subjective   Andre Saenz is a generally healthy 4 y.o. 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 Both a child in her classroom as well as a  have swabbed positive in the past week. Mom just found out about this today. School has recommended the entire classroom get tested. The classroom will be closed for 10 days regardless.     Andre hasn't had any signs/symptoms of COVID.     Review of Systems  See HPI      Objective       Vitals:  No vitals were obtained today due to virtual visit.    Physical Exam  Not performed in the setting of a virtual visit.     Phone call duration: 6 minutes    
73

## 2021-06-21 ENCOUNTER — APPOINTMENT (OUTPATIENT)
Dept: CARDIOLOGY | Facility: CLINIC | Age: 86
End: 2021-06-21
Payer: MEDICARE

## 2021-06-21 VITALS
SYSTOLIC BLOOD PRESSURE: 146 MMHG | DIASTOLIC BLOOD PRESSURE: 74 MMHG | HEART RATE: 85 BPM | WEIGHT: 114 LBS | BODY MASS INDEX: 20.98 KG/M2 | OXYGEN SATURATION: 97 % | HEIGHT: 62 IN

## 2021-06-21 PROCEDURE — 99214 OFFICE O/P EST MOD 30 MIN: CPT

## 2021-06-21 NOTE — REVIEW OF SYSTEMS
[Constipation] : constipation [Joint Pain] : joint pain [Knee Pain] : knee pain [Easy Bleeding] : a tendency for easy bleeding [Easy Bruising] : a tendency for easy bruising [Negative] : Genitourinary [Rash] : no rash [Dizziness] : no dizziness [Depression] : no depression [Anxiety] : no anxiety [FreeTextEntry4] : Nose Bleed [FreeTextEntry7] : GI Bleed

## 2021-06-21 NOTE — PHYSICAL EXAM
[General Appearance - Well Developed] : well developed [Normal Appearance] : normal appearance [Well Groomed] : well groomed [General Appearance - Well Nourished] : well nourished [No Deformities] : no deformities [General Appearance - In No Acute Distress] : no acute distress [Normal Conjunctiva] : the conjunctiva exhibited no abnormalities [Normal Oral Mucosa] : normal oral mucosa [Normal Jugular Venous A Waves Present] : normal jugular venous A waves present [Normal Jugular Venous V Waves Present] : normal jugular venous V waves present [No Jugular Venous Moran A Waves] : no jugular venous moran A waves [Respiration, Rhythm And Depth] : normal respiratory rhythm and effort [Exaggerated Use Of Accessory Muscles For Inspiration] : no accessory muscle use [Auscultation Breath Sounds / Voice Sounds] : lungs were clear to auscultation bilaterally [Bowel Sounds] : normal bowel sounds [Abdomen Soft] : soft [Abdomen Tenderness] : non-tender [Abnormal Walk] : normal gait [Gait - Sufficient For Exercise Testing] : the gait was sufficient for exercise testing [Nail Clubbing] : no clubbing of the fingernails [Cyanosis, Localized] : no localized cyanosis [Skin Color & Pigmentation] : normal skin color and pigmentation [Skin Turgor] : normal skin turgor [] : no rash [Impaired Insight] : insight and judgment were intact [Oriented To Time, Place, And Person] : oriented to person, place, and time [No Anxiety] : not feeling anxious [Normal Rate] : normal [Normal S1] : normal S1 [Normal S2] : normal S2 [Click] : a ~M click was heard [II] : a grade 2 [2+] : right 2+ [No Abnormalities] : the abdominal aorta was not enlarged and no bruit was heard [___ +] : [unfilled]U+ pitting edema to L ankle [S3] : no S3 [S4] : no S4 [Right Carotid Bruit] : no bruit heard over the right carotid [Left Carotid Bruit] : no bruit heard over the left carotid [Right Femoral Bruit] : no bruit heard over the right femoral artery [Left Femoral Bruit] : no bruit heard over the left femoral artery

## 2021-06-21 NOTE — DISCUSSION/SUMMARY
[FreeTextEntry1] : Clinically the patient is doing well.  She has no shortness of breath, palpitation, or chest discomfort.  She had no further bleeding.  She has lost a fair amount of weight and I am going to reduce the dose of Xarelto to 15 mg once a day.  She will stop the Plavix as of September 1.\par \par The echocardiogram performed at [place] did show probably severe MR which I would doubt she has.  Repeat echocardiogram next month to check on the mitral valve.  Otherwise she is doing well.\par \par If she does have any problems or symptoms she would call me.  We did go over the results of all her testing and her medications, and I answered her questions.  I would see her in 2 months.

## 2021-06-21 NOTE — HISTORY OF PRESENT ILLNESS
[FreeTextEntry1] : I saw Elsa Brunner in the office today for followup visit,. She is an 87-year-old white female with mitral valve prolapse. Echo performed 11/14 shows MAC with mild to moderate MR. The posterior mitral leaflet was not well visualized. She was in the emergency room in October 2013 with a rapid heartbeat. This seemed to occur associated with pain in her knee. She was in rapid atrial fibrillation and converted to sinus rhythm with IV Cardizem. She now is on Cardizem 180 mg once a day. Her losartan 100 mg once a day which she was taking for hypertension was discontinued. Occasionally she gets a palpitation which relieved with belching.She was started on Prilosec in case she is having heartburn. She remains on Xarelto.\par \par Blood work dated 6/20 demonstrates a cholesterol of 193, triglycerides 62, HDL 91, and LDL 90.. She is now on simvastatin 10 mg once a day.\par \par Patient has been Xarelto 20 mg once a day. She has some minimal bright red blood per rectum which has been a chronic problem. She underwent a GI evaluation Dr. Galeana. Everything was normal except the small bowel camera did show a potential small ulceration.. Patient's had no signs of bleeding. As per Dr. Galeana she is off the omeprazole.  \par \par Echocardiogram 1/21 demonstrates an ejection fraction of 65-70%. There is mild MR and mild-mod TR with PA pressure of 44. There is severe aortic stenosis with a peak gradient of 71, and a valve area of 0.7 cm². There is stage I diastolic dysfunction... Most recent carotid Doppler performed 7/20 showed mild plaque. She had significant nosebleed and Xarelto was temporarily stopped.  The need for Plavix is 6 months secondary to an LAD stent 3/1/21.  Blood work 5/21 demonstrated a hemoglobin of 11 with hematocrit 34.9.\par \par \par She presented to the emergency room at [place] 11/25/16 with severe left shoulder pain and palpitation. She was in rapid atrial fibrillation. She was given IV Cardizem and converted to sinus rhythm. She got a cortisone shot in her shoulder and the pain is better. \par \par She was referred to the structural heart center at [place] for evaluation of her aortic stenosis.  Cardiac catheterization 2/21 demonstrated a 60% proximal and 80% mid LAD.  20% circumflex, and 50% ostial RCA.  3/1/2020 she underwent stent to the LAD.  3/1/21.  She underwent TAVR.  Echo 3/25/2021 demonstrated ejection fraction of 75%.  There was mild to moderate MR and TR with a PA pressure of 33.  Mild AI, with stage II diastolic dysfunction.  4/1/2021 she had A. fib with GI bleeding.  Khoi echo 5/21 demonstrated an ejection fraction of 65%.  There was aortic valve replacement with mild AI and mild TR.  PA pressure 40.  There was probably severe MR with severe left atrial enlargement.\par \par A 24-day monitor demonstrated 9% A. fib.  2% APC and less than 1% VPC.\par

## 2021-07-12 ENCOUNTER — APPOINTMENT (OUTPATIENT)
Dept: CARDIOLOGY | Facility: CLINIC | Age: 86
End: 2021-07-12
Payer: MEDICARE

## 2021-07-12 ENCOUNTER — APPOINTMENT (OUTPATIENT)
Dept: CARDIOLOGY | Facility: CLINIC | Age: 86
End: 2021-07-12

## 2021-07-12 PROCEDURE — 93306 TTE W/DOPPLER COMPLETE: CPT

## 2021-07-19 ENCOUNTER — NON-APPOINTMENT (OUTPATIENT)
Age: 86
End: 2021-07-19

## 2021-08-02 ENCOUNTER — RX RENEWAL (OUTPATIENT)
Age: 86
End: 2021-08-02

## 2021-08-31 ENCOUNTER — APPOINTMENT (OUTPATIENT)
Dept: CARDIOLOGY | Facility: CLINIC | Age: 86
End: 2021-08-31
Payer: MEDICARE

## 2021-08-31 VITALS
BODY MASS INDEX: 20.61 KG/M2 | OXYGEN SATURATION: 98 % | DIASTOLIC BLOOD PRESSURE: 75 MMHG | HEART RATE: 70 BPM | SYSTOLIC BLOOD PRESSURE: 159 MMHG | HEIGHT: 62 IN | WEIGHT: 112 LBS

## 2021-08-31 DIAGNOSIS — D64.9 ANEMIA, UNSPECIFIED: ICD-10-CM

## 2021-08-31 PROCEDURE — 99214 OFFICE O/P EST MOD 30 MIN: CPT

## 2021-08-31 NOTE — HISTORY OF PRESENT ILLNESS
[FreeTextEntry1] : I saw Elsa Brunner in the office today for followup visit,. She is an 87-year-old white female with mitral valve prolapse. Echo performed 11/14 shows MAC with mild to moderate MR. The posterior mitral leaflet was not well visualized. She was in the emergency room in October 2013 with a rapid heartbeat. This seemed to occur associated with pain in her knee. She was in rapid atrial fibrillation and converted to sinus rhythm with IV Cardizem. She now is on Cardizem 180 mg once a day. Her losartan 100 mg once a day which she was taking for hypertension was discontinued. Occasionally she gets a palpitation which relieved with belching.She was started on Prilosec in case she is having heartburn. She remains on Xarelto.\par \par Blood work dated 6/20 demonstrates a cholesterol of 193, triglycerides 62, HDL 91, and LDL 90.. She is now on simvastatin 10 mg once a day.  \par \par Patient has been Xarelto 20 mg once a day. She has some minimal bright red blood per rectum which has been a chronic problem. She underwent a GI evaluation Dr. Galeana. Everything was normal except the small bowel camera did show a potential small ulceration.. Patient's had no signs of bleeding. As per Dr. Galeana she is off the omeprazole.  \par \par Echocardiogram 1/21 demonstrates an ejection fraction of 65-70%. There is mild MR and mild-mod TR with PA pressure of 44. There is severe aortic stenosis with a peak gradient of 71, and a valve area of 0.7 cm². There is stage I diastolic dysfunction... Most recent carotid Doppler performed 7/20 showed mild plaque. She had significant nosebleed and Xarelto was temporarily stopped.  The need for Plavix is 6 months secondary to an LAD stent 3/1/21.  Blood work 5/21 demonstrated a hemoglobin of 11 with hematocrit 34.9.\par \par \par She presented to the emergency room at [place] 11/25/16 with severe left shoulder pain and palpitation. She was in rapid atrial fibrillation. She was given IV Cardizem and converted to sinus rhythm. She got a cortisone shot in her shoulder and the pain is better. \par \par She was referred to the structural heart center at [place] for evaluation of her aortic stenosis.  Cardiac catheterization 2/21 demonstrated a 60% proximal and 80% mid LAD.  20% circumflex, and 50% ostial RCA.  3/1/2020 she underwent stent to the LAD.  3/1/21.  She underwent TAVR.  Echo 3/25/2021 demonstrated ejection fraction of 75%.  There was mild to moderate MR and TR with a PA pressure of 33.  Mild AI, with stage II diastolic dysfunction.  4/1/2021 she had A. fib with GI bleeding.  Khoi echo 5/21 demonstrated an ejection fraction of 65%.  There was aortic valve replacement with mild AI and mild TR.  PA pressure 40.  There was probably severe MR with severe left atrial enlargement.\par \par A 24-day monitor demonstrated 9% A. fib.  2% APC and less than 1% VPC.\par \par She has been seeing Dr. Norwood and had a significant drop in hemoglobin to 8.0.  Her Xarelto has been on hold.  She has still been taking the Plavix because of the stent.  It is now 6 months\par \par She has been getting iron therapy and her most recent hemoglobin is up to 10.6.\par

## 2021-08-31 NOTE — PHYSICAL EXAM
[Normal Appearance] : normal appearance [General Appearance - Well Developed] : well developed [Well Groomed] : well groomed [General Appearance - Well Nourished] : well nourished [No Deformities] : no deformities [General Appearance - In No Acute Distress] : no acute distress [Normal Conjunctiva] : the conjunctiva exhibited no abnormalities [Normal Oral Mucosa] : normal oral mucosa [Normal Jugular Venous A Waves Present] : normal jugular venous A waves present [Normal Jugular Venous V Waves Present] : normal jugular venous V waves present [No Jugular Venous Moran A Waves] : no jugular venous moran A waves [Exaggerated Use Of Accessory Muscles For Inspiration] : no accessory muscle use [Respiration, Rhythm And Depth] : normal respiratory rhythm and effort [Auscultation Breath Sounds / Voice Sounds] : lungs were clear to auscultation bilaterally [Bowel Sounds] : normal bowel sounds [Abdomen Soft] : soft [Abdomen Tenderness] : non-tender [Abnormal Walk] : normal gait [Gait - Sufficient For Exercise Testing] : the gait was sufficient for exercise testing [Nail Clubbing] : no clubbing of the fingernails [Cyanosis, Localized] : no localized cyanosis [Skin Color & Pigmentation] : normal skin color and pigmentation [Skin Turgor] : normal skin turgor [] : no rash [Oriented To Time, Place, And Person] : oriented to person, place, and time [Impaired Insight] : insight and judgment were intact [No Anxiety] : not feeling anxious [Normal Rate] : normal [Normal S1] : normal S1 [Normal S2] : normal S2 [Click] : a ~M click was heard [II] : a grade 2 [2+] : left 2+ [No Abnormalities] : the abdominal aorta was not enlarged and no bruit was heard [___ +] : [unfilled]U+ pitting edema to L ankle [S3] : no S3 [S4] : no S4 [Right Carotid Bruit] : no bruit heard over the right carotid [Left Carotid Bruit] : no bruit heard over the left carotid [Right Femoral Bruit] : no bruit heard over the right femoral artery [Left Femoral Bruit] : no bruit heard over the left femoral artery

## 2021-08-31 NOTE — DISCUSSION/SUMMARY
[FreeTextEntry1] : Clinically the patient is doing well.  She has no chest pain, shortness of breath, or palpitation.  Blood pressure is good.  In the past she did well on Xarelto without Plavix.  Now that she is 6 months out from the stent we will stop her Plavix.  I spoke with Dr. Norwood and we are all in agreement on trying her just on Xarelto as a sole agent.  She will start 15 mg once a day and see how she does.  Have her blood checked October 4 and I will see her in 6 weeks.  At that time we will decide if we want to maintain a low dose of Xarelto or go back to the 20 mg.  She has normal creatinine clearance and her therapeutic dose would be 20 mg.\par \par This was discussed with the patient and her son and I answered all their questions.

## 2021-10-14 ENCOUNTER — APPOINTMENT (OUTPATIENT)
Dept: CARDIOLOGY | Facility: CLINIC | Age: 86
End: 2021-10-14
Payer: MEDICARE

## 2021-10-14 VITALS
OXYGEN SATURATION: 98 % | DIASTOLIC BLOOD PRESSURE: 71 MMHG | BODY MASS INDEX: 20.98 KG/M2 | WEIGHT: 114 LBS | SYSTOLIC BLOOD PRESSURE: 159 MMHG | HEART RATE: 67 BPM | HEIGHT: 62 IN

## 2021-10-14 DIAGNOSIS — Z87.19 PERSONAL HISTORY OF OTHER DISEASES OF THE DIGESTIVE SYSTEM: ICD-10-CM

## 2021-10-14 PROCEDURE — 99214 OFFICE O/P EST MOD 30 MIN: CPT

## 2021-10-14 RX ORDER — METHENAMINE HIPPURATE 1 G/1
1 TABLET ORAL
Refills: 0 | Status: ACTIVE | COMMUNITY

## 2021-10-14 NOTE — HISTORY OF PRESENT ILLNESS
[FreeTextEntry1] : I saw Elsa Brunner in the office today for followup visit,. She is an 87-year-old white female with mitral valve prolapse. Echo performed 11/14 shows MAC with mild to moderate MR. The posterior mitral leaflet was not well visualized. She was in the emergency room in October 2013 with a rapid heartbeat. This seemed to occur associated with pain in her knee. She was in rapid atrial fibrillation and converted to sinus rhythm with IV Cardizem. She now is on Cardizem 180 mg once a day. Her losartan 100 mg once a day which she was taking for hypertension was discontinued. Occasionally she gets a palpitation which relieved with belching.She was started on Prilosec in case she is having heartburn. She remains on Xarelto.\par \par Blood work dated 6/20 demonstrates a cholesterol of 193, triglycerides 62, HDL 91, and LDL 90.. She is now on simvastatin 10 mg once a day.  \par \par Patient has been Xarelto 20 mg once a day. She has some minimal bright red blood per rectum which has been a chronic problem. She underwent a GI evaluation Dr. Galeana. Everything was normal except the small bowel camera did show a potential small ulceration.. Patient's had no signs of bleeding. As per Dr. Galeana she is off the omeprazole.  \par \par Echocardiogram 1/21 demonstrates an ejection fraction of 65-70%. There is mild MR and mild-mod TR with PA pressure of 44. There is severe aortic stenosis with a peak gradient of 71, and a valve area of 0.7 cm². There is stage I diastolic dysfunction... Most recent carotid Doppler performed 7/20 showed mild plaque. She had significant nosebleed and Xarelto was temporarily stopped.  The need for Plavix is 6 months secondary to an LAD stent 3/1/21.  Blood work 5/21 demonstrated a hemoglobin of 11 with hematocrit 34.9.\par \par \par She presented to the emergency room at [place] 11/25/16 with severe left shoulder pain and palpitation. She was in rapid atrial fibrillation. She was given IV Cardizem and converted to sinus rhythm. She got a cortisone shot in her shoulder and the pain is better. \par \par She was referred to the structural heart center at [place] for evaluation of her aortic stenosis.  Cardiac catheterization 2/21 demonstrated a 60% proximal and 80% mid LAD.  20% circumflex, and 50% ostial RCA.  3/1/2020 she underwent stent to the LAD.  3/1/21.  She underwent TAVR.  Echo 3/25/2021 demonstrated ejection fraction of 75%.  There was mild to moderate MR and TR with a PA pressure of 33.  Mild AI, with stage II diastolic dysfunction.  4/1/2021 she had A. fib with GI bleeding.  Khoi echo 5/21 demonstrated an ejection fraction of 65%.  There was aortic valve replacement with mild AI and mild TR.  PA pressure 40.  There was probably severe MR with severe left atrial enlargement.\par \par A 24-day monitor demonstrated 9% A. fib.  2% APC and less than 1% VPC.\par \par She has been seeing Dr. Norwood and had a significant drop in hemoglobin to 8.0.  Her Xarelto has been on hold.  She has still been taking the Plavix because of the stent.  It is now 6 months\par \par She has been getting iron therapy and her most recent hemoglobin is up to 10.6.  Last visit she was started on Xarelto 15 mg once a day.  Patient has been doing well without any obvious bleeding.  Blood work with Dr. Norwood has been stable.\par

## 2021-10-14 NOTE — DISCUSSION/SUMMARY
[FreeTextEntry1] : The patient is doing well.  Blood pressure and heart rate are well controlled.  She is having no signs or symptoms of active heart disease.  I discussed the risks and benefits going to higher-dose Xarelto with the patient and her son.  She does understand this is a subtherapeutic dose based on her kidney function and that there is an increased risk of getting blood clot and potential stroke.  However on the higher dose of Xarelto there is a increased risk of bleeding.\par \par At the present time the patient would defer and stay on the lower dose to avoid any bleeding abnormality.  If she changes her mind she will let me know.  If all is well I will see her in 3 months.

## 2021-10-14 NOTE — PHYSICAL EXAM
[General Appearance - Well Developed] : well developed [Normal Appearance] : normal appearance [Well Groomed] : well groomed [No Deformities] : no deformities [General Appearance - Well Nourished] : well nourished [General Appearance - In No Acute Distress] : no acute distress [Normal Conjunctiva] : the conjunctiva exhibited no abnormalities [Normal Oral Mucosa] : normal oral mucosa [Normal Jugular Venous A Waves Present] : normal jugular venous A waves present [Normal Jugular Venous V Waves Present] : normal jugular venous V waves present [No Jugular Venous Moran A Waves] : no jugular venous moran A waves [Respiration, Rhythm And Depth] : normal respiratory rhythm and effort [Exaggerated Use Of Accessory Muscles For Inspiration] : no accessory muscle use [Auscultation Breath Sounds / Voice Sounds] : lungs were clear to auscultation bilaterally [Abdomen Soft] : soft [Bowel Sounds] : normal bowel sounds [Abdomen Tenderness] : non-tender [Abnormal Walk] : normal gait [Gait - Sufficient For Exercise Testing] : the gait was sufficient for exercise testing [Nail Clubbing] : no clubbing of the fingernails [Cyanosis, Localized] : no localized cyanosis [Skin Color & Pigmentation] : normal skin color and pigmentation [Skin Turgor] : normal skin turgor [] : no rash [Oriented To Time, Place, And Person] : oriented to person, place, and time [Impaired Insight] : insight and judgment were intact [No Anxiety] : not feeling anxious [Normal Rate] : normal [Normal S1] : normal S1 [Normal S2] : normal S2 [Click] : a ~M click was heard [II] : a grade 2 [2+] : left 2+ [No Abnormalities] : the abdominal aorta was not enlarged and no bruit was heard [___ +] : [unfilled]U+ pitting edema to L ankle [S3] : no S3 [S4] : no S4 [Right Carotid Bruit] : no bruit heard over the right carotid [Left Carotid Bruit] : no bruit heard over the left carotid [Right Femoral Bruit] : no bruit heard over the right femoral artery [Left Femoral Bruit] : no bruit heard over the left femoral artery

## 2022-01-13 ENCOUNTER — APPOINTMENT (OUTPATIENT)
Dept: CARDIOLOGY | Facility: CLINIC | Age: 87
End: 2022-01-13
Payer: MEDICARE

## 2022-01-13 VITALS
WEIGHT: 116 LBS | BODY MASS INDEX: 21.35 KG/M2 | SYSTOLIC BLOOD PRESSURE: 137 MMHG | OXYGEN SATURATION: 98 % | DIASTOLIC BLOOD PRESSURE: 70 MMHG | HEART RATE: 74 BPM | HEIGHT: 62 IN

## 2022-01-13 PROCEDURE — 99214 OFFICE O/P EST MOD 30 MIN: CPT

## 2022-01-13 NOTE — REASON FOR VISIT
[Follow-Up - Clinic] : a clinic follow-up of [Aortic Stenosis] : aortic stenosis [Atrial Fibrillation] : atrial fibrillation [Hyperlipidemia] : hyperlipidemia [Mitral Regurgitation] : mitral regurgitation [FreeTextEntry1] : TAVR

## 2022-01-13 NOTE — HISTORY OF PRESENT ILLNESS
[FreeTextEntry1] : I saw Elsa Brunner in the office today for followup visit,. She is an 87-year-old white female with mitral valve prolapse. Echo performed 11/14 shows MAC with mild to moderate MR. The posterior mitral leaflet was not well visualized. She was in the emergency room in October 2013 with a rapid heartbeat. This seemed to occur associated with pain in her knee. She was in rapid atrial fibrillation and converted to sinus rhythm with IV Cardizem. She now is on Cardizem 180 mg once a day. Her losartan 100 mg once a day which she was taking for hypertension was discontinued. Occasionally she gets a palpitation which relieved with belching.She was started on Prilosec in case she is having heartburn. She remains on Xarelto.\par \par Blood work dated 6/20 demonstrates a cholesterol of 193, triglycerides 62, HDL 91, and LDL 90.. She is now on simvastatin 10 mg once a day.  \par \par Patient has been Xarelto 20 mg once a day. She has some minimal bright red blood per rectum which has been a chronic problem. She underwent a GI evaluation Dr. Galeana. Everything was normal except the small bowel camera did show a potential small ulceration.. Patient's had no signs of bleeding. As per Dr. Galeana she is off the omeprazole.  \par \par Echocardiogram 1/21 demonstrates an ejection fraction of 65-70%. There is mild MR and mild-mod TR with PA pressure of 44. There is severe aortic stenosis with a peak gradient of 71, and a valve area of 0.7 cm². There is stage I diastolic dysfunction... Most recent carotid Doppler performed 7/20 showed mild plaque. She had significant nosebleed and Xarelto was temporarily stopped.  The need for Plavix is 6 months secondary to an LAD stent 3/1/21.  Blood work 5/21 demonstrated a hemoglobin of 11 with hematocrit 34.9.\par \par \par She presented to the emergency room at [place] 11/25/16 with severe left shoulder pain and palpitation. She was in rapid atrial fibrillation. She was given IV Cardizem and converted to sinus rhythm. She got a cortisone shot in her shoulder and the pain is better. \par \par She was referred to the structural heart center at [place] for evaluation of her aortic stenosis.  Cardiac catheterization 2/21 demonstrated a 60% proximal and 80% mid LAD.  20% circumflex, and 50% ostial RCA.  3/1/2020 she underwent stent to the LAD.  3/1/21.  She underwent TAVR.  Echo 3/25/2021 demonstrated ejection fraction of 75%.  There was mild to moderate MR and TR with a PA pressure of 33.  Mild AI, with stage II diastolic dysfunction.  4/1/2021 she had A. fib with GI bleeding.  Khoi echo 5/21 demonstrated an ejection fraction of 65%.  There was aortic valve replacement with mild AI and mild TR.  PA pressure 40.  There was probably severe MR with severe left atrial enlargement.\par \par A 24-day monitor demonstrated 9% A. fib.  2% APC and less than 1% VPC.\par \par She has been seeing Dr. Norwood and had a significant drop in hemoglobin to 8.0.  Her Xarelto has been on hold.  She has still been taking the Plavix because of the stent.  It is now 6 months\par \par She has been getting iron therapy and her most recent hemoglobin is up to 10.6.  Last visit she was started on Xarelto 15 mg once a day.  With normal renal function we increased the dose to 20 mg.  Patient has been doing well without any obvious bleeding.  Blood work with Dr. Norwood has been stable.\par

## 2022-01-13 NOTE — DISCUSSION/SUMMARY
[FreeTextEntry1] : Clinically the patient remains stable.  There is no evidence of congestive heart failure.  She has chronic venous insufficiency.  Blood pressure and heart rate are well controlled.  Her exam is unchanged.\par \par I would appreciate a copy of her most recent blood work for my review.  We did go over her medications and I answered her questions.  She uses the support stockings as much as a family member can put them on.\par \par If all is well I would see her in 4 months.

## 2022-04-13 PROBLEM — I25.10 CAD IN NATIVE ARTERY: Status: ACTIVE | Noted: 2021-02-27

## 2022-04-13 PROBLEM — Z95.3 S/P TAVR (TRANSCATHETER AORTIC VALVE REPLACEMENT), BIOPROSTHETIC: Status: ACTIVE | Noted: 2021-03-28

## 2022-04-13 PROBLEM — I65.29 CAROTID ARTERY PLAQUE: Status: ACTIVE | Noted: 2020-01-20

## 2022-04-14 ENCOUNTER — APPOINTMENT (OUTPATIENT)
Dept: CARDIOLOGY | Facility: CLINIC | Age: 87
End: 2022-04-14
Payer: MEDICARE

## 2022-04-14 ENCOUNTER — NON-APPOINTMENT (OUTPATIENT)
Age: 87
End: 2022-04-14

## 2022-04-14 VITALS
SYSTOLIC BLOOD PRESSURE: 129 MMHG | WEIGHT: 116 LBS | HEIGHT: 62 IN | HEART RATE: 76 BPM | BODY MASS INDEX: 21.35 KG/M2 | DIASTOLIC BLOOD PRESSURE: 74 MMHG | OXYGEN SATURATION: 97 %

## 2022-04-14 DIAGNOSIS — I65.29 OCCLUSION AND STENOSIS OF UNSPECIFIED CAROTID ARTERY: ICD-10-CM

## 2022-04-14 DIAGNOSIS — E78.00 PURE HYPERCHOLESTEROLEMIA, UNSPECIFIED: ICD-10-CM

## 2022-04-14 DIAGNOSIS — I10 ESSENTIAL (PRIMARY) HYPERTENSION: ICD-10-CM

## 2022-04-14 DIAGNOSIS — I25.10 ATHEROSCLEROTIC HEART DISEASE OF NATIVE CORONARY ARTERY W/OUT ANGINA PECTORIS: ICD-10-CM

## 2022-04-14 DIAGNOSIS — Z95.3 PRESENCE OF XENOGENIC HEART VALVE: ICD-10-CM

## 2022-04-14 PROCEDURE — 99214 OFFICE O/P EST MOD 30 MIN: CPT

## 2022-04-14 PROCEDURE — 93000 ELECTROCARDIOGRAM COMPLETE: CPT

## 2022-04-14 NOTE — DISCUSSION/SUMMARY
[FreeTextEntry1] : Clinically the patient is doing well.  She has no shortness of breath, chest pain, palpitation.  She is weak and is careful while she walks.  She has no clinical episodes of bleeding.  She is seeing Dr. Norwood and may require iron infusion.\par \par I would appreciate a copy of her blood work from this morning.  If the anemia becomes a problem I would reduce the Xarelto to 15 mg a day rather than have significant low blood counts.\par \par We went over her medications, most recent echo, and I answered all of her questions.  If all is well we will see her again in 4 months.  She will have her another echocardiogram a week before her appointment.

## 2022-04-14 NOTE — HISTORY OF PRESENT ILLNESS
[FreeTextEntry1] : I saw Elsa Brunner in the office today for followup visit,. She is an 88-year-old white female with mitral valve prolapse.  She developed severe aortic stenosis and underwent TAVR. . She was in the emergency room in October 2013 with a rapid heartbeat. This seemed to occur associated with pain in her knee. She was in rapid atrial fibrillation and converted to sinus rhythm with IV Cardizem. She now is on Cardizem 180 mg once a day. Her losartan 100 mg once a day which she was taking for hypertension was discontinued. Occasionally she gets a palpitation which relieved with belching.She was started on Prilosec in case she is having heartburn. She remains on Xarelto.\par \par Blood work dated 6/20 demonstrates a cholesterol of 193, triglycerides 62, HDL 91, and LDL 90.. She is now on simvastatin 10 mg once a day.  Blood work 5/21 demonstrated normal BMP, Hgb 11.0.\par \par Patient has been Xarelto 20 mg once a day. She has some minimal bright red blood per rectum which has been a chronic problem. She underwent a GI evaluation Dr. Galeana. Everything was normal except the small bowel camera did show a potential small ulceration.. Patient's had no signs of bleeding. As per Dr. Galeana she is off the omeprazole.  \par \par Echocardiogram 1/21 demonstrates an ejection fraction of 65-70%. There is mild MR and mild-mod TR with PA pressure of 44. There is severe aortic stenosis with a peak gradient of 71, and a valve area of 0.7 cm². There is stage I diastolic dysfunction... Most recent carotid Doppler performed 7/20 showed mild plaque. She had significant nosebleed and Xarelto was temporarily stopped.  The need for Plavix is 6 months secondary to an LAD stent 3/1/21.  Blood work 5/21 demonstrated a hemoglobin of 11 with hematocrit 34.9.\par \par \par She presented to the emergency room at [facility] 11/25/16 with severe left shoulder pain and palpitation. She was in rapid atrial fibrillation. She was given IV Cardizem and converted to sinus rhythm. She got a cortisone shot in her shoulder and the pain is better. \par \par She was referred to the structural heart center at [facility] for evaluation of her aortic stenosis.  Cardiac catheterization 2/21 demonstrated a 60% proximal and 80% mid LAD.  20% circumflex, and 50% ostial RCA.  3/1/2020 she underwent stent to the LAD.  3/1/21.  She underwent TAVR.  Echo 3/25/2021 demonstrated ejection fraction of 75%.  There was mild to moderate MR and TR with a PA pressure of 33.  Mild AI, with stage II diastolic dysfunction.  4/1/2021 she had A. fib with GI bleeding.  Repeat echo 5/21 demonstrated an ejection fraction of 65%.  There was aortic valve replacement with mild AI and mild TR.  PA pressure 40.  There was probably severe MR with severe left atrial enlargement.\par \par A 24-day monitor demonstrated 9% A. fib.  2% APC and less than 1% VPC.\par \par She has been seeing Dr. Norwood and had a significant drop in hemoglobin to 8.0.  Her Xarelto has been on hold.  She has still been taking the Plavix because of the stent.  It is now 6 months\par \par She has been getting iron therapy and her most recent hemoglobin is up to 10.6.  Last visit she was started on Xarelto 15 mg once a day.  With normal renal function we increased the dose to 20 mg.  Patient has been doing well without any obvious bleeding.  Blood work with Dr. Norwood has been stable.  She had blood work in your office this morning.\par \par ECG demonstrates sinus rhythm and remains normal.\par

## 2022-05-12 ENCOUNTER — RX RENEWAL (OUTPATIENT)
Age: 87
End: 2022-05-12

## 2022-07-21 ENCOUNTER — APPOINTMENT (OUTPATIENT)
Dept: CARDIOLOGY | Facility: CLINIC | Age: 87
End: 2022-07-21

## 2022-07-21 PROCEDURE — 93306 TTE W/DOPPLER COMPLETE: CPT

## 2022-08-01 ENCOUNTER — NON-APPOINTMENT (OUTPATIENT)
Age: 87
End: 2022-08-01

## 2022-08-01 ENCOUNTER — APPOINTMENT (OUTPATIENT)
Dept: CARDIOLOGY | Facility: CLINIC | Age: 87
End: 2022-08-01

## 2022-08-01 VITALS
SYSTOLIC BLOOD PRESSURE: 149 MMHG | DIASTOLIC BLOOD PRESSURE: 66 MMHG | HEART RATE: 47 BPM | OXYGEN SATURATION: 100 % | BODY MASS INDEX: 20.98 KG/M2 | WEIGHT: 114 LBS | HEIGHT: 62 IN

## 2022-08-01 PROCEDURE — 99215 OFFICE O/P EST HI 40 MIN: CPT

## 2022-08-01 PROCEDURE — 93000 ELECTROCARDIOGRAM COMPLETE: CPT

## 2022-08-01 NOTE — PHYSICAL EXAM
[General Appearance - Well Developed] : well developed [Normal Appearance] : normal appearance [Well Groomed] : well groomed [General Appearance - Well Nourished] : well nourished [No Deformities] : no deformities [General Appearance - In No Acute Distress] : no acute distress [Normal Conjunctiva] : the conjunctiva exhibited no abnormalities [Normal Oral Mucosa] : normal oral mucosa [Normal Jugular Venous A Waves Present] : normal jugular venous A waves present [Normal Jugular Venous V Waves Present] : normal jugular venous V waves present [No Jugular Venous Moran A Waves] : no jugular venous moran A waves [Respiration, Rhythm And Depth] : normal respiratory rhythm and effort [Exaggerated Use Of Accessory Muscles For Inspiration] : no accessory muscle use [Auscultation Breath Sounds / Voice Sounds] : lungs were clear to auscultation bilaterally [Bowel Sounds] : normal bowel sounds [Abdomen Soft] : soft [Abnormal Walk] : normal gait [Abdomen Tenderness] : non-tender [Gait - Sufficient For Exercise Testing] : the gait was sufficient for exercise testing [Nail Clubbing] : no clubbing of the fingernails [Cyanosis, Localized] : no localized cyanosis [Skin Color & Pigmentation] : normal skin color and pigmentation [Skin Turgor] : normal skin turgor [] : no rash [Oriented To Time, Place, And Person] : oriented to person, place, and time [Impaired Insight] : insight and judgment were intact [No Anxiety] : not feeling anxious [Normal Rate] : normal [Normal S1] : normal S1 [Normal S2] : normal S2 [Click] : a ~M click was heard [II] : a grade 2 [2+] : left 2+ [No Abnormalities] : the abdominal aorta was not enlarged and no bruit was heard [___ +] : [unfilled]U+ pitting edema to L ankle [S3] : no S3 [S4] : no S4 [Right Carotid Bruit] : no bruit heard over the right carotid [Left Carotid Bruit] : no bruit heard over the left carotid [Right Femoral Bruit] : no bruit heard over the right femoral artery [Left Femoral Bruit] : no bruit heard over the left femoral artery

## 2022-08-01 NOTE — DISCUSSION/SUMMARY
[FreeTextEntry1] : Clinically the patient is doing well.  She does not have any symptoms of angina or significant arrhythmia.  She is in a sinus rhythm today.  Her systolic blood pressure is a bit higher, but her diastolic blood pressure is a bit low.  I do not think additional medication is helpful.  She has longstanding lower extremity edema attributable to venous insufficiency.  She generally wears compression socks, and she will continue this.  She will elevate her legs more, which she does not commonly do.\par \par I had an extensive conversation about anticoagulant therapy in the setting of apparent GI bleeding.  We discussed the possibility of occlusion of her left atrial appendage which would not be preferable if her anemia is able to be handled more conservatively.  I have requested records from hematology. \par \par We went over her medications, most recent echo, and I answered all of her questions.  If all is well we will see her again in 6 months.

## 2022-08-01 NOTE — HISTORY OF PRESENT ILLNESS
[FreeTextEntry1] : I saw Elsa Murray in the office today for followup visit.  She was last seen in the office 3 months ago. She is accompanied by her son, visiting from [place].  \par \par She is an 88-year-old white female with mitral valve prolapse.  She has a history of coronary artery disease, which was discovered in 2021 while she was undergoing evaluation for aortic valve replacement.  She was found to have severe disease of the LAD, and nonobstructive disease elsewhere.  She is status post PCI of the LAD in March 2020.  She had a subsequent transcatheter aortic valve replacement. She has PAF.  She has had gastrointestinal bleeding, and anemia, such that Xarelto has been on hold intermittently, especially when she was taking Plavix.\par  \par She has been doing well from a cardiovascular perspective for last 3 months.  She does not report chest discomfort or shortness of breath with activity.  She denies orthopnea, PND and edema.  She denies palpitations, dizziness and syncope.  Her anemia has been controlled.  She has been tolerating Xarelto, without difficulty.

## 2022-09-04 ENCOUNTER — NON-APPOINTMENT (OUTPATIENT)
Age: 87
End: 2022-09-04

## 2022-09-05 ENCOUNTER — TRANSCRIPTION ENCOUNTER (OUTPATIENT)
Age: 87
End: 2022-09-05

## 2022-09-06 ENCOUNTER — OUTPATIENT (OUTPATIENT)
Dept: OUTPATIENT SERVICES | Facility: HOSPITAL | Age: 87
LOS: 1 days | End: 2022-09-06

## 2022-09-06 ENCOUNTER — APPOINTMENT (OUTPATIENT)
Dept: DISASTER EMERGENCY | Facility: HOSPITAL | Age: 87
End: 2022-09-06

## 2022-09-06 VITALS
HEART RATE: 69 BPM | SYSTOLIC BLOOD PRESSURE: 145 MMHG | HEIGHT: 63 IN | RESPIRATION RATE: 17 BRPM | WEIGHT: 113.98 LBS | DIASTOLIC BLOOD PRESSURE: 69 MMHG | TEMPERATURE: 98 F | OXYGEN SATURATION: 98 %

## 2022-09-06 VITALS
RESPIRATION RATE: 18 BRPM | SYSTOLIC BLOOD PRESSURE: 172 MMHG | HEART RATE: 66 BPM | OXYGEN SATURATION: 99 % | TEMPERATURE: 98 F | DIASTOLIC BLOOD PRESSURE: 64 MMHG

## 2022-09-06 DIAGNOSIS — Z98.890 OTHER SPECIFIED POSTPROCEDURAL STATES: Chronic | ICD-10-CM

## 2022-09-06 DIAGNOSIS — U07.1 COVID-19: ICD-10-CM

## 2022-09-06 RX ORDER — BEBTELOVIMAB 87.5 MG/ML
175 INJECTION, SOLUTION INTRAVENOUS ONCE
Refills: 0 | Status: COMPLETED | OUTPATIENT
Start: 2022-09-06 | End: 2022-09-06

## 2022-09-06 RX ADMIN — BEBTELOVIMAB 175 MILLIGRAM(S): 87.5 INJECTION, SOLUTION INTRAVENOUS at 14:34

## 2022-09-06 NOTE — CHART NOTE - NSCHARTNOTEFT_GEN_A_CORE
CC: Monoclonal Antibody Administration/COVID 19 Positive      History: Patient presents for administration of monoclonal antibody  Patient has been screened and was deemed to be a candidate.    Exposure: unknown    Symptoms/ Criteria: fever sore throat cough    Inclusion Criteria: age > 85 years  HTN    Date of Positive Test: verified by clinical call center     Date of symptom onset: 9/3    Vaccine status: Moderna last  4/22    PMHx:  No pertinent family history in first degree relatives    Infection due to severe acute respiratory syndrome coronavirus 2 (SARS-CoV-2)    HTN (Hypertension)    MVP (Mitral Valve Prolapse)    Hypercholesterolemia    Hiatal Hernia    Thyroid Nodule    Constipation    DJD (Degenerative Joint Disease)    History of Osteoarthritis    Diverticulosis of the Colon    Abdominal Mass    Afib    HLD (hyperlipidemia)    Aortic valve stenosis    CAD (coronary artery disease)    Osteopenia    H/O Shoulder Replacement    Parathyroid    S/P Bunionectomy    Cataract, Other    H/O ovarian cystectomy    H/O knee surgery    S/P cardiac catheterization    SysAdmin_VisitLink          T(C): 36.6 (09-06-22 @ 14:22), Max: 36.6 (09-06-22 @ 14:22)  HR: 69 (09-06-22 @ 14:22) (69 - 69)  BP: 145/69 (09-06-22 @ 14:22) (145/69 - 145/69)  RR: 17 (09-06-22 @ 14:22) (17 - 17)  SpO2: 98% (09-06-22 @ 14:22) (98% - 98%)      PE:   Appearance: NAD	  HEENT:   Normal oral mucosa.   Lymphatic: No lymphadenopathy  Cardiovascular: Normal S1 S2, No JVD, No murmurs, No edema  Respiratory: Lungs clear to auscultation	  Gastrointestinal:  Soft, Non-tender. No guarding   Skin: warm and dry  Neurologic: Non-focal  Extremities: Normal range of motion.     ASSESSMENT:  Pt is a 88y year old Female with PMH  HTN  Covid +  referred to the infusion center for Monoclonal antibody administration  (Bebtelovimab).        PLAN:  - Administration procedure explained to patient   - Consent for monoclonal antibody administration obtained   - Risk & benefits discussed/all questions answered  - Administer Bebtelovimab per protocol  - will observe patient for one hour post administration  and then if stable discharge home with outpt follow up as planned by PMD.        - Patient tolerated treatment well denies complaints of chest pain/SOB/dizziness/ palpitations  - VSS for discharge home  - D/C instructions given/ fact sheet included.  - Patient to follow-up with PCP as needed.

## 2023-01-09 ENCOUNTER — NON-APPOINTMENT (OUTPATIENT)
Age: 88
End: 2023-01-09

## 2023-01-09 ENCOUNTER — APPOINTMENT (OUTPATIENT)
Dept: CARDIOLOGY | Facility: CLINIC | Age: 88
End: 2023-01-09
Payer: MEDICARE

## 2023-01-09 VITALS
WEIGHT: 114 LBS | BODY MASS INDEX: 20.98 KG/M2 | DIASTOLIC BLOOD PRESSURE: 79 MMHG | OXYGEN SATURATION: 99 % | HEIGHT: 62 IN | HEART RATE: 67 BPM | SYSTOLIC BLOOD PRESSURE: 184 MMHG

## 2023-01-09 PROCEDURE — 99214 OFFICE O/P EST MOD 30 MIN: CPT

## 2023-01-09 PROCEDURE — 93000 ELECTROCARDIOGRAM COMPLETE: CPT

## 2023-01-09 NOTE — DISCUSSION/SUMMARY
[FreeTextEntry1] : Clinically the patient is doing well.  She does not have any symptoms of angina or significant arrhythmia.  She is in a sinus rhythm today.  Her systolic blood pressure is a bit higher, but her blood pressure is typically much better controlled.  I do not think additional medication is helpful.  She has longstanding lower extremity edema attributable to venous insufficiency.  She generally wears compression socks, and she will continue this.   \par \par I reviewed her blood work from July 2022.  This revealed a hemoglobin of 10.6.  Echocardiography performed in July 2022 revealed an ejection fraction of 65% with mild to moderate mitral regurgitation and normal function for transcatheter aortic valve prosthesis.  I reviewed her catheterization from March 1, 2021 it was at that time that she had rotational atherectomy, balloon angioplasty and stenting of her mid LAD.\par \par No additional testing is necessary at this time.  I have suggested a follow-up in about 6 months.  She has been seeing her internist every 3 months.  She will call with questions or concerns in the meantime.

## 2023-01-09 NOTE — HISTORY OF PRESENT ILLNESS
[FreeTextEntry1] : I saw Elsa Murray in the office today for followup visit.  She was last seen in the office 3 months ago.  \par \par She is an 88-year-old white female with mitral valve prolapse.  She has a history of coronary artery disease, which was discovered while she was undergoing evaluation for aortic valve replacement.  She was found to have severe disease of the LAD, and nonobstructive disease elsewhere.  She is status post PCI of the LAD in March 2020.  She had a subsequent transcatheter aortic valve replacement. She has PAF.  She has had gastrointestinal bleeding, and anemia, such that Xarelto has been on hold intermittently, especially when she was taking Plavix.\par  \par She has been doing well from a cardiovascular perspective for last 3 months.  She does not report chest discomfort or shortness of breath with activity.  She denies orthopnea, PND.  She has long-standing lower extremity edema and she wears compression stockings.  She denies palpitations, dizziness and syncope.  Her anemia has been controlled.  She has been tolerating Xarelto, without difficulty.

## 2023-01-09 NOTE — PHYSICAL EXAM
[General Appearance - Well Developed] : well developed [Normal Appearance] : normal appearance [Well Groomed] : well groomed [General Appearance - Well Nourished] : well nourished [No Deformities] : no deformities [General Appearance - In No Acute Distress] : no acute distress [Normal Conjunctiva] : the conjunctiva exhibited no abnormalities [Normal Oral Mucosa] : normal oral mucosa [Normal Jugular Venous A Waves Present] : normal jugular venous A waves present [Normal Jugular Venous V Waves Present] : normal jugular venous V waves present [No Jugular Venous Moran A Waves] : no jugular venous moran A waves [Respiration, Rhythm And Depth] : normal respiratory rhythm and effort [Exaggerated Use Of Accessory Muscles For Inspiration] : no accessory muscle use [Auscultation Breath Sounds / Voice Sounds] : lungs were clear to auscultation bilaterally [Abdomen Soft] : soft [Bowel Sounds] : normal bowel sounds [Abdomen Tenderness] : non-tender [Abnormal Walk] : normal gait [Gait - Sufficient For Exercise Testing] : the gait was sufficient for exercise testing [Nail Clubbing] : no clubbing of the fingernails [Cyanosis, Localized] : no localized cyanosis [Skin Color & Pigmentation] : normal skin color and pigmentation [Skin Turgor] : normal skin turgor [] : no rash [Oriented To Time, Place, And Person] : oriented to person, place, and time [No Anxiety] : not feeling anxious [Impaired Insight] : insight and judgment were intact [Normal Rate] : normal [Normal S1] : normal S1 [Normal S2] : normal S2 [Click] : a ~M click was heard [II] : a grade 2 [2+] : left 2+ [No Abnormalities] : the abdominal aorta was not enlarged and no bruit was heard [___ +] : [unfilled]U+ pitting edema to L ankle [S3] : no S3 [S4] : no S4 [Right Carotid Bruit] : no bruit heard over the right carotid [Left Carotid Bruit] : no bruit heard over the left carotid [Right Femoral Bruit] : no bruit heard over the right femoral artery [Left Femoral Bruit] : no bruit heard over the left femoral artery

## 2023-05-18 NOTE — PROVIDER CONTACT NOTE (OTHER) - BACKGROUND
TAVR workup; None TAVR workup; H/H 6.8; CT Abd/ Pelvis (-), 2 Units PRBC 3/17 with H+H 6.1, GI Consult awaiting Occult Blood Specimen, past Hx of HTN, HLD, CAD, Afib on Xarelto and venous insufficiency.

## 2023-06-27 ENCOUNTER — NON-APPOINTMENT (OUTPATIENT)
Age: 88
End: 2023-06-27

## 2023-07-10 ENCOUNTER — NON-APPOINTMENT (OUTPATIENT)
Age: 88
End: 2023-07-10

## 2023-07-10 ENCOUNTER — APPOINTMENT (OUTPATIENT)
Dept: CARDIOLOGY | Facility: CLINIC | Age: 88
End: 2023-07-10
Payer: MEDICARE

## 2023-07-10 VITALS
BODY MASS INDEX: 20.43 KG/M2 | WEIGHT: 111 LBS | DIASTOLIC BLOOD PRESSURE: 67 MMHG | HEIGHT: 62 IN | HEART RATE: 65 BPM | SYSTOLIC BLOOD PRESSURE: 170 MMHG | OXYGEN SATURATION: 99 %

## 2023-07-10 VITALS — SYSTOLIC BLOOD PRESSURE: 150 MMHG | DIASTOLIC BLOOD PRESSURE: 55 MMHG

## 2023-07-10 PROCEDURE — 99214 OFFICE O/P EST MOD 30 MIN: CPT

## 2023-07-10 PROCEDURE — 93000 ELECTROCARDIOGRAM COMPLETE: CPT

## 2023-07-10 RX ORDER — RIVAROXABAN 15 MG/1
15 TABLET, FILM COATED ORAL DAILY
Qty: 30 | Refills: 11 | Status: ACTIVE | COMMUNITY
Start: 2022-05-12 | End: 1900-01-01

## 2023-07-10 NOTE — DISCUSSION/SUMMARY
[FreeTextEntry1] : Clinically the patient is doing well.  She does not have any symptoms of angina or significant arrhythmia.  She is in a sinus rhythm today.  Her systolic blood pressure is initially quite high, but comes down quite nicely by the conclusion of the examination.  In addition, her diastolic blood pressure is quite low.  I would not make any changes to her diltiazem at this timeShe has longstanding lower extremity edema attributable to venous insufficiency.  She generally wears compression socks, and she will continue this.   \par \par I reviewed her blood work from July 2022.  This revealed a hemoglobin of 10.6.  Echocardiography performed in July 2022 revealed an ejection fraction of 65% with mild to moderate mitral regurgitation and normal function for transcatheter aortic valve prosthesis.  I reviewed her catheterization from March 1, 2021 it was at that time that she had rotational atherectomy, balloon angioplasty and stenting of her mid LAD.\par \par No additional testing is necessary at this time.\par \par Unless there was a compelling reason to discontinue simvastatin, I think that the benefit likely outweighs the risk.  She is going to be seen her internist next month, and will bring this up.  Although her creatinine is in the normal range, given her age, weight and very small amount of muscle mass, I think that her creatinine clearance is actually below 50.  I think that changing her to Xarelto 15 mg daily is safer than continuing 20 mg daily.  I have refilled the smaller dose.

## 2023-07-10 NOTE — HISTORY OF PRESENT ILLNESS
[FreeTextEntry1] : I saw Elsa Murray in the office today for followup visit.  She was last seen in the office 6 months ago.  \par \par She is an 89-year-old white female with mitral valve prolapse.  She has a history of coronary artery disease, which was discovered while she was undergoing evaluation for aortic valve replacement.  She was found to have severe disease of the LAD, and nonobstructive disease elsewhere.  She is status post PCI of the LAD in March 2020.  She had a subsequent transcatheter aortic valve replacement. She has PAF.  She has had gastrointestinal bleeding, and anemia, such that Xarelto has been on hold intermittently, especially when she was taking Plavix.\par  \par She has been doing well from a cardiovascular perspective for last 6 months.  She does not report chest discomfort or shortness of breath with activity.  She denies orthopnea, PND.  She has long-standing lower extremity edema and she wears compression stockings.  She denies palpitations, dizziness and syncope.  Her anemia has been controlled.  She has been tolerating Xarelto, without difficulty. She fell a few weeks ago, which sounds like was related to a misstep.  She has been off her statin, though it does not sound as if there was any major complication in terms of liver function test, or anything else.

## 2023-09-13 ENCOUNTER — NON-APPOINTMENT (OUTPATIENT)
Age: 88
End: 2023-09-13

## 2023-11-08 NOTE — PATIENT PROFILE ADULT. - ANESTHESIA, PREVIOUS REACTION, PROFILE
Ms. Us's blood pressure was elevated when she arrived to the emergency department (208/92). It improved to the 170s/90s without any medications.  Her high blood pressure is likely the cause of her symptoms.    Please check her blood pressure twice a day, once in the mornings and once in the evenings.  Please have her follow-up with her regular doctor or doctor at the nursing facility within the next couple days for blood pressure management.    If her blood pressure goes back up over 200s/100s please have her return to the emergency room.  
none

## 2023-12-13 ENCOUNTER — EMERGENCY (EMERGENCY)
Facility: HOSPITAL | Age: 88
LOS: 1 days | Discharge: HOME | End: 2023-12-13
Attending: STUDENT IN AN ORGANIZED HEALTH CARE EDUCATION/TRAINING PROGRAM | Admitting: STUDENT IN AN ORGANIZED HEALTH CARE EDUCATION/TRAINING PROGRAM
Payer: MEDICARE

## 2023-12-13 VITALS
TEMPERATURE: 97 F | RESPIRATION RATE: 16 BRPM | HEART RATE: 64 BPM | DIASTOLIC BLOOD PRESSURE: 74 MMHG | SYSTOLIC BLOOD PRESSURE: 159 MMHG | OXYGEN SATURATION: 96 %

## 2023-12-13 VITALS
TEMPERATURE: 98 F | OXYGEN SATURATION: 97 % | DIASTOLIC BLOOD PRESSURE: 70 MMHG | HEART RATE: 66 BPM | RESPIRATION RATE: 18 BRPM | SYSTOLIC BLOOD PRESSURE: 146 MMHG

## 2023-12-13 DIAGNOSIS — Z98.890 OTHER SPECIFIED POSTPROCEDURAL STATES: Chronic | ICD-10-CM

## 2023-12-13 PROCEDURE — 70450 CT HEAD/BRAIN W/O DYE: CPT | Mod: 26,MA

## 2023-12-13 PROCEDURE — 99284 EMERGENCY DEPT VISIT MOD MDM: CPT | Mod: FS

## 2023-12-13 PROCEDURE — 99284 EMERGENCY DEPT VISIT MOD MDM: CPT | Mod: 25

## 2023-12-13 PROCEDURE — 72125 CT NECK SPINE W/O DYE: CPT | Mod: MA

## 2023-12-13 PROCEDURE — 72125 CT NECK SPINE W/O DYE: CPT | Mod: 26,MA

## 2023-12-13 PROCEDURE — 70450 CT HEAD/BRAIN W/O DYE: CPT | Mod: MA

## 2023-12-13 RX ORDER — LIDOCAINE 4 G/100G
1 CREAM TOPICAL ONCE
Refills: 0 | Status: DISCONTINUED | OUTPATIENT
Start: 2023-12-13 | End: 2023-12-17

## 2023-12-13 RX ORDER — ACETAMINOPHEN 500 MG
975 TABLET ORAL ONCE
Refills: 0 | Status: DISCONTINUED | OUTPATIENT
Start: 2023-12-13 | End: 2023-12-17

## 2023-12-13 NOTE — ED PROVIDER NOTE - CLINICAL SUMMARY MEDICAL DECISION MAKING FREE TEXT BOX
88 y/o F with PMH of Afib on Xarelto, HTN, HLD, osteoporosis presenting with head trauma s/p mechanical fall  Noted L frontal hematoma   r/o ICH  Also notes some mild L sided neck pain but nonfocal neurological exam  No other signs of trauma or joint injuries   CTH/Cspine negative for acute pathology   Analgesia  Concussion precautions discussed. 89 y/o F with PMH of Afib on Xarelto, HTN, HLD, osteoporosis presenting with head trauma s/p mechanical fall  Noted L frontal hematoma   r/o ICH  Also notes some mild L sided neck pain but nonfocal neurological exam  No other signs of trauma or joint injuries   CTH/Cspine negative for acute pathology   Analgesia  Concussion precautions discussed. 88 y/o F with PMH of Afib on Xarelto, HTN, HLD, osteoporosis presenting with head trauma s/p mechanical fall  Noted L frontal scalp hematoma   r/o ICH  Also notes some mild L sided neck pain but nonfocal neurological exam and preserved upper extremity strength   No other signs of trauma or joint injuries   CTH/Cspine negative for acute pathology   Analgesia  Concussion precautions discussed.

## 2023-12-13 NOTE — ED ADULT NURSE REASSESSMENT NOTE - NS ED NURSE REASSESS COMMENT FT1
pt aox3, disch to see PMD with daughter, feeling much better, no n/v, ambulatory to bathroom with assistants, voiding

## 2023-12-13 NOTE — ED PROVIDER NOTE - DIFFERENTIAL DIAGNOSIS
Differential Diagnosis Differentials include but not limited to contusion, intracranial hemorrhage, skull fracture, vertebral fracture.  Moving all extremities, low suspicion for extremity fracture

## 2023-12-13 NOTE — ED PROVIDER NOTE - NS ED ATTENDING STATEMENT MOD
I have seen and examined this patient and fully participated in the care of this patient as the teaching attending.  The service was shared with the REBEKAH.  I reviewed and verified the documentation and independently performed the documented:

## 2023-12-13 NOTE — ED ADULT NURSE NOTE - NSFALLHARMRISKINTERV_ED_ALL_ED
Assistance OOB with selected safe patient handling equipment if applicable/Assistance with ambulation/Communicate risk of Fall with Harm to all staff, patient, and family/Monitor gait and stability/Provide visual cue: red socks, yellow wristband, yellow gown, etc/Reinforce activity limits and safety measures with patient and family/Bed in lowest position, wheels locked, appropriate side rails in place/Call bell, personal items and telephone in reach/Instruct patient to call for assistance before getting out of bed/chair/stretcher/Non-slip footwear applied when patient is off stretcher/[place] to call system/Physically safe environment - no spills, clutter or unnecessary equipment/Purposeful Proactive Rounding/Room/bathroom lighting operational, light cord in reach Assistance OOB with selected safe patient handling equipment if applicable/Assistance with ambulation/Communicate risk of Fall with Harm to all staff, patient, and family/Monitor gait and stability/Provide visual cue: red socks, yellow wristband, yellow gown, etc/Reinforce activity limits and safety measures with patient and family/Bed in lowest position, wheels locked, appropriate side rails in place/Call bell, personal items and telephone in reach/Instruct patient to call for assistance before getting out of bed/chair/stretcher/Non-slip footwear applied when patient is off stretcher/[place] to call system/Physically safe environment - no spills, clutter or unnecessary equipment/Purposeful Proactive Rounding/Room/bathroom lighting operational, light cord in reach

## 2023-12-13 NOTE — ED PROVIDER NOTE - PATIENT PORTAL LINK FT
You can access the FollowMyHealth Patient Portal offered by [facility] by registering at the following website: [email or web address]. By joining [employer]’s FollowMyHealth portal, you will also be able to view your health information using other applications (apps) compatible with our system. You can access the FollowMyHealth Patient Portal offered by [facility] by registering at the following website: [email or web address]. By joining [employer]’s FollowMyHealth portal, you will also be able to view your health information using other applications (apps) compatible with our system.

## 2023-12-13 NOTE — ED PROVIDER NOTE - OBJECTIVE STATEMENT
89-year-old female with history of osteopenia, coronary artery disease, aortic valve stenosis, hyperlipidemia, atrial fibrillation, osteoarthritis, and hypertension presents for evaluation after trip and fall at home prior to arrival.  Patient states she tripped and hit the left side of her head.  Small abrasion to left side of forehead.  Is on Xarelto.  Patient reports some mild neck discomfort but reports chronic in nature.  Denies any other injuries or complaints.  States she has slight pain in her head where the abrasion is, but denies other diffuse headache. denies LOC. patient states tetanus up to date   PCP Juju Solo

## 2023-12-13 NOTE — ED ADULT NURSE NOTE - OBJECTIVE STATEMENT
pt aox3, " fell today, lt forehead area puncture wound, no active bleeding,  no loc" FROM 4 extremities

## 2023-12-13 NOTE — ED PROVIDER NOTE - NSFOLLOWUPINSTRUCTIONS_ED_ALL_ED_FT
Tylenol every 6 hours as needed for pain   Seek medical attention for worsening symptoms, such as lethargy or change in mental status.

## 2024-01-08 ENCOUNTER — APPOINTMENT (OUTPATIENT)
Dept: CARDIOLOGY | Facility: CLINIC | Age: 89
End: 2024-01-08
Payer: MEDICARE

## 2024-01-08 VITALS
BODY MASS INDEX: 20.43 KG/M2 | HEIGHT: 62 IN | OXYGEN SATURATION: 99 % | SYSTOLIC BLOOD PRESSURE: 147 MMHG | HEART RATE: 64 BPM | WEIGHT: 111 LBS | DIASTOLIC BLOOD PRESSURE: 77 MMHG

## 2024-01-08 DIAGNOSIS — I35.0 NONRHEUMATIC AORTIC (VALVE) STENOSIS: ICD-10-CM

## 2024-01-08 DIAGNOSIS — I48.0 PAROXYSMAL ATRIAL FIBRILLATION: ICD-10-CM

## 2024-01-08 PROCEDURE — 99214 OFFICE O/P EST MOD 30 MIN: CPT

## 2024-01-08 PROCEDURE — 93000 ELECTROCARDIOGRAM COMPLETE: CPT

## 2024-01-08 NOTE — DISCUSSION/SUMMARY
[FreeTextEntry1] : Clinically the patient is doing well.  She does not have any symptoms of angina or significant arrhythmia.  She is in a sinus rhythm today.  Her systolic blood pressure is initially quite high, but comes down quite nicely by the conclusion of the examination.  In addition, her diastolic blood pressure is quite low.  I would not make any changes to her diltiazem at this timeShe has longstanding lower extremity edema attributable to venous insufficiency.  She generally wears compression socks, and she will continue this. Her falls are a concern and this was discussed at length.    I reviewed her blood work from July 2022.  This revealed a hemoglobin of 10.6.  Echocardiography performed in July 2022 revealed an ejection fraction of 65% with mild to moderate mitral regurgitation and normal function for transcatheter aortic valve prosthesis.  I reviewed her catheterization from March 1, 2021 it was at that time that she had rotational atherectomy, balloon angioplasty and stenting of her mid LAD.  No additional testing is necessary at this time.I will see her in 6 months.  I would consider another echo at that time.   [EKG obtained to assist in diagnosis and management of assessed problem(s)] : EKG obtained to assist in diagnosis and management of assessed problem(s)

## 2024-01-08 NOTE — PHYSICAL EXAM
[General Appearance - Well Developed] : well developed [Normal Appearance] : normal appearance [Well Groomed] : well groomed [General Appearance - Well Nourished] : well nourished [No Deformities] : no deformities [General Appearance - In No Acute Distress] : no acute distress [Normal Conjunctiva] : the conjunctiva exhibited no abnormalities [Normal Oral Mucosa] : normal oral mucosa [Normal Jugular Venous A Waves Present] : normal jugular venous A waves present [Normal Jugular Venous V Waves Present] : normal jugular venous V waves present [No Jugular Venous Moran A Waves] : no jugular venous moran A waves [Respiration, Rhythm And Depth] : normal respiratory rhythm and effort [Exaggerated Use Of Accessory Muscles For Inspiration] : no accessory muscle use [Auscultation Breath Sounds / Voice Sounds] : lungs were clear to auscultation bilaterally [Abdomen Soft] : soft [Bowel Sounds] : normal bowel sounds [Abdomen Tenderness] : non-tender [Abnormal Walk] : normal gait [Nail Clubbing] : no clubbing of the fingernails [Gait - Sufficient For Exercise Testing] : the gait was sufficient for exercise testing [Cyanosis, Localized] : no localized cyanosis [Skin Color & Pigmentation] : normal skin color and pigmentation [] : no rash [Skin Turgor] : normal skin turgor [Oriented To Time, Place, And Person] : oriented to person, place, and time [Impaired Insight] : insight and judgment were intact [No Anxiety] : not feeling anxious [Normal S1] : normal S1 [Normal Rate] : normal [Normal S2] : normal S2 [Click] : a ~M click was heard [II] : a grade 2 [2+] : left 2+ [No Abnormalities] : the abdominal aorta was not enlarged and no bruit was heard [___ +] : [unfilled]U+ pitting edema to L ankle [S3] : no S3 [S4] : no S4 [Right Carotid Bruit] : no bruit heard over the right carotid [Left Carotid Bruit] : no bruit heard over the left carotid [Right Femoral Bruit] : no bruit heard over the right femoral artery [Left Femoral Bruit] : no bruit heard over the left femoral artery

## 2024-01-08 NOTE — REVIEW OF SYSTEMS
[Joint Pain] : joint pain [Constipation] : constipation [Knee Pain] : knee pain [Easy Bleeding] : a tendency for easy bleeding [Easy Bruising] : a tendency for easy bruising [Negative] : Genitourinary [Rash] : no rash [Dizziness] : no dizziness [Depression] : no depression [Anxiety] : no anxiety [FreeTextEntry4] : Nose Bleed [FreeTextEntry7] : GI Bleed

## 2024-01-08 NOTE — HISTORY OF PRESENT ILLNESS
[FreeTextEntry1] : I saw Elsa Murray in the office today for followup visit.  She was last seen in the office 6 months ago.    She is an 89-year-old white female with mitral valve prolapse.  She has a history of coronary artery disease, which was discovered while she was undergoing evaluation for aortic valve replacement.  She was found to have severe disease of the LAD, and nonobstructive disease elsewhere.  She is status post PCI of the LAD in March 2020.  She had a subsequent transcatheter aortic valve replacement. She has PAF.  She has had gastrointestinal bleeding, and anemia, such that Xarelto has been on hold intermittently, especially when she was taking Plavix.   She has been doing well from a cardiovascular perspective for last 6 months.  She does not report chest discomfort or shortness of breath with activity.  She denies orthopnea, PND.  She has long-standing lower extremity edema and she wears compression stockings.  She denies palpitations, dizziness and syncope.  Her anemia has been controlled.  She has been tolerating Xarelto, without difficulty. She fell a few weeks ago, December 13, though itisi unclear why.  It may have been related to some area rug, which has since been discarded.  She has been off her statin, though it does not sound as if there was any major complication in terms of liver function test, or anything else.

## 2024-02-20 ENCOUNTER — RX RENEWAL (OUTPATIENT)
Age: 89
End: 2024-02-20

## 2024-02-20 RX ORDER — RIVAROXABAN 20 MG/1
20 TABLET, FILM COATED ORAL
Qty: 90 | Refills: 3 | Status: ACTIVE | COMMUNITY
Start: 2024-02-20

## 2024-04-27 ENCOUNTER — EMERGENCY (EMERGENCY)
Facility: HOSPITAL | Age: 89
LOS: 1 days | Discharge: HOME | End: 2024-04-27
Attending: EMERGENCY MEDICINE | Admitting: EMERGENCY MEDICINE
Payer: MEDICARE

## 2024-04-27 VITALS
DIASTOLIC BLOOD PRESSURE: 72 MMHG | RESPIRATION RATE: 18 BRPM | OXYGEN SATURATION: 100 % | WEIGHT: 114.64 LBS | HEIGHT: 62 IN | SYSTOLIC BLOOD PRESSURE: 151 MMHG | TEMPERATURE: 97 F | HEART RATE: 64 BPM

## 2024-04-27 DIAGNOSIS — Z98.890 OTHER SPECIFIED POSTPROCEDURAL STATES: Chronic | ICD-10-CM

## 2024-04-27 PROCEDURE — 73562 X-RAY EXAM OF KNEE 3: CPT

## 2024-04-27 PROCEDURE — 73562 X-RAY EXAM OF KNEE 3: CPT | Mod: 26,RT

## 2024-04-27 PROCEDURE — 99284 EMERGENCY DEPT VISIT MOD MDM: CPT | Mod: 25

## 2024-04-27 PROCEDURE — 73502 X-RAY EXAM HIP UNI 2-3 VIEWS: CPT | Mod: 26,RT

## 2024-04-27 PROCEDURE — 70450 CT HEAD/BRAIN W/O DYE: CPT | Mod: 26,MC

## 2024-04-27 PROCEDURE — 72125 CT NECK SPINE W/O DYE: CPT | Mod: 26,MC

## 2024-04-27 PROCEDURE — 73552 X-RAY EXAM OF FEMUR 2/>: CPT

## 2024-04-27 PROCEDURE — 73502 X-RAY EXAM HIP UNI 2-3 VIEWS: CPT

## 2024-04-27 PROCEDURE — 73700 CT LOWER EXTREMITY W/O DYE: CPT | Mod: 26,RT,MC

## 2024-04-27 PROCEDURE — 73700 CT LOWER EXTREMITY W/O DYE: CPT | Mod: MC

## 2024-04-27 PROCEDURE — 72125 CT NECK SPINE W/O DYE: CPT | Mod: MC

## 2024-04-27 PROCEDURE — 70450 CT HEAD/BRAIN W/O DYE: CPT | Mod: MC

## 2024-04-27 PROCEDURE — 73552 X-RAY EXAM OF FEMUR 2/>: CPT | Mod: 26,RT

## 2024-04-27 PROCEDURE — 99285 EMERGENCY DEPT VISIT HI MDM: CPT

## 2024-04-27 NOTE — ED PROVIDER NOTE - NSFOLLOWUPINSTRUCTIONS_ED_ALL_ED_FT
Follow-up with your orthopedist, call to make an appointment.  Take Tylenol 500 mg orally every 4-6 hours as needed for pain.    A hematoma is a collection of blood under the skin, in an organ, in a body space, in a joint space, or in other tissue. The blood can thicken (clot) to form a lump that you can see and feel. The lump is often firm and may become sore and tender. Most hematomas get better in a few days to weeks. However, some hematomas may be serious and require medical care. Hematomas can range from very small to very large.    What are the causes?  This condition is caused by:  A blunt or penetrating injury.  Leakage from a blood vessel under the skin.  Some medical procedures, including surgeries, such as oral surgery, face lifts, and surgeries on the joints.  Some medical conditions that cause bleeding or bruising. There may be multiple hematomas that appear in different areas of the body.  What increases the risk?  You are more likely to develop this condition if:  You are an older adult.  You use blood thinners.  You regularly use NSAIDs, such as ibuprofen, for pain.  You play contact sports.  What are the signs or symptoms?  Comparison of a normal ankle and an ankle that is swollen and bruised.  Symptoms of this condition depend on where the hematoma is located.    Common symptoms of a hematoma that is under the skin include:  A firm lump on the body.  Pain and tenderness in the area.  Bruising. Blue, dark blue, purple-red, or yellowish skin (discoloration) may appear at the site of the hematoma if the hematoma is close to the surface of the skin.  Common symptoms of a hematoma that is deep in the tissues or body spaces may be less obvious. They include:  A collection of blood in the stomach (intra-abdominal hematoma). This may cause pain in the abdomen, weakness, fainting, and shortness of breath.  A collection of blood in the head (intracranial hematoma). This may cause a headache or symptoms such as weakness, trouble speaking or understanding, or a change in consciousness.  How is this diagnosed?  This condition is diagnosed based on:  Your medical history.  A physical exam.  Imaging tests, such as an ultrasound or CT scan. These may be needed if your health care provider suspects a hematoma in deeper tissues or body spaces.  Blood tests. These may be needed if your health care provider believes that the hematoma is caused by a medical condition.  How is this treated?  Treatment for this condition depends on the cause, size, and location of the hematoma. Treatment may include:  Doing nothing. The majority of hematomas do not need treatment as many of them go away on their own.  Surgery or close monitoring. This may be needed for large hematomas or hematomas that affect vital organs.  Medicines. Medicines may be given if there is an underlying medical cause for the hematoma.  Follow these instructions at home:  Managing pain, stiffness, and swelling    Bag of ice on a towel on the skin.  If directed, put ice on the injured area. To do this:  Put ice in a plastic bag.  Place a towel between your skin and the bag.  Leave the ice on for 20 minutes, 2–3 times a day for the first couple of days.  If your skin turns bright red, remove the ice right away to prevent skin damage. The risk of skin damage is higher if you cannot feel pain, heat, or cold.  If directed, apply heat to the affected area as often as told by your health care provider. Use the heat source that your health care provider recommends, such as a moist heat pack or a heating pad.  Place a towel between your skin and the heat source.  Leave the heat on for 20–30 minutes.  If your skin turns bright red, remove the heat right away to prevent burns. The risk of burns is higher if you cannot feel pain, heat, or cold.  Raise (elevate) the injured area above the level of your heart while you are sitting or lying down.  If directed, wrap the affected area with an elastic bandage. The bandage applies pressure (compression) to the area, which may help to reduce swelling and promote healing. Do not wrap the bandage too tightly around the affected area.  If your hematoma is on a leg or foot (lower extremity) and is painful, your health care provider may recommend crutches. Use them as told by your health care provider.  General instructions    Take over-the-counter and prescription medicines only as told by your health care provider.  Rest the injured area as directed by your health care provider.  Keep all follow-up visits. Your health care provider may want to see how your hematoma is progressing with treatment.  Contact a health care provider if:  You have a fever.  The swelling or discoloration gets worse.  You develop more hematomas.  Your pain is worse or your pain is not controlled with medicine.  Your skin over the hematoma breaks or starts bleeding.  Get help right away if:  Your hematoma is in your chest or abdomen and you have weakness, shortness of breath, or a change in consciousness.  You have a hematoma on your scalp that is caused by a fall or injury, and you also have:  A headache that gets worse.  Trouble speaking or understanding speech.  Weakness.  A change in alertness or consciousness.  These symptoms may be an emergency. Get help right away. Call 911.  Do not wait to see if the symptoms will go away.  Do not drive yourself to the hospital.  This information is not intended to replace advice given to you by your health care provider. Make sure you discuss any questions you have with your health care provider.    Document Revised: 06/12/2023 Document Reviewed: 06/12/2023

## 2024-04-27 NOTE — ED PROVIDER NOTE - PHYSICAL EXAMINATION
Right arm, mid humeral aspect abrasion noted, no deformity, good range of motion, able to open and close hand, extend and flex at the elbow

## 2024-04-27 NOTE — ED PROVIDER NOTE - PATIENT PORTAL LINK FT
You can access the FollowMyHealth Patient Portal offered by [facility] by registering at the following website: [email or web address]. By joining [facility]’s FollowMyHealth portal, you will also be able to view your health information using other applications (apps) compatible with our system.

## 2024-04-27 NOTE — ED PROVIDER NOTE - CLINICAL SUMMARY MEDICAL DECISION MAKING FREE TEXT BOX
89-year-old female on Xarelto, with history of fall at home, will follow-up CT head, CT cervical spine, CT hip, x-ray knee, x-ray hip and reevaluate.

## 2024-04-27 NOTE — ED ADULT NURSE NOTE - OBJECTIVE STATEMENT
Pt received in room 11B, pt is a 89y F who is +Ox4 but forgetful. Pt is independent at baseline but since her mechanical fall 2 weeks ago pt has been having difficulty with ADLs. Pt is on xarelto and has R lateral thigh and knee ecchymosis and swelling. Pt denies headstrike or LOC on fall. Pt denies numbness/tingling of UE/LE since the fall. Pt noted to have +2 B/L LE edema in the leg/ankle/foot, per pt and HCP this is her baseline. Pt denies any cp/sob//palpitations. Pt denies any nausea/vomiting/diarrhea/constipation.

## 2024-04-27 NOTE — ED PROVIDER NOTE - OBJECTIVE STATEMENT
89-year-old female PMH of HTN, HLD, CAD with cardiac stent, A-fib on Xarelto, aortic stenosis with TAVR, presents to the emergency department with son states that about a week ago she slipped and fell at home on her right side, since then patient has had pain to her right hip extending down to her right knee.

## 2024-04-27 NOTE — ED ADULT NURSE NOTE - NSFALLHARMRISKINTERV_ED_ALL_ED
Assistance OOB with selected safe patient handling equipment if applicable/Assistance with ambulation/Communicate risk of Fall with Harm to all staff, patient, and family/Monitor gait and stability/Provide visual cue: red socks, yellow wristband, yellow gown, etc/Reinforce activity limits and safety measures with patient and family/Bed in lowest position, wheels locked, appropriate side rails in place/Call bell, personal items and telephone in reach/Instruct patient to call for assistance before getting out of bed/chair/stretcher/Non-slip footwear applied when patient is off stretcher/[place] to call system/Physically safe environment - no spills, clutter or unnecessary equipment/Purposeful Proactive Rounding/Room/bathroom lighting operational, light cord in reach

## 2024-05-11 ENCOUNTER — INPATIENT (INPATIENT)
Facility: HOSPITAL | Age: 89
LOS: 2 days | Discharge: HOME | DRG: 948 | End: 2024-05-14
Attending: INTERNAL MEDICINE | Admitting: INTERNAL MEDICINE
Payer: MEDICARE

## 2024-05-11 VITALS
DIASTOLIC BLOOD PRESSURE: 71 MMHG | WEIGHT: 111.99 LBS | TEMPERATURE: 98 F | HEART RATE: 80 BPM | OXYGEN SATURATION: 98 % | HEIGHT: 62 IN | RESPIRATION RATE: 20 BRPM | SYSTOLIC BLOOD PRESSURE: 116 MMHG

## 2024-05-11 DIAGNOSIS — Z98.890 OTHER SPECIFIED POSTPROCEDURAL STATES: Chronic | ICD-10-CM

## 2024-05-11 DIAGNOSIS — R41.0 DISORIENTATION, UNSPECIFIED: ICD-10-CM

## 2024-05-11 LAB
ALBUMIN SERPL ELPH-MCNC: 3.5 G/DL — SIGNIFICANT CHANGE UP (ref 3.3–5)
ALP SERPL-CCNC: 185 U/L — HIGH (ref 40–120)
ALT FLD-CCNC: 24 U/L — SIGNIFICANT CHANGE UP (ref 12–78)
ANION GAP SERPL CALC-SCNC: 5 MMOL/L — SIGNIFICANT CHANGE UP (ref 5–17)
AST SERPL-CCNC: 23 U/L — SIGNIFICANT CHANGE UP (ref 15–37)
BASOPHILS # BLD AUTO: 0.02 K/UL — SIGNIFICANT CHANGE UP (ref 0–0.2)
BASOPHILS NFR BLD AUTO: 0.3 % — SIGNIFICANT CHANGE UP (ref 0–2)
BILIRUB SERPL-MCNC: 0.5 MG/DL — SIGNIFICANT CHANGE UP (ref 0.2–1.2)
BUN SERPL-MCNC: 41 MG/DL — HIGH (ref 7–23)
CALCIUM SERPL-MCNC: 9.1 MG/DL — SIGNIFICANT CHANGE UP (ref 8.5–10.1)
CHLORIDE SERPL-SCNC: 106 MMOL/L — SIGNIFICANT CHANGE UP (ref 96–108)
CO2 SERPL-SCNC: 26 MMOL/L — SIGNIFICANT CHANGE UP (ref 22–31)
CREAT SERPL-MCNC: 1.4 MG/DL — HIGH (ref 0.5–1.3)
EGFR: 36 ML/MIN/1.73M2 — LOW
EOSINOPHIL # BLD AUTO: 0.06 K/UL — SIGNIFICANT CHANGE UP (ref 0–0.5)
EOSINOPHIL NFR BLD AUTO: 0.8 % — SIGNIFICANT CHANGE UP (ref 0–6)
GLUCOSE SERPL-MCNC: 109 MG/DL — HIGH (ref 70–99)
HCT VFR BLD CALC: 31.8 % — LOW (ref 34.5–45)
HGB BLD-MCNC: 10.1 G/DL — LOW (ref 11.5–15.5)
IMM GRANULOCYTES NFR BLD AUTO: 0.9 % — SIGNIFICANT CHANGE UP (ref 0–0.9)
LYMPHOCYTES # BLD AUTO: 0.7 K/UL — LOW (ref 1–3.3)
LYMPHOCYTES # BLD AUTO: 9.2 % — LOW (ref 13–44)
MCHC RBC-ENTMCNC: 29.4 PG — SIGNIFICANT CHANGE UP (ref 27–34)
MCHC RBC-ENTMCNC: 31.8 GM/DL — LOW (ref 32–36)
MCV RBC AUTO: 92.7 FL — SIGNIFICANT CHANGE UP (ref 80–100)
MONOCYTES # BLD AUTO: 0.72 K/UL — SIGNIFICANT CHANGE UP (ref 0–0.9)
MONOCYTES NFR BLD AUTO: 9.5 % — SIGNIFICANT CHANGE UP (ref 2–14)
NEUTROPHILS # BLD AUTO: 6.01 K/UL — SIGNIFICANT CHANGE UP (ref 1.8–7.4)
NEUTROPHILS NFR BLD AUTO: 79.3 % — HIGH (ref 43–77)
NRBC # BLD: 0 /100 WBCS — SIGNIFICANT CHANGE UP (ref 0–0)
PLATELET # BLD AUTO: 261 K/UL — SIGNIFICANT CHANGE UP (ref 150–400)
POTASSIUM SERPL-MCNC: 4.4 MMOL/L — SIGNIFICANT CHANGE UP (ref 3.5–5.3)
POTASSIUM SERPL-SCNC: 4.4 MMOL/L — SIGNIFICANT CHANGE UP (ref 3.5–5.3)
PROT SERPL-MCNC: 6.7 G/DL — SIGNIFICANT CHANGE UP (ref 6–8.3)
RBC # BLD: 3.43 M/UL — LOW (ref 3.8–5.2)
RBC # FLD: 18.4 % — HIGH (ref 10.3–14.5)
SODIUM SERPL-SCNC: 137 MMOL/L — SIGNIFICANT CHANGE UP (ref 135–145)
TROPONIN I, HIGH SENSITIVITY RESULT: 11.4 NG/L — SIGNIFICANT CHANGE UP
WBC # BLD: 7.58 K/UL — SIGNIFICANT CHANGE UP (ref 3.8–10.5)
WBC # FLD AUTO: 7.58 K/UL — SIGNIFICANT CHANGE UP (ref 3.8–10.5)

## 2024-05-11 PROCEDURE — 93971 EXTREMITY STUDY: CPT | Mod: 26,RT

## 2024-05-11 PROCEDURE — 70496 CT ANGIOGRAPHY HEAD: CPT | Mod: 26,MC

## 2024-05-11 PROCEDURE — 99285 EMERGENCY DEPT VISIT HI MDM: CPT | Mod: FS

## 2024-05-11 PROCEDURE — 73562 X-RAY EXAM OF KNEE 3: CPT | Mod: 26,RT

## 2024-05-11 PROCEDURE — 70498 CT ANGIOGRAPHY NECK: CPT | Mod: 26,MC

## 2024-05-11 PROCEDURE — 93010 ELECTROCARDIOGRAM REPORT: CPT

## 2024-05-11 PROCEDURE — 70450 CT HEAD/BRAIN W/O DYE: CPT | Mod: 26,XU,MC

## 2024-05-11 RX ORDER — PHENYLEPHRINE HYDROCHLORIDE 10 MG/ML
100 INJECTION INTRAVENOUS ONCE
Refills: 0 | Status: DISCONTINUED | OUTPATIENT
Start: 2024-05-11 | End: 2024-05-11

## 2024-05-11 RX ORDER — PANTOPRAZOLE SODIUM 20 MG/1
40 TABLET, DELAYED RELEASE ORAL
Refills: 0 | Status: DISCONTINUED | OUTPATIENT
Start: 2024-05-11 | End: 2024-05-14

## 2024-05-11 RX ORDER — LANOLIN ALCOHOL/MO/W.PET/CERES
3 CREAM (GRAM) TOPICAL AT BEDTIME
Refills: 0 | Status: DISCONTINUED | OUTPATIENT
Start: 2024-05-11 | End: 2024-05-14

## 2024-05-11 RX ORDER — PHENYLEPHRINE HYDROCHLORIDE 10 MG/ML
200 INJECTION INTRAVENOUS ONCE
Refills: 0 | Status: DISCONTINUED | OUTPATIENT
Start: 2024-05-11 | End: 2024-05-11

## 2024-05-11 RX ORDER — ACETAMINOPHEN 500 MG
650 TABLET ORAL EVERY 6 HOURS
Refills: 0 | Status: DISCONTINUED | OUTPATIENT
Start: 2024-05-11 | End: 2024-05-14

## 2024-05-11 RX ORDER — ONDANSETRON 8 MG/1
4 TABLET, FILM COATED ORAL EVERY 6 HOURS
Refills: 0 | Status: DISCONTINUED | OUTPATIENT
Start: 2024-05-11 | End: 2024-05-14

## 2024-05-11 RX ORDER — DILTIAZEM HCL 120 MG
240 CAPSULE, EXT RELEASE 24 HR ORAL DAILY
Refills: 0 | Status: DISCONTINUED | OUTPATIENT
Start: 2024-05-11 | End: 2024-05-14

## 2024-05-11 RX ORDER — SODIUM CHLORIDE 9 MG/ML
1000 INJECTION INTRAMUSCULAR; INTRAVENOUS; SUBCUTANEOUS
Refills: 0 | Status: DISCONTINUED | OUTPATIENT
Start: 2024-05-11 | End: 2024-05-12

## 2024-05-11 RX ORDER — SIMVASTATIN 20 MG/1
10 TABLET, FILM COATED ORAL AT BEDTIME
Refills: 0 | Status: DISCONTINUED | OUTPATIENT
Start: 2024-05-12 | End: 2024-05-12

## 2024-05-11 RX ORDER — SODIUM CHLORIDE 9 MG/ML
500 INJECTION INTRAMUSCULAR; INTRAVENOUS; SUBCUTANEOUS ONCE
Refills: 0 | Status: COMPLETED | OUTPATIENT
Start: 2024-05-11 | End: 2024-05-11

## 2024-05-11 NOTE — ED ADULT NURSE NOTE - NSFALLHARMRISKINTERV_ED_ALL_ED

## 2024-05-11 NOTE — ED PROVIDER NOTE - DIFFERENTIAL DIAGNOSIS
Differential Diagnosis Patient presenting to the emergency room for an episode of confusion.  Differential is broad includes possible TIA versus CVA versus infectious etiology.  Also with increased difficulty ambulating in the setting of a recent fall.  As patient is back to baseline she would not be considered tenecteplase candidate.  Will obtain screening labs check viral swab obtain CT imaging of the head CT imaging of the head and neck will obtain venous Dopplers of lower extremity and x-ray of the knee EKG will monitor.  Previous CTS lower extremity was reviewed.

## 2024-05-11 NOTE — ED ADULT NURSE NOTE - PATIENT'S PREFERRED PRONOUN
3/31/2022    Chief Complaint   Patient presents with    Hypotension     3 day fu, her cardiologist told her to stop the amlodipine and the lasix     Fatigue     still very tired     Nausea     this has not improved        Mitchel Corley is a 62 y.o. female, presents today for:      ASSESSMENT/PLAN:  1. Hypotension, unspecified hypotension type  Improving today after cessation of Amlodipine/ Furosemide. Encouraged to keep appt with Cardio to discuss if additional testing is needed. 2. Dizziness  See above    3. Nausea  Improving today  Continue Trulicity at 1.5 for the next several weeks. If improves, may want to consider trial of another GLP1 such as Ozempic to see if she tolerates better. 4. 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 Coronary artery disease involving native coronary artery of native heart with angina pectoris ([facility] [address]  See above    6. Type 2 diabetes mellitus with other circulatory complication, without long-term current use of insulin ([facility] [address]  See above      Return for keep marysol 3. Presenting today for 3 day follow up on hypotension. Better today. Stopped Amlodipine/ Furosemide per Cardio orders. Blood pressure now 110-120/ 70-80s. Continues to have mild dizziness and nausea which has improved. Took 1.5 mg Trulicity today, unknown at this time how much of an effect it has had on her nausea. Continues to feel very fatigued. CBC, CMP were essentially normal on Monday. Continues to not have melena or heamtochia in stool or hematuria. HPI 3/28/22: Fatigue/ Dizzines/ Nausea x 1 week. Blood pressure last week 80-90s/  50-60s. Mild right parietal headaches, appetite loss and leg swelling. Room and patient are intermittently spinning but worse with position changes. Continues to have persistent chest pain and tightness which has previously been discussed with Cardiology. No recent URI/ UTI. No cough or fever.   No melena/ hematochezia. No recent falls. BS running around 170s which has improved since last visit. Trying to drink more water due to concern for dehydration which has not helped. Started splitting Amlodipine last week due to concern for hypotension which she does not feel has helped. Only medication change since last visit on 3/4/22 was an increase in Trulicity from 6.0-6 mg weekly. Did not start Paxil as previously discussed. 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 PHQ Scores 6/10/2021   PHQ2 Score 4   PHQ9 Score 9     Interpretation of Total Score Depression Severity: 1-4 = Minimal depression, 5-9 = Mild depression, 10-14 = Moderate depression, 15-19 = Moderately severe depression, 20-27 = Severe depression       Lab Results   Component Value Date     03/28/2022    K 3.6 03/28/2022    CL 99 03/28/2022    CO2 24 03/28/2022    BUN 9 03/28/2022    CREATININE 0.7 03/28/2022    GLUCOSE 142 (H) 03/28/2022    CALCIUM 9.8 03/28/2022    PROT 6.9 03/28/2022    LABALBU 4.3 03/28/2022    BILITOT 1.0 03/28/2022    ALKPHOS 92 03/28/2022    AST 16 03/28/2022    ALT 20 03/28/2022    LABGLOM >60 03/28/2022    GFRAA >60 03/28/2022    AGRATIO 1.7 03/28/2022    GLOB 2.9 06/11/2021         Review of Systems   Constitutional: Negative. Respiratory: Negative for cough, chest tightness and shortness of breath. Cardiovascular: Positive for chest pain. Negative for palpitations and leg swelling. Gastrointestinal: Positive for nausea and vomiting. Negative for blood in stool, constipation and diarrhea. Endocrine: Negative for polydipsia, polyphagia and polyuria. Genitourinary: Negative. Musculoskeletal: Negative. Skin: Negative. Neurological: Positive for dizziness (improving) and headaches (improving). Negative for tremors and light-headedness.    Psychiatric/Behavioral: Negative for decreased concentration, dysphoric mood, self-injury, sleep disturbance and suicidal ideas. The patient is not nervous/anxious. Current Outpatient Medications on File Prior to Visit   Medication Sig Dispense Refill    ondansetron (ZOFRAN-ODT) 4 MG disintegrating tablet Take 1 tablet by mouth 3 times daily as needed for Nausea or Vomiting 21 tablet 0    Dulaglutide (TRULICITY) 1.5 CF/6.0FX SOPN Sample Lot# [id number] exp 7/22/23 2 pen 0    atorvastatin (LIPITOR) 40 MG tablet TAKE ONE TABLET BY MOUTH DAILY AT NIGHT 30 tablet 5    clopidogrel (PLAVIX) 75 MG tablet Take 1 tablet by mouth daily 30 tablet 5    insulin glargine (LANTUS SOLOSTAR) 100 UNIT/ML injection pen Inject 7 Units into the skin nightly 5 pen 3    lisinopril (PRINIVIL;ZESTRIL) 20 MG tablet Take 1 tablet by mouth daily 30 tablet 5    metFORMIN (GLUCOPHAGE) 1000 MG tablet Take 1 tablet by mouth 2 times daily (with meals) 60 tablet 5    pantoprazole (PROTONIX) 40 MG tablet TAKE ONE TABLET BY MOUTH 2 TIMES A DAY 60 tablet 5    Insulin Pen Needle (Relayware PEN NEEDLES) 32G X 4 MM MISC USE AS DIRECTED FOR LANTUS 100 each 0    fluticasone (FLONASE) 50 MCG/ACT nasal spray 1 spray by Each Nostril route daily 1 each 5    Dulaglutide (TRULICITY) 3 PQ/7.1QI SOPN Inject 3 mg into the skin once a week diabetes 4 pen 5    nitroGLYCERIN (NITROSTAT) 0.4 MG SL tablet up to max of 3 total doses. If no relief after 1 dose, call 911. 25 tablet 3    aspirin 81 MG chewable tablet Take 1 tablet by mouth daily 30 tablet 11    albuterol sulfate HFA (PROVENTIL HFA) 108 (90 Base) MCG/ACT inhaler Inhale 2 puffs into the lungs every 6 hours as needed for Wheezing or Shortness of Breath 1 Inhaler 2    amLODIPine (NORVASC) 10 MG tablet Take 1 tablet by mouth daily (Patient not taking: Reported on 3/31/2022) 30 tablet 5    furosemide (LASIX) 20 MG tablet TAKE ONE TABLET BY MOUTH EVERY DAY (Patient not taking: Reported on 3/31/2022) 30 tablet 5     No current facility-administered medications on file prior to visit.      Allergies   Allergen Reactions    Penicillins Itching, Swelling and Rash    Arginine      Nausea and vomiting     Cymbalta [Duloxetine Hcl] Other (See Comments)     Severe headache, abnormal dreams    Imdur [Isosorbide Dinitrate] Swelling     nausea    Metoprolol      Headache, nausea     Nitroglycerin      Headache     Cephalexin Itching and Rash     Past Medical History:   Diagnosis Date    Acute blood loss anemia     Asthma     CAD (coronary artery disease)     Chest pain     [facility] obstructive pulmonary [facility]     Coronary artery disease 9/4/2012    Depression     Diabetes mellitus (HCC)     Enlarged heart     Fatigue     Generalized headaches     Hyperlipidemia     Hypertension     Migraine     Morning stiffness of joints     Myocardial infarction ([address] 05/13/2018    Numbness or tingling hands and feet    OA (osteoarthritis) of knee 1/27/2015    Obesity, Class I, BMI 30.0-34.9 (see actual BMI)     Osteoporosis     Pulmonary nodule     S/P PTCA (percutaneous transluminal coronary angioplasty) 2/6/2019    Shortness of breath      Past Surgical History:   Procedure Laterality Date    CARDIAC CATHETERIZATION  04/01/2016    Dr. Garth Bustillo CARDIAC CATHETERIZATION  08/01/2012    Dr. Vahe Agustin  11/01/2019    Non Obs CAD, medical management    CHOLECYSTECTOMY      CORONARY ANGIOPLASTY  08/06/2019    POBA of mid LAD, NC Balloon- 3.25 x 15    CORONARY ANGIOPLASTY WITH STENT PLACEMENT  05/13/2018    Dr. Aruna Ramírez - w/placement of IABP, Xience 3.25 x 18 mid Circ, POBA 2.5 x 12 to prox LAD    CORONARY ARTERY BYPASS GRAFT  06/13/2018    LIMA, SVG to OM2, SVG to rPDA    DIAGNOSTIC CARDIAC CATH LAB PROCEDURE  06/06/2013    Dr. Franklyn Romero - Non Obs CAD    HYSTERECTOMY      PTCA      UPPER GASTROINTESTINAL ENDOSCOPY       Social History     Tobacco Use    Smoking status: Former Smoker     Packs/day: 1.00     Years: 20.00     Pack years: 20.00     Types: Cigarettes Quit date: 5/1/2018     Years since quitting: 3.9    Smokeless tobacco: Never Used   Substance Use Topics    Alcohol use: No     Alcohol/week: 0.0 standard drinks     Family History   Problem Relation Age of Onset    Emphysema Mother     Heart Failure Mother     Hypertension Mother     Heart Disease Mother     High Blood Pressure Mother     High Cholesterol Mother     Stroke Mother     Heart Failure Father     Hypertension Father     Heart Disease Father     High Blood Pressure Father     High Cholesterol Father     Hypertension Sister     High Blood Pressure Sister     Hypertension Brother     High Blood Pressure Brother     Cancer Brother     Hypertension Maternal Grandmother     High Blood Pressure Maternal Grandmother     Hypertension Maternal Grandfather     High Blood Pressure Maternal Grandfather     Hypertension Paternal Grandmother     High Blood Pressure Paternal Grandmother     Hypertension Paternal Grandfather     High Blood Pressure Paternal Grandfather     High Blood Pressure Sister     High Blood Pressure Sister     High Blood Pressure Sister     Heart Failure Maternal Aunt     Hypertension Maternal Aunt     Heart Failure Maternal Uncle     Hypertension Maternal Uncle     Cancer Other         nephew-colon, liver, lung    Heart Failure Other     Diabetes Other     Osteoarthritis Other     Hypertension Paternal Aunt     Hypertension Paternal Uncle     Asthma Neg Hx        Vitals:    03/31/22 1331   BP: 122/78   Pulse: 70   Temp: 97.6 °F (36.4 °C)   TempSrc: Infrared   SpO2: 98%   Weight: 170 lb (77.1 kg)     Estimated body mass index is 32.12 kg/m² as calculated from the following:    Height as of 10/21/21: 5' 1\" (1.549 m). Weight as of this encounter: 170 lb (77.1 kg). Physical Exam  Vitals and nursing note reviewed. Constitutional:       Appearance: Normal appearance. HENT:      Head: Normocephalic.       Right Ear: Tympanic membrane, ear canal and external ear normal.      Left Ear: Tympanic membrane, ear canal and external ear normal.      Nose: Nose normal.      Mouth/Throat:      Mouth: Mucous membranes are moist.   Eyes:      Extraocular Movements: Extraocular movements intact. Conjunctiva/sclera: Conjunctivae normal.      Pupils: Pupils are equal, round, and reactive to light. Neck:      Vascular: No carotid bruit. Cardiovascular:      Rate and Rhythm: Normal rate and regular rhythm. Pulses: Normal pulses. Carotid pulses are 2+ on the right side and 2+ on the left side. Heart sounds: Normal heart sounds. Pulmonary:      Effort: Pulmonary effort is normal.      Breath sounds: No wheezing or rhonchi. Abdominal:      General: Abdomen is flat. Palpations: Abdomen is soft. Tenderness: There is no abdominal tenderness. Hernia: No hernia is present. Musculoskeletal:      Cervical back: Normal range of motion. Right lower leg: No edema. Left lower leg: No edema. Lymphadenopathy:      Cervical: No cervical adenopathy. Skin:     General: Skin is warm and dry. Capillary Refill: Capillary refill takes less than 2 seconds. Neurological:      General: No focal deficit present. Mental Status: She is alert and oriented to person, place, and time. Cranial Nerves: No cranial nerve deficit. Sensory: No sensory deficit. Motor: No weakness. Coordination: Coordination normal.      Gait: Gait normal.      Deep Tendon Reflexes: Reflexes normal.   Psychiatric:         Mood and Affect: Mood normal.         Behavior: Behavior normal.         Thought Content: Thought content normal.         Judgment: Judgment normal.           Patient's questions answered and concerns addressed. Patient agrees to plan of care.         Electronically signed by GERMAN Douglass CNP on 4/1/2022 at 7:57 AM Her/She

## 2024-05-11 NOTE — ED PROVIDER NOTE - NSICDXPASTSURGICALHX_GEN_ALL_CORE_FT
Arrange repeat labs in 3 months.  
See message and advise. Patient leaving for vacation tomorrow  
Quality 226: Preventive Care And Screening: Tobacco Use: Screening And Cessation Intervention: Patient screened for tobacco use and is an ex/non-smoker
Quality 130: Documentation Of Current Medications In The Medical Record: Current Medications Documented
Detail Level: Detailed
Quality 431: Preventive Care And Screening: Unhealthy Alcohol Use - Screening: Patient not identified as an unhealthy alcohol user when screened for unhealthy alcohol use using a systematic screening method
PAST SURGICAL HISTORY:  Cataract, Other surgery   bilateral    H/O knee surgery     H/O ovarian cystectomy 2013    H/O Shoulder Replacement right shoulder  get clindamycin before surgery    Parathyroid surgery    S/P Bunionectomy     S/P cardiac catheterization 2/26/21

## 2024-05-11 NOTE — ED PROVIDER NOTE - CLINICAL SUMMARY MEDICAL DECISION MAKING FREE TEXT BOX
Patient is a 89-year-old female who presents to the emergency room with worsening confusion.  Past medical history of A-fib on Xarelto CAD hyperlipidemia hypertension history of a thyroid nodule.  Patient presents today for an episode of confusion.  Daughter had reported that she received a message from the patient, she seemed to be at her baseline at that time but then around 2 PM she received another message and patient appeared to be increasingly confused.  This prompted her to go to the patient's house and patient appeared to be more confused than baseline.  On arrival to the emergency room patient was back to her baseline.  Of note patient did report ports a fall 2 weeks ago was seen in the emergency room at that time has had continued pain and bruising to the right lower extremity making ambulation difficult.  No additional falls.  On exam patient is lying in bed awake and alert at baseline per family.  Normocephalic atraumatic pupils equal round and reactive heart regular rate lungs clear to auscultation abdomen soft nontender nondistended.  There is some peripheral edema noted to left lower extremity which is at baseline.  Right lower extremity extensive bruising and swelling is noted from the lower femur tracking down the knee into the upper calf with swelling noted to the right knee and limited range of motion.  Ecchymosis is old.  No superimposed cellulitis.  Compartments soft positive pedal pulse cap refill less than 2 seconds sensation grossly intact.  Full range of motion of bilateral upper extremities with no pain on range of motion.  No midline C -T–L tenderness to palpation.  Patient presenting to the emergency room for an episode of confusion.  Differential is broad includes possible TIA versus CVA versus infectious etiology.  Also with increased difficulty ambulating in the setting of a recent fall.  As patient is back to baseline she would not be considered tenecteplase candidate.  Will obtain screening labs check viral swab obtain CT imaging of the head CT imaging of the head and neck will obtain venous Dopplers of lower extremity and x-ray of the knee EKG will monitor.  Previous CTS lower extremity was reviewed.  Independent review of EKG reveals a sinus bradycardia at 56 bpm.  Independent review of right knee x-ray reveals severe arthritis no fracture.  Right lower extremity venous Doppler reveals no evidence of DVT independent review of CT reveals no acute intercranial hemorrhage or hemodynamically significant stenosis tiny aneurysm noted.  Will admit at this time for further workup and evaluation.  Medicine team to follow-up aneurysm results.  Patient will likely require physical therapy and possible subacute rehab.

## 2024-05-11 NOTE — ED ADULT NURSE NOTE - CHIEF COMPLAINT QUOTE
ambulates to triage with walker.  Daughter states that she is typically confused on a good day, but today, at approximately 2pm, daughter noticed sudden onset of worsening confusion.  (patient resides by herself, and when daughter got there at 2pm, she was more confused than normal, but when I spoke to her at 12:30 she sounded normal)  patient alert / oriented x3  patient does take Xarelto.  (daughter states that she had a fall 2-2.5 weeks ago and was seen / treated here, but to the best of her knowledge, she has not had any recent fall, patient denies any falls, but states that she has pain in the back of her head)  neuro intact.  good strength, sensation intact, no drift, no droop (extensive swelling both lower extremities)

## 2024-05-11 NOTE — ED ADULT TRIAGE NOTE - CHIEF COMPLAINT QUOTE
ambulates to triage with walker.  Daughter states that she is typically confused on a good day, but today, at approximately 2pm, daughter noticed sudden onset of worsening confusion.  (patient resides by herself, and when daughter got there at 2pm, she was more confused than normal, but when I spoke to her at 12:30 she sounded normal)  patient alert / oriented x3  patient does take Xarelto.  (daughter states that she had a fall 2-2.5 weeks ago and was seen / treated here, but to the best of her knowledge, she has not had any recent fall, patient denies any falls, but states that she has pain in the back of her head) ambulates to triage with walker.  Daughter states that she is typically confused on a good day, but today, at approximately 2pm, daughter noticed sudden onset of worsening confusion.  (patient resides by herself, and when daughter got there at 2pm, she was more confused than normal, but when I spoke to her at 12:30 she sounded normal)  patient alert / oriented x3  patient does take Xarelto.  (daughter states that she had a fall 2-2.5 weeks ago and was seen / treated here, but to the best of her knowledge, she has not had any recent fall, patient denies any falls, but states that she has pain in the back of her head)  neuro intact.  good strength, sensation intact, no drift, no droop (extensive swelling both lower extremities)

## 2024-05-11 NOTE — ED ADULT TRIAGE NOTE - AS TEMP SITE
The patient was seen for neuropsychological evaluation at the request of Miguelangel Parks DO for the purposes of diagnostic clarification and treatment planning.  177 minutes of test administration and scoring were provided by this writer.  Please see Dr. Kj Simms's report for a full interpretation of the findings.    Grace May  Psychometrist   forehead

## 2024-05-11 NOTE — ED ADULT NURSE NOTE - OBJECTIVE STATEMENT
Pt A&OX3, bib daughter, pt is unaware why she was bought to ED today.  As per daughter, she called pt at around 12:30pm today and pt was behaving normally.  At around 2:00, pt called daughter and was not making sense.  Pt denies any recent injury/illness.  VSS.  CM in place, NSR bradycardic. Pt A&OX3, bib daughter, pt is unaware why she was bought to ED today.  As per daughter, she called pt at around 12:30pm today and pt was behaving normally.  At around 2:00, pt called daughter and was not making sense.  Pt denies any recent injury/illness.  VSS.  CM in place, sinus ashley in 50's.  Pt denies any CP/sob/dizziness.  Pt's only complaint is pain to left side of posterior head.  No obvious signs of injury on assessment.  Abd soft nondistended, nontender, moving all ext well.  BLE edema noted +3, baseline.

## 2024-05-11 NOTE — ED PROVIDER NOTE - OBJECTIVE STATEMENT
89 F hx afib on xarelto, cad, valvular disease, hld, htn, thyroid nodule presents accompanied by son and daughter for period of confusion. Pt is confused/forgetful at baseline. Daughter received message from pt around noon that seemed fine but then received weird message from pt around 2PM. Went to pt's house and she wasn't acting her usual self. By the time they arrived in ED pt at her normal baseline. sustained fall 2 weeks ago, seen in this ED. States she has knee pain/bruising/swelling that is extending throughout her knee, making it difficult to walk due to trouble bending her leg. Denies falling again.

## 2024-05-11 NOTE — ED PROVIDER NOTE - MUSCULOSKELETAL, MLM
left leg edema at baseline; RLE bruising and swelling to right knee and lower leg with dec ROM, +NVI

## 2024-05-11 NOTE — ED PROVIDER NOTE - WHICH SHOWED
Independent review of EKG reveals a sinus bradycardia at 56 bpm.  Independent review of right knee x-ray reveals severe arthritis no fracture.  Right lower extremity venous Doppler reveals no evidence of DVT independent review of CT reveals no acute intercranial hemorrhage or hemodynamically significant stenosis tiny aneurysm noted.

## 2024-05-12 DIAGNOSIS — R41.82 ALTERED MENTAL STATUS, UNSPECIFIED: ICD-10-CM

## 2024-05-12 DIAGNOSIS — I48.91 UNSPECIFIED ATRIAL FIBRILLATION: ICD-10-CM

## 2024-05-12 DIAGNOSIS — E78.5 HYPERLIPIDEMIA, UNSPECIFIED: ICD-10-CM

## 2024-05-12 DIAGNOSIS — I10 ESSENTIAL (PRIMARY) HYPERTENSION: ICD-10-CM

## 2024-05-12 LAB
A1C WITH ESTIMATED AVERAGE GLUCOSE RESULT: >15.5 % — HIGH (ref 4–5.6)
ANION GAP SERPL CALC-SCNC: 5 MMOL/L — SIGNIFICANT CHANGE UP (ref 5–17)
APPEARANCE UR: CLEAR — SIGNIFICANT CHANGE UP
BILIRUB UR-MCNC: NEGATIVE — SIGNIFICANT CHANGE UP
BUN SERPL-MCNC: 32 MG/DL — HIGH (ref 7–23)
CALCIUM SERPL-MCNC: 8.4 MG/DL — LOW (ref 8.5–10.1)
CHLORIDE SERPL-SCNC: 113 MMOL/L — HIGH (ref 96–108)
CHOLEST SERPL-MCNC: 192 MG/DL — SIGNIFICANT CHANGE UP
CO2 SERPL-SCNC: 24 MMOL/L — SIGNIFICANT CHANGE UP (ref 22–31)
COLOR SPEC: YELLOW — SIGNIFICANT CHANGE UP
CREAT SERPL-MCNC: 0.87 MG/DL — SIGNIFICANT CHANGE UP (ref 0.5–1.3)
DIFF PNL FLD: NEGATIVE — SIGNIFICANT CHANGE UP
EGFR: 63 ML/MIN/1.73M2 — SIGNIFICANT CHANGE UP
ESTIMATED AVERAGE GLUCOSE: >398 MG/DL — HIGH (ref 68–114)
GLUCOSE SERPL-MCNC: 92 MG/DL — SIGNIFICANT CHANGE UP (ref 70–99)
GLUCOSE UR QL: NEGATIVE MG/DL — SIGNIFICANT CHANGE UP
HCT VFR BLD CALC: 29.5 % — LOW (ref 34.5–45)
HDLC SERPL-MCNC: 57 MG/DL — SIGNIFICANT CHANGE UP
HGB BLD-MCNC: 9.2 G/DL — LOW (ref 11.5–15.5)
KETONES UR-MCNC: NEGATIVE MG/DL — SIGNIFICANT CHANGE UP
LEUKOCYTE ESTERASE UR-ACNC: NEGATIVE — SIGNIFICANT CHANGE UP
LIPID PNL WITH DIRECT LDL SERPL: 119 MG/DL — HIGH
MAGNESIUM SERPL-MCNC: 2.5 MG/DL — SIGNIFICANT CHANGE UP (ref 1.6–2.6)
MCHC RBC-ENTMCNC: 29 PG — SIGNIFICANT CHANGE UP (ref 27–34)
MCHC RBC-ENTMCNC: 31.2 GM/DL — LOW (ref 32–36)
MCV RBC AUTO: 93.1 FL — SIGNIFICANT CHANGE UP (ref 80–100)
NITRITE UR-MCNC: NEGATIVE — SIGNIFICANT CHANGE UP
NON HDL CHOLESTEROL: 135 MG/DL — HIGH
NRBC # BLD: 0 /100 WBCS — SIGNIFICANT CHANGE UP (ref 0–0)
PH UR: 5.5 — SIGNIFICANT CHANGE UP (ref 5–8)
PLATELET # BLD AUTO: 235 K/UL — SIGNIFICANT CHANGE UP (ref 150–400)
POTASSIUM SERPL-MCNC: 3.9 MMOL/L — SIGNIFICANT CHANGE UP (ref 3.5–5.3)
POTASSIUM SERPL-SCNC: 3.9 MMOL/L — SIGNIFICANT CHANGE UP (ref 3.5–5.3)
PROT UR-MCNC: NEGATIVE MG/DL — SIGNIFICANT CHANGE UP
RAPID RVP RESULT: SIGNIFICANT CHANGE UP
RBC # BLD: 3.17 M/UL — LOW (ref 3.8–5.2)
RBC # FLD: 18.4 % — HIGH (ref 10.3–14.5)
SARS-COV-2 RNA SPEC QL NAA+PROBE: SIGNIFICANT CHANGE UP
SODIUM SERPL-SCNC: 142 MMOL/L — SIGNIFICANT CHANGE UP (ref 135–145)
SP GR SPEC: 1.01 — SIGNIFICANT CHANGE UP (ref 1–1.03)
TRIGL SERPL-MCNC: 88 MG/DL — SIGNIFICANT CHANGE UP
UROBILINOGEN FLD QL: 0.2 MG/DL — SIGNIFICANT CHANGE UP (ref 0.2–1)
WBC # BLD: 5.16 K/UL — SIGNIFICANT CHANGE UP (ref 3.8–10.5)
WBC # FLD AUTO: 5.16 K/UL — SIGNIFICANT CHANGE UP (ref 3.8–10.5)

## 2024-05-12 PROCEDURE — 70551 MRI BRAIN STEM W/O DYE: CPT | Mod: 26

## 2024-05-12 PROCEDURE — 99222 1ST HOSP IP/OBS MODERATE 55: CPT

## 2024-05-12 RX ORDER — ASPIRIN/CALCIUM CARB/MAGNESIUM 324 MG
81 TABLET ORAL DAILY
Refills: 0 | Status: DISCONTINUED | OUTPATIENT
Start: 2024-05-12 | End: 2024-05-14

## 2024-05-12 RX ORDER — SIMVASTATIN 20 MG/1
1 TABLET, FILM COATED ORAL
Qty: 0 | Refills: 0 | DISCHARGE

## 2024-05-12 RX ORDER — SODIUM CHLORIDE 9 MG/ML
1000 INJECTION INTRAMUSCULAR; INTRAVENOUS; SUBCUTANEOUS
Refills: 0 | Status: DISCONTINUED | OUTPATIENT
Start: 2024-05-12 | End: 2024-05-13

## 2024-05-12 RX ORDER — MULTIVIT-MIN/FERROUS GLUCONATE 9 MG/15 ML
1 LIQUID (ML) ORAL
Qty: 0 | Refills: 0 | DISCHARGE

## 2024-05-12 RX ORDER — DENOSUMAB 60 MG/ML
1 INJECTION SUBCUTANEOUS
Qty: 0 | Refills: 0 | DISCHARGE

## 2024-05-12 RX ORDER — METHENAMINE MANDELATE 1 G
1 TABLET ORAL
Qty: 0 | Refills: 0 | DISCHARGE

## 2024-05-12 RX ORDER — RIVAROXABAN 15 MG-20MG
15 KIT ORAL
Refills: 0 | Status: DISCONTINUED | OUTPATIENT
Start: 2024-05-12 | End: 2024-05-14

## 2024-05-12 RX ADMIN — SODIUM CHLORIDE 50 MILLILITER(S): 9 INJECTION INTRAMUSCULAR; INTRAVENOUS; SUBCUTANEOUS at 01:37

## 2024-05-12 RX ADMIN — Medication 81 MILLIGRAM(S): at 12:44

## 2024-05-12 RX ADMIN — RIVAROXABAN 15 MILLIGRAM(S): KIT at 17:57

## 2024-05-12 RX ADMIN — PANTOPRAZOLE SODIUM 40 MILLIGRAM(S): 20 TABLET, DELAYED RELEASE ORAL at 05:11

## 2024-05-12 RX ADMIN — SODIUM CHLORIDE 500 MILLILITER(S): 9 INJECTION INTRAMUSCULAR; INTRAVENOUS; SUBCUTANEOUS at 01:11

## 2024-05-12 NOTE — H&P ADULT - PROBLEM SELECTOR PLAN 2
Continue Cardizem for rate control  Continue Xarelto  Check ECHO  Cardio consult  Further work-up/management pending clinical course.

## 2024-05-12 NOTE — CARE COORDINATION ASSESSMENT. - OTHER PERTINENT DISCHARGE PLANNING INFORMATION:
Pt admitted from home with difficulty walking. I attempted to interview the pt however she's a poor historian. She provided me with permission to speak to her dtr Maricruz. Per Maricruz, pt is mildly confused, lives alone and uses a walker and a chairlift. Maricruz is in the process of looking for an aide for her mother. Maricruz stated that family and a friend check in with her. Awaiting Tx team recommendation of dc plan.

## 2024-05-12 NOTE — CARE COORDINATION ASSESSMENT. - NSPASTMEDSURGHISTORY_GEN_ALL_CORE_FT
PAST MEDICAL & SURGICAL HISTORY:  Abdominal Mass      Diverticulosis of the Colon      History of Osteoarthritis      DJD (Degenerative Joint Disease)  shoulders, knees, cervical and lumbar spine      Constipation      Thyroid Nodule      Hiatal Hernia      Hypercholesterolemia      MVP (Mitral Valve Prolapse)  last echo 2010      HTN (Hypertension)      Cataract, Other  surgery   bilateral      S/P Bunionectomy      Parathyroid  surgery      H/O Shoulder Replacement  right shoulder  get clindamycin before surgery      HLD (hyperlipidemia)      Afib      H/O ovarian cystectomy  2013      Aortic valve stenosis      H/O knee surgery      Osteopenia      CAD (coronary artery disease)      S/P cardiac catheterization  2/26/21

## 2024-05-12 NOTE — H&P ADULT - HISTORY OF PRESENT ILLNESS
This is a 89 F hx afib on Xarelto, CAD, valvular disease, HTN, HLD, thyroid nodule who presents accompanied by son and daughter for period of confusion. Pt is confused/forgetful at baseline. Daughter received message from pt around noon that seemed fine but then received weird message from pt around 2PM. Went to pt's house and she wasn't acting her usual self. By the time they arrived in ED pt at her normal baseline. She sustained a fall 2 weeks ago. She was seen in this ED. States she has knee pain/bruising/swelling that is extending throughout her knee, making it difficult to walk due to trouble bending her leg. Denies falling again.

## 2024-05-12 NOTE — CONSULT NOTE ADULT - ASSESSMENT
Elsa is a 89 F hx afib on Xarelto, CAD with severe LAD disease s/p PCI in 3/2020, AS s/p tavr, HTN, HLD, thyroid nodule who presents accompanied by son and daughter for period of confusion.     - episodes of confusion and neuro work up/evaluation is in progress.    - no sign of acute ischemia and troponin is negative  - history of cad with severe lad disease, s/p pci in 2020  - cont asa, and should ideally be on a statin    - history of severe as s/p tavr in 2021  - normal LV function at this time  - no sign of volume overload on exam other than longstanding lower extremity edema attributable to venous insufficiency.    - paf, in sr on ek  - cont ac with xarelto and cont ccb    - trend creatinine and electrolytes. Keep K>4, mg>2  - will follow with you

## 2024-05-12 NOTE — CAREGIVER ENGAGEMENT NOTE - CAREGIVER EDUCATION/DISCUSSION
Routing refill request to provider to review approval because:  See last TSH, ok for refill extension  Ekta Mccarthy RN, BSN  Message handled by Nurse Triage.       Yes

## 2024-05-12 NOTE — CONSULT NOTE ADULT - SUBJECTIVE AND OBJECTIVE BOX
[facility] - Jennifer Panchal, Imtiaz Dee, Torres Posada  Office Number: [phone]    Initial Consult Note    CHIEF COMPLAINT: Patient is a 89y old  Female who presents with a chief complaint of confusion, difficulty walking (12 May 2024 07:01)      HPI:  This is a 89 F hx afib on Xarelto, CAD, valvular disease, HTN, HLD, thyroid nodule who presents accompanied by son and daughter for period of confusion. Pt is confused/forgetful at baseline. Daughter received message from pt around noon that seemed fine but then received weird message from pt around 2PM. Went to pt's house and she wasn't acting her usual self. By the time they arrived in ED pt at her normal baseline. She sustained a fall 2 weeks ago. She was seen in this ED. States she has knee pain/bruising/swelling that is extending throughout her knee, making it difficult to walk due to trouble bending her leg. Denies falling again. (12 May 2024 07:01)      PAST MEDICAL & SURGICAL HISTORY:  HTN (Hypertension)      MVP (Mitral Valve Prolapse)  last echo 2010      Hypercholesterolemia      Hiatal Hernia      Thyroid Nodule      Constipation      DJD (Degenerative Joint Disease)  shoulders, knees, cervical and lumbar spine      History of Osteoarthritis      Diverticulosis of the Colon      Abdominal Mass      Afib      HLD (hyperlipidemia)      Aortic valve stenosis      CAD (coronary artery disease)      Osteopenia      H/O Shoulder Replacement  right shoulder  get clindamycin before surgery      Parathyroid  surgery      S/P Bunionectomy      Cataract, Other  surgery   bilateral      H/O ovarian cystectomy  2013      H/O knee surgery      S/P cardiac catheterization  2/26/21          SOCIAL HISTORY:  No tobacco, ethanol, or drug abuse.    FAMILY HISTORY:    No family history of acute MI or sudden cardiac death.    MEDICATIONS  (STANDING):  aspirin enteric coated 81 milliGRAM(s) Oral daily  diltiazem    milliGRAM(s) Oral daily  pantoprazole    Tablet 40 milliGRAM(s) Oral before breakfast  rivaroxaban 15 milliGRAM(s) Oral with dinner  sodium chloride 0.9%. 1000 milliLiter(s) (50 mL/Hr) IV Continuous <Continuous>    MEDICATIONS  (PRN):  acetaminophen     Tablet .. 650 milliGRAM(s) Oral every 6 hours PRN Temp greater or equal to 38C (100.4F), Mild Pain (1 - 3)  aluminum hydroxide/magnesium hydroxide/simethicone Suspension 30 milliLiter(s) Oral every 4 hours PRN Dyspepsia  melatonin 3 milliGRAM(s) Oral at bedtime PRN Insomnia  ondansetron Injectable 4 milliGRAM(s) IV Push every 6 hours PRN Nausea and/or Vomiting      Allergies    codeine (Other)  penicillin (Flushing; Hypotension; Other)    Intolerances        REVIEW OF SYSTEMS:    CONSTITUTIONAL: No weakness, fevers or chills  EYES/ENT: No visual changes;  No vertigo or throat pain   NECK: No pain or stiffness  RESPIRATORY: No cough, wheezing, hemoptysis; No shortness of breath  CARDIOVASCULAR: No chest pain or palpitations  GASTROINTESTINAL: No abdominal pain. No nausea, vomiting, or hematemesis; No diarrhea or constipation. No melena or hematochezia.  GENITOURINARY: No dysuria, frequency or hematuria  NEUROLOGICAL: No numbness or weakness  SKIN: No itching or rash  All other review of systems is negative unless indicated above    VITAL SIGNS:   Vital Signs Last 24 Hrs  T(C): 36.4 (12 May 2024 04:49), Max: 36.6 (11 May 2024 20:16)  T(F): 97.5 (12 May 2024 04:49), Max: 97.8 (11 May 2024 20:16)  HR: 55 (12 May 2024 04:49) (50 - 80)  BP: 119/53 (12 May 2024 04:49) (106/74 - 140/63)  BP(mean): --  RR: 18 (12 May 2024 04:49) (16 - 20)  SpO2: 94% (12 May 2024 04:49) (94% - 99%)    Parameters below as of 12 May 2024 04:49  Patient On (Oxygen Delivery Method): room air        I&O's Summary    11 May 2024 07:01  -  12 May 2024 07:00  --------------------------------------------------------  IN: 250 mL / OUT: 400 mL / NET: -150 mL        On Exam:    Constitutional: NAD, alert and oriented x 2  Lungs:  Non-labored, breath sounds are clear bilaterally, No wheezing, rales or rhonchi  Cardiovascular: RRR.  S1 and S2 positive.  No murmurs, rubs, gallops or clicks  Gastrointestinal: Bowel Sounds present, soft, nontender.   Lymph: mild peripheral edema. No cervical lymphadenopathy.  Neurological: Alert, no focal deficits  Skin: No rashes or ulcers   Psych:  Mood & affect appropriate.    LABS: All Labs Reviewed:                        9.2    5.16  )-----------( 235      ( 12 May 2024 06:28 )             29.5                         10.1   7.58  )-----------( 261      ( 11 May 2024 19:15 )             31.8     12 May 2024 06:28    142    |  113    |  32     ----------------------------<  92     3.9     |  24     |  0.87   11 May 2024 19:15    137    |  106    |  41     ----------------------------<  109    4.4     |  26     |  1.40     Ca    8.4        12 May 2024 06:28  Ca    9.1        11 May 2024 19:15  Mg     2.5       12 May 2024 06:28    TPro  6.7    /  Alb  3.5    /  TBili  0.5    /  DBili  x      /  AST  23     /  ALT  24     /  AlkPhos  185    11 May 2024 19:15          Blood Culture:         RADIOLOGY:    EKG: sr

## 2024-05-12 NOTE — CARE COORDINATION ASSESSMENT. - PRO ARRIVE FROM
Pharmacist Admission Medication History    Admission medication history is complete. The information provided in this note is only as accurate as the sources available at the time of the update.    Medication reconciliation/reorder completed by provider prior to medication history? No    Information Source(s): Patient and CareEverywhere/SureScripts via in-person    Pertinent Information: none    Changes made to PTA medication list:    Added: Fish oil    Deleted: alendronate, buspirone      Changed: propranolol sig    Medication Affordability:  Not including over the counter (OTC) medications, was there a time in the past 12 months when you did not take your medications as prescribed because of cost?: No    Allergies reviewed with patient and updates made in EHR: yes    Medication History Completed By:    Paloma BynumD, [facility]   Emergency Medicine Pharmacist  [phone] or Karey  April 27, 2023    Prior to Admission medications    Medication Sig Last Dose Taking? Auth Provider Long Term End Date   Ascorbic Acid (VITAMIN C) 500 MG CAPS Take 500 mg by mouth daily 4/26/2023 at am Yes Unknown, Entered By History     aspirin 81 MG tablet Take 1 tablet by mouth daily. 4/26/2023 at am Yes Reported, Patient     calcium citrate-vitamin D (CITRACAL) 200-6.25 MG-MCG TABS per tablet Take 1 tablet by mouth daily 4/26/2023 at am Yes Unknown, Entered By History     fish oil-omega-3 fatty acids 1000 MG capsule Take 1 g by mouth daily 4/26/2023 at am Yes Unknown, Entered By History     multivitamin w/minerals (THERA-VIT-M) tablet Take 1 tablet by mouth daily 4/26/2023 at am Yes Unknown, Entered By History     propranolol (INDERAL) 10 MG tablet Take 10 mg by mouth 3 times daily as needed (anixety) More than a month at - Yes Reported, Patient Yes        
home

## 2024-05-12 NOTE — H&P ADULT - PROBLEM SELECTOR PLAN 4
"DATE & TIME: 2/19 1900-0730    Cognitive Concerns/ Orientation : HECTOR full orientation, nonverbal but will communicate by shaking head \"yes/no\"   BEHAVIOR & AGGRESSION TOOL COLOR: green  CIWA SCORE: n/a   ABNL VS/O2: VSS on RA  MOBILITY: Up with a lift, A2; repo q2h  PAIN MANAGMENT: denies pain  DIET: NPO, TF (7a-7p)  BOWEL/BLADDER: incont on B&B  ABNL LAB/BG: phos 2.4, currently being replaced - recheck in AM  DRAIN/DEVICES: IV infusing phos/abx, Gtube  TELEMETRY RHYTHM: n/a  SKIN: coccyx ANANYA - blanchable redness, bruises  TESTS/PROCEDURES: n/a  D/C DAY/GOALS/PLACE: pending improvement, 1-2 days  OTHER IMPORTANT INFO:  " Continue statin

## 2024-05-12 NOTE — H&P ADULT - PROBLEM SELECTOR PLAN 1
Admit  R/O TIA  ASA 81mg qd  Continue statin  Neuro check q4h  Check lipid profile, A1C  Check ECHO  PT  Neuro eval  Further work-up/management pending clinical course.

## 2024-05-12 NOTE — H&P ADULT - NSICDXPASTSURGICALHX_GEN_ALL_CORE_FT
Mohs Case Number: [id number] Mohs Case Number: [id number] PAST SURGICAL HISTORY:  Cataract, Other surgery   bilateral    H/O knee surgery     H/O ovarian cystectomy 2013    H/O Shoulder Replacement right shoulder  get clindamycin before surgery    Parathyroid surgery    S/P Bunionectomy     S/P cardiac catheterization 2/26/21

## 2024-05-12 NOTE — ED ADULT NURSE REASSESSMENT NOTE - NS ED NURSE REASSESS COMMENT FT1
Pt is alert, oriented to name, place and date, not situation. vss, no c/o pain or discomfort
Pt received lying on stretcher in NAD, returned from CT. Pt denies pain or discomfort, denies SOB, resps are eupneic, VSS. Awaiting results lab and radiology.

## 2024-05-12 NOTE — PHYSICAL THERAPY INITIAL EVALUATION ADULT - GENERAL OBSERVATIONS, REHAB EVAL
Pt rec'd supine in bed, (+) hep loc in place Pt rec'd supine in bed, (+) hep loc in place, Right knee redness and bruised, Bilateral LE edema noted.

## 2024-05-13 ENCOUNTER — RESULT REVIEW (OUTPATIENT)
Age: 89
End: 2024-05-13

## 2024-05-13 PROCEDURE — 93306 TTE W/DOPPLER COMPLETE: CPT | Mod: 26

## 2024-05-13 PROCEDURE — 99232 SBSQ HOSP IP/OBS MODERATE 35: CPT

## 2024-05-13 RX ORDER — LANOLIN ALCOHOL/MO/W.PET/CERES
5 CREAM (GRAM) TOPICAL ONCE
Refills: 0 | Status: COMPLETED | OUTPATIENT
Start: 2024-05-13 | End: 2024-05-13

## 2024-05-13 RX ORDER — LANOLIN ALCOHOL/MO/W.PET/CERES
3 CREAM (GRAM) TOPICAL ONCE
Refills: 0 | Status: DISCONTINUED | OUTPATIENT
Start: 2024-05-13 | End: 2024-05-13

## 2024-05-13 RX ORDER — POLYETHYLENE GLYCOL 3350 17 G/17G
17 POWDER, FOR SOLUTION ORAL DAILY
Refills: 0 | Status: DISCONTINUED | OUTPATIENT
Start: 2024-05-13 | End: 2024-05-14

## 2024-05-13 RX ORDER — SIMVASTATIN 20 MG/1
10 TABLET, FILM COATED ORAL AT BEDTIME
Refills: 0 | Status: DISCONTINUED | OUTPATIENT
Start: 2024-05-13 | End: 2024-05-14

## 2024-05-13 RX ORDER — DONEPEZIL HYDROCHLORIDE 10 MG/1
5 TABLET, FILM COATED ORAL AT BEDTIME
Refills: 0 | Status: DISCONTINUED | OUTPATIENT
Start: 2024-05-13 | End: 2024-05-14

## 2024-05-13 RX ADMIN — POLYETHYLENE GLYCOL 3350 17 GRAM(S): 17 POWDER, FOR SOLUTION ORAL at 11:45

## 2024-05-13 RX ADMIN — Medication 3 MILLIGRAM(S): at 19:17

## 2024-05-13 RX ADMIN — PANTOPRAZOLE SODIUM 40 MILLIGRAM(S): 20 TABLET, DELAYED RELEASE ORAL at 05:31

## 2024-05-13 RX ADMIN — Medication 240 MILLIGRAM(S): at 05:31

## 2024-05-13 RX ADMIN — Medication 650 MILLIGRAM(S): at 17:15

## 2024-05-13 RX ADMIN — RIVAROXABAN 15 MILLIGRAM(S): KIT at 19:00

## 2024-05-13 RX ADMIN — Medication 650 MILLIGRAM(S): at 18:15

## 2024-05-13 RX ADMIN — Medication 81 MILLIGRAM(S): at 11:45

## 2024-05-13 RX ADMIN — DONEPEZIL HYDROCHLORIDE 5 MILLIGRAM(S): 10 TABLET, FILM COATED ORAL at 19:17

## 2024-05-13 RX ADMIN — Medication 5 MILLIGRAM(S): at 21:22

## 2024-05-13 RX ADMIN — SODIUM CHLORIDE 50 MILLILITER(S): 9 INJECTION INTRAMUSCULAR; INTRAVENOUS; SUBCUTANEOUS at 05:31

## 2024-05-13 RX ADMIN — SIMVASTATIN 10 MILLIGRAM(S): 20 TABLET, FILM COATED ORAL at 19:17

## 2024-05-13 NOTE — SOCIAL WORK PROGRESS NOTE - NSSWPROGRESSNOTE_GEN_ALL_CORE
pt now for jackie, pt and family at bedside and in agreement. dc packet given, pt and family to review for selection of facilities. Darlene requested pt now for jackie, pt and family at bedside and in agreement. dc packet given, pt and family to review for selection of facilities. Darlene requested/ jeff (son ) [phone]/ [facility]. Pending additional choices.

## 2024-05-13 NOTE — PROGRESS NOTE ADULT - ASSESSMENT
89 F hx afib on Xarelto, CAD with severe LAD disease s/p PCI in 3/2020, AS s/p tavr, HTN, HLD, thyroid nodule who presents accompanied by son and daughter for period of confusion.     PAF, CAD, HTN, HLD  - episodes of confusion and neuro work up/evaluation is in progress.    - no sign of acute ischemia and troponin is negative  - history of cad with severe lad disease, s/p pci in 2020  - cont asa, and should ideally be on a statin if able to tolerate    - history of severe as s/p tavr in 2021  - normal LV function at this time  - no sign of volume overload on exam other than longstanding lower extremity edema attributable to venous insufficiency.    - paf, in sr on ek  - cont ac with xarelto and cont ccb    - Monitor and replete lytes, keep K>4, Mg>2.  - Will continue to follow.    Trey Ward NP  Nurse Practitioner- Cardiology   Call TEAMS

## 2024-05-14 ENCOUNTER — TRANSCRIPTION ENCOUNTER (OUTPATIENT)
Age: 89
End: 2024-05-14

## 2024-05-14 VITALS
SYSTOLIC BLOOD PRESSURE: 139 MMHG | HEART RATE: 67 BPM | TEMPERATURE: 97 F | RESPIRATION RATE: 18 BRPM | DIASTOLIC BLOOD PRESSURE: 76 MMHG | OXYGEN SATURATION: 95 %

## 2024-05-14 DIAGNOSIS — N39.0 URINARY TRACT INFECTION, SITE NOT SPECIFIED: ICD-10-CM

## 2024-05-14 LAB
FOLATE SERPL-MCNC: 12 NG/ML — SIGNIFICANT CHANGE UP
TSH SERPL-MCNC: 2.57 UIU/ML — SIGNIFICANT CHANGE UP (ref 0.36–3.74)
VIT B12 SERPL-MCNC: 631 PG/ML — SIGNIFICANT CHANGE UP (ref 232–1245)

## 2024-05-14 PROCEDURE — 87086 URINE CULTURE/COLONY COUNT: CPT

## 2024-05-14 PROCEDURE — 83036 HEMOGLOBIN GLYCOSYLATED A1C: CPT

## 2024-05-14 PROCEDURE — 85027 COMPLETE CBC AUTOMATED: CPT

## 2024-05-14 PROCEDURE — 80053 COMPREHEN METABOLIC PANEL: CPT

## 2024-05-14 PROCEDURE — 70498 CT ANGIOGRAPHY NECK: CPT | Mod: MC

## 2024-05-14 PROCEDURE — 83735 ASSAY OF MAGNESIUM: CPT

## 2024-05-14 PROCEDURE — 93306 TTE W/DOPPLER COMPLETE: CPT

## 2024-05-14 PROCEDURE — 93010 ELECTROCARDIOGRAM REPORT: CPT

## 2024-05-14 PROCEDURE — 70496 CT ANGIOGRAPHY HEAD: CPT | Mod: MC

## 2024-05-14 PROCEDURE — 85025 COMPLETE CBC W/AUTO DIFF WBC: CPT

## 2024-05-14 PROCEDURE — 93005 ELECTROCARDIOGRAM TRACING: CPT

## 2024-05-14 PROCEDURE — 70450 CT HEAD/BRAIN W/O DYE: CPT | Mod: MC

## 2024-05-14 PROCEDURE — 97162 PT EVAL MOD COMPLEX 30 MIN: CPT

## 2024-05-14 PROCEDURE — 99285 EMERGENCY DEPT VISIT HI MDM: CPT

## 2024-05-14 PROCEDURE — 82746 ASSAY OF FOLIC ACID SERUM: CPT

## 2024-05-14 PROCEDURE — 36415 COLL VENOUS BLD VENIPUNCTURE: CPT

## 2024-05-14 PROCEDURE — 70551 MRI BRAIN STEM W/O DYE: CPT | Mod: MC

## 2024-05-14 PROCEDURE — 82607 VITAMIN B-12: CPT

## 2024-05-14 PROCEDURE — 84484 ASSAY OF TROPONIN QUANT: CPT

## 2024-05-14 PROCEDURE — 73562 X-RAY EXAM OF KNEE 3: CPT

## 2024-05-14 PROCEDURE — 87186 SC STD MICRODIL/AGAR DIL: CPT

## 2024-05-14 PROCEDURE — 97110 THERAPEUTIC EXERCISES: CPT

## 2024-05-14 PROCEDURE — 81003 URINALYSIS AUTO W/O SCOPE: CPT

## 2024-05-14 PROCEDURE — 97112 NEUROMUSCULAR REEDUCATION: CPT

## 2024-05-14 PROCEDURE — 80061 LIPID PANEL: CPT

## 2024-05-14 PROCEDURE — 84443 ASSAY THYROID STIM HORMONE: CPT

## 2024-05-14 PROCEDURE — 97116 GAIT TRAINING THERAPY: CPT

## 2024-05-14 PROCEDURE — 93971 EXTREMITY STUDY: CPT

## 2024-05-14 PROCEDURE — 80048 BASIC METABOLIC PNL TOTAL CA: CPT

## 2024-05-14 PROCEDURE — 0225U NFCT DS DNA&RNA 21 SARSCOV2: CPT

## 2024-05-14 PROCEDURE — 99232 SBSQ HOSP IP/OBS MODERATE 35: CPT

## 2024-05-14 RX ORDER — SIMVASTATIN 20 MG/1
1 TABLET, FILM COATED ORAL
Qty: 0 | Refills: 0 | DISCHARGE
Start: 2024-05-14

## 2024-05-14 RX ORDER — CEFPODOXIME PROXETIL 100 MG
1 TABLET ORAL
Qty: 0 | Refills: 0 | DISCHARGE
Start: 2024-05-14

## 2024-05-14 RX ORDER — RIVAROXABAN 15 MG-20MG
1 KIT ORAL
Refills: 0 | DISCHARGE

## 2024-05-14 RX ORDER — ASPIRIN/CALCIUM CARB/MAGNESIUM 324 MG
1 TABLET ORAL
Qty: 0 | Refills: 0 | DISCHARGE
Start: 2024-05-14

## 2024-05-14 RX ORDER — CEFPODOXIME PROXETIL 100 MG
200 TABLET ORAL EVERY 12 HOURS
Refills: 0 | Status: DISCONTINUED | OUTPATIENT
Start: 2024-05-14 | End: 2024-05-14

## 2024-05-14 RX ORDER — DONEPEZIL HYDROCHLORIDE 10 MG/1
1 TABLET, FILM COATED ORAL
Qty: 0 | Refills: 0 | DISCHARGE
Start: 2024-05-14

## 2024-05-14 RX ORDER — RIVAROXABAN 15 MG-20MG
1 KIT ORAL
Qty: 0 | Refills: 0 | DISCHARGE
Start: 2024-05-14

## 2024-05-14 RX ADMIN — POLYETHYLENE GLYCOL 3350 17 GRAM(S): 17 POWDER, FOR SOLUTION ORAL at 12:29

## 2024-05-14 RX ADMIN — Medication 240 MILLIGRAM(S): at 05:21

## 2024-05-14 RX ADMIN — PANTOPRAZOLE SODIUM 40 MILLIGRAM(S): 20 TABLET, DELAYED RELEASE ORAL at 05:21

## 2024-05-14 RX ADMIN — RIVAROXABAN 15 MILLIGRAM(S): KIT at 16:59

## 2024-05-14 RX ADMIN — Medication 81 MILLIGRAM(S): at 12:29

## 2024-05-14 NOTE — PROGRESS NOTE ADULT - ASSESSMENT
89 F hx afib on Xarelto, CAD with severe LAD disease s/p PCI in 3/2020, AS s/p tavr, HTN, HLD, thyroid nodule who presents accompanied by son and daughter for period of confusion.     Admitted with ams, now resolved  - no sign of acute ischemia and troponin is negative  - history of cad with severe lad disease, s/p pci in 2020  - cont asa and statin     - history of severe as s/p tavr in 2021  - -Echo as above 5/13/2024 ef 57% full report as above   - no sign of volume overload on exam other than longstanding lower extremity edema attributable to venous insufficiency.    - paf   -tele sb overnight   -falsely elevated rates on tele due to tremor , on exam patient was in 60s   - cont ac with xarelto and cont ccb    - Monitor and replete lytes, keep K>4, Mg>2.  - Will continue to follow.    89 F hx afib on Xarelto, CAD with severe LAD disease s/p PCI in 3/2020, AS s/p tavr, HTN, HLD, thyroid nodule who presents accompanied by son and daughter for period of confusion.     Admitted with ams, now resolved  - no sign of acute ischemia and troponin is negative  - history of cad with severe lad disease, s/p pci in 2020  - cont asa and statin     - history of severe as s/p tavr in 2021  - -Echo as above 5/13/2024 ef 57% full report as above   - no sign of volume overload on exam other than longstanding lower extremity edema attributable to venous insufficiency.    - paf   - tele sb overnight   - falsely elevated rates on tele due to tremor , on exam patient was in 60s   - cont ac with xarelto and cont ccb    - Monitor and replete lytes, keep K>4, Mg>2.  - Will continue to follow.

## 2024-05-14 NOTE — DISCHARGE NOTE PROVIDER - HOSPITAL COURSE
This is a 89 F hx afib on Xarelto, CAD, valvular disease, HTN, HLD, thyroid nodule who presents accompanied by son and daughter for period of confusion. Pt is confused/forgetful at baseline. Daughter received message from pt around noon that seemed fine but then received weird message from pt around 2PM. Went to pt's house and she wasn't acting her usual self. By the time they arrived in ED pt at her normal baseline. She sustained a fall 2 weeks ago. She was seen in this ED. States she has knee pain/bruising/swelling that is extending throughout her knee, making it difficult to walk due to trouble bending her leg. Denies falling again.    Patient likely with metabolic encephalopathy possibly 2/2 to UTI  MRI/CT negative for CVA  Treated with abx  Going to [facility]    >35 minutes spent on discharge

## 2024-05-14 NOTE — DISCHARGE NOTE NURSING/CASE MANAGEMENT/SOCIAL WORK - NSDCPEXARELTO_GEN_ALL_CORE
Rivaroxaban/Xarelto - Compliance/Rivaroxaban/Xarelto - Dietary Advice/Rivaroxaban/Xarelto - Follow up monitoring/Rivaroxaban/Xarelto - Potential for adverse drug reactions and interactions Yes

## 2024-05-14 NOTE — DISCHARGE NOTE PROVIDER - CARE PROVIDER_API CALL
GLORY SHAW  [address] [place] [address]  Phone: [phone]  Fax: [phone]  Established Patient  Follow Up Time: 1 week

## 2024-05-14 NOTE — DISCHARGE NOTE PROVIDER - NSDCMRMEDTOKEN_GEN_ALL_CORE_FT
aspirin 81 mg oral delayed release tablet: 1 tab(s) orally once a day  cefpodoxime 200 mg oral tablet: 1 tab(s) orally every 12 hours x 5 days  dilTIAZem 240 mg/24 hours oral capsule, extended release: 1 cap(s) orally once a day  donepezil 5 mg oral tablet: 1 tab(s) orally once a day (at bedtime)  methenamine hippurate 1 g oral tablet: 1 tab(s) orally once a day  rivaroxaban 15 mg oral tablet: 1 tab(s) orally once a day (before a meal) with dinner  simvastatin 10 mg oral tablet: 1 tab(s) orally once a day (at bedtime)

## 2024-05-14 NOTE — PROGRESS NOTE ADULT - SUBJECTIVE AND OBJECTIVE BOX
[facility] -- Ansley Morales,  Jennifer, Imtiaz Dee Savella, Goodger, Cohen  Office # [phone]    Follow Up: PAfib    Subjective/Observations: Pt remains disoriented, alert to self only.  Denied any chest pain, palpitation, sob. Remains on RA.     REVIEW OF SYSTEMS: All other review of systems is negative unless indicated above  PAST MEDICAL & SURGICAL HISTORY:  HTN (Hypertension)      MVP (Mitral Valve Prolapse)  last echo 2010      Hypercholesterolemia      Hiatal Hernia      Thyroid Nodule      Constipation      DJD (Degenerative Joint Disease)  shoulders, knees, cervical and lumbar spine      History of Osteoarthritis      Diverticulosis of the Colon      Abdominal Mass      Afib      HLD (hyperlipidemia)      Aortic valve stenosis      CAD (coronary artery disease)      Osteopenia      H/O Shoulder Replacement  right shoulder  get clindamycin before surgery      Parathyroid  surgery      S/P Bunionectomy      Cataract, Other  surgery   bilateral      H/O ovarian cystectomy  2013      H/O knee surgery      S/P cardiac catheterization  2/26/21        MEDICATIONS  (STANDING):  aspirin enteric coated 81 milliGRAM(s) Oral daily  diltiazem    milliGRAM(s) Oral daily  pantoprazole    Tablet 40 milliGRAM(s) Oral before breakfast  polyethylene glycol 3350 17 Gram(s) Oral daily  rivaroxaban 15 milliGRAM(s) Oral with dinner  sodium chloride 0.9%. 1000 milliLiter(s) (50 mL/Hr) IV Continuous <Continuous>    MEDICATIONS  (PRN):  acetaminophen     Tablet .. 650 milliGRAM(s) Oral every 6 hours PRN Temp greater or equal to 38C (100.4F), Mild Pain (1 - 3)  aluminum hydroxide/magnesium hydroxide/simethicone Suspension 30 milliLiter(s) Oral every 4 hours PRN Dyspepsia  melatonin 3 milliGRAM(s) Oral at bedtime PRN Insomnia  ondansetron Injectable 4 milliGRAM(s) IV Push every 6 hours PRN Nausea and/or Vomiting    Allergies    codeine (Other)  penicillin (Flushing; Hypotension; Other)    Intolerances      Vital Signs Last 24 Hrs  T(C): 36.4 (13 May 2024 05:03), Max: 36.7 (12 May 2024 13:00)  T(F): 97.5 (13 May 2024 05:03), Max: 98 (12 May 2024 13:00)  HR: 71 (13 May 2024 05:03) (62 - 89)  BP: 167/76 (13 May 2024 05:03) (139/62 - 167/76)  BP(mean): --  RR: 18 (13 May 2024 05:03) (18 - 19)  SpO2: 95% (13 May 2024 05:03) (90% - 96%)    Parameters below as of 13 May 2024 05:03  Patient On (Oxygen Delivery Method): room air      I&O's Summary    12 May 2024 07:01  -  13 May 2024 07:00  --------------------------------------------------------  IN: 0 mL / OUT: 1850 mL / NET: -1850 mL        TELE:   PHYSICAL EXAM:  Constitutional: NAD, awake and alert  HEENT: Moist Mucous Membranes, Anicteric  Pulmonary: Non-labored, breath sounds are clear bilaterally, No wheezing, rales or rhonchi  Cardiovascular: Regular, S1 and S2, No murmurs, rubs, gallops or clicks  Gastrointestinal: Bowel Sounds present, soft, nontender.   Lymph: No peripheral edema. No lymphadenopathy.  Skin: No visible rashes or ulcers.  Psych: confuse  LABS: All Labs Reviewed:                        9.2    5.16  )-----------( 235      ( 12 May 2024 06:28 )             29.5                         10.1   7.58  )-----------( 261      ( 11 May 2024 19:15 )             31.8     12 May 2024 06:28    142    |  113    |  32     ----------------------------<  92     3.9     |  24     |  0.87   11 May 2024 19:15    137    |  106    |  41     ----------------------------<  109    4.4     |  26     |  1.40     Ca    8.4        12 May 2024 06:28  Ca    9.1        11 May 2024 19:15  Mg     2.5       12 May 2024 06:28    TPro  6.7    /  Alb  3.5    /  TBili  0.5    /  DBili  x      /  AST  23     /  ALT  24     /  AlkPhos  185    11 May 2024 19:15          12 Lead ECG:   Ventricular Rate 56 BPM    Atrial Rate 56 BPM    P-R Interval 186 ms    QRS Duration 88 ms    Q-T Interval 444 ms    QTC Calculation(Bazett) 428 ms    P Axis 113 degrees    R Axis 42 degrees    T Axis 62 degrees    Diagnosis Line Sinus bradycardia  Otherwise normal ECG  Confirmed by claudy Posada ([id number]) on 5/12/2024 12:20:57 PM (05-11-24 @ 17:48)      Patient name: RUBENS BARKER  YOB: 1934   Age: 87 (F)   MR#: [id number]  Study Date: 5/6/2021  Location: O/PSonographer: Mague Almazan RDCS  Study quality: Technically good  Referring Physician: Jp Jacobo MD  Blood Pressure: 158/65 mmHg  Height: 160 cm  Weight: 52 kg  BSA: 1.5 m2  ------------------------------------------------------------------------  PROCEDURE: Transthoracic echocardiogram with 2-D, M-Mode  and complete spectral and color flow Doppler.  INDICATION: Nonrheumatic aortic (valve) stenosis (I35.0)  ------------------------------------------------------------------------  Dimensions:    Normal Values:  LA:     3.5    2.0 - 4.0 cm  Ao:     3.3    2.0 - 3.8 cm  SEPTUM: 1.0    0.6 - 1.2 cm  PWT:    0.8    0.6 - 1.1 cm  LVIDd:  5.0    3.0 - 5.6 cm  LVIDs:  3.0    1.8 - 4.0 cm  Derived variables:  LVMI: 103 g/m2  RWT: 0.32  EF (Visual Estimate): 65 %  Doppler Peak Velocity (m/sec): AoV=2.1 TV=2.4  ------------------------------------------------------------------------  Observations:  Mitral Valve: Mitral annular and leaflet calcification.  Probably severe mitral regurgitation.  Aortic Valve/Aorta: s/p transcatheter aortic valve  replacement, with normal gradient.  ---LVOTd 2.18 cm. TVI 22.4 cm.  ---Aortic valve TVI 43.6 cm. Mean gradient 6.8 mmHg.  ---Aortic valve area by continuity 1.9 cm2.  Mild paravalvular aortic regurgitation.  Normal aortic root size.  Left Atrium: Severely dilated left atrium.  Left Ventricle: Mild-moderate left ventricular enlargement.  Normal left ventricular systolic function. No segmental  wall motion abnormalities.  Right Heart: Normal right atrium. Normal right ventricular  size and function.  Normal tricuspid valve. Mild tricuspid regurgitation.  Normal pulmonicvalve. Mild-moderate pulmonic  regurgitation.  Pericardium/Pleura: Normal pericardium with no pericardial  effusion.  Hemodynamic: Estimated right atrial pressure is severely  elevated.  Mild pulmonary hypertension. Estimated PASP 40 mmHg.  ------------------------------------------------------------------------  Conclusions:  Mild-moderate left ventricular enlargement.  Normal left ventricular systolic function. No segmental  wall motion abnormalities.  Probably severe mitral regurgitation.  s/ptranscatheter aortic valve replacement, with normal  gradient. Mild paravalvular aortic regurgitation.  Mild pulmonary hypertension.  ------------------------------------------------------------------------  Confirmed on  5/6/2021 - 16:54:28 by Remy Sheridan MD,  KIARRA  ------------------------------------------------------------------------     
Neurology Follow up note    RUBENS [id number]    HPI:  This is a 89 F hx afib on Xarelto, CAD, valvular disease, HTN, HLD, thyroid nodule who presents accompanied by son and daughter for period of confusion. Pt is confused/forgetful at baseline. Daughter received message from pt around noon that seemed fine but then received weird message from pt around 2PM. Went to pt's house and she wasn't acting her usual self. By the time they arrived in ED pt at her normal baseline. She sustained a fall 2 weeks ago. She was seen in this ED. States she has knee pain/bruising/swelling that is extending throughout her knee, making it difficult to walk due to trouble bending her leg. Denies falling again. (12 May 2024 07:01)      Interval History -doing better    Patient is seen, chart was reviewed and case was discussed with the treatment team.  Pt is not in any distress.   Lying on bed comfortably.       Vital Signs Last 24 Hrs  T(C): 36.9 (13 May 2024 12:50), Max: 36.9 (13 May 2024 12:50)  T(F): 98.5 (13 May 2024 12:50), Max: 98.5 (13 May 2024 12:50)  HR: 60 (13 May 2024 12:50) (60 - 71)  BP: 115/62 (13 May 2024 12:50) (115/62 - 167/76)  BP(mean): --  RR: 18 (13 May 2024 12:50) (18 - 18)  SpO2: 97% (13 May 2024 12:50) (95% - 97%)    Parameters below as of 13 May 2024 12:50  Patient On (Oxygen Delivery Method): room air            REVIEW OF SYSTEMS:    Constitutional: No fever, weight loss or fatigue  Eyes: No eye pain, visual disturbances, or discharge  ENT:  No difficulty hearing, tinnitus, vertigo; No sinus or throat pain  Neck: No pain or stiffness  Respiratory: No cough, wheezing, chills or hemoptysis  Cardiovascular: No chest pain, palpitations, shortness of breath, dizziness or leg swelling  Gastrointestinal: No abdominal or epigastric pain. No nausea, vomiting or hematemesis; No diarrhea or constipation.  Genitourinary: No dysuria, frequency, hematuria or incontinence  Neurological: No headaches,  loss of strength, numbness or tremors  Psychiatric: No depression, anxiety, mood swings or difficulty sleeping  Musculoskeletal: No joint pain or swelling; No muscle, back or extremity pain  Skin: No itching, burning, rashes or lesions   Lymph Nodes: No enlarged glands  Endocrine: No heat or cold intolerance; No hair loss,   Allergy and Immunologic: No hives or eczema    On Neurological Examination:    Mental Status - Pt is alert, awake, oriented X3.. Follows commands well and able to answer questions appropriately.Mood and affect  normal    Speech -  Normal.     Cranial Nerves - Pupils 3 mm equal and reactive to light, extraocular eye movements intact. Pt has no visual field deficit.  Pt has no  facial asymmetry. Facial sensation is intact.Tongue - is in midline.    Muscle tone - is normal all over. Moves all extremities equally. No asymmetry is seen.      Motor Exam - 4+/5 all over,   No drift. No shaking or tremors.    Sensory Exam - Pt withdraws all extremities equally on stimulation. No asymmetry seen. No complaints of tingling, numbness.        coordination:    Finger to nose: normal    Heel to shin: normal    Deep tendon Reflexes - 2 plus all over.            Neck Supple -  Yes.     MEDICATIONS    acetaminophen     Tablet .. 650 milliGRAM(s) Oral every 6 hours PRN  aluminum hydroxide/magnesium hydroxide/simethicone Suspension 30 milliLiter(s) Oral every 4 hours PRN  aspirin enteric coated 81 milliGRAM(s) Oral daily  diltiazem    milliGRAM(s) Oral daily  donepezil 5 milliGRAM(s) Oral at bedtime  melatonin 3 milliGRAM(s) Oral at bedtime PRN  ondansetron Injectable 4 milliGRAM(s) IV Push every 6 hours PRN  pantoprazole    Tablet 40 milliGRAM(s) Oral before breakfast  polyethylene glycol 3350 17 Gram(s) Oral daily  rivaroxaban 15 milliGRAM(s) Oral with dinner  simvastatin 10 milliGRAM(s) Oral at bedtime      Allergies    codeine (Other)  penicillin (Flushing; Hypotension; Other)    Intolerances        LABS:  CBC Full  -  ( 12 May 2024 06:28 )  WBC Count : 5.16 K/uL  RBC Count : 3.17 M/uL  Hemoglobin : 9.2 g/dL  Hematocrit : 29.5 %  Platelet Count - Automated : 235 K/uL  Mean Cell Volume : 93.1 fl  Mean Cell Hemoglobin : 29.0 pg  Mean Cell Hemoglobin Concentration : 31.2 gm/dL  Auto Neutrophil # : x  A    Urinalysis Basic - ( 12 May 2024 14:35 )    Color: Yellow / Appearance: Clear / S.015 / pH: x  Gluc: x / Ketone: Negative mg/dL  / Bili: Negative / Urobili: 0.2 mg/dL   Blood: x / Protein: Negative mg/dL / Nitrite: Negative   Leuk Esterase: Negative / RBC: x / WBC x   Sq Epi: x / Non Sq Epi: x / Bacteria: x          142  |  113<H>  |  32<H>  ----------------------------<  92  3.9   |  24  |  0.87    Ca    8.4<L>      12 May 2024 06:28  Mg     2.5         TPro  6.7  /  Alb  3.5  /  TBili  0.5  /  DBili  x   /  AST  23  /  ALT  24  /  AlkPhos  185<H>      Hemoglobin A1C:     Vitamin B12     RADIOLOGY    ASSESSMENT AND PLAN:      seen for ams  likely ME  MCI vs early dementia    stated on aricept  Physical therapy evaluation.  OOB to chair/ambulation with assistance only.  Pain is accessed and addressed.  Plan of care was discussed with family. Questions answered.  Would continue to follow.              
Neurology Follow up note    RUBENS [id number]    HPI:  This is a 89 F hx afib on Xarelto, CAD, valvular disease, HTN, HLD, thyroid nodule who presents accompanied by son and daughter for period of confusion. Pt is confused/forgetful at baseline. Daughter received message from pt around noon that seemed fine but then received weird message from pt around 2PM. Went to pt's house and she wasn't acting her usual self. By the time they arrived in ED pt at her normal baseline. She sustained a fall 2 weeks ago. She was seen in this ED. States she has knee pain/bruising/swelling that is extending throughout her knee, making it difficult to walk due to trouble bending her leg. Denies falling again. (12 May 2024 07:01)      Interval History -no N/V/Diarrhea     Patient is seen, chart was reviewed and case was discussed with the treatment team.  Pt is not in any distress.   Lying on bed comfortably.       Vital Signs Last 24 Hrs  T(C): 36.6 (14 May 2024 12:51), Max: 37.1 (13 May 2024 20:12)  T(F): 97.8 (14 May 2024 12:51), Max: 98.8 (13 May 2024 20:12)  HR: 62 (14 May 2024 12:51) (60 - 67)  BP: 116/68 (14 May 2024 12:51) (116/68 - 153/68)  BP(mean): --  RR: 18 (14 May 2024 12:51) (18 - 18)  SpO2: 95% (14 May 2024 12:51) (95% - 99%)    Parameters below as of 14 May 2024 12:51  Patient On (Oxygen Delivery Method): room air                REVIEW OF SYSTEMS:    Constitutional: No fever, weight loss or fatigue  Eyes: No eye pain, visual disturbances, or discharge  ENT:  No difficulty hearing, tinnitus, vertigo; No sinus or throat pain  Neck: No pain or stiffness  Respiratory: No cough, wheezing, chills or hemoptysis  Cardiovascular: No chest pain, palpitations, shortness of breath, dizziness or leg swelling  Gastrointestinal: No abdominal or epigastric pain. No nausea, vomiting or hematemesis; No diarrhea or constipation.  Genitourinary: No dysuria, frequency, hematuria or incontinence  Neurological: No headaches,  loss of strength, numbness or tremors  Psychiatric: No depression, anxiety, mood swings or difficulty sleeping  Musculoskeletal: No joint pain or swelling; No muscle, back or extremity pain  Skin: No itching, burning, rashes or lesions   Lymph Nodes: No enlarged glands  Endocrine: No heat or cold intolerance; No hair loss,   Allergy and Immunologic: No hives or eczema    On Neurological Examination:    Mental Status - Pt is alert, awake, oriented X3.. Follows commands well and able to answer questions appropriately.Mood and affect  normal    Speech -  Normal.     Cranial Nerves - Pupils 3 mm equal and reactive to light, extraocular eye movements intact. Pt has no visual field deficit.  Pt has no  facial asymmetry. Facial sensation is intact.Tongue - is in midline.    Muscle tone - is normal all over. Moves all extremities equally. No asymmetry is seen.      Motor Exam - 4+/5 all over,   No drift. No shaking or tremors.    Sensory Exam - Pt withdraws all extremities equally on stimulation. No asymmetry seen. No complaints of tingling, numbness.        coordination:    Finger to nose: normal    Heel to shin: normal    Deep tendon Reflexes - 2 plus all over.            Neck Supple -  Yes.     MEDICATIONS    acetaminophen     Tablet .. 650 milliGRAM(s) Oral every 6 hours PRN  aluminum hydroxide/magnesium hydroxide/simethicone Suspension 30 milliLiter(s) Oral every 4 hours PRN  aspirin enteric coated 81 milliGRAM(s) Oral daily  diltiazem    milliGRAM(s) Oral daily  donepezil 5 milliGRAM(s) Oral at bedtime  melatonin 3 milliGRAM(s) Oral at bedtime PRN  ondansetron Injectable 4 milliGRAM(s) IV Push every 6 hours PRN  pantoprazole    Tablet 40 milliGRAM(s) Oral before breakfast  polyethylene glycol 3350 17 Gram(s) Oral daily  rivaroxaban 15 milliGRAM(s) Oral with dinner  simvastatin 10 milliGRAM(s) Oral at bedtime      Allergies    codeine (Other)  penicillin (Flushing; Hypotension; Other)    Intolerances            Hemoglobin A1C:     Vitamin B12     RADIOLOGY    ASSESSMENT AND PLAN:      seen for ams  likely ME  MCI vs early dementia    tolerating  aricept  Physical therapy evaluation.  OOB to chair/ambulation with assistance only.  Pain is accessed and addressed.  Plan of care was discussed with family. Questions answered.  Would continue to follow.        
[facility] -- Jennifer Panchal Pannella, Patel, Savella Goodger, Cohen  Office # [phone]      Follow Up:    pafib     Subjective/Observations:   Seen bedside, sitting in chair on room air , offers no complaints  tele reporting afib 120, but on exam rates 60, has fine tremor which is likely causing false tele reading     REVIEW OF SYSTEMS: All other review of systems is negative unless indicated above    PAST MEDICAL & SURGICAL HISTORY:  HTN (Hypertension)      MVP (Mitral Valve Prolapse)  last echo       Hypercholesterolemia      Hiatal Hernia      Thyroid Nodule      Constipation      DJD (Degenerative Joint Disease)  shoulders, knees, cervical and lumbar spine      History of Osteoarthritis      Diverticulosis of the Colon      Abdominal Mass      Afib      HLD (hyperlipidemia)      Aortic valve stenosis      CAD (coronary artery disease)      Osteopenia      H/O Shoulder Replacement  right shoulder  get clindamycin before surgery      Parathyroid  surgery      S/P Bunionectomy      Cataract, Other  surgery   bilateral      H/O ovarian cystectomy        H/O knee surgery      S/P cardiac catheterization  21          MEDICATIONS  (STANDING):  aspirin enteric coated 81 milliGRAM(s) Oral daily  cefpodoxime 200 milliGRAM(s) Oral every 12 hours  diltiazem    milliGRAM(s) Oral daily  donepezil 5 milliGRAM(s) Oral at bedtime  pantoprazole    Tablet 40 milliGRAM(s) Oral before breakfast  polyethylene glycol 3350 17 Gram(s) Oral daily  rivaroxaban 15 milliGRAM(s) Oral with dinner  simvastatin 10 milliGRAM(s) Oral at bedtime    MEDICATIONS  (PRN):  acetaminophen     Tablet .. 650 milliGRAM(s) Oral every 6 hours PRN Temp greater or equal to 38C (100.4F), Mild Pain (1 - 3)  aluminum hydroxide/magnesium hydroxide/simethicone Suspension 30 milliLiter(s) Oral every 4 hours PRN Dyspepsia  melatonin 3 milliGRAM(s) Oral at bedtime PRN Insomnia  ondansetron Injectable 4 milliGRAM(s) IV Push every 6 hours PRN Nausea and/or Vomiting      Allergies    codeine (Other)  penicillin (Flushing; Hypotension; Other)    Intolerances        Vital Signs Last 24 Hrs  T(C): 36.3 (14 May 2024 04:45), Max: 37.1 (13 May 2024 20:12)  T(F): 97.3 (14 May 2024 04:45), Max: 98.8 (13 May 2024 20:12)  HR: 65 (14 May 2024 05:15) (60 - 67)  BP: 153/68 (14 May 2024 04:45) (115/62 - 153/68)  BP(mean): --  RR: 18 (14 May 2024 04:45) (18 - 18)  SpO2: 97% (14 May 2024 04:45) (97% - 99%)    Parameters below as of 14 May 2024 04:45  Patient On (Oxygen Delivery Method): room air        I&O's Summary    13 May 2024 07:01  -  14 May 2024 07:00  --------------------------------------------------------  IN: 0 mL / OUT: 900 mL / NET: -900 mL      PHYSICAL EXAM:  TELE:    Constitutional: NAD, awake and alert, well-developed  HEENT: Moist Mucous Membranes, Anicteric  Pulmonary: Non-labored, breath sounds are clear bilaterally, No wheezing, crackles or rhonchi  Cardiovascular: Regular, S1 and S2 nl, murmur   Gastrointestinal: Bowel Sounds present, soft, nontender.   Lymph: chronic peripheral edema.  Skin: No visible rashes or ulcers.  Psych:  Mood & affect appropriate    LABS: All Labs Reviewed:                        9.2    5.16  )-----------( 235      ( 12 May 2024 06:28 )             29.5                         10.1   7.58  )-----------( 261      ( 11 May 2024 19:15 )             31.8     12 May 2024 06:28    142    |  113    |  32     ----------------------------<  92     3.9     |  24     |  0.87   11 May 2024 19:15    137    |  106    |  41     ----------------------------<  109    4.4     |  26     |  1.40     Ca    8.4        12 May 2024 06:28  Ca    9.1        11 May 2024 19:15  Mg     2.5       12 May 2024 06:28    TPro  6.7    /  Alb  3.5    /  TBili  0.5    /  DBili  x      /  AST  23     /  ALT  24     /  AlkPhos  185    11 May 2024 19:15             EC Lead ECG:   Ventricular Rate 56 BPM    Atrial Rate 56 BPM    P-R Interval 186 ms    QRS Duration 88 ms    Q-T Interval 444 ms    QTC Calculation(Bazett) 428 ms    P Axis 113 degrees    R Axis 42 degrees    T Axis 62 degrees    Diagnosis Line Sinus bradycardia  Otherwise normal ECG  Confirmed by claudy Posada ([id number]) on 2024 12:20:57 PM (24 @ 17:48)      TRANSTHORACIC ECHOCARDIOGRAM REPORT  ________________________________________________________________________________                                      _______       Pt. Name:       RUBENS BARKER Study Date:    2024  MRN:            [id number]           YOB: 1934  Accession #:    [id number]          Age:           89 years  Account#:       [id number]         Gender:        F  Visit ID#  Heart Rate:                        Height:        61.81 in (157.00 cm)  Rhythm:                     Weight:        112.43 lb (51.00 kg)  Blood Pressure: 167/76 mmHg        BSA/BMI:       1.49 m² / 20.69 kg/m²  ________________________________________________________________________________________  Referring Physician:    [id number] Torrey Weldon  Interpreting Physician: Anna Macdonald MD  Primary Sonographer:    Skylar Cassidy [facility]    CPT:               ECHO TTE WO CON COMP W DOPP - 82349.m  Indication(s):     Cerebral infarction, unspecified - I63.9  Procedure:         Transthoracic echocardiogram with 2-D, M-mode and complete                     spectral and color flow Doppler.  Ordering Location: [facility]  Admission Status:  Inpatient    _______________________________________________________________________________________     CONCLUSIONS:      1. Left ventricular cavity is normal in size. Left ventricular systolic function is normal with an ejection fraction of 57 % by Gold's method of disks.   2. Normal right ventricular cavity size and normal systolic function.  3. A TAVR valve is noted in the aortic position. There is no paravalvular regurg noted. The mean gradient is 12 mmHg with a normal Dimensionless index of 0.48.   4. There is moderate calcification of the mitral valve annulus.   5. Mild mitral regurgitation.   6. At least moderate pulmonic regurgitation, though the PV is not well visualized.   7. Structurally normal tricuspid valve with normal leaflet excursion. Trace tricuspid regurgitation.   8. Estimated pulmonary artery systolic pressure is 34mmHg, consistent with normal pulmonary artery pressure.   9. The inferior vena cava is normal in size (normal <2.1cm) with abnormal inspiratory collapse (abnormal <50%) consistent with mildly elevated right atrial pressure (~8, range 5-10mmHg).  10. No pericardial effusion seen.  11. Large right pleural effusion noted.    ________________________________________________________________________________________  FINDINGS:     Left Ventricle:  The left ventricular cavity is normal in size. Left ventricular systolic function is normal with a calculated ejection fraction of 57 % by the Gold's biplane method of disks. There is E/A reversal consistent with delayed relaxation of the LV.     Right Ventricle:  The right ventricular cavity is normal in size and normal systolic function.     Left Atrium:  The left atrium is mildly dilated.     Right Atrium:  The right atrium is normal in size.     Aortic Valve:  A a transcatheter deployed (TAVR) is present in the aortic position. The prosthetic valve is well seated with normal function. There is trace intravalvular regurgitation. There is no paravalvular regurgitation. The peak transaortic velocity is 2.37 m/s, peak transaortic gradient is 22.5 mmHg and mean transaortic gradient is 12.0 mmHg with an LVOT/aortic valve VTI ratio of 0.48. The aortic valve area is estimated at 1.37 cm² by the continuity equation.     Mitral Valve:  There is moderate calcification of the mitral valve annulus. There is mild mitral regurgitation.     Tricuspid Valve:  Structurally normal tricuspid valve with normal leaflet excursion. There is trace tricuspid regurgitation. Estimated pulmonary artery systolic pressure is 34 mmHg, consistent with normal pulmonary artery pressure.     Pulmonic Valve:  The pulmonic valve was not well visualized. There is moderate pulmonic regurgitation.     Aorta:  The aortic root at the sinuses of Valsalva is normal in size.     Pericardium:  No pericardial effusion seen.     Pleura:  Large right pleural effusion noted.     Systemic Veins:  The inferior vena cava is normal in size (normal <2.1cm) with abnormal inspiratory collapse (abnormal <50%) consistent with mildly elevated right atrial pressure (~8, range 5-10mmHg).  ____________________________________________________________________  QUANTITATIVE DATA:  Left Ventricle Measurements: (Indexed to BSA)     IVSd (2D):   1.1 cm  LVPWd (2D):  1.0 cm  LVIDd (2D):  4.0 cm  LVIDs (2D):  2.4 cm  LV Mass:     137 g  91.6 g/m²  LV Vol d, MOD A2C: 69.4 ml 46.48 ml/m²  LV Vol d, MOD A4C: 69.5 ml 46.55 ml/m²  LV Vol d, MOD BP:  69.2 ml 46.31 ml/m²  LV Vol s, MOD A2C: 27.2 ml 18.22 ml/m²  LV Vol s, MOD A4C: 29.3 ml 19.62 ml/m²  LV Vol s, MOD BP:  29.4 ml 19.70 ml/m²  LVOT SV MOD BP:    39.7 ml  LV EF% MOD BP:     57 %     MV E Vmax:    1.01 m/s  MV A Vmax:    1.21 m/s  MV E/A:       0.83  e' lateral:   7.62 cm/s  e' medial:    8.27 cm/s  E/e' lateral: 13.25  E/e' medial:  12.21  E/e' Average: 12.71  MV DT:        254 msec    Aorta Measurements: (Normal range) (Indexed to BSA)     Sinuses of Valsalva: 2.50 cm (2.7 - 3.3 cm)       Left Atrium Measurements: (Indexed to BSA)  LA Diam 2D: 2.80 cm       LVOT / RVOT/ Qp/Qs Data: (Indexed to BSA)  LVOT Diameter:  1.90 cm  LVOT Area:      2.84 cm²  LVOT Vmax:      1.08 m/s  LVOT Vmn:       0.756 m/s  LVOT VTI:       25.00 cm  LVOT peak grad: 5 mmHg  LVOT mean grad: 2.0 mmHg  LVOT SV:        70.9 ml   47.47 ml/m²    Aortic Valve Measurements:  AV Vmax:                2.4 m/s  AV Peak Gradient:       22.5 mmHg  AV Mean Gradient:       12.0 mmHg  AV VTI:                 51.9 cm  AV VTI Ratio:           0.48  AoV EOA, Contin:        1.37 cm²  AoV EOA, Contin i:      0.91 cm²/m²  AoV Dimensionless Index 0.48    Mitral Valve Measurements:     MV E Vmax: 1.0 m/s  MV A Vmax: 1.2 m/s  MV E/A:    0.8       Tricuspid Valve Measurements:     TR Vmean:         2.1 m/s  TR Vmax:          2.5 m/s  TR Peak Gradient: 25.6 mmHg  RA Pressure:      8 mmHg  PASP:             34 mmHg  TR VTI:           77.70 cm    ________________________________________________________________________________________  Electronically signed on 2024 at 11:36:13 AM by Anna Macdonald MD         *** Final ***      Radiology:        
Date of Service: 24 @ 10:50    Patient is a 89y old  Female who presents with a chief complaint of confusion, difficulty walking (13 May 2024 09:57)      INTERVAL HPI/OVERNIGHT EVENTS: Patient seen and examined. NAD. No complaints.    Vital Signs Last 24 Hrs  T(C): 36.4 (13 May 2024 05:03), Max: 36.7 (12 May 2024 13:00)  T(F): 97.5 (13 May 2024 05:03), Max: 98 (12 May 2024 13:00)  HR: 71 (13 May 2024 05:03) (62 - 89)  BP: 167/76 (13 May 2024 05:03) (139/62 - 167/76)  BP(mean): --  RR: 18 (13 May 2024 05:03) (18 - 19)  SpO2: 95% (13 May 2024 05:03) (90% - 96%)    Parameters below as of 13 May 2024 05:03  Patient On (Oxygen Delivery Method): room air            142  |  113<H>  |  32<H>  ----------------------------<  92  3.9   |  24  |  0.87    Ca    8.4<L>      12 May 2024 06:28  Mg     2.5         TPro  6.7  /  Alb  3.5  /  TBili  0.5  /  DBili  x   /  AST  23  /  ALT  24  /  AlkPhos  185<H>                            9.2    5.16  )-----------( 235      ( 12 May 2024 06:28 )             29.5       CAPILLARY BLOOD GLUCOSE        Urinalysis Basic - ( 12 May 2024 14:35 )    Color: Yellow / Appearance: Clear / S.015 / pH: x  Gluc: x / Ketone: Negative mg/dL  / Bili: Negative / Urobili: 0.2 mg/dL   Blood: x / Protein: Negative mg/dL / Nitrite: Negative   Leuk Esterase: Negative / RBC: x / WBC x   Sq Epi: x / Non Sq Epi: x / Bacteria: x    < from: MR Head No Cont (24 @ 14:26) >    ACC: [id number] EXAM:  MR BRAIN   ORDERED BY: ANILA EM     PROCEDURE DATE:  2024          INTERPRETATION:  .    CLINICAL INFORMATION: Cerebrovascular accident. Altered mental status.    TECHNIQUE: Multiplanar multisequential MRI of the brain was acquired   without the administration of IV gadolinium.    COMPARISON: Prior CT study of the head and CT angiogram studies of the   head and neck performed on 2024. No prior brain MRI studies are   available for comparison.    FINDINGS: Multiple patchy confluent nonspecific foci of T2/FLAIR   hyperintensity are noted throughout the deep and periventricular white   matter of the cerebral hemispheres. There is no associated mass effect.   There is no evidence of acute ischemia on the diffusion-weighted images.    A small area of encephalomalacia and gliosis is seen within the right   anteromedial temporal lobe. Ex vacuo dilatation of the right temporal   horn is notable.    There is diffuse cerebral volume loss with prominence of the sulci,   fissures, and cisternal spaces which is normal for the patient's age.   Ventricular size and configuration is unremarkable. Flow-voids are noted   throughout the major intracranial vessels, on the T2 weighted images,   consistent with their patency. The sellar region and posterior fossa   appear unremarkable.    The paranasal sinuses and tympanomastoid cavities are clear. Calvarial   signal is within normal limits. There is evidence of bilateral cataract   removal.    IMPRESSION: Noacute intracranial hemorrhage or evidence of acute   ischemia.    Multiple patchy confluent nonspecific abnormal white matter foci of   T2/FLAIR prolongation statistically favoring microvascular type changes.    --- End of Report ---            ALVIN CHOW MD; Attending Radiologist  This document has been electronically signed. May 12 2024  2:31PM    < end of copied text >            acetaminophen     Tablet .. 650 milliGRAM(s) Oral every 6 hours PRN  aluminum hydroxide/magnesium hydroxide/simethicone Suspension 30 milliLiter(s) Oral every 4 hours PRN  aspirin enteric coated 81 milliGRAM(s) Oral daily  diltiazem    milliGRAM(s) Oral daily  melatonin 3 milliGRAM(s) Oral at bedtime PRN  ondansetron Injectable 4 milliGRAM(s) IV Push every 6 hours PRN  pantoprazole    Tablet 40 milliGRAM(s) Oral before breakfast  polyethylene glycol 3350 17 Gram(s) Oral daily  rivaroxaban 15 milliGRAM(s) Oral with dinner  sodium chloride 0.9%. 1000 milliLiter(s) IV Continuous <Continuous>              REVIEW OF SYSTEMS:  CONSTITUTIONAL: No fever, no weight loss, or no fatigue  NECK: No pain, no stiffness  RESPIRATORY: No cough, no wheezing, no chills, no hemoptysis, No shortness of breath  CARDIOVASCULAR: No chest pain, no palpitations, no dizziness, no leg swelling  GASTROINTESTINAL: No abdominal pain. No nausea, no vomiting, no hematemesis; No diarrhea, no constipation. No melena, no hematochezia.  GENITOURINARY: No dysuria, no frequency, no hematuria, no incontinence  NEUROLOGICAL: No headaches, no loss of strength, no numbness, no tremors  SKIN: No itching, no burning  MUSCULOSKELETAL: No joint pain, no swelling; No muscle, no back, no extremity pain  PSYCHIATRIC: No depression, no mood swings,   HEME/LYMPH: No easy bruising, no bleeding gums  ALLERY AND IMMUNOLOGIC: No hives       Consultant(s) Notes Reviewed:  [X] YES  [ ] NO    PHYSICAL EXAM:  GENERAL: NAD  HEAD:  Atraumatic, Normocephalic  EYES: EOMI, PERRLA, conjunctiva and sclera clear  ENMT: No tonsillar erythema, exudates, or enlargement; Moist mucous membranes  NECK: Supple, No JVD  NERVOUS SYSTEM:  Awake & alert  CHEST/LUNG: Clear to auscultation bilaterally; No rales, rhonchi, wheezing,  HEART: Regular rate and rhythm  ABDOMEN: Soft, Nontender, Nondistended; Bowel sounds present  EXTREMITIES:  No clubbing, cyanosis, or edema  LYMPH: No lymphadenopathy noted  SKIN: No rashes      Advanced care planning discussed with patient/family [X] YES   [ ] NO    Advanced care planning discussed with patient/family. Patient's health status was discussed. All appropriate changes have been made regarding patient's end-of-life care. Advanced care planning forms reviewed/discussed/completed.  20 minutes spent.   
Date of Service: 24 @ 11:34    Patient is a 89y old  Female who presents with a chief complaint of confusion, difficulty walking (14 May 2024 10:33)      INTERVAL HPI/OVERNIGHT EVENTS: Patient seen and examined. NAD. No complaints.    Vital Signs Last 24 Hrs  T(C): 36.3 (14 May 2024 04:45), Max: 37.1 (13 May 2024 20:12)  T(F): 97.3 (14 May 2024 04:45), Max: 98.8 (13 May 2024 20:12)  HR: 65 (14 May 2024 05:15) (60 - 67)  BP: 153/68 (14 May 2024 04:45) (115/62 - 153/68)  BP(mean): --  RR: 18 (14 May 2024 04:45) (18 - 18)  SpO2: 97% (14 May 2024 04:45) (97% - 99%)    Parameters below as of 14 May 2024 04:45  Patient On (Oxygen Delivery Method): room air                  CAPILLARY BLOOD GLUCOSE        Urinalysis Basic - ( 12 May 2024 14:35 )    Color: Yellow / Appearance: Clear / S.015 / pH: x  Gluc: x / Ketone: Negative mg/dL  / Bili: Negative / Urobili: 0.2 mg/dL   Blood: x / Protein: Negative mg/dL / Nitrite: Negative   Leuk Esterase: Negative / RBC: x / WBC x   Sq Epi: x / Non Sq Epi: x / Bacteria: x      Culture - Urine (24 @ 14:35)   Specimen Source: Clean Catch Clean Catch (Midstream)  Culture Results:   10,000 - 49,000 CFU/mL Escherichia coli      Historical Values  Culture - Urine (24 @ 14:35)   Specimen Source: Clean Catch Clean Catch (Midstream)  Culture Results:   10,000 - 49,000 CFU/mL Escherichia coli        acetaminophen     Tablet .. 650 milliGRAM(s) Oral every 6 hours PRN  aluminum hydroxide/magnesium hydroxide/simethicone Suspension 30 milliLiter(s) Oral every 4 hours PRN  aspirin enteric coated 81 milliGRAM(s) Oral daily  cefpodoxime 200 milliGRAM(s) Oral every 12 hours  diltiazem    milliGRAM(s) Oral daily  donepezil 5 milliGRAM(s) Oral at bedtime  melatonin 3 milliGRAM(s) Oral at bedtime PRN  ondansetron Injectable 4 milliGRAM(s) IV Push every 6 hours PRN  pantoprazole    Tablet 40 milliGRAM(s) Oral before breakfast  polyethylene glycol 3350 17 Gram(s) Oral daily  rivaroxaban 15 milliGRAM(s) Oral with dinner  simvastatin 10 milliGRAM(s) Oral at bedtime              REVIEW OF SYSTEMS:  CONSTITUTIONAL: No fever, no weight loss, or no fatigue  NECK: No pain, no stiffness  RESPIRATORY: No cough, no wheezing, no chills, no hemoptysis, No shortness of breath  CARDIOVASCULAR: No chest pain, no palpitations, no dizziness, no leg swelling  GASTROINTESTINAL: No abdominal pain. No nausea, no vomiting, no hematemesis; No diarrhea, no constipation. No melena, no hematochezia.  GENITOURINARY: No dysuria, no frequency, no hematuria, no incontinence  NEUROLOGICAL: No headaches, no loss of strength, no numbness, no tremors  SKIN: No itching, no burning  MUSCULOSKELETAL: No joint pain, no swelling; No muscle, no back, no extremity pain  PSYCHIATRIC: No depression, no mood swings,   HEME/LYMPH: No easy bruising, no bleeding gums  ALLERY AND IMMUNOLOGIC: No hives       Consultant(s) Notes Reviewed:  [X] YES  [ ] NO    PHYSICAL EXAM:  GENERAL: NAD  HEAD:  Atraumatic, Normocephalic  EYES: EOMI, PERRLA, conjunctiva and sclera clear  ENMT: No tonsillar erythema, exudates, or enlargement; Moist mucous membranes  NECK: Supple, No JVD  NERVOUS SYSTEM:  Awake & alert  CHEST/LUNG: Clear to auscultation bilaterally; No rales, rhonchi, wheezing,  HEART: Regular rate and rhythm  ABDOMEN: Soft, Nontender, Nondistended; Bowel sounds present  EXTREMITIES:  No clubbing, cyanosis, or edema  LYMPH: No lymphadenopathy noted  SKIN: No rashes      Advanced care planning discussed with patient/family [X] YES   [ ] NO    Advanced care planning discussed with patient/family. Patient's health status was discussed. All appropriate changes have been made regarding patient's end-of-life care. Advanced care planning forms reviewed/discussed/completed.  20 minutes spent.

## 2024-05-14 NOTE — DISCHARGE NOTE NURSING/CASE MANAGEMENT/SOCIAL WORK - NSDCPEFALRISK_GEN_ALL_CORE
For information on Fall & Injury Prevention, visit: [email or web address] OR  [email or web address] OR  https://www.cdc.gov/steadi/patient.html

## 2024-05-14 NOTE — PROGRESS NOTE ADULT - PROBLEM SELECTOR PLAN 1
Resolved -- likely metabolic encephalopathy from UTI  CVA ruled out  Continue ASA 81mg qd  Patient was taken off statin as outpatient -- restarted  PT  Neuro f/u
Resolved  CVA ruled out  Continue ASA 81mg qd  Patient was taken off statin as outpatient  Neuro check q4h  Check ECHO  PT  Neuro f/u  Further work-up/management pending clinical course.

## 2024-05-14 NOTE — PROGRESS NOTE ADULT - REASON FOR ADMISSION
confusion, difficulty walking

## 2024-05-14 NOTE — DISCHARGE NOTE PROVIDER - NSDCFUSCHEDAPPT_GEN_ALL_CORE_FT
Phillip Rodriguez Physician [facility]  CARDIOLOGY [address] [facility]  Scheduled Appointment: 07/15/2024

## 2024-05-14 NOTE — PROGRESS NOTE ADULT - PROBLEM SELECTOR PLAN 2
Appt. scheduled  
From: Dirk Harris  To: Nicolasa Gonzalez MD  Sent: 3/24/2020 12:18 PM CDT  Subject: Non-Urgent Medical Question    Hi Dr. Gonzalez- This is Dirk's wife Jess. He is working today and asked me to reach out to you. He hasn't had a fever but has had a cough with light green to darker green mucus that doesn't seem to want to go away. He can't come in to get checked out because he is the only one working right now and can't take off work. He wants to know if you can prescribe him a Z-pack or something else or what would you suggest? If you need to get any more information, his # is [phone].    Thanks!  
Please schedule a video or telephone call. Thanks.  
Continue Cardizem for rate control  Continue Xarelto  Cardio f/u
Continue Cardizem for rate control  Continue Xarelto  Check ECHO  Cardio f/u  Further work-up/management pending clinical course.

## 2024-05-14 NOTE — PROGRESS NOTE ADULT - NSPROGADDITIONALINFOA_GEN_ALL_CORE
Daughter Maricruz [phone]  D/C planning
Carlo Alcantara [phone]  D/C planning to [facility]
CBC/BMP/PT/PTT/CMP

## 2024-05-14 NOTE — DISCHARGE NOTE NURSING/CASE MANAGEMENT/SOCIAL WORK - PATIENT PORTAL LINK FT
You can access the FollowMyHealth Patient Portal offered by [facility] by registering at the following website: [email or web address]. By joining [facility]’s FollowMyHealth portal, you will also be able to view your health information using other applications (apps) compatible with our system.

## 2024-05-14 NOTE — DISCHARGE NOTE PROVIDER - NSDCCPCAREPLAN_GEN_ALL_CORE_FT
PRINCIPAL DISCHARGE DIAGNOSIS  Diagnosis: Metabolic encephalopathy  Assessment and Plan of Treatment: Follow-up with your primary care doctor within 1 week.        SECONDARY DISCHARGE DIAGNOSES  Diagnosis: Acute UTI  Assessment and Plan of Treatment: Finish course of antibiotics.      Diagnosis: Atrial fibrillation  Assessment and Plan of Treatment: Continue current medications

## 2024-05-14 NOTE — PROGRESS NOTE ADULT - NS ATTEND AMEND GEN_ALL_CORE FT
89 F hx afib on Xarelto, CAD with severe LAD disease s/p PCI in 3/2020, AS s/p tavr, HTN, HLD, thyroid nodule who presents accompanied by son and daughter for period of confusion.     - episodes of confusion and neuro work up/evaluation is in progress.    - no sign of acute ischemia and troponin is negative  - history of cad with severe lad disease, s/p pci in 2020  - cont asa, statin if able   - may need to start on losartan for bp control. will need neurp input regarding permissive htn.     - history of severe as s/p tavr in 2021  - normal LV function at this time  - no sign of volume overload on exam other than longstanding lower extremity edema attributable to venous insufficiency.    - paf, in sr on ek  - cont ac with xarelto and cont ccb    - trend creatinine and electrolytes. Keep K>4, mg>2  - will follow with you
89 F hx afib on Xarelto, CAD with severe LAD disease s/p PCI in 3/2020, AS s/p tavr, HTN, HLD, thyroid nodule who presents accompanied by son and daughter for period of confusion.     - episodes of confusion and neuro work up/evaluation is in progress.    - no sign of acute ischemia and troponin is negative  - history of cad with severe lad disease, s/p pci in 2020  - cont asa, statin if able   - may need to start on losartan for bp control.    - history of severe as s/p tavr in 2021  - normal LV function at this time  - no sign of volume overload on exam other than longstanding lower extremity edema attributable to venous insufficiency.    - paf, in sr on ekg  - fast rates? but sr on exam and ekg  - cont ac with xarelto and cont ccb    - trend creatinine and electrolytes. Keep K>4, mg>2  - will follow with you.

## 2024-05-14 NOTE — SOCIAL WORK PROGRESS NOTE - NSSWPROGRESSNOTE_GEN_ALL_CORE
pt for dc to [facility]. Pt and family in agreement, sw spoke to dtr who plans on coming to hospital today at 3:30. [facility] to call for 4:15 . all paperwork to accompany pt. no further sw services indicated at this time.

## 2024-07-25 ENCOUNTER — APPOINTMENT (OUTPATIENT)
Dept: CARDIOLOGY | Facility: CLINIC | Age: 89
End: 2024-07-25
Payer: MEDICARE

## 2024-07-25 ENCOUNTER — NON-APPOINTMENT (OUTPATIENT)
Age: 89
End: 2024-07-25

## 2024-07-25 VITALS
DIASTOLIC BLOOD PRESSURE: 75 MMHG | BODY MASS INDEX: 19.51 KG/M2 | OXYGEN SATURATION: 99 % | WEIGHT: 106 LBS | SYSTOLIC BLOOD PRESSURE: 118 MMHG | HEART RATE: 116 BPM | HEIGHT: 62 IN

## 2024-07-25 DIAGNOSIS — I48.0 PAROXYSMAL ATRIAL FIBRILLATION: ICD-10-CM

## 2024-07-25 PROCEDURE — G2211 COMPLEX E/M VISIT ADD ON: CPT

## 2024-07-25 PROCEDURE — 93000 ELECTROCARDIOGRAM COMPLETE: CPT

## 2024-07-25 PROCEDURE — 99214 OFFICE O/P EST MOD 30 MIN: CPT

## 2024-07-25 RX ORDER — DONEPEZIL HYDROCHLORIDE 5 MG/1
5 TABLET ORAL
Refills: 0 | Status: ACTIVE | COMMUNITY

## 2024-07-25 RX ORDER — SIMVASTATIN 10 MG/1
10 TABLET, FILM COATED ORAL
Refills: 0 | Status: ACTIVE | COMMUNITY

## 2024-07-25 NOTE — DISCUSSION/SUMMARY
[FreeTextEntry1] : Clinically the patient is doing well.  She does not have any symptoms of angina.  Although she is typically in sinus rhythm, today she is in atrial fibrillation.  On her EKG, and on her initial vital signs, heart rate is somewhat accelerated.  At rest during the examination her heart rate is normal.   She has longstanding lower extremity edema attributable to venous insufficiency.  She generally wears compression socks, and she will continue this. Her falls are a concern and this was discussed at length.  We discussed the possibility of stopping anticoagulation and replacing it with nothing, and we discussed the possibility of a Watchman procedure.  For now, I think she can simply continue Xarelto.  I reviewed her blood work from July 2022.  This revealed a hemoglobin of 10.6.  Echocardiography performed in July 2022 revealed an ejection fraction of 65% with mild to moderate mitral regurgitation and normal function for transcatheter aortic valve prosthesis.  I reviewed her catheterization from March 1, 2021 it was at that time that she had rotational atherectomy, balloon angioplasty and stenting of her mid LAD.  No additional testing is necessary at this time.I will see her in 6 months.  I would consider another echo at that time.   [EKG obtained to assist in diagnosis and management of assessed problem(s)] : EKG obtained to assist in diagnosis and management of assessed problem(s)

## 2024-07-25 NOTE — PHYSICAL EXAM
[General Appearance - Well Developed] : well developed [Normal Appearance] : normal appearance [Well Groomed] : well groomed [General Appearance - Well Nourished] : well nourished [No Deformities] : no deformities [General Appearance - In No Acute Distress] : no acute distress [Normal Conjunctiva] : the conjunctiva exhibited no abnormalities [Normal Oral Mucosa] : normal oral mucosa [Normal Jugular Venous A Waves Present] : normal jugular venous A waves present [Normal Jugular Venous V Waves Present] : normal jugular venous V waves present [No Jugular Venous Moran A Waves] : no jugular venous moran A waves [Respiration, Rhythm And Depth] : normal respiratory rhythm and effort [Exaggerated Use Of Accessory Muscles For Inspiration] : no accessory muscle use [Auscultation Breath Sounds / Voice Sounds] : lungs were clear to auscultation bilaterally [Bowel Sounds] : normal bowel sounds [Abdomen Soft] : soft [Abdomen Tenderness] : non-tender [Abnormal Walk] : normal gait [Gait - Sufficient For Exercise Testing] : the gait was sufficient for exercise testing [Nail Clubbing] : no clubbing of the fingernails [Cyanosis, Localized] : no localized cyanosis [Skin Color & Pigmentation] : normal skin color and pigmentation [Skin Turgor] : normal skin turgor [] : no rash [Oriented To Time, Place, And Person] : oriented to person, place, and time [Impaired Insight] : insight and judgment were intact [No Anxiety] : not feeling anxious [Normal Rate] : normal [Normal S1] : normal S1 [Normal S2] : normal S2 [Click] : a ~M click was heard [II] : a grade 2 [2+] : left 2+ [No Abnormalities] : the abdominal aorta was not enlarged and no bruit was heard [___ +] : [unfilled]U+ pitting edema to L ankle [Irregularly Irregular] : irregularly irregular [S3] : no S3 [S4] : no S4 [Right Carotid Bruit] : no bruit heard over the right carotid [Left Carotid Bruit] : no bruit heard over the left carotid [Right Femoral Bruit] : no bruit heard over the right femoral artery [Left Femoral Bruit] : no bruit heard over the left femoral artery

## 2024-07-25 NOTE — HISTORY OF PRESENT ILLNESS
[FreeTextEntry1] : I saw Elsa Murray in the office today for followup visit.  She was last seen in the office 6 months ago.    She is an 89-year-old white female with mitral valve prolapse.  She has a history of coronary artery disease, which was discovered while she was undergoing evaluation for aortic valve replacement.  She was found to have severe disease of the LAD, and nonobstructive disease elsewhere.  She is status post PCI of the LAD in March 2020.  She had a subsequent transcatheter aortic valve replacement. She has PAF.  She has had gastrointestinal bleeding, and anemia, such that Xarelto has been on hold intermittently, especially when she was taking Plavix.   She had fall and was hospitalized in May.  She went to rehabilitation, and was noted to be confused.  She was started on Aricept.  She does not report chest discomfort or shortness of breath with activity.  She denies orthopnea, PND.  She has long-standing lower extremity edema and she wears compression stockings.  She denies palpitations, dizziness and syncope.  Her anemia has been controlled.  She has been tolerating Xarelto, without difficulty. She fell a few weeks ago, December 13, though itisi unclear why.  It may have been related to some area rug, which has since been discarded.  She has been off her statin, though it does not sound as if there was any major complication in terms of liver function test, or anything else.

## 2024-09-04 NOTE — CARE COORDINATION ASSESSMENT. - NSPTRESPFORCHILD_GEN_ALL_CORE
No PAST MEDICAL HISTORY:  Anemia     Arthritis     Bladder mass     CA skin, basal cell     Cancer, skin, squamous cell     CKD (chronic kidney disease)     HLD (hyperlipidemia)     HTN (hypertension)     Hypothyroidism     Melanoma of skin     Obstruction of bowel     Parkinson disease     Prostate cancer

## 2024-09-05 ENCOUNTER — RX RENEWAL (OUTPATIENT)
Age: 89
End: 2024-09-05

## 2024-09-08 ENCOUNTER — NON-APPOINTMENT (OUTPATIENT)
Age: 89
End: 2024-09-08

## 2024-09-09 ENCOUNTER — INPATIENT (INPATIENT)
Facility: HOSPITAL | Age: 89
LOS: 6 days | Discharge: HOME HEALTH | DRG: 603 | End: 2024-09-16
Attending: INTERNAL MEDICINE | Admitting: INTERNAL MEDICINE
Payer: MEDICARE

## 2024-09-09 VITALS
HEART RATE: 50 BPM | SYSTOLIC BLOOD PRESSURE: 180 MMHG | HEIGHT: 63 IN | WEIGHT: 104.94 LBS | TEMPERATURE: 98 F | RESPIRATION RATE: 14 BRPM | DIASTOLIC BLOOD PRESSURE: 76 MMHG | OXYGEN SATURATION: 99 %

## 2024-09-09 DIAGNOSIS — Z98.890 OTHER SPECIFIED POSTPROCEDURAL STATES: Chronic | ICD-10-CM

## 2024-09-09 DIAGNOSIS — L03.90 CELLULITIS, UNSPECIFIED: ICD-10-CM

## 2024-09-09 LAB
ALBUMIN SERPL ELPH-MCNC: 3.9 G/DL — SIGNIFICANT CHANGE UP (ref 3.3–5)
ALP SERPL-CCNC: 173 U/L — HIGH (ref 30–120)
ALT FLD-CCNC: 25 U/L — SIGNIFICANT CHANGE UP (ref 10–60)
ANION GAP SERPL CALC-SCNC: 8 MMOL/L — SIGNIFICANT CHANGE UP (ref 5–17)
APTT BLD: 37.5 SEC — HIGH (ref 24.5–35.6)
AST SERPL-CCNC: 18 U/L — SIGNIFICANT CHANGE UP (ref 10–40)
BASOPHILS # BLD AUTO: 0.03 K/UL — SIGNIFICANT CHANGE UP (ref 0–0.2)
BASOPHILS NFR BLD AUTO: 0.4 % — SIGNIFICANT CHANGE UP (ref 0–2)
BILIRUB SERPL-MCNC: 0.4 MG/DL — SIGNIFICANT CHANGE UP (ref 0.2–1.2)
BUN SERPL-MCNC: 27 MG/DL — HIGH (ref 7–23)
CALCIUM SERPL-MCNC: 9.7 MG/DL — SIGNIFICANT CHANGE UP (ref 8.4–10.5)
CHLORIDE SERPL-SCNC: 104 MMOL/L — SIGNIFICANT CHANGE UP (ref 96–108)
CO2 SERPL-SCNC: 29 MMOL/L — SIGNIFICANT CHANGE UP (ref 22–31)
CREAT SERPL-MCNC: 0.96 MG/DL — SIGNIFICANT CHANGE UP (ref 0.5–1.3)
EGFR: 56 ML/MIN/1.73M2 — LOW
EOSINOPHIL # BLD AUTO: 0.07 K/UL — SIGNIFICANT CHANGE UP (ref 0–0.5)
EOSINOPHIL NFR BLD AUTO: 0.8 % — SIGNIFICANT CHANGE UP (ref 0–6)
ERYTHROCYTE [SEDIMENTATION RATE] IN BLOOD: 16 MM/HR — SIGNIFICANT CHANGE UP (ref 0–20)
GLUCOSE SERPL-MCNC: 95 MG/DL — SIGNIFICANT CHANGE UP (ref 70–99)
HCT VFR BLD CALC: 29.6 % — LOW (ref 34.5–45)
HGB BLD-MCNC: 9.1 G/DL — LOW (ref 11.5–15.5)
IMM GRANULOCYTES NFR BLD AUTO: 0.8 % — SIGNIFICANT CHANGE UP (ref 0–0.9)
INR BLD: 1.21 RATIO — HIGH (ref 0.85–1.18)
LYMPHOCYTES # BLD AUTO: 1.02 K/UL — SIGNIFICANT CHANGE UP (ref 1–3.3)
LYMPHOCYTES # BLD AUTO: 12.1 % — LOW (ref 13–44)
MCHC RBC-ENTMCNC: 25.8 PG — LOW (ref 27–34)
MCHC RBC-ENTMCNC: 30.7 GM/DL — LOW (ref 32–36)
MCV RBC AUTO: 83.9 FL — SIGNIFICANT CHANGE UP (ref 80–100)
MONOCYTES # BLD AUTO: 0.78 K/UL — SIGNIFICANT CHANGE UP (ref 0–0.9)
MONOCYTES NFR BLD AUTO: 9.3 % — SIGNIFICANT CHANGE UP (ref 2–14)
NEUTROPHILS # BLD AUTO: 6.44 K/UL — SIGNIFICANT CHANGE UP (ref 1.8–7.4)
NEUTROPHILS NFR BLD AUTO: 76.6 % — SIGNIFICANT CHANGE UP (ref 43–77)
NRBC # BLD: 0 /100 WBCS — SIGNIFICANT CHANGE UP (ref 0–0)
PLATELET # BLD AUTO: 245 K/UL — SIGNIFICANT CHANGE UP (ref 150–400)
POTASSIUM SERPL-MCNC: 4.5 MMOL/L — SIGNIFICANT CHANGE UP (ref 3.5–5.3)
POTASSIUM SERPL-SCNC: 4.5 MMOL/L — SIGNIFICANT CHANGE UP (ref 3.5–5.3)
PROT SERPL-MCNC: 7 G/DL — SIGNIFICANT CHANGE UP (ref 6–8.3)
PROTHROM AB SERPL-ACNC: 13.1 SEC — HIGH (ref 9.5–13)
RBC # BLD: 3.53 M/UL — LOW (ref 3.8–5.2)
RBC # FLD: 15.2 % — HIGH (ref 10.3–14.5)
SODIUM SERPL-SCNC: 141 MMOL/L — SIGNIFICANT CHANGE UP (ref 135–145)
WBC # BLD: 8.41 K/UL — SIGNIFICANT CHANGE UP (ref 3.8–10.5)
WBC # FLD AUTO: 8.41 K/UL — SIGNIFICANT CHANGE UP (ref 3.8–10.5)

## 2024-09-09 PROCEDURE — 99221 1ST HOSP IP/OBS SF/LOW 40: CPT

## 2024-09-09 PROCEDURE — 73590 X-RAY EXAM OF LOWER LEG: CPT | Mod: 26,RT

## 2024-09-09 PROCEDURE — 99285 EMERGENCY DEPT VISIT HI MDM: CPT | Mod: FS

## 2024-09-09 PROCEDURE — 71045 X-RAY EXAM CHEST 1 VIEW: CPT | Mod: 26

## 2024-09-09 PROCEDURE — 93010 ELECTROCARDIOGRAM REPORT: CPT

## 2024-09-09 RX ORDER — PANTOPRAZOLE SODIUM 40 MG
40 TABLET, DELAYED RELEASE (ENTERIC COATED) ORAL
Refills: 0 | Status: DISCONTINUED | OUTPATIENT
Start: 2024-09-09 | End: 2024-09-16

## 2024-09-09 RX ORDER — RIVAROXABAN 10 MG/1
15 TABLET, FILM COATED ORAL
Refills: 0 | Status: DISCONTINUED | OUTPATIENT
Start: 2024-09-09 | End: 2024-09-16

## 2024-09-09 RX ORDER — POLYETHYLENE GLYCOL 3350 17 G/17G
17 POWDER, FOR SOLUTION ORAL DAILY
Refills: 0 | Status: DISCONTINUED | OUTPATIENT
Start: 2024-09-09 | End: 2024-09-16

## 2024-09-09 RX ORDER — ACETAMINOPHEN 325 MG/1
650 TABLET ORAL EVERY 6 HOURS
Refills: 0 | Status: DISCONTINUED | OUTPATIENT
Start: 2024-09-09 | End: 2024-09-16

## 2024-09-09 RX ORDER — DONEPEZIL HYDROCHLORIDE 5 MG/1
5 TABLET, FILM COATED ORAL AT BEDTIME
Refills: 0 | Status: DISCONTINUED | OUTPATIENT
Start: 2024-09-09 | End: 2024-09-16

## 2024-09-09 RX ORDER — DILTIAZEM HYDROCHLORIDE 5 MG/ML
240 INJECTION INTRAVENOUS DAILY
Refills: 0 | Status: DISCONTINUED | OUTPATIENT
Start: 2024-09-09 | End: 2024-09-16

## 2024-09-09 RX ORDER — SENNA 187 MG
2 TABLET ORAL AT BEDTIME
Refills: 0 | Status: DISCONTINUED | OUTPATIENT
Start: 2024-09-09 | End: 2024-09-16

## 2024-09-09 RX ORDER — VANCOMYCIN/0.9 % SOD CHLORIDE 1.75G/25
1000 PLASTIC BAG, INJECTION (ML) INTRAVENOUS ONCE
Refills: 0 | Status: COMPLETED | OUTPATIENT
Start: 2024-09-09 | End: 2024-09-09

## 2024-09-09 RX ADMIN — Medication 250 MILLIGRAM(S): at 19:33

## 2024-09-09 RX ADMIN — Medication 1000 MILLIGRAM(S): at 22:05

## 2024-09-09 RX ADMIN — Medication 10 MILLIGRAM(S): at 22:28

## 2024-09-09 RX ADMIN — Medication 2 TABLET(S): at 22:28

## 2024-09-09 RX ADMIN — DONEPEZIL HYDROCHLORIDE 5 MILLIGRAM(S): 5 TABLET, FILM COATED ORAL at 22:29

## 2024-09-09 NOTE — ED ADULT NURSE NOTE - OBJECTIVE STATEMENT
pt to ED reports she hit her lower right leg on furniture on Friday; had a hematoma; now has redness and swelling to right lower extremity

## 2024-09-09 NOTE — ED ADULT NURSE NOTE - NSFALLHARMRISKINTERV_ED_ALL_ED

## 2024-09-09 NOTE — ED PROVIDER NOTE - OBJECTIVE STATEMENT
Patient is a 89-year-old female with past medical history of A-fib on anticoagulation, CAD, hypertension, hyperlipidemia, mitral valve prolapse, thyroid nodule aortic valve replacement presents with right leg hematoma.  Patient reports a few days ago she possibly hit her right leg causing hematoma.  Patient reports pain and swelling to area.  Patient went to urgent care who referred her to ED for evaluation.  Patient denies fever chest pain shortness of breath nausea vomiting

## 2024-09-09 NOTE — ED PROVIDER NOTE - CLINICAL SUMMARY MEDICAL DECISION MAKING FREE TEXT BOX
89-year-old female with history of hypertension mitral valve prolapse A-fib on Xarelto sent to ER by her PCP for evaluation of hematoma on right leg.  Patient states she banged her leg on her chair 3 days ago and noted swelling and bruising.  Was seen at urgent care but they could not do any testing so she was referred here.  Denies fever pain bleeding or other injury or symptom.  Her PCP is Dr. Solo.    VSS Afebrile, NAD  HEENT - clear  PERRL EOMI  Neck supple  lungs clear  Cor S1S2 RR - MGR  Abd soft nontender, no mass or HSM, no rebound  Ext FROM intact, Right lower leg with large hematoma and surrounding cellulitis.  No active bleeding or drainage.  Full range of motion pulses and sensation intact.  Remainder of exam unremarkable.  Neuro Intact, no deficits.  Skin Warm and dry no rash.    Impression right leg hematoma cellulitis.  Plan labs x-ray and likely admission for further management.

## 2024-09-09 NOTE — CONSULT NOTE ADULT - ASSESSMENT
89 yo with RLE cellulitis with hematoma. Conservative management with abx at this time.  If skin becomes compromised, or infection doesn't resolve poss incision and drainage needed.

## 2024-09-10 DIAGNOSIS — L03.115 CELLULITIS OF RIGHT LOWER LIMB: ICD-10-CM

## 2024-09-10 DIAGNOSIS — M25.512 PAIN IN LEFT SHOULDER: ICD-10-CM

## 2024-09-10 DIAGNOSIS — I48.91 UNSPECIFIED ATRIAL FIBRILLATION: ICD-10-CM

## 2024-09-10 DIAGNOSIS — S80.11XA CONTUSION OF RIGHT LOWER LEG, INITIAL ENCOUNTER: ICD-10-CM

## 2024-09-10 LAB
ANION GAP SERPL CALC-SCNC: 4 MMOL/L — LOW (ref 5–17)
BUN SERPL-MCNC: 19 MG/DL — SIGNIFICANT CHANGE UP (ref 7–23)
CALCIUM SERPL-MCNC: 9.5 MG/DL — SIGNIFICANT CHANGE UP (ref 8.4–10.5)
CHLORIDE SERPL-SCNC: 105 MMOL/L — SIGNIFICANT CHANGE UP (ref 96–108)
CO2 SERPL-SCNC: 32 MMOL/L — HIGH (ref 22–31)
CREAT SERPL-MCNC: 0.79 MG/DL — SIGNIFICANT CHANGE UP (ref 0.5–1.3)
EGFR: 71 ML/MIN/1.73M2 — SIGNIFICANT CHANGE UP
GLUCOSE SERPL-MCNC: 94 MG/DL — SIGNIFICANT CHANGE UP (ref 70–99)
HCT VFR BLD CALC: 30.3 % — LOW (ref 34.5–45)
HGB BLD-MCNC: 9.3 G/DL — LOW (ref 11.5–15.5)
MCHC RBC-ENTMCNC: 25.5 PG — LOW (ref 27–34)
MCHC RBC-ENTMCNC: 30.7 GM/DL — LOW (ref 32–36)
MCV RBC AUTO: 83 FL — SIGNIFICANT CHANGE UP (ref 80–100)
NRBC # BLD: 0 /100 WBCS — SIGNIFICANT CHANGE UP (ref 0–0)
PLATELET # BLD AUTO: 240 K/UL — SIGNIFICANT CHANGE UP (ref 150–400)
POTASSIUM SERPL-MCNC: 4.3 MMOL/L — SIGNIFICANT CHANGE UP (ref 3.5–5.3)
POTASSIUM SERPL-SCNC: 4.3 MMOL/L — SIGNIFICANT CHANGE UP (ref 3.5–5.3)
RBC # BLD: 3.65 M/UL — LOW (ref 3.8–5.2)
RBC # FLD: 15.3 % — HIGH (ref 10.3–14.5)
SODIUM SERPL-SCNC: 141 MMOL/L — SIGNIFICANT CHANGE UP (ref 135–145)
WBC # BLD: 8.14 K/UL — SIGNIFICANT CHANGE UP (ref 3.8–10.5)
WBC # FLD AUTO: 8.14 K/UL — SIGNIFICANT CHANGE UP (ref 3.8–10.5)

## 2024-09-10 PROCEDURE — 73030 X-RAY EXAM OF SHOULDER: CPT | Mod: 26,LT

## 2024-09-10 PROCEDURE — 99231 SBSQ HOSP IP/OBS SF/LOW 25: CPT

## 2024-09-10 RX ORDER — VANCOMYCIN/0.9 % SOD CHLORIDE 1.75G/25
750 PLASTIC BAG, INJECTION (ML) INTRAVENOUS EVERY 24 HOURS
Refills: 0 | Status: DISCONTINUED | OUTPATIENT
Start: 2024-09-10 | End: 2024-09-12

## 2024-09-10 RX ADMIN — Medication 250 MILLIGRAM(S): at 20:12

## 2024-09-10 RX ADMIN — Medication 10 MILLIGRAM(S): at 21:11

## 2024-09-10 RX ADMIN — RIVAROXABAN 15 MILLIGRAM(S): 10 TABLET, FILM COATED ORAL at 17:22

## 2024-09-10 RX ADMIN — DILTIAZEM HYDROCHLORIDE 240 MILLIGRAM(S): 5 INJECTION INTRAVENOUS at 06:04

## 2024-09-10 RX ADMIN — Medication 40 MILLIGRAM(S): at 06:05

## 2024-09-10 RX ADMIN — Medication 2 TABLET(S): at 21:12

## 2024-09-10 RX ADMIN — DONEPEZIL HYDROCHLORIDE 5 MILLIGRAM(S): 5 TABLET, FILM COATED ORAL at 21:12

## 2024-09-10 NOTE — H&P ADULT - PROBLEM SELECTOR PLAN 2
due to trauma  sx eval noted - will moniotr clinical status  no I and d at present  cold compress  wound care  pain control

## 2024-09-10 NOTE — CARE COORDINATION ASSESSMENT. - NSCAREPROVIDERS_GEN_ALL_CORE_FT
CARE PROVIDERS:  Accepting Physician: Dionna Weldon  Administration: Boston Angela  Administration: Luis Manuel Dill  Admitting: Dionna Weldon  Attending: Dionna Weldon  Consultant: Calli Gardner  Consultant: Missael Sheridan  ED ACP: Adrianna Barlow  ED Attending: Luis Angel Al  ED Nurse: Ingrid Michel  Infection Control: Mohini Peace  Nurse: Aleksandra Cabezas  Nurse: Kimmie Muir  Nurse: Corinne Olivares  Ordered: ServiceAccount, SCMMLM  Ordered: ServiceAccount, SCMMLM  Outpatient Provider: Donavon Greenwood  Outpatient Provider: Nate Morales  Override: Aleksandra Cabezas  PCA/Nursing Assistant: Laurel Arshad  Primary Team: Adrianna Barlow  Registered Dietitian: Ryann Blanchard  : Zaina Lopez

## 2024-09-10 NOTE — H&P ADULT - HISTORY OF PRESENT ILLNESS
trying to get off of a trampoline, slipped, hitting head on something metal on the ground, head lac to back of head, no LOC, no vomiting Patient is a 89-year-old female with past medical history of A-fib on anticoagulation, CAD, hypertension, hyperlipidemia, mitral valve prolapse, thyroid nodule aortic valve replacement presents with right leg hematoma.  Patient reports a few days ago she possibly hit her right leg causing hematoma.  Patient reports pain and swelling to area.  Patient went to urgent care who referred her to ED for evaluation.  Patient denies fever chest pain shortness of breath nausea vomiting

## 2024-09-10 NOTE — ED ADULT NURSE REASSESSMENT NOTE - NS ED NURSE REASSESS COMMENT FT1
patient A&Ox3 in no acute distress surgery team by bed side evaluating the patient give report to Francis at this time

## 2024-09-10 NOTE — PATIENT CHOICE NOTE. - NSPTCHOICENOTES_GEN_ALL_CORE
[facility] care agency [phone] will reach out to you within 24-72 hours of your discharge to schedule home care visit/eval appointment with you. Please call agency for any queries regarding home care services

## 2024-09-10 NOTE — PROGRESS NOTE ADULT - SUBJECTIVE AND OBJECTIVE BOX
pt seen  doing well  no complaints  ICU Vital Signs Last 24 Hrs  T(C): 36.6 (10 Sep 2024 07:20), Max: 36.7 (09 Sep 2024 18:09)  T(F): 97.8 (10 Sep 2024 07:20), Max: 98.1 (09 Sep 2024 18:09)  HR: 60 (10 Sep 2024 07:20) (50 - 62)  BP: 159/72 (10 Sep 2024 07:20) (129/56 - 180/76)  BP(mean): 80 (09 Sep 2024 23:42) (80 - 80)  ABP: --  ABP(mean): --  RR: 18 (10 Sep 2024 07:20) (14 - 18)  SpO2: 98% (10 Sep 2024 07:20) (97% - 100%)    O2 Parameters below as of 10 Sep 2024 07:20  Patient On (Oxygen Delivery Method): room air        NAD  RLE, erythema improving  less tender                          9.3    8.14  )-----------( 240      ( 10 Sep 2024 06:00 )             30.3

## 2024-09-10 NOTE — CONSULT NOTE ADULT - SUBJECTIVE AND OBJECTIVE BOX
HPI used:  Patient is a 89-year-old female with past medical history of A-fib on anticoagulation, CAD, hypertension, hyperlipidemia, mitral valve prolapse, thyroid nodule aortic valve replacement presents with right leg hematoma.  Patient reports a few days ago she possibly hit her right leg causing hematoma.  Patient reports pain and swelling to area.  Patient went to urgent care who referred her to ED for evaluation.  at this time pt has no complaints. Examined with daughter bedside    REVIEW OF SYSTEMS:    CONSTITUTIONAL: No weakness, fatigue, malaise, fevers or chills, no weight change, appetite change  EYES: No visual changes; No double vision,  No vertigo, eye pain  Ears: no otalgia, no otorhea, no hearing loss, tinnitus  Nose: no epistaxis, rhinorrhea, sinus pressure  Throat: no throat pain, no oral lesions  NECK: No pain or stiffness  RESPIRATORY: No cough (productive or dry), wheezing, hemoptysis; No shortness of breath  CARDIOVASCULAR: No chest pain or palpitations,    GASTROINTESTINAL: No abdominal or epigastric pain. No nausea, vomiting, or hematemesis; No diarrhea or constipation. No melena or hematochezia.  GENITOURINARY: No dysuria, frequency, urgency or hematuria,    NEUROLOGICAL: No numbness or weakness, headache, memory loss,   SKIN: as above      PAST MEDICAL & SURGICAL HISTORY:  HTN (Hypertension)      MVP (Mitral Valve Prolapse)  last echo 2010      Hypercholesterolemia      Hiatal Hernia      Thyroid Nodule      Constipation      DJD (Degenerative Joint Disease)  shoulders, knees, cervical and lumbar spine      History of Osteoarthritis      Diverticulosis of the Colon      Abdominal Mass      Afib      HLD (hyperlipidemia)      Aortic valve stenosis      CAD (coronary artery disease)      Osteopenia      H/O Shoulder Replacement  right shoulder  get clindamycin before surgery      Parathyroid  surgery      S/P Bunionectomy      Cataract, Other  surgery   bilateral      H/O ovarian cystectomy  2013      H/O knee surgery      S/P cardiac catheterization  2/26/21      Home Medications:  dilTIAZem 240 mg/24 hours oral capsule, extended release: 1 cap(s) orally once a day (09 Sep 2024 19:41)  donepezil 5 mg oral tablet: 1 tab(s) orally once a day (at bedtime) (09 Sep 2024 19:41)  methenamine hippurate 1 g oral tablet: 1 tab(s) orally once a day (09 Sep 2024 19:41)  rivaroxaban 15 mg oral tablet: 1 tab(s) orally once a day (before a meal) with dinner (09 Sep 2024 19:41)  simvastatin 10 mg oral tablet: 1 tab(s) orally once a day (at bedtime) (09 Sep 2024 19:41)    Allergies    codeine (Other)  penicillin (Flushing; Hypotension; Other)    Intolerances        SH-neg x3    .  VITAL SIGNS:  T(C): 36.7 (09-09-24 @ 18:09), Max: 36.7 (09-09-24 @ 18:09)  T(F): 98.1 (09-09-24 @ 18:09), Max: 98.1 (09-09-24 @ 18:09)  HR: 50 (09-09-24 @ 18:09) (50 - 50)  BP: 180/76 (09-09-24 @ 18:09) (180/76 - 180/76)  BP(mean): --  RR: 14 (09-09-24 @ 18:09) (14 - 14)  SpO2: 99% (09-09-24 @ 18:09) (99% - 99%)  Wt(kg): --    PHYSICAL EXAM:    Constitutional:  NAD, resting comfortably in bed  Head: NC/AT  Eyes: PERRL b/l  ENT: MMM  Neck: supple; no JVD or thyromegaly  Respiratory: CTA B/L   Cardiac: +S1/S2; RRR   Gastrointestinal: soft, NT/ND; no rebound or guarding; + BS  Genitourinary: normal external genitalia  Back: no CVA B/L  Extremities: :LLE +1, RLE+2, lateral hematoma 4x3 cm, with irais-nikolas erythema                          9.1    8.41  )-----------( 245      ( 09 Sep 2024 19:30 )             29.6   09-09    141  |  104  |  27<H>  ----------------------------<  95  4.5   |  29  |  0.96    Ca    9.7      09 Sep 2024 19:30    TPro  7.0  /  Alb  3.9  /  TBili  0.4  /  DBili  x   /  AST  18  /  ALT  25  /  AlkPhos  173<H>  09-09     
  HPI:  89YO F PMH A-fib on anticoagulation, CAD, hypertension, hyperlipidemia, mitral valve prolapse, thyroid nodule aortic valve replacement presented with right leg hematoma sp trauma Pt is a poor historian. Noted worsening erythema and swelling of right leg. No fever chills n/v/d CP SOB.     Infectious disease consult was called to evaluate pt and for antibiotic management.      Past Medical & Surgical Hx:  PAST MEDICAL & SURGICAL HISTORY:  HTN (Hypertension)  MVP (Mitral Valve Prolapse)  last echo 2010  Hypercholesterolemia  Hiatal Hernia  Thyroid Nodule  Constipation  DJD (Degenerative Joint Disease)  shoulders, knees, cervical and lumbar spine  History of Osteoarthritis  Diverticulosis of the Colon  Abdominal Mass  Afib  HLD (hyperlipidemia)  Aortic valve stenosis  CAD (coronary artery disease)  Osteopenia  H/O Shoulder Replacement  right shoulder  get clindamycin before surgery  Parathyroid  surgery  S/P Bunionectomy  Cataract, Other  surgery   bilateral  H/O ovarian cystectomy  2013  H/O knee surgery  S/P cardiac catheterization  2/26/21    Social History--  EtOH: denies   Tobacco: denies  Drug Use: denies    FAMILY HISTORY:  Noncontributory    Allergies  codeine (Other)  penicillin (Flushing; Hypotension; Other)    Intolerances  NONE      Home Medications:  dilTIAZem 240 mg/24 hours oral capsule, extended release: 1 cap(s) orally once a day (09 Sep 2024 19:41)  donepezil 5 mg oral tablet: 1 tab(s) orally once a day (at bedtime) (09 Sep 2024 19:41)  methenamine hippurate 1 g oral tablet: 1 tab(s) orally once a day (09 Sep 2024 19:41)  rivaroxaban 15 mg oral tablet: 1 tab(s) orally once a day (before a meal) with dinner (09 Sep 2024 19:41)  simvastatin 10 mg oral tablet: 1 tab(s) orally once a day (at bedtime) (09 Sep 2024 19:41)      Current Inpatient Medications :    ANTIBIOTICS:   levoFLOXacin IVPB          OTHER RELEVANT MEDICATIONS :  acetaminophen     Tablet .. 650 milliGRAM(s) Oral every 6 hours PRN  atorvastatin 10 milliGRAM(s) Oral at bedtime  diltiazem    milliGRAM(s) Oral daily  donepezil 5 milliGRAM(s) Oral at bedtime  magnesium hydroxide Suspension 30 milliLiter(s) Oral daily PRN  pantoprazole    Tablet 40 milliGRAM(s) Oral before breakfast  polyethylene glycol 3350 17 Gram(s) Oral daily PRN  rivaroxaban 15 milliGRAM(s) Oral with dinner  senna 2 Tablet(s) Oral at bedtime      ROS:  Unable to obtain due to : poor historian      Physical Exam:  Vital Signs Last 24 Hrs  T(C): 36.9 (10 Sep 2024 16:19), Max: 36.9 (10 Sep 2024 16:19)  T(F): 98.4 (10 Sep 2024 16:19), Max: 98.4 (10 Sep 2024 16:19)  HR: 60 (10 Sep 2024 16:19) (50 - 62)  BP: 157/61 (10 Sep 2024 16:19) (129/56 - 180/76)  BP(mean): 93 (10 Sep 2024 16:19) (80 - 93)  RR: 18 (10 Sep 2024 16:19) (14 - 18)  SpO2: 98% (10 Sep 2024 16:19) (97% - 100%)    Parameters below as of 10 Sep 2024 16:19  Patient On (Oxygen Delivery Method): room air      Height (cm): 160 (09-09 @ 18:09)  Weight (kg): 47.6 (09-09 @ 18:09)  BMI (kg/m2): 18.6 (09-09 @ 18:09)  BSA (m2): 1.47 (09-09 @ 18:09)    General: well developed well nourished, in no acute distress  Neck: supple, trachea midline  Lungs: clear, no wheeze/rhonchi  Cardiovascular: regular rate and rhythm, S1 S2  Abdomen: soft, nontender, ND, bowel sounds normal  Neurological:  alert and oriented x3  Skin: no rash  Extremities: Right lateral leg hematoma with leg erythema swelling    Labs:               9.3    8.14  )-----------( 240      ( 10 Sep 2024 06:00 )             30.3   09-10    141  |  105  |  19  ----------------------------<  94  4.3   |  32<H>  |  0.79    Ca    9.5      10 Sep 2024 06:00    TPro  7.0  /  Alb  3.9  /  TBili  0.4  /  DBili  x   /  AST  18  /  ALT  25  /  AlkPhos  173<H>  09-09    RECENT CULTURES:          RADIOLOGY & ADDITIONAL STUDIES:    Assessment :   89YO F PMH PMH of HTN, HLD, CAD with cardiac stent, A-fib on Xarelto, aortic stenosis sp TAVR, mitral valve prolapse, thyroid nodule aortic valve replacement presented with right leg hematoma sp trauma Noted worsening erythema and swelling of right leg.   Admitted with Right leg hematoma with superimposed cellulitis    Plan :   Change to Vanc   MRSA screen  Trend temps and cbc  Fu cultures  Elevate leg  Asp precautions    Continue with present regiment .  Approptiate use of antibiotics and adverse effects reviewed.      > 45 minutes spent in direct patient care reviewing  the notes, lab data/ imaging , discussion with multidisciplinary team. All questions were addressed and answered to the best of my capacity .    Thank you for allowing me to participate in the care of your patient .      Jordan Garcia MD  Infectious Disease  [phone]

## 2024-09-10 NOTE — CARE COORDINATION ASSESSMENT. - PRO ARRIVE FROM
DX:89-year-old female with history of hypertension mitral valve prolapse A-fib on Xarelto sent to ER by her PCP for evaluation of hematoma on right leg.  Patient states she banged her leg on her chair 3 days ago and noted swelling and bruising.       Completed Dose of Vancomycin.  Pending PT Consult.     CM met with patient at bedside. Patient is alert times 2-3, confusion reorient prn. Patient stated she lives alone. CM called Daughter Maricruz [phone] and left a message awaiting a call back. Patient stated lives in apartment and has 8-9 steps to get in and then her bedroom is one first floor. Patient stated she owns a walker.       CM verified:     PCP: DR. Edil Jackson  [phone].  Pharmacy: Awaiting to verify with Daughter Maricruz.   Insurance: Medicare.   [place]: No    CM explained about home care services with a verbal understanding. Patient choice [facility] care agency [phone] will reach out to you within 24-72 hours of your discharge to schedule home care visit/eval appointment with you. Please call agency for any queries regarding home care services./home DX:89-year-old female with history of hypertension mitral valve prolapse A-fib on Xarelto sent to ER by her PCP for evaluation of hematoma on right leg.  Patient states she banged her leg on her chair 3 days ago and noted swelling and bruising.       Completed Dose of Vancomycin.  Pending PT Consult.   Patient right lower leg red and warm to touch with a hematoma on side of leg.    CM met with patient at bedside. Patient is alert times 2-3, confusion reorient prn. Patient stated she lives alone. CM called Daughter Maricruz [phone] and left a message awaiting a call back. Patient stated lives in apartment and has 8-9 steps to get in and then her bedroom is one first floor. Patient stated she owns a walker.       CM verified:     PCP: DR. Edil Jackson  [phone].  Pharmacy: Awaiting to verify with Daughter Maricruz.   Insurance: Medicare.   [place]: No    CM explained about home care services with a verbal understanding. Patient choice [facility] care agency [phone] will reach out to you within 24-72 hours of your discharge to schedule home care visit/eval appointment with you. Please call agency for any queries regarding home care services./home

## 2024-09-10 NOTE — H&P ADULT - MUSCULOSKELETAL COMMENTS
right leg discomfort left shoulder at joint decreased rom, rle with erythema and swelling and large closed hematoma on lateral leg

## 2024-09-10 NOTE — CARE COORDINATION ASSESSMENT. - OTHER PERTINENT DISCHARGE PLANNING INFORMATION:
89-year-old female with history of hypertension mitral valve prolapse A-fib on Xarelto sent to ER by her PCP for evaluation of hematoma on right leg.  Patient states she banged her leg on her chair 3 days ago and noted swelling and bruising. Met patient at bedside.  Explained role of CM, verbalized understanding. Pt was made aware a CM will remain available through hospitalization.  Contact information given in discharge/ transitions resource folder.

## 2024-09-10 NOTE — H&P ADULT - PROBLEM SELECTOR PLAN 1
due to trauma and hematoma  id eval  iv levaquin - pt states is allergic to pcn  fu cultures  wound care  trend labs  pt eval

## 2024-09-10 NOTE — CARE COORDINATION ASSESSMENT. - NSDCPLANSERVICES_GEN_ALL_CORE
DX:89-year-old female with history of hypertension mitral valve prolapse A-fib on Xarelto sent to ER by her PCP for evaluation of hematoma on right leg.  Patient states she banged her leg on her chair 3 days ago and noted swelling and bruising.       Completed Dose of Vancomycin.  Pending PT Consult.     CM met with patient at bedside. Patient is alert times 2-3, confusion reorient prn. Patient stated she lives alone. CM called Daughter Maricruz [phone] and left a message awaiting a call back. Patient stated lives in apartment and has 8-9 steps to get in and then her bedroom is one first floor. Patient stated she owns a walker.       CM verified:     PCP: DR. Edil Jackson  [phone].  Pharmacy: Awaiting to verify with Daughter Maricruz.   Insurance: Medicare.   [place]: No    CM explained about home care services with a verbal understanding. Patient choice [facility] care agency [phone] will reach out to you within 24-72 hours of your discharge to schedule home care visit/eval appointment with you. Please call agency for any queries regarding home care services./Anticipated Needs Unclear at Present

## 2024-09-10 NOTE — CAREGIVER ENGAGEMENT NOTE - DISCHARGE DATE
13-Sep-2024 Opioid Counseling: I discussed with the patient the potential side effects of opioids including but not limited to addiction, altered mental status, and depression. I stressed avoiding alcohol, benzodiazepines, muscle relaxants and sleep aids unless specifically okayed by a physician. The patient verbalized understanding of the proper use and possible adverse effects of opioids. All of the patient's questions and concerns were addressed. They were instructed to flush the remaining pills down the toilet if they did not need them for pain.

## 2024-09-10 NOTE — ED ADULT NURSE REASSESSMENT NOTE - NS ED NURSE REASSESS COMMENT FT1
patient A&Ox3 at this time , resting comfy on hospital bed no c/o at this time , continue to monitor

## 2024-09-11 ENCOUNTER — TRANSCRIPTION ENCOUNTER (OUTPATIENT)
Age: 89
End: 2024-09-11

## 2024-09-11 LAB
ANION GAP SERPL CALC-SCNC: 7 MMOL/L — SIGNIFICANT CHANGE UP (ref 5–17)
BUN SERPL-MCNC: 25 MG/DL — HIGH (ref 7–23)
CALCIUM SERPL-MCNC: 9.4 MG/DL — SIGNIFICANT CHANGE UP (ref 8.4–10.5)
CHLORIDE SERPL-SCNC: 105 MMOL/L — SIGNIFICANT CHANGE UP (ref 96–108)
CO2 SERPL-SCNC: 28 MMOL/L — SIGNIFICANT CHANGE UP (ref 22–31)
CREAT SERPL-MCNC: 0.96 MG/DL — SIGNIFICANT CHANGE UP (ref 0.5–1.3)
EGFR: 56 ML/MIN/1.73M2 — LOW
GLUCOSE SERPL-MCNC: 94 MG/DL — SIGNIFICANT CHANGE UP (ref 70–99)
HCT VFR BLD CALC: 29.6 % — LOW (ref 34.5–45)
HGB BLD-MCNC: 9.3 G/DL — LOW (ref 11.5–15.5)
MCHC RBC-ENTMCNC: 25.9 PG — LOW (ref 27–34)
MCHC RBC-ENTMCNC: 31.4 GM/DL — LOW (ref 32–36)
MCV RBC AUTO: 82.5 FL — SIGNIFICANT CHANGE UP (ref 80–100)
MRSA PCR RESULT.: SIGNIFICANT CHANGE UP
NRBC # BLD: 0 /100 WBCS — SIGNIFICANT CHANGE UP (ref 0–0)
PLATELET # BLD AUTO: 237 K/UL — SIGNIFICANT CHANGE UP (ref 150–400)
POTASSIUM SERPL-MCNC: 4.2 MMOL/L — SIGNIFICANT CHANGE UP (ref 3.5–5.3)
POTASSIUM SERPL-SCNC: 4.2 MMOL/L — SIGNIFICANT CHANGE UP (ref 3.5–5.3)
RBC # BLD: 3.59 M/UL — LOW (ref 3.8–5.2)
RBC # FLD: 15.2 % — HIGH (ref 10.3–14.5)
S AUREUS DNA NOSE QL NAA+PROBE: SIGNIFICANT CHANGE UP
SODIUM SERPL-SCNC: 140 MMOL/L — SIGNIFICANT CHANGE UP (ref 135–145)
VANCOMYCIN TROUGH SERPL-MCNC: 7.5 UG/ML — LOW (ref 10–20)
WBC # BLD: 9.98 K/UL — SIGNIFICANT CHANGE UP (ref 3.8–10.5)
WBC # FLD AUTO: 9.98 K/UL — SIGNIFICANT CHANGE UP (ref 3.8–10.5)

## 2024-09-11 RX ADMIN — DONEPEZIL HYDROCHLORIDE 5 MILLIGRAM(S): 5 TABLET, FILM COATED ORAL at 21:46

## 2024-09-11 RX ADMIN — Medication 10 MILLIGRAM(S): at 21:46

## 2024-09-11 RX ADMIN — Medication 250 MILLIGRAM(S): at 22:15

## 2024-09-11 RX ADMIN — DILTIAZEM HYDROCHLORIDE 240 MILLIGRAM(S): 5 INJECTION INTRAVENOUS at 05:35

## 2024-09-11 RX ADMIN — Medication 40 MILLIGRAM(S): at 05:35

## 2024-09-11 RX ADMIN — ACETAMINOPHEN 650 MILLIGRAM(S): 325 TABLET ORAL at 03:02

## 2024-09-11 RX ADMIN — ACETAMINOPHEN 650 MILLIGRAM(S): 325 TABLET ORAL at 02:53

## 2024-09-11 RX ADMIN — RIVAROXABAN 15 MILLIGRAM(S): 10 TABLET, FILM COATED ORAL at 16:55

## 2024-09-11 RX ADMIN — Medication 2 TABLET(S): at 21:46

## 2024-09-11 NOTE — PROGRESS NOTE ADULT - SUBJECTIVE AND OBJECTIVE BOX
RUBENS BARKER  MRN-[id number] [id number]    GENERAL SURGERY/ DR. RODRIGUEZ    OFFERS NO COMPLAINTS, APPEARS CONFUSED BASELINE MENTATION       MEDICATIONS  (STANDING):  atorvastatin 10 milliGRAM(s) Oral at bedtime  diltiazem    milliGRAM(s) Oral daily  donepezil 5 milliGRAM(s) Oral at bedtime  pantoprazole    Tablet 40 milliGRAM(s) Oral before breakfast  rivaroxaban 15 milliGRAM(s) Oral with dinner  senna 2 Tablet(s) Oral at bedtime  vancomycin  IVPB 750 milliGRAM(s) IV Intermittent every 24 hours    MEDICATIONS  (PRN):  acetaminophen     Tablet .. 650 milliGRAM(s) Oral every 6 hours PRN Temp greater or equal to 38C (100.4F), Moderate Pain (4 - 6)  magnesium hydroxide Suspension 30 milliLiter(s) Oral daily PRN Constipation  polyethylene glycol 3350 17 Gram(s) Oral daily PRN Constipation     Vital Signs Last 24 Hrs  T(C): 36.7 (11 Sep 2024 05:05), Max: 37.2 (10 Sep 2024 23:41)  T(F): 98 (11 Sep 2024 05:05), Max: 98.9 (10 Sep 2024 23:41)  HR: 58 (11 Sep 2024 05:05) (58 - 60)  BP: 154/66 (11 Sep 2024 05:05) (147/56 - 157/61)  BP(mean): 93 (10 Sep 2024 16:19) (93 - 93)  RR: 18 (11 Sep 2024 05:05) (17 - 18)  SpO2: 97% (11 Sep 2024 05:05) (97% - 98%)    Parameters below as of 11 Sep 2024 05:05  Patient On (Oxygen Delivery Method): room air    09-10-24 @ 07:01  -  09-11-24 @ 07:00  --------------------------------------------------------  IN: 0 mL / OUT: 350 mL / NET: -350 mL    RIGHT LOWER EXT  A LATERAL HEMATOMA APPROXIMATELY 4 X 3 CM, NO BLEEDING / EXPANDING, SOME CHRONIC SKIN CHANGES/ DISCOLORATION  WITH TRACE EDEMA   SOFT COMPARTMENTS   PALPABLE DP AND PT PULSES                          9.3    9.98  )-----------( 237      ( 11 Sep 2024 06:00 )             29.6      09-11    140  |  105  |  25<H>  ----------------------------<  94  4.2   |  28  |  0.96    Ca    9.4      11 Sep 2024 06:00    TPro  7.0  /  Alb  3.9  /  TBili  0.4  /  DBili  x   /  AST  18  /  ALT  25  /  AlkPhos  173<H>  09-09                     ASSESSMENT &  PLAN:      RIGHT  LOWER EXT HEMATOMA WITH CELLULITIS    CONTINUE OBSERVATION, NO SURGICAL INTERVENTION    ELEVATION  CONTINUE IV ANTIBIOTIC, CHANGED TO VANCOMYCIN AS PER ID   SURGICAL TEAM WILL FOLLOW UP

## 2024-09-11 NOTE — DIETITIAN INITIAL EVALUATION ADULT - ADD RECOMMEND
1. Continue with current diet order  2. Provide ONS: Magic Cup 4oz (290 kcal, 9 g protein) bid, Ensure High Protein plus 8oz (350 kcal, 20g protein) qd  3. Encourage po intake as needed   4. Diet modified and sent to MD via teams

## 2024-09-11 NOTE — DISCHARGE NOTE NURSING/CASE MANAGEMENT/SOCIAL WORK - PATIENT PORTAL LINK FT
You can access the FollowMyHealth Patient Portal offered by [facility] by registering at the following website: [email or web address]. By joining [employer]’s FollowMyHealth portal, you will also be able to view your health information using other applications (apps) compatible with our system.

## 2024-09-11 NOTE — DIETITIAN INITIAL EVALUATION ADULT - REASON FOR ADMISSION
HPI:  Patient is a 89-year-old female with past medical history of A-fib on anticoagulation, CAD, hypertension, hyperlipidemia, mitral valve prolapse, thyroid nodule aortic valve replacement presents with right leg hematoma.  Patient reports a few days ago she possibly hit her right leg causing hematoma.  Patient reports pain and swelling to area.  Patient went to urgent care who referred her to ED for evaluation.  Patient denies fever chest pain shortness of breath nausea vomiting   (10 Sep 2024 10:28)

## 2024-09-11 NOTE — DIETITIAN INITIAL EVALUATION ADULT - BUCCAL DEPLETION IS
moderate Depression    Depression    DM2 (diabetes mellitus, type 2)  since 5/16/2017  Hyperlipidemia    Hypertension    Melanoma    Parkinsons    Tinnitus of left ear    Vertigo

## 2024-09-11 NOTE — DIETITIAN INITIAL EVALUATION ADULT - OTHER INFO
Visited patient in room, per % consumed breakfast. Denies n/v/d/c, no BM noted, bowel regimen in place. No reported difficulty chewing or swallowing. NKFA. No reported UBW, current adm weight 105#, per [facility] HI wt 112# in May, 106# in July, 6% wt loss in 4 months is not clinically significant, wt stable x2 months, will continue to monitor weight trends as able.     Pertinent medications/nutrition labs reviewed; receiving antibiotic, Mg.     Education is not appropriate at this time. Recommend to add Magic Cup 4oz (290 kcal, 9 g protein) bid, Ensure High Protein plus 8oz (350 kcal, 20g protein) qd to optimize intake. Pt receptive, no nutrition related questions at this time. RD to continue to monitor nutrition status per protocol.

## 2024-09-11 NOTE — DISCHARGE NOTE NURSING/CASE MANAGEMENT/SOCIAL WORK - NSDCPEFALRISK_GEN_ALL_CORE
For information on Fall & Injury Prevention, visit: [email or web address] OR  [email or web address] OR  https://www.cdc.gov/steadi/patient.html

## 2024-09-11 NOTE — CASE MANAGEMENT PROGRESS NOTE - NSCMPROGRESSNOTE_GEN_ALL_CORE
RN/CM noted pt's case discussed during Interdisciplinary rounds, pt still on IV Levaquin/ IV Vanco for RLE cellulitis. Pt lives alone, was independent with rolling walker use. Pt's community MD is DR Juju Solo [phone]. DC pending hospital course. CM remains available and continues to follow case.

## 2024-09-11 NOTE — DIETITIAN NUTRITION RISK NOTIFICATION - TREATMENT: THE FOLLOWING DIET HAS BEEN RECOMMENDED
Diet, DASH/TLC:   Sodium & Cholesterol Restricted  Supplement Feeding Modality:  Oral  Ensure Plus High Protein Cans or Servings Per Day:  1       Frequency:  Daily (09-11-24 @ 12:09) [Pending Verification By Attending]  Diet, Regular:   DASH/TLC {Sodium & Cholesterol Restricted} (09-09-24 @ 20:52) [Active]

## 2024-09-11 NOTE — DIETITIAN INITIAL EVALUATION ADULT - PERTINENT LABORATORY DATA
09-11    140  |  105  |  25<H>  ----------------------------<  94  4.2   |  28  |  0.96    Ca    9.4      11 Sep 2024 06:00    TPro  7.0  /  Alb  3.9  /  TBili  0.4  /  DBili  x   /  AST  18  /  ALT  25  /  AlkPhos  173<H>  09-09  A1C with Estimated Average Glucose Result: 5.0 % (05-12-24 @ 06:28)

## 2024-09-11 NOTE — PROGRESS NOTE ADULT - SUBJECTIVE AND OBJECTIVE BOX
RUBENS BARKER is a 89yFemale , patient examined and chart reviewed    INTERVAL HPI/ OVERNIGHT EVENTS:   No events.  Afebrile.    PAST MEDICAL & SURGICAL HISTORY:  HTN (Hypertension)  MVP (Mitral Valve Prolapse)  last echo 2010  Hypercholesterolemia  Hiatal Hernia  Thyroid Nodule  Constipation  DJD (Degenerative Joint Disease)  shoulders, knees, cervical and lumbar spine  History of Osteoarthritis  Diverticulosis of the Colon  Abdominal Mass  Afib  HLD (hyperlipidemia)  Aortic valve stenosis  CAD (coronary artery disease)  Osteopenia  H/O Shoulder Replacement  right shoulder  get clindamycin before surgery  Parathyroid  surgery  S/P Bunionectomy  Cataract, Other  surgery   bilateral  H/O ovarian cystectomy  2013  H/O knee surgery  S/P cardiac catheterization  2/26/21      For details regarding the patient's social history, family history, and other miscellaneous elements, please refer the initial infectious diseases consultation and/or the admitting history and physical examination for this admission.     ROS:  CONSTITUTIONAL:  Negative fever or chills  EYES:  Negative  blurry vision or double vision  CARDIOVASCULAR:  Negative for chest pain or palpitations  RESPIRATORY:  Negative for cough, wheezing, or SOB   GASTROINTESTINAL:  Negative for nausea, vomiting, diarrhea, constipation, or abdominal pain  GENITOURINARY:  Negative frequency, urgency or dysuria  NEUROLOGIC:  No headache, confusion, dizziness, lightheadedness  All other systems were reviewed and are negative     codeine (Other)  penicillin (Flushing; Hypotension; Other)      Current inpatient medications :    ANTIBIOTICS/RELEVANT:  vancomycin  IVPB 750 milliGRAM(s) IV Intermittent every 24 hours      acetaminophen     Tablet .. 650 milliGRAM(s) Oral every 6 hours PRN  atorvastatin 10 milliGRAM(s) Oral at bedtime  diltiazem    milliGRAM(s) Oral daily  donepezil 5 milliGRAM(s) Oral at bedtime  magnesium hydroxide Suspension 30 milliLiter(s) Oral daily PRN  pantoprazole    Tablet 40 milliGRAM(s) Oral before breakfast  polyethylene glycol 3350 17 Gram(s) Oral daily PRN  rivaroxaban 15 milliGRAM(s) Oral with dinner  senna 2 Tablet(s) Oral at bedtime      Objective:    09-10 @ 07:01  -  09-11 @ 07:00  --------------------------------------------------------  IN: 0 mL / OUT: 350 mL / NET: -350 mL      T(C): 36.8 (09-11-24 @ 20:00), Max: 37.2 (09-10-24 @ 23:41)  HR: 65 (09-11-24 @ 20:00) (58 - 65)  BP: 130/68 (09-11-24 @ 20:00) (130/68 - 155/54)  RR: 18 (09-11-24 @ 20:00) (16 - 18)  SpO2: 97% (09-11-24 @ 20:00) (97% - 98%)  Wt(kg): --      Physical Exam:  General: well developed well nourished, in no acute distress  Neck: supple, trachea midline  Lungs: clear, no wheeze/rhonchi  Cardiovascular: regular rate and rhythm, S1 S2  Abdomen: soft, nontender,  bowel sounds normal  Neurological: alert and oriented x3  Skin: no rash  Extremities: Right leg hematoma with erythema- better today          LABS:                          9.3    9.98  )-----------( 237      ( 11 Sep 2024 06:00 )             29.6       09-11    140  |  105  |  25<H>  ----------------------------<  94  4.2   |  28  |  0.96    Ca    9.4      11 Sep 2024 06:00    Vancomycin Level, Trough: 7.5 ug/mL (09-11 @ 20:00)    MICROBIOLOGY:        RADIOLOGY & ADDITIONAL STUDIES:          Assessment :  89YO F PMH PMH of HTN, HLD, CAD with cardiac stent, A-fib on Xarelto, aortic stenosis sp TAVR, mitral valve prolapse, thyroid nodule aortic valve replacement presented with right leg hematoma sp trauma Noted worsening erythema and swelling of right leg.   Admitted with Right leg hematoma with superimposed cellulitis  Some improvement in erythema today    Plan :   Cont Vanc   MRSA screen  Trend temps and cbc  Fu cultures  Elevate leg  Asp precautions      Continue with present regiment.  Appropriate use of antibiotics and adverse effects reviewed.      > 35 minutes were spent in direct patient care reviewing notes, medications ,labs data/ imaging , discussion with multidisciplinary team.    Thank you for allowing me to participate in care of your patient .    Jordan Garcia MD  Infectious Disease  [phone]

## 2024-09-11 NOTE — PROGRESS NOTE ADULT - SUBJECTIVE AND OBJECTIVE BOX
Date of Service: 09-11-24 @ 10:20    Patient is a 89y old  Female who presents with a chief complaint of rle infection (11 Sep 2024 08:55)       INTERVAL HPI/OVERNIGHT EVENTS: feels well, still with pain in leg    MEDICATIONS  (STANDING):  atorvastatin 10 milliGRAM(s) Oral at bedtime  diltiazem    milliGRAM(s) Oral daily  donepezil 5 milliGRAM(s) Oral at bedtime  pantoprazole    Tablet 40 milliGRAM(s) Oral before breakfast  rivaroxaban 15 milliGRAM(s) Oral with dinner  senna 2 Tablet(s) Oral at bedtime  vancomycin  IVPB 750 milliGRAM(s) IV Intermittent every 24 hours    MEDICATIONS  (PRN):  acetaminophen     Tablet .. 650 milliGRAM(s) Oral every 6 hours PRN Temp greater or equal to 38C (100.4F), Moderate Pain (4 - 6)  magnesium hydroxide Suspension 30 milliLiter(s) Oral daily PRN Constipation  polyethylene glycol 3350 17 Gram(s) Oral daily PRN Constipation      Allergies    codeine (Other)  penicillin (Flushing; Hypotension; Other)    Intolerances        REVIEW OF SYSTEMS:  CONSTITUTIONAL: No fever, weight loss, or fatigue  EYES: No eye pain, visual disturbances  ENMT:  No difficulty hearing, tinnitus, vertigo; No sinus or throat pain  NECK: No pain or stiffness  RESPIRATORY: No cough, wheezing, chills or hemoptysis; No shortness of breath  CARDIOVASCULAR: No chest pain, palpitations, dizziness  GASTROINTESTINAL: No abdominal or epigastric pain. No nausea, vomiting, or hematemesis; No diarrhea or constipation. No melena or hematochezia.  GENITOURINARY: No dysuria, frequency, hematuria, or incontinence  NEUROLOGICAL: No headaches, memory loss, loss of strength, numbness, or tremors  SKIN: No itching, burning  LYMPH NODES: No enlarged glands  MUSCULOSKELETAL: pain in leg- right  PSYCHIATRIC: No depression, mood swings  HEME/LYMPH: No easy bruising, or bleeding gums  ALLERGY AND IMMUNOLOGIC: No hives    Vital Signs Last 24 Hrs  T(C): 36.7 (11 Sep 2024 05:05), Max: 37.2 (10 Sep 2024 23:41)  T(F): 98 (11 Sep 2024 05:05), Max: 98.9 (10 Sep 2024 23:41)  HR: 58 (11 Sep 2024 05:05) (58 - 60)  BP: 154/66 (11 Sep 2024 05:05) (147/56 - 157/61)  BP(mean): 93 (10 Sep 2024 16:19) (93 - 93)  RR: 18 (11 Sep 2024 05:05) (17 - 18)  SpO2: 97% (11 Sep 2024 05:05) (97% - 98%)    Parameters below as of 11 Sep 2024 05:05  Patient On (Oxygen Delivery Method): room air        PHYSICAL EXAM:  GENERAL: NAD, well-groomed, well-developed  HEAD:  Atraumatic, Normocephalic  EYES: EOMI, PERRLA, conjunctiva and sclera clear  ENMT: No tonsillar erythema, exudates, or enlargement   NECK: Supple, No JVD  NERVOUS SYSTEM:  Alert & Oriented X3, Good concentration  CHEST/LUNG: Clear to auscultation bilaterally; No rales, rhonchi, wheezing  HEART: Regular rate and rhythm  ABDOMEN: Soft, Nontender, Nondistended; Bowel sounds present  EXTREMITIES:  2+ Peripheral Pulses, right le with erythema and tender, with large hematoma on lateral right leg   LYMPH: No lymphadenopathy noted  SKIN: No rashes     LABS:                        9.3    9.98  )-----------( 237      ( 11 Sep 2024 06:00 )             29.6     11 Sep 2024 06:00    140    |  105    |  25     ----------------------------<  94     4.2     |  28     |  0.96     Ca    9.4        11 Sep 2024 06:00      PT/INR - ( 09 Sep 2024 19:30 )   PT: 13.1 sec;   INR: 1.21 ratio         PTT - ( 09 Sep 2024 19:30 )  PTT:37.5 sec  Urinalysis Basic - ( 11 Sep 2024 06:00 )    Color: x / Appearance: x / SG: x / pH: x  Gluc: 94 mg/dL / Ketone: x  / Bili: x / Urobili: x   Blood: x / Protein: x / Nitrite: x   Leuk Esterase: x / RBC: x / WBC x   Sq Epi: x / Non Sq Epi: x / Bacteria: x      CAPILLARY BLOOD GLUCOSE                RADIOLOGY & ADDITIONAL TESTS:      Consultant(s) Notes Reviewed:  [x ] YES  [ ] NO    Care Discussed with Consultants/Other Providers [ x] YES  [ ] NO    Advanced care planning discussed with patient and family, advanced care planning forms reviewed, discussed, and completed.  20 minutes spent.

## 2024-09-11 NOTE — DIETITIAN INITIAL EVALUATION ADULT - ORAL INTAKE PTA/DIET HISTORY
Limited information obtained from pt 2/2 confusion. Reported having 3 meals a day, pre-made meals from [employer], also drinks Ensure 8oz qd.

## 2024-09-12 LAB
ANION GAP SERPL CALC-SCNC: 7 MMOL/L — SIGNIFICANT CHANGE UP (ref 5–17)
BUN SERPL-MCNC: 37 MG/DL — HIGH (ref 7–23)
CALCIUM SERPL-MCNC: 9.4 MG/DL — SIGNIFICANT CHANGE UP (ref 8.4–10.5)
CHLORIDE SERPL-SCNC: 106 MMOL/L — SIGNIFICANT CHANGE UP (ref 96–108)
CO2 SERPL-SCNC: 28 MMOL/L — SIGNIFICANT CHANGE UP (ref 22–31)
CREAT SERPL-MCNC: 0.78 MG/DL — SIGNIFICANT CHANGE UP (ref 0.5–1.3)
EGFR: 72 ML/MIN/1.73M2 — SIGNIFICANT CHANGE UP
GLUCOSE SERPL-MCNC: 95 MG/DL — SIGNIFICANT CHANGE UP (ref 70–99)
HCT VFR BLD CALC: 28.6 % — LOW (ref 34.5–45)
HGB BLD-MCNC: 8.9 G/DL — LOW (ref 11.5–15.5)
MCHC RBC-ENTMCNC: 25.7 PG — LOW (ref 27–34)
MCHC RBC-ENTMCNC: 31.1 GM/DL — LOW (ref 32–36)
MCV RBC AUTO: 82.7 FL — SIGNIFICANT CHANGE UP (ref 80–100)
NRBC # BLD: 0 /100 WBCS — SIGNIFICANT CHANGE UP (ref 0–0)
PLATELET # BLD AUTO: 276 K/UL — SIGNIFICANT CHANGE UP (ref 150–400)
POTASSIUM SERPL-MCNC: 4.2 MMOL/L — SIGNIFICANT CHANGE UP (ref 3.5–5.3)
POTASSIUM SERPL-SCNC: 4.2 MMOL/L — SIGNIFICANT CHANGE UP (ref 3.5–5.3)
RBC # BLD: 3.46 M/UL — LOW (ref 3.8–5.2)
RBC # FLD: 15.5 % — HIGH (ref 10.3–14.5)
SODIUM SERPL-SCNC: 141 MMOL/L — SIGNIFICANT CHANGE UP (ref 135–145)
WBC # BLD: 9.82 K/UL — SIGNIFICANT CHANGE UP (ref 3.8–10.5)
WBC # FLD AUTO: 9.82 K/UL — SIGNIFICANT CHANGE UP (ref 3.8–10.5)

## 2024-09-12 RX ORDER — CEFAZOLIN SODIUM 2 G/100ML
2000 INJECTION, SOLUTION INTRAVENOUS EVERY 8 HOURS
Refills: 0 | Status: DISCONTINUED | OUTPATIENT
Start: 2024-09-12 | End: 2024-09-16

## 2024-09-12 RX ADMIN — Medication 40 MILLIGRAM(S): at 05:08

## 2024-09-12 RX ADMIN — Medication 10 MILLIGRAM(S): at 21:08

## 2024-09-12 RX ADMIN — ACETAMINOPHEN 650 MILLIGRAM(S): 325 TABLET ORAL at 21:37

## 2024-09-12 RX ADMIN — CEFAZOLIN SODIUM 100 MILLIGRAM(S): 2 INJECTION, SOLUTION INTRAVENOUS at 21:08

## 2024-09-12 RX ADMIN — ACETAMINOPHEN 650 MILLIGRAM(S): 325 TABLET ORAL at 21:07

## 2024-09-12 RX ADMIN — Medication 2 TABLET(S): at 21:08

## 2024-09-12 RX ADMIN — DONEPEZIL HYDROCHLORIDE 5 MILLIGRAM(S): 5 TABLET, FILM COATED ORAL at 21:08

## 2024-09-12 RX ADMIN — RIVAROXABAN 15 MILLIGRAM(S): 10 TABLET, FILM COATED ORAL at 17:21

## 2024-09-12 RX ADMIN — DILTIAZEM HYDROCHLORIDE 240 MILLIGRAM(S): 5 INJECTION INTRAVENOUS at 05:07

## 2024-09-12 NOTE — CAREGIVER ENGAGEMENT NOTE - CAREGIVER EDUCATION - EFFECTIVENESS
Verbalization

## 2024-09-12 NOTE — CAREGIVER ENGAGEMENT NOTE - CAREGIVER EDUCATION - TYPES DISCUSSED
After-care skilled tasks/Discharge plan/DME/Home Care Services/Insurance benefits/Post-acute care agency contact/Post-discharge escalation process/SNF placement process/Transportation coordination/Transportation letter provided

## 2024-09-12 NOTE — PROGRESS NOTE ADULT - SUBJECTIVE AND OBJECTIVE BOX
RUBENS BARKER is a 89yFemale , patient examined and chart reviewed    INTERVAL HPI/ OVERNIGHT EVENTS:   No events.  Afebrile.    PAST MEDICAL & SURGICAL HISTORY:  HTN (Hypertension)  MVP (Mitral Valve Prolapse)  last echo 2010  Hypercholesterolemia  Hiatal Hernia  Thyroid Nodule  Constipation  DJD (Degenerative Joint Disease)  shoulders, knees, cervical and lumbar spine  History of Osteoarthritis  Diverticulosis of the Colon  Abdominal Mass  Afib  HLD (hyperlipidemia)  Aortic valve stenosis  CAD (coronary artery disease)  Osteopenia  H/O Shoulder Replacement  right shoulder  get clindamycin before surgery  Parathyroid  surgery  S/P Bunionectomy  Cataract, Other  surgery   bilateral  H/O ovarian cystectomy  2013  H/O knee surgery  S/P cardiac catheterization  2/26/21      For details regarding the patient's social history, family history, and other miscellaneous elements, please refer the initial infectious diseases consultation and/or the admitting history and physical examination for this admission.     ROS:  CONSTITUTIONAL:  Negative fever or chills  EYES:  Negative  blurry vision or double vision  CARDIOVASCULAR:  Negative for chest pain or palpitations  RESPIRATORY:  Negative for cough, wheezing, or SOB   GASTROINTESTINAL:  Negative for nausea, vomiting, diarrhea, constipation, or abdominal pain  GENITOURINARY:  Negative frequency, urgency or dysuria  NEUROLOGIC:  No headache, confusion, dizziness, lightheadedness  All other systems were reviewed and are negative     codeine (Other)  penicillin (Flushing; Hypotension; Other)      Current inpatient medications :    ANTIBIOTICS/RELEVANT:  vancomycin  IVPB 750 milliGRAM(s) IV Intermittent every 24 hours    MEDICATIONS  (STANDING):  atorvastatin 10 milliGRAM(s) Oral at bedtime  diltiazem    milliGRAM(s) Oral daily  donepezil 5 milliGRAM(s) Oral at bedtime  pantoprazole    Tablet 40 milliGRAM(s) Oral before breakfast  rivaroxaban 15 milliGRAM(s) Oral with dinner  senna 2 Tablet(s) Oral at bedtime    MEDICATIONS  (PRN):  acetaminophen     Tablet .. 650 milliGRAM(s) Oral every 6 hours PRN Temp greater or equal to 38C (100.4F), Moderate Pain (4 - 6)  magnesium hydroxide Suspension 30 milliLiter(s) Oral daily PRN Constipation  polyethylene glycol 3350 17 Gram(s) Oral daily PRN Constipation        Objective:  Vital Signs Last 24 Hrs  T(C): 36.8 (12 Sep 2024 13:49), Max: 37.2 (12 Sep 2024 04:18)  T(F): 98.2 (12 Sep 2024 13:49), Max: 98.9 (12 Sep 2024 04:18)  HR: 60 (12 Sep 2024 13:49) (60 - 65)  BP: 133/60 (12 Sep 2024 13:49) (130/68 - 149/61)  RR: 17 (12 Sep 2024 13:49) (17 - 18)  SpO2: 98% (12 Sep 2024 13:49) (97% - 98%)    Parameters below as of 12 Sep 2024 13:49  Patient On (Oxygen Delivery Method): room air      Physical Exam:  General: well developed well nourished, in no acute distress  Neck: supple, trachea midline  Lungs: clear, no wheeze/rhonchi  Cardiovascular: regular rate and rhythm, S1 S2  Abdomen: soft, nontender,  bowel sounds normal  Neurological: alert and oriented x3  Skin: no rash  Extremities: Right leg hematoma with erythema- better today          LABS:                        8.9    9.82  )-----------( 276      ( 12 Sep 2024 06:00 )             28.6   09-12    141  |  106  |  37<H>  ----------------------------<  95  4.2   |  28  |  0.78    Ca    9.4      12 Sep 2024 06:00        MICROBIOLOGY:        RADIOLOGY & ADDITIONAL STUDIES:          Assessment :  89YO F PMH PMH of HTN, HLD, CAD with cardiac stent, A-fib on Xarelto, aortic stenosis sp TAVR, mitral valve prolapse, thyroid nodule aortic valve replacement presented with right leg hematoma sp trauma Noted worsening erythema and swelling of right leg.   Admitted with Right leg hematoma with superimposed cellulitis  Some improvement in erythema today  MRSA screen neg    Plan :   Change Vanc to Ancef  Trend temps and cbc  Elevate leg  Asp precautions  Increase activity  Stable from ID standpoint      Continue with present regiment.  Appropriate use of antibiotics and adverse effects reviewed.      > 35 minutes were spent in direct patient care reviewing notes, medications ,labs data/ imaging , discussion with multidisciplinary team.    Thank you for allowing me to participate in care of your patient .    Jordan Garcia MD  Infectious Disease  [phone]

## 2024-09-12 NOTE — PROGRESS NOTE ADULT - SUBJECTIVE AND OBJECTIVE BOX
Date of Service: 09-12-24 @ 10:08    Patient is a 89y old  Female who presents with a chief complaint of rle infection (12 Sep 2024 07:36)       INTERVAL HPI/OVERNIGHT EVENTS: stable, feels well    MEDICATIONS  (STANDING):  atorvastatin 10 milliGRAM(s) Oral at bedtime  diltiazem    milliGRAM(s) Oral daily  donepezil 5 milliGRAM(s) Oral at bedtime  pantoprazole    Tablet 40 milliGRAM(s) Oral before breakfast  rivaroxaban 15 milliGRAM(s) Oral with dinner  senna 2 Tablet(s) Oral at bedtime  vancomycin  IVPB 750 milliGRAM(s) IV Intermittent every 24 hours    MEDICATIONS  (PRN):  acetaminophen     Tablet .. 650 milliGRAM(s) Oral every 6 hours PRN Temp greater or equal to 38C (100.4F), Moderate Pain (4 - 6)  magnesium hydroxide Suspension 30 milliLiter(s) Oral daily PRN Constipation  polyethylene glycol 3350 17 Gram(s) Oral daily PRN Constipation      Allergies    codeine (Other)  penicillin (Flushing; Hypotension; Other)    Intolerances        REVIEW OF SYSTEMS:  CONSTITUTIONAL: No fever, weight loss, or fatigue  EYES: No eye pain, visual disturbances  ENMT:  No difficulty hearing, tinnitus, vertigo; No sinus or throat pain  NECK: No pain or stiffness  RESPIRATORY: No cough, wheezing, chills or hemoptysis; No shortness of breath  CARDIOVASCULAR: No chest pain, palpitations, dizziness  GASTROINTESTINAL: No abdominal or epigastric pain. No nausea, vomiting, or hematemesis; No diarrhea or constipation. No melena or hematochezia.  GENITOURINARY: No dysuria, frequency, hematuria, or incontinence  NEUROLOGICAL: No headaches, memory loss, loss of strength, numbness, or tremors  SKIN: No itching, burning  LYMPH NODES: No enlarged glands  MUSCULOSKELETAL: pain in lower ext  PSYCHIATRIC: No depression, mood swings  HEME/LYMPH: No easy bruising, or bleeding gums  ALLERGY AND IMMUNOLOGIC: No hives    Vital Signs Last 24 Hrs  T(C): 37.2 (12 Sep 2024 04:18), Max: 37.2 (12 Sep 2024 04:18)  T(F): 98.9 (12 Sep 2024 04:18), Max: 98.9 (12 Sep 2024 04:18)  HR: 64 (12 Sep 2024 04:18) (58 - 65)  BP: 149/61 (12 Sep 2024 04:18) (130/68 - 152/61)  BP(mean): --  RR: 18 (12 Sep 2024 04:18) (16 - 18)  SpO2: 97% (12 Sep 2024 04:18) (97% - 97%)    Parameters below as of 12 Sep 2024 04:18  Patient On (Oxygen Delivery Method): room air        PHYSICAL EXAM:  GENERAL: NAD, well-groomed, well-developed  HEAD:  Atraumatic, Normocephalic  EYES: EOMI, PERRLA, conjunctiva and sclera clear  ENMT: No tonsillar erythema, exudates, or enlargement   NECK: Supple, No JVD  NERVOUS SYSTEM:  Alert & Oriented X3, Good concentration  CHEST/LUNG: Clear to auscultation bilaterally; No rales, rhonchi, wheezing  HEART: Regular rate and rhythm  ABDOMEN: Soft, Nontender, Nondistended; Bowel sounds present  EXTREMITIES:  2+ Peripheral Pulses, rle with erythema and swelling - decreased, stable large hematoma   LYMPH: No lymphadenopathy noted  SKIN: No rashes     LABS:                        8.9    9.82  )-----------( 276      ( 12 Sep 2024 06:00 )             28.6     12 Sep 2024 06:00    141    |  106    |  37     ----------------------------<  95     4.2     |  28     |  0.78     Ca    9.4        12 Sep 2024 06:00        Urinalysis Basic - ( 12 Sep 2024 06:00 )    Color: x / Appearance: x / SG: x / pH: x  Gluc: 95 mg/dL / Ketone: x  / Bili: x / Urobili: x   Blood: x / Protein: x / Nitrite: x   Leuk Esterase: x / RBC: x / WBC x   Sq Epi: x / Non Sq Epi: x / Bacteria: x      CAPILLARY BLOOD GLUCOSE                RADIOLOGY & ADDITIONAL TESTS:      Consultant(s) Notes Reviewed:  [ x] YES  [ ] NO    Care Discussed with Consultants/Other Providers [ x] YES  [ ] NO    Advanced care planning discussed with patient and family, advanced care planning forms reviewed, discussed, and completed.  20 minutes spent.

## 2024-09-12 NOTE — CASE MANAGEMENT PROGRESS NOTE - NSCMPROGRESSNOTE_GEN_ALL_CORE
RN/CM noted pt's case discussed during Interdisciplinary rounds, pt still on  IV Vanco for RLE cellulitis. Pt lives alone, was independent with rolling walker use. Pt's community MD is DR Juju Solo [phone]. Currently pt requires 1 person assistance. DC pending hospital course. CM remains available and continues to follow case.  RN/CM noted pt's case discussed during Interdisciplinary rounds, pt still on  IV Vanco for RLE cellulitis. Pt lives alone, was independent with rolling walker use. Pt's community MD is DR Juju Solo [phone]. Currently pt requires 1 person assistance. AS per discussion of MSW with family @ bedside, family is NOT receptive to rehab, pt has 8H/day of aide services and family assumes care thereafter, and is opting for DC home with home care. DC pending hospital course. CM remains available and continues to follow case.

## 2024-09-12 NOTE — CAREGIVER ENGAGEMENT NOTE - CAREGIVER EDUCATION NOTES - FREE TEXT
Per discussion w/ inpatient interdisciplinary treatment team this AM, patient is not yet medically cleared for transition to next level of care, however  met w/ patient & her son Willie @ bedside on [address] to discuss discharge planning. Both expressed preference toward patient returning home on discharge w/ new home care P.T./ O.T. services & w/ resumption of private-pay  services. Son identified that patient has the  w/ her from approx. 1-9PM everyday, and one of her sons stays w/ her overnight and then her daughter comes in the mornings until the aide arrives. Son also reported that patient was @ sub-acute rehab several months ago and did not find much benefit due to having lots of downtime, so their preference remains for returning home. Hospital  aware of need for HCPT/OT referral.  to remain available for any changes to discharge plan.

## 2024-09-12 NOTE — PROGRESS NOTE ADULT - SUBJECTIVE AND OBJECTIVE BOX
RUBENS BARKER  MRN-[id number] 89y    GENERAL SURGERY/ DR. RODRIGUEZ    NO COMPLAINTS THIS MORNING     MEDICATIONS  (STANDING):  atorvastatin 10 milliGRAM(s) Oral at bedtime  diltiazem    milliGRAM(s) Oral daily  donepezil 5 milliGRAM(s) Oral at bedtime  pantoprazole    Tablet 40 milliGRAM(s) Oral before breakfast  rivaroxaban 15 milliGRAM(s) Oral with dinner  senna 2 Tablet(s) Oral at bedtime  vancomycin  IVPB 750 milliGRAM(s) IV Intermittent every 24 hours    MEDICATIONS  (PRN):  acetaminophen     Tablet .. 650 milliGRAM(s) Oral every 6 hours PRN Temp greater or equal to 38C (100.4F), Moderate Pain (4 - 6)  magnesium hydroxide Suspension 30 milliLiter(s) Oral daily PRN Constipation  polyethylene glycol 3350 17 Gram(s) Oral daily PRN Constipation     Vital Signs Last 24 Hrs  T(C): 37.2 (12 Sep 2024 04:18), Max: 37.2 (12 Sep 2024 04:18)  T(F): 98.9 (12 Sep 2024 04:18), Max: 98.9 (12 Sep 2024 04:18)  HR: 64 (12 Sep 2024 04:18) (58 - 65)  BP: 149/61 (12 Sep 2024 04:18) (130/68 - 152/61)  RR: 18 (12 Sep 2024 04:18) (16 - 18)  SpO2: 97% (12 Sep 2024 04:18) (97% - 97%)    Parameters below as of 12 Sep 2024 04:18  Patient On (Oxygen Delivery Method): room air    09-11-24 @ 07:01  -  09-12-24 @ 07:00  --------------------------------------------------------  IN: 250 mL / OUT: 200 mL / NET: 50 mL    RIGHT LOWER EXT  A LATERAL HEMATOMA APPROXIMATELY 4 X 3 CM, NO BLEEDING / EXPANDING, SOME CHRONIC SKIN CHANGES/ DISCOLORATION  WITH TRACE EDEMA   SOFT COMPARTMENTS   PALPABLE DP AND PT PULSES                          9.3    9.98  )-----------( 237      ( 11 Sep 2024 06:00 )             29.6      09-11    140  |  105  |  25<H>  ----------------------------<  94  4.2   |  28  |  0.96    Ca    9.4      11 Sep 2024 06:00    TPro  7.0  /  Alb  3.9  /  TBili  0.4  /  DBili  x   /  AST  18  /  ALT  25  /  AlkPhos  173<H>  09-09                     ASSESSMENT &  PLAN:      RIGHT LOWER EXT HEMATOMA , IMPROVING CELLULITIS    CONTINUE OBSERVATION, NO SURGICAL INTERVENTION    ELEVATION, AMBULATION   CONTINUE IV ANTIBIOTIC , VANCOMYCIN AS PER ID   SURGICAL TEAM WILL FOLLOW UP

## 2024-09-13 DIAGNOSIS — R55 SYNCOPE AND COLLAPSE: ICD-10-CM

## 2024-09-13 LAB
ALBUMIN SERPL ELPH-MCNC: 2.9 G/DL — LOW (ref 3.3–5)
ALP SERPL-CCNC: 183 U/L — HIGH (ref 30–120)
ALT FLD-CCNC: 26 U/L — SIGNIFICANT CHANGE UP (ref 10–60)
ANION GAP SERPL CALC-SCNC: 5 MMOL/L — SIGNIFICANT CHANGE UP (ref 5–17)
ANION GAP SERPL CALC-SCNC: 8 MMOL/L — SIGNIFICANT CHANGE UP (ref 5–17)
AST SERPL-CCNC: 26 U/L — SIGNIFICANT CHANGE UP (ref 10–40)
BILIRUB SERPL-MCNC: 0.2 MG/DL — SIGNIFICANT CHANGE UP (ref 0.2–1.2)
BUN SERPL-MCNC: 32 MG/DL — HIGH (ref 7–23)
BUN SERPL-MCNC: 34 MG/DL — HIGH (ref 7–23)
CALCIUM SERPL-MCNC: 9.1 MG/DL — SIGNIFICANT CHANGE UP (ref 8.4–10.5)
CALCIUM SERPL-MCNC: 9.6 MG/DL — SIGNIFICANT CHANGE UP (ref 8.4–10.5)
CHLORIDE SERPL-SCNC: 107 MMOL/L — SIGNIFICANT CHANGE UP (ref 96–108)
CHLORIDE SERPL-SCNC: 107 MMOL/L — SIGNIFICANT CHANGE UP (ref 96–108)
CK MB CFR SERPL CALC: 0.8 NG/ML — SIGNIFICANT CHANGE UP (ref 0–3.6)
CO2 SERPL-SCNC: 27 MMOL/L — SIGNIFICANT CHANGE UP (ref 22–31)
CO2 SERPL-SCNC: 30 MMOL/L — SIGNIFICANT CHANGE UP (ref 22–31)
CREAT SERPL-MCNC: 0.81 MG/DL — SIGNIFICANT CHANGE UP (ref 0.5–1.3)
CREAT SERPL-MCNC: 0.84 MG/DL — SIGNIFICANT CHANGE UP (ref 0.5–1.3)
EGFR: 66 ML/MIN/1.73M2 — SIGNIFICANT CHANGE UP
EGFR: 69 ML/MIN/1.73M2 — SIGNIFICANT CHANGE UP
GLUCOSE BLDC GLUCOMTR-MCNC: 185 MG/DL — HIGH (ref 70–99)
GLUCOSE SERPL-MCNC: 112 MG/DL — HIGH (ref 70–99)
GLUCOSE SERPL-MCNC: 98 MG/DL — SIGNIFICANT CHANGE UP (ref 70–99)
HCT VFR BLD CALC: 30.5 % — LOW (ref 34.5–45)
HCT VFR BLD CALC: 30.7 % — LOW (ref 34.5–45)
HGB BLD-MCNC: 9.4 G/DL — LOW (ref 11.5–15.5)
HGB BLD-MCNC: 9.6 G/DL — LOW (ref 11.5–15.5)
MAGNESIUM SERPL-MCNC: 1.8 MG/DL — SIGNIFICANT CHANGE UP (ref 1.6–2.6)
MCHC RBC-ENTMCNC: 25.8 PG — LOW (ref 27–34)
MCHC RBC-ENTMCNC: 25.9 PG — LOW (ref 27–34)
MCHC RBC-ENTMCNC: 30.6 GM/DL — LOW (ref 32–36)
MCHC RBC-ENTMCNC: 31.5 GM/DL — LOW (ref 32–36)
MCV RBC AUTO: 82.4 FL — SIGNIFICANT CHANGE UP (ref 80–100)
MCV RBC AUTO: 84.1 FL — SIGNIFICANT CHANGE UP (ref 80–100)
NRBC # BLD: 0 /100 WBCS — SIGNIFICANT CHANGE UP (ref 0–0)
NRBC # BLD: 0 /100 WBCS — SIGNIFICANT CHANGE UP (ref 0–0)
PHOSPHATE SERPL-MCNC: 3.7 MG/DL — SIGNIFICANT CHANGE UP (ref 2.5–4.5)
PLATELET # BLD AUTO: 261 K/UL — SIGNIFICANT CHANGE UP (ref 150–400)
PLATELET # BLD AUTO: 265 K/UL — SIGNIFICANT CHANGE UP (ref 150–400)
POTASSIUM SERPL-MCNC: 3.6 MMOL/L — SIGNIFICANT CHANGE UP (ref 3.5–5.3)
POTASSIUM SERPL-MCNC: 4 MMOL/L — SIGNIFICANT CHANGE UP (ref 3.5–5.3)
POTASSIUM SERPL-SCNC: 3.6 MMOL/L — SIGNIFICANT CHANGE UP (ref 3.5–5.3)
POTASSIUM SERPL-SCNC: 4 MMOL/L — SIGNIFICANT CHANGE UP (ref 3.5–5.3)
PROT SERPL-MCNC: 5.9 G/DL — LOW (ref 6–8.3)
RBC # BLD: 3.65 M/UL — LOW (ref 3.8–5.2)
RBC # BLD: 3.7 M/UL — LOW (ref 3.8–5.2)
RBC # FLD: 15.6 % — HIGH (ref 10.3–14.5)
RBC # FLD: 15.7 % — HIGH (ref 10.3–14.5)
SODIUM SERPL-SCNC: 142 MMOL/L — SIGNIFICANT CHANGE UP (ref 135–145)
SODIUM SERPL-SCNC: 142 MMOL/L — SIGNIFICANT CHANGE UP (ref 135–145)
T4 FREE SERPL-MCNC: 1.2 NG/DL — SIGNIFICANT CHANGE UP (ref 0.9–1.8)
TROPONIN I, HIGH SENSITIVITY RESULT: 15.7 NG/L — SIGNIFICANT CHANGE UP
TSH SERPL-MCNC: 2.05 UIU/ML — SIGNIFICANT CHANGE UP (ref 0.27–4.2)
WBC # BLD: 11.31 K/UL — HIGH (ref 3.8–10.5)
WBC # BLD: 9.32 K/UL — SIGNIFICANT CHANGE UP (ref 3.8–10.5)
WBC # FLD AUTO: 11.31 K/UL — HIGH (ref 3.8–10.5)
WBC # FLD AUTO: 9.32 K/UL — SIGNIFICANT CHANGE UP (ref 3.8–10.5)

## 2024-09-13 PROCEDURE — 93010 ELECTROCARDIOGRAM REPORT: CPT

## 2024-09-13 RX ORDER — SODIUM CHLORIDE 9 MG/ML
500 INJECTION INTRAMUSCULAR; INTRAVENOUS; SUBCUTANEOUS ONCE
Refills: 0 | Status: COMPLETED | OUTPATIENT
Start: 2024-09-13 | End: 2024-09-13

## 2024-09-13 RX ORDER — ALPRAZOLAM 0.25 MG
0.25 TABLET ORAL ONCE
Refills: 0 | Status: DISCONTINUED | OUTPATIENT
Start: 2024-09-13 | End: 2024-09-13

## 2024-09-13 RX ADMIN — Medication 2 TABLET(S): at 21:01

## 2024-09-13 RX ADMIN — CEFAZOLIN SODIUM 100 MILLIGRAM(S): 2 INJECTION, SOLUTION INTRAVENOUS at 14:03

## 2024-09-13 RX ADMIN — SODIUM CHLORIDE 1000 MILLILITER(S): 9 INJECTION INTRAMUSCULAR; INTRAVENOUS; SUBCUTANEOUS at 10:25

## 2024-09-13 RX ADMIN — Medication 0.25 MILLIGRAM(S): at 21:01

## 2024-09-13 RX ADMIN — CEFAZOLIN SODIUM 100 MILLIGRAM(S): 2 INJECTION, SOLUTION INTRAVENOUS at 05:28

## 2024-09-13 RX ADMIN — DONEPEZIL HYDROCHLORIDE 5 MILLIGRAM(S): 5 TABLET, FILM COATED ORAL at 21:01

## 2024-09-13 RX ADMIN — Medication 1000 MILLILITER(S): at 10:44

## 2024-09-13 RX ADMIN — Medication 10 MILLIGRAM(S): at 21:01

## 2024-09-13 RX ADMIN — DILTIAZEM HYDROCHLORIDE 240 MILLIGRAM(S): 5 INJECTION INTRAVENOUS at 14:03

## 2024-09-13 RX ADMIN — CEFAZOLIN SODIUM 100 MILLIGRAM(S): 2 INJECTION, SOLUTION INTRAVENOUS at 21:01

## 2024-09-13 RX ADMIN — Medication 40 MILLIGRAM(S): at 05:28

## 2024-09-13 RX ADMIN — RIVAROXABAN 15 MILLIGRAM(S): 10 TABLET, FILM COATED ORAL at 17:35

## 2024-09-13 NOTE — CHART NOTE - NSCHARTNOTEFT_GEN_A_CORE
RR called for pt found to be sitting on toilet with brief LOC after attempting to stand.  BP 60s/30s and 500cc NS bolus ordered. Pt moved back to her bed via wheelchair and placed in Trendelenburg position with raised lower extremities  EKG done showing AFIB with HR 89bpm  repeat BP still 60s/30s and 1L LR bolus ordered  pending repeat BP. RR called for pt found to be sitting on toilet with brief LOC after attempting to stand.  BP 60s/30s and 500cc NS bolus ordered. Pt moved back to her bed via wheelchair and placed in Trendelenburg position with raised lower extremities  EKG done showing AFIB with HR 89bpm  repeat BP still 60s/30s and 1L LR bolus ordered  pending repeat BP.    update to vitals:  pt is responding well and feels better  T97.3F  /70  HR 63  SaO2 95% RA

## 2024-09-13 NOTE — PROGRESS NOTE ADULT - SUBJECTIVE AND OBJECTIVE BOX
RUBENS BARKER is a 89yFemale , patient examined and chart reviewed    INTERVAL HPI/ OVERNIGHT EVENTS:   Events noted- Had RRT this am due to syncope with vasovagal while in bathroom.  Anxious Afebrile.  Daughter at bedside.      PAST MEDICAL & SURGICAL HISTORY:  HTN (Hypertension)  MVP (Mitral Valve Prolapse)  last echo 2010  Hypercholesterolemia  Hiatal Hernia  Thyroid Nodule  Constipation  DJD (Degenerative Joint Disease)  shoulders, knees, cervical and lumbar spine  History of Osteoarthritis  Diverticulosis of the Colon  Abdominal Mass  Afib  HLD (hyperlipidemia)  Aortic valve stenosis  CAD (coronary artery disease)  Osteopenia  H/O Shoulder Replacement  right shoulder  get clindamycin before surgery  Parathyroid  surgery  S/P Bunionectomy  Cataract, Other  surgery   bilateral  H/O ovarian cystectomy  2013  H/O knee surgery  S/P cardiac catheterization  2/26/21      For details regarding the patient's social history, family history, and other miscellaneous elements, please refer the initial infectious diseases consultation and/or the admitting history and physical examination for this admission.     ROS:  CONSTITUTIONAL:  Negative fever or chills  EYES:  Negative  blurry vision or double vision  CARDIOVASCULAR:  Negative for chest pain or palpitations  RESPIRATORY:  Negative for cough, wheezing, or SOB   GASTROINTESTINAL:  Negative for nausea, vomiting, diarrhea, constipation, or abdominal pain  GENITOURINARY:  Negative frequency, urgency or dysuria  NEUROLOGIC:  No headache, confusion, dizziness, lightheadedness  All other systems were reviewed and are negative     codeine (Other)  penicillin (Flushing; Hypotension; Other)      Current inpatient medications :    ANTIBIOTICS/RELEVANT:  ceFAZolin   IVPB 2000 milliGRAM(s) IV Intermittent every 8 hours    MEDICATIONS  (STANDING):  atorvastatin 10 milliGRAM(s) Oral at bedtime  diltiazem    milliGRAM(s) Oral daily  donepezil 5 milliGRAM(s) Oral at bedtime  melatonin 3 milliGRAM(s) Oral at bedtime  pantoprazole    Tablet 40 milliGRAM(s) Oral before breakfast  rivaroxaban 15 milliGRAM(s) Oral with dinner  senna 2 Tablet(s) Oral at bedtime    MEDICATIONS  (PRN):  acetaminophen     Tablet .. 650 milliGRAM(s) Oral every 6 hours PRN Temp greater or equal to 38C (100.4F), Moderate Pain (4 - 6)  magnesium hydroxide Suspension 30 milliLiter(s) Oral daily PRN Constipation  polyethylene glycol 3350 17 Gram(s) Oral daily PRN Constipation      Objective:  Vital Signs Last 24 Hrs  T(C): 36.4 (13 Sep 2024 13:56), Max: 37.1 (12 Sep 2024 21:22)  T(F): 97.5 (13 Sep 2024 13:56), Max: 98.7 (12 Sep 2024 21:22)  HR: 80 (13 Sep 2024 13:56) (58 - 103)  BP: 137/66 (13 Sep 2024 13:56) (60/39 - 147/57)  RR: 18 (13 Sep 2024 13:56) (18 - 18)  SpO2: 96% (13 Sep 2024 13:56) (96% - 98%)    Parameters below as of 13 Sep 2024 13:56  Patient On (Oxygen Delivery Method): room air          Physical Exam:  General: well developed well nourished, in no acute distress  Neck: supple, trachea midline  Lungs: clear, no wheeze/rhonchi  Cardiovascular: regular rate and rhythm, S1 S2  Abdomen: soft, nontender,  bowel sounds normal  Neurological: alert and oriented x3  Skin: no rash  Extremities: Right leg hematoma with erythema- better today          LABS:                        8.9    9.82  )-----------( 276      ( 12 Sep 2024 06:00 )             28.6   09-12    141  |  106  |  37<H>  ----------------------------<  95  4.2   |  28  |  0.78    Ca    9.4      12 Sep 2024 06:00        MICROBIOLOGY:        RADIOLOGY & ADDITIONAL STUDIES:          Assessment :  89YO F PMH PMH of HTN, HLD, CAD with cardiac stent, A-fib on Xarelto, aortic stenosis sp TAVR, mitral valve prolapse, thyroid nodule aortic valve replacement presented with right leg hematoma sp trauma Noted worsening erythema and swelling of right leg.   Admitted with Right leg hematoma with superimposed cellulitis  Improvement in erythema   MRSA screen neg  Events noted- Had RRT this am due to syncope with vasovagal while in bathroom.  Clinically better now      Plan :   Cont Ancef  Can change to po Keflex 500mg q8h till 9/19 on Dc  Trend temps and cbc  Elevate leg  Asp precautions  Increase activity  Stable from ID standpoint  Dc planning per primary team    D/w pt's daughter at bedside.    Continue with present regiment.  Appropriate use of antibiotics and adverse effects reviewed.      > 35 minutes were spent in direct patient care reviewing notes, medications ,labs data/ imaging , discussion with multidisciplinary team.    Thank you for allowing me to participate in care of your patient .    Jordan Garcia MD  Infectious Disease  [phone]

## 2024-09-13 NOTE — PROGRESS NOTE ADULT - SUBJECTIVE AND OBJECTIVE BOX
SURGERY    89y/o female with past medical history of A-fib on anticoagulation, CAD, hypertension, hyperlipidemia, mitral valve prolapse, thyroid nodule aortic valve replacement presented with right leg hematoma.     SUBJECTIVE:   Patient seen at bedside, no overnight events, pain when examining bullae on lateral aspect of RIGHT lower leg.     OBJECTIVE:   T(C): 36.4 (09-13-24 @ 04:54), Max: 37.1 (09-12-24 @ 21:22)  HR: 58 (09-13-24 @ 04:54) (58 - 69)  BP: 147/57 (09-13-24 @ 04:54) (126/58 - 147/57)  RR: 18 (09-13-24 @ 04:54) (17 - 18)  SpO2: 96% (09-13-24 @ 04:54) (96% - 98%)  CAPILLARY BLOOD GLUCOSE    I&O's Detail    12 Sep 2024 07:01  -  13 Sep 2024 07:00  --------------------------------------------------------  IN:  Total IN: 0 mL    OUT:    Voided (mL): 1000 mL  Total OUT: 1000 mL    Total NET: -1000 mL    Physical exam:  HEENT: PERRLA, EOM intact  Neck: trachea midline  Chest: Clear through auscultation bilaterally, No rales, rhonchi wheezes noted bilaterally  Heart: S1,S1 RRR, no murmurs noted  Abdomen: soft non distended, BS x 4  RIGHT lower leg: Outer area marked, showing improvement of erythema and swelling. Fluid filled bullae  LEFT Extremity: no edema noted, warm, no calf tenderness     MEDICATIONS  (STANDING):  atorvastatin 10 milliGRAM(s) Oral at bedtime  ceFAZolin   IVPB 2000 milliGRAM(s) IV Intermittent every 8 hours  diltiazem    milliGRAM(s) Oral daily  donepezil 5 milliGRAM(s) Oral at bedtime  melatonin 3 milliGRAM(s) Oral at bedtime  pantoprazole    Tablet 40 milliGRAM(s) Oral before breakfast  rivaroxaban 15 milliGRAM(s) Oral with dinner  senna 2 Tablet(s) Oral at bedtime    MEDICATIONS  (PRN):  acetaminophen     Tablet .. 650 milliGRAM(s) Oral every 6 hours PRN Temp greater or equal to 38C (100.4F), Moderate Pain (4 - 6)  magnesium hydroxide Suspension 30 milliLiter(s) Oral daily PRN Constipation  polyethylene glycol 3350 17 Gram(s) Oral daily PRN Constipation      LABS:                        9.6    9.32  )-----------( 261      ( 13 Sep 2024 06:00 )             30.5     09-13    142  |  107  |  32<H>  ----------------------------<  98  4.0   |  27  |  0.81    Ca    9.6      13 Sep 2024 06:00        Urinalysis Basic - ( 13 Sep 2024 06:00 )    Color: x / Appearance: x / SG: x / pH: x  Gluc: 98 mg/dL / Ketone: x  / Bili: x / Urobili: x   Blood: x / Protein: x / Nitrite: x   Leuk Esterase: x / RBC: x / WBC x   Sq Epi: x / Non Sq Epi: x / Bacteria: x        RADIOLOGY & ADDITIONAL STUDIES:    Assessment  Patient is a 89y old  Female who presents with a chief complaint of RIGHT lower extremity infection       Plan      Surgical Team Contact Information     SURGERY Progress Note PA    89y/o female with past medical history of A-fib on anticoagulation, CAD, hypertension, hyperlipidemia, mitral valve prolapse, thyroid nodule aortic valve replacement presented with RIGHT leg hematoma.     SUBJECTIVE:   Patient seen at bedside, no overnight events, pain when examining bullae on lateral aspect of RIGHT lower leg.     OBJECTIVE:   T(C): 36.4 (09-13-24 @ 04:54), Max: 37.1 (09-12-24 @ 21:22)  HR: 58 (09-13-24 @ 04:54) (58 - 69)  BP: 147/57 (09-13-24 @ 04:54) (126/58 - 147/57)  RR: 18 (09-13-24 @ 04:54) (17 - 18)  SpO2: 96% (09-13-24 @ 04:54) (96% - 98%)  CAPILLARY BLOOD GLUCOSE    I&O's Detail    12 Sep 2024 07:01  -  13 Sep 2024 07:00  --------------------------------------------------------  IN:  Total IN: 0 mL    OUT:    Voided (mL): 1000 mL  Total OUT: 1000 mL    Total NET: -1000 mL    Physical exam:  HEENT: PERRLA, EOM intact  Neck: trachea midline  Chest: Clear through auscultation bilaterally, No rales, rhonchi wheezes noted bilaterally  Heart: S1,S1 RRR, no murmurs noted  Abdomen: soft non distended, BS x 4  RIGHT lower leg: Outer area marked, showing improvement of erythema and swelling. Fluid filled bullae  LEFT Extremity: no edema noted, warm, no calf tenderness     MEDICATIONS  (STANDING):  atorvastatin 10 milliGRAM(s) Oral at bedtime  ceFAZolin   IVPB 2000 milliGRAM(s) IV Intermittent every 8 hours  diltiazem    milliGRAM(s) Oral daily  donepezil 5 milliGRAM(s) Oral at bedtime  melatonin 3 milliGRAM(s) Oral at bedtime  pantoprazole    Tablet 40 milliGRAM(s) Oral before breakfast  rivaroxaban 15 milliGRAM(s) Oral with dinner  senna 2 Tablet(s) Oral at bedtime    MEDICATIONS  (PRN):  acetaminophen     Tablet .. 650 milliGRAM(s) Oral every 6 hours PRN Temp greater or equal to 38C (100.4F), Moderate Pain (4 - 6)  magnesium hydroxide Suspension 30 milliLiter(s) Oral daily PRN Constipation  polyethylene glycol 3350 17 Gram(s) Oral daily PRN Constipation      LABS:                        9.6    9.32  )-----------( 261      ( 13 Sep 2024 06:00 )             30.5     09-13    142  |  107  |  32<H>  ----------------------------<  98  4.0   |  27  |  0.81    Ca    9.6      13 Sep 2024 06:00        Urinalysis Basic - ( 13 Sep 2024 06:00 )    Color: x / Appearance: x / SG: x / pH: x  Gluc: 98 mg/dL / Ketone: x  / Bili: x / Urobili: x   Blood: x / Protein: x / Nitrite: x   Leuk Esterase: x / RBC: x / WBC x   Sq Epi: x / Non Sq Epi: x / Bacteria: x    RADIOLOGY & ADDITIONAL STUDIES:    Assessment/Plan  Patient is a 89y old female who presented with a chief complaint of RIGHT lower extremity infection     Continue current care  Diet - Regular  IV ABX changed from vanco to ancef per ID  Labs in AM  GI/DVT prophylaxis  Analgesia prn  OOB with assistance   Incentive spirometry/Cough/Deep breathing exercises  Dr. Sheridan contacted - will see pt with him later to complete paln           SURGERY Progress Note PA    89y/o female with past medical history of A-fib on anticoagulation, CAD, hypertension, hyperlipidemia, mitral valve prolapse, thyroid nodule aortic valve replacement presented with RIGHT leg hematoma.     SUBJECTIVE:   Patient seen at bedside, no overnight events, pain when examining bullae on lateral aspect of RIGHT lower leg.     OBJECTIVE:   T(C): 36.4 (09-13-24 @ 04:54), Max: 37.1 (09-12-24 @ 21:22)  HR: 58 (09-13-24 @ 04:54) (58 - 69)  BP: 147/57 (09-13-24 @ 04:54) (126/58 - 147/57)  RR: 18 (09-13-24 @ 04:54) (17 - 18)  SpO2: 96% (09-13-24 @ 04:54) (96% - 98%)  CAPILLARY BLOOD GLUCOSE    I&O's Detail    12 Sep 2024 07:01  -  13 Sep 2024 07:00  --------------------------------------------------------  IN:  Total IN: 0 mL    OUT:    Voided (mL): 1000 mL  Total OUT: 1000 mL    Total NET: -1000 mL    Physical exam:  HEENT: PERRLA, EOM intact  Neck: trachea midline  Chest: Clear through auscultation bilaterally, No rales, rhonchi wheezes noted bilaterally  Heart: S1,S1 RRR, no murmurs noted  Abdomen: soft non distended, BS x 4  RIGHT lower leg: Outer area marked, showing improvement of erythema and swelling within the original outer markings. Fluid filled bullae noted  LEFT Extremity: no edema noted, warm, no calf tenderness     MEDICATIONS  (STANDING):  atorvastatin 10 milliGRAM(s) Oral at bedtime  ceFAZolin   IVPB 2000 milliGRAM(s) IV Intermittent every 8 hours  diltiazem    milliGRAM(s) Oral daily  donepezil 5 milliGRAM(s) Oral at bedtime  melatonin 3 milliGRAM(s) Oral at bedtime  pantoprazole    Tablet 40 milliGRAM(s) Oral before breakfast  rivaroxaban 15 milliGRAM(s) Oral with dinner  senna 2 Tablet(s) Oral at bedtime    MEDICATIONS  (PRN):  acetaminophen     Tablet .. 650 milliGRAM(s) Oral every 6 hours PRN Temp greater or equal to 38C (100.4F), Moderate Pain (4 - 6)  magnesium hydroxide Suspension 30 milliLiter(s) Oral daily PRN Constipation  polyethylene glycol 3350 17 Gram(s) Oral daily PRN Constipation      LABS:                        9.6    9.32  )-----------( 261      ( 13 Sep 2024 06:00 )             30.5     09-13    142  |  107  |  32<H>  ----------------------------<  98  4.0   |  27  |  0.81    Ca    9.6      13 Sep 2024 06:00        Urinalysis Basic - ( 13 Sep 2024 06:00 )    Color: x / Appearance: x / SG: x / pH: x  Gluc: 98 mg/dL / Ketone: x  / Bili: x / Urobili: x   Blood: x / Protein: x / Nitrite: x   Leuk Esterase: x / RBC: x / WBC x   Sq Epi: x / Non Sq Epi: x / Bacteria: x    RADIOLOGY & ADDITIONAL STUDIES:    Assessment/Plan  Patient is a 89y old female who presented with a chief complaint of RIGHT lower extremity infection. RIGHT leg hematoma with bullae on lateral   aspect RIGHT lower extremity.  Continue current care  Diet - Regular  IV ABX changed from vanco to ancef per ID  On Xarelto  Labs in AM  GI/DVT prophylaxis  Analgesia prn  OOB with assistance   Incentive spirometry/Cough/Deep breathing exercises  Dr. Sheridan contacted - will see pt with him later to complete plan           SURGERY Progress Note PA    89y/o female with past medical history of A-fib on anticoagulation, CAD, hypertension, hyperlipidemia, mitral valve prolapse, thyroid nodule aortic valve replacement presented with RIGHT leg hematoma.     SUBJECTIVE:   Patient seen at bedside, no overnight events, pain when examining bullae on lateral aspect of RIGHT lower leg.     OBJECTIVE:   T(C): 36.4 (09-13-24 @ 04:54), Max: 37.1 (09-12-24 @ 21:22)  HR: 58 (09-13-24 @ 04:54) (58 - 69)  BP: 147/57 (09-13-24 @ 04:54) (126/58 - 147/57)  RR: 18 (09-13-24 @ 04:54) (17 - 18)  SpO2: 96% (09-13-24 @ 04:54) (96% - 98%)  CAPILLARY BLOOD GLUCOSE    I&O's Detail    12 Sep 2024 07:01  -  13 Sep 2024 07:00  --------------------------------------------------------  IN:  Total IN: 0 mL    OUT:    Voided (mL): 1000 mL  Total OUT: 1000 mL    Total NET: -1000 mL    Physical exam:  HEENT: PERRLA, EOM intact  Neck: trachea midline  Chest: Clear through auscultation bilaterally, No rales, rhonchi wheezes noted bilaterally  Heart: S1,S1 RRR, no murmurs noted  Abdomen: soft non distended, BS x 4  RIGHT lower extremity: Outer area marked, showing improvement of erythema and swelling within the original outer markings. No progression of swelling or erythema outside of marking. Fluid/blood filled bullae noted. - tender to palpation.  LEFT lower extremity: no edema noted, warm, no calf tenderness     MEDICATIONS  (STANDING):  atorvastatin 10 milliGRAM(s) Oral at bedtime  ceFAZolin   IVPB 2000 milliGRAM(s) IV Intermittent every 8 hours  diltiazem    milliGRAM(s) Oral daily  donepezil 5 milliGRAM(s) Oral at bedtime  melatonin 3 milliGRAM(s) Oral at bedtime  pantoprazole    Tablet 40 milliGRAM(s) Oral before breakfast  rivaroxaban 15 milliGRAM(s) Oral with dinner  senna 2 Tablet(s) Oral at bedtime    MEDICATIONS  (PRN):  acetaminophen     Tablet .. 650 milliGRAM(s) Oral every 6 hours PRN Temp greater or equal to 38C (100.4F), Moderate Pain (4 - 6)  magnesium hydroxide Suspension 30 milliLiter(s) Oral daily PRN Constipation  polyethylene glycol 3350 17 Gram(s) Oral daily PRN Constipation      LABS:                        9.6    9.32  )-----------( 261      ( 13 Sep 2024 06:00 )             30.5     09-13    142  |  107  |  32<H>  ----------------------------<  98  4.0   |  27  |  0.81    Ca    9.6      13 Sep 2024 06:00        Urinalysis Basic - ( 13 Sep 2024 06:00 )    Color: x / Appearance: x / SG: x / pH: x  Gluc: 98 mg/dL / Ketone: x  / Bili: x / Urobili: x   Blood: x / Protein: x / Nitrite: x   Leuk Esterase: x / RBC: x / WBC x   Sq Epi: x / Non Sq Epi: x / Bacteria: x    RADIOLOGY & ADDITIONAL STUDIES:    Assessment/Plan  Patient is a 89y old female who presented with a chief complaint of RIGHT lower extremity infection. RIGHT leg hematoma with bullae on lateral   aspect RIGHT lower extremity.  Continue current care  Diet - Regular  IV ABX changed from vanco to ancef per ID  On Xarelto  Labs in AM  GI/DVT prophylaxis  Analgesia prn  OOB with assistance   Incentive spirometry/Cough/Deep breathing exercises  Dr. Sheridan contacted - will see pt with him later to complete plan

## 2024-09-13 NOTE — PHYSICAL THERAPY INITIAL EVALUATION ADULT - IMPAIRMENTS CONTRIBUTING TO GAIT DEVIATIONS, PT EVAL
impaired balance/impaired coordination/decreased flexibility/decreased strength
impaired balance/decreased strength

## 2024-09-13 NOTE — PROGRESS NOTE ADULT - SUBJECTIVE AND OBJECTIVE BOX
Date of Service: 09-13-24 @ 10:19    Patient is a 89y old  Female who presents with a chief complaint of RIGHT lower extremity infection (13 Sep 2024 09:09)       INTERVAL HPI/OVERNIGHT EVENTS: events this am noted, pt with brief syncopal episode while on comode    MEDICATIONS  (STANDING):  atorvastatin 10 milliGRAM(s) Oral at bedtime  ceFAZolin   IVPB 2000 milliGRAM(s) IV Intermittent every 8 hours  diltiazem    milliGRAM(s) Oral daily  donepezil 5 milliGRAM(s) Oral at bedtime  lactated ringers Bolus 1000 milliLiter(s) IV Bolus once  melatonin 3 milliGRAM(s) Oral at bedtime  pantoprazole    Tablet 40 milliGRAM(s) Oral before breakfast  rivaroxaban 15 milliGRAM(s) Oral with dinner  senna 2 Tablet(s) Oral at bedtime  sodium chloride 0.9% Bolus 500 milliLiter(s) IV Bolus once    MEDICATIONS  (PRN):  acetaminophen     Tablet .. 650 milliGRAM(s) Oral every 6 hours PRN Temp greater or equal to 38C (100.4F), Moderate Pain (4 - 6)  magnesium hydroxide Suspension 30 milliLiter(s) Oral daily PRN Constipation  polyethylene glycol 3350 17 Gram(s) Oral daily PRN Constipation      Allergies    codeine (Other)  penicillin (Flushing; Hypotension; Other)    Intolerances        REVIEW OF SYSTEMS:  CONSTITUTIONAL: No fever, weight loss, or fatigue  EYES: No eye pain, visual disturbances  ENMT:  No difficulty hearing, tinnitus, vertigo; No sinus or throat pain  NECK: No pain or stiffness  RESPIRATORY: No cough, wheezing, chills or hemoptysis; No shortness of breath  CARDIOVASCULAR: No chest pain, palpitations, dizziness  GASTROINTESTINAL: No abdominal or epigastric pain. No nausea, vomiting, or hematemesis; No diarrhea or constipation. No melena or hematochezia.  GENITOURINARY: No dysuria, frequency, hematuria, or incontinence  NEUROLOGICAL: syncopal epidsode whiel having bm this am  SKIN: No itching, burning  LYMPH NODES: No enlarged glands  MUSCULOSKELETAL: No joint pain or swelling; No muscle, back, or extremity pain  PSYCHIATRIC: No depression, mood swings  HEME/LYMPH: No easy bruising, or bleeding gums  ALLERGY AND IMMUNOLOGIC: No hives    Vital Signs Last 24 Hrs  T(C): 36.4 (13 Sep 2024 04:54), Max: 37.1 (12 Sep 2024 21:22)  T(F): 97.5 (13 Sep 2024 04:54), Max: 98.7 (12 Sep 2024 21:22)  HR: 58 (13 Sep 2024 04:54) (58 - 69)  BP: 147/57 (13 Sep 2024 04:54) (126/58 - 147/57)  BP(mean): --  RR: 18 (13 Sep 2024 04:54) (17 - 18)  SpO2: 96% (13 Sep 2024 04:54) (96% - 98%)    Parameters below as of 13 Sep 2024 04:54  Patient On (Oxygen Delivery Method): room air        PHYSICAL EXAM:  GENERAL: NAD, cachectic  HEAD:  Atraumatic, Normocephalic  EYES: EOMI, PERRLA, conjunctiva and sclera clear  ENMT: No tonsillar erythema, exudates, or enlargement   NECK: Supple, No JVD  NERVOUS SYSTEM:  Alert & awake and stable  CHEST/LUNG: Clear to auscultation bilaterally; No rales, rhonchi, wheezing  HEART: Regular rate and rhythm  ABDOMEN: Soft, Nontender, Nondistended; Bowel sounds present  EXTREMITIES:  2+ Peripheral Pulses, rle with less redness and swelling, hematoma stable on lateral aspect   LYMPH: No lymphadenopathy noted  SKIN: No rashes     LABS:                        9.6    9.32  )-----------( 261      ( 13 Sep 2024 06:00 )             30.5     13 Sep 2024 06:00    142    |  107    |  32     ----------------------------<  98     4.0     |  27     |  0.81     Ca    9.6        13 Sep 2024 06:00        Urinalysis Basic - ( 13 Sep 2024 06:00 )    Color: x / Appearance: x / SG: x / pH: x  Gluc: 98 mg/dL / Ketone: x  / Bili: x / Urobili: x   Blood: x / Protein: x / Nitrite: x   Leuk Esterase: x / RBC: x / WBC x   Sq Epi: x / Non Sq Epi: x / Bacteria: x      CAPILLARY BLOOD GLUCOSE      POCT Blood Glucose.: 185 mg/dL (13 Sep 2024 09:53)            RADIOLOGY & ADDITIONAL TESTS:      Consultant(s) Notes Reviewed:  x[ x] YES  [ ] NO    Care Discussed with Consultants/Other Providers [ x] YES  [ ] NO    Advanced care planning discussed with patient and family, advanced care planning forms reviewed, discussed, and completed.  20 minutes spent.

## 2024-09-13 NOTE — PHYSICAL THERAPY INITIAL EVALUATION ADULT - ADDITIONAL COMMENTS
Pt lives alone in an apartment, there are 8 stairs to enter and no stairs to negotiate within. PTA pt was independent with ADLs and ambulation with RW.
Pt and chart report pt was independent with a RW PTA. She has HHA from 1p-9p every day, her son stays with her overnight and her daughter stays with her from the morning until the HHA arrives. Pt unable to recall presence of ESTEPHANIA, reports she might have a few ESTEPHANIA.

## 2024-09-13 NOTE — PROGRESS NOTE ADULT - NSPROGADDITIONALINFOA_GEN_ALL_CORE
agree with above unless contradicts below  Leg stable  no drainage,   will d/w ID about coverage
agree with above unless contradicts below  doing well  cellulitis improving  cont abx  will wrap leg

## 2024-09-13 NOTE — PHYSICAL THERAPY INITIAL EVALUATION ADULT - PERTINENT HX OF CURRENT PROBLEM, REHAB EVAL
Pt is a 89 y/o female with past medical history of A-fib on anticoagulation, CAD, hypertension, hyperlipidemia, mitral valve prolapse, thyroid nodule aortic valve replacement presents with right leg hematoma.  Patient reports a few days ago she possibly hit her right leg causing hematoma.  Patient reports pain and swelling to area.  Patient went to urgent care who referred her to ED for evaluation.  Patient denies fever chest pain shortness of breath nausea vomiting. X-ray right tibia/fibula (9/10): No acute radiographic osseous pathology. CXR (9/10):  No radiographic evidence of active chest disease.
Pt is a 89 y/o female who presents with right lower leg cellulitis secondary to having struck it on an object at home. She is being followed by surgery.  Had RR today 9/13 while having BM on toilet, determined to be vasovagal, no further intervention/work-up was recommended following the RR by the medical team.

## 2024-09-13 NOTE — PHYSICAL THERAPY INITIAL EVALUATION ADULT - ADL SKILLS, REHAB EVAL
Dr. Bui office requesting referral for behavioral health [facility]    icd 10 [id number]  deterred  Service requested [id number]  
Referral entered  
independent
independent/needs device

## 2024-09-13 NOTE — PHYSICAL THERAPY INITIAL EVALUATION ADULT - GAIT DEVIATIONS NOTED, PT EVAL
decreased ashley/increased time in double stance/decreased velocity of limb motion/decreased step length/decreased stride length/decreased weight-shifting ability
decreased ashley/increased time in double stance/decreased step length/decreased weight-shifting ability

## 2024-09-13 NOTE — CASE MANAGEMENT PROGRESS NOTE - NSCMPROGRESSNOTE_GEN_ALL_CORE
RN/CM noted pt's case discussed during Interdisciplinary rounds, ID MD changed IV ABT to IV Ancef for RLE cellulitis. Pt had RRT called for syncopal episode while in bathroom. Pt lives alone, was independent with rolling walker use. Pt's community MD is DR Juju Solo [phone]. Currently pt requires 1 person assistance. AS per discussion of MSW with family @ bedside, family is NOT receptive to rehab, pt has 8H/day of aide services and family assumes care thereafter, and is opting for DC home with home care. Family provided home care choice of [facility] [place] [phone], CM referred case accordingly. DC pending hospital course. CM remains available and continues to follow case.      RN/CM noted pt's case discussed during Interdisciplinary rounds, ID MD changed IV ABT to IV Ancef for RLE cellulitis. Pt had RRT called for syncopal episode while in bathroom. CM advised by MD, plan for DC MON 09/16/2024. Pt lives alone, was independent with rolling walker use. Pt's community MD is DR Juju Solo [phone]. Currently pt requires 1 person assistance. AS per discussion of MSW with family @ bedside, family is NOT receptive to rehab, pt has 8H/day of aide services and family assumes care thereafter, and is opting for DC home with home care. Family provided CM the home care choice of [facility] [place] [phone], CM referred case accordingly and notified home care agency of above plan for DC, with request for start of care 09/17/2024. DC pending hospital course. CM remains available and continues to follow case.

## 2024-09-13 NOTE — PHYSICAL THERAPY INITIAL EVALUATION ADULT - BED MOBILITY TRAINING, PT EVAL
pt will be CG for all bed mobility
Patient will perform bed mobility independently within 1-2 weeks to promote independence with functional mobility.

## 2024-09-13 NOTE — PHYSICAL THERAPY INITIAL EVALUATION ADULT - TRANSFER TRAINING, PT EVAL
pt will perform all transfers with RW and CG/min assist
Patient will perform transfers independently with a rolling walker within 1-2 weeks to promote safety and reduce risk of falls.

## 2024-09-13 NOTE — PHYSICAL THERAPY INITIAL EVALUATION ADULT - GAIT TRAINING, PT EVAL
pt will ambulate up 35 feet with RW and CG/min assist
Patient will ambulate 100 feet independently with a rolling walker within 1-2 weeks for safe household ambulation.

## 2024-09-14 LAB
ANION GAP SERPL CALC-SCNC: 5 MMOL/L — SIGNIFICANT CHANGE UP (ref 5–17)
BUN SERPL-MCNC: 37 MG/DL — HIGH (ref 7–23)
CALCIUM SERPL-MCNC: 9.1 MG/DL — SIGNIFICANT CHANGE UP (ref 8.4–10.5)
CHLORIDE SERPL-SCNC: 108 MMOL/L — SIGNIFICANT CHANGE UP (ref 96–108)
CO2 SERPL-SCNC: 29 MMOL/L — SIGNIFICANT CHANGE UP (ref 22–31)
CREAT SERPL-MCNC: 0.97 MG/DL — SIGNIFICANT CHANGE UP (ref 0.5–1.3)
EGFR: 56 ML/MIN/1.73M2 — LOW
GLUCOSE SERPL-MCNC: 94 MG/DL — SIGNIFICANT CHANGE UP (ref 70–99)
HCT VFR BLD CALC: 26.8 % — LOW (ref 34.5–45)
HGB BLD-MCNC: 8.3 G/DL — LOW (ref 11.5–15.5)
MCHC RBC-ENTMCNC: 25.9 PG — LOW (ref 27–34)
MCHC RBC-ENTMCNC: 31 GM/DL — LOW (ref 32–36)
MCV RBC AUTO: 83.8 FL — SIGNIFICANT CHANGE UP (ref 80–100)
NRBC # BLD: 0 /100 WBCS — SIGNIFICANT CHANGE UP (ref 0–0)
PLATELET # BLD AUTO: 261 K/UL — SIGNIFICANT CHANGE UP (ref 150–400)
POTASSIUM SERPL-MCNC: 4.2 MMOL/L — SIGNIFICANT CHANGE UP (ref 3.5–5.3)
POTASSIUM SERPL-SCNC: 4.2 MMOL/L — SIGNIFICANT CHANGE UP (ref 3.5–5.3)
RBC # BLD: 3.2 M/UL — LOW (ref 3.8–5.2)
RBC # FLD: 15.7 % — HIGH (ref 10.3–14.5)
SODIUM SERPL-SCNC: 142 MMOL/L — SIGNIFICANT CHANGE UP (ref 135–145)
WBC # BLD: 10.01 K/UL — SIGNIFICANT CHANGE UP (ref 3.8–10.5)
WBC # FLD AUTO: 10.01 K/UL — SIGNIFICANT CHANGE UP (ref 3.8–10.5)

## 2024-09-14 RX ADMIN — DONEPEZIL HYDROCHLORIDE 5 MILLIGRAM(S): 5 TABLET, FILM COATED ORAL at 22:10

## 2024-09-14 RX ADMIN — RIVAROXABAN 15 MILLIGRAM(S): 10 TABLET, FILM COATED ORAL at 18:22

## 2024-09-14 RX ADMIN — Medication 10 MILLIGRAM(S): at 21:11

## 2024-09-14 RX ADMIN — Medication 3 MILLIGRAM(S): at 21:12

## 2024-09-14 RX ADMIN — CEFAZOLIN SODIUM 100 MILLIGRAM(S): 2 INJECTION, SOLUTION INTRAVENOUS at 13:59

## 2024-09-14 RX ADMIN — CEFAZOLIN SODIUM 100 MILLIGRAM(S): 2 INJECTION, SOLUTION INTRAVENOUS at 05:39

## 2024-09-14 RX ADMIN — ACETAMINOPHEN 650 MILLIGRAM(S): 325 TABLET ORAL at 21:12

## 2024-09-14 RX ADMIN — ACETAMINOPHEN 650 MILLIGRAM(S): 325 TABLET ORAL at 21:42

## 2024-09-14 RX ADMIN — Medication 40 MILLIGRAM(S): at 05:39

## 2024-09-14 RX ADMIN — Medication 2 TABLET(S): at 21:12

## 2024-09-14 RX ADMIN — CEFAZOLIN SODIUM 100 MILLIGRAM(S): 2 INJECTION, SOLUTION INTRAVENOUS at 21:11

## 2024-09-14 RX ADMIN — DILTIAZEM HYDROCHLORIDE 240 MILLIGRAM(S): 5 INJECTION INTRAVENOUS at 05:39

## 2024-09-14 NOTE — CHART NOTE - NSCHARTNOTEFT_GEN_A_CORE
Assessment:   brief history: Patient is a 89-year-old female with past medical history of A-fib on anticoagulation, CAD, hypertension, hyperlipidemia, mitral valve prolapse, thyroid nodule aortic valve replacement presents with right leg hematoma.  Patient reports a few days ago she possibly hit her right leg causing hematoma.  Patient reports pain and swelling to area.  Patient went to urgent care who referred her to ED for evaluation.  Patient denies fever chest pain shortness of breath nausea vomiting    () Patient seen for nutrition follow up. Hospital course noted, chart reviewed, patient with RRT  due to brief LOC/hypotension, requiring 1 liter fluid bolus. Spoke with patient and family at bedside, feels she is with fair PO intake, enjoys and is consuming both the Ensure Plus HP and Magic cup. Also receiving some snacks she enjoys from home. Consuming meals provided, patient/family made aware of menu ordering process, and alternate menu items available. Offers no food preferences at time of RD visit.    Factors impacting intake: [x ] none [ ] nausea  [ ] vomiting [ ] diarrhea [ ] constipation  [ ]chewing problems [ ] swallowing issues  [ ] other:     Diet Presciption: Diet, Regular:   DASH/TLC {Sodium & Cholesterol Restricted} (24 @ 20:52)    Intake: variable, 0-75% per documentation    Current Weight: Weight (kg): 47.6 ( @ 18:09) no new weight available  % Weight Change    Pertinent Medications: MEDICATIONS  (STANDING):  atorvastatin 10 milliGRAM(s) Oral at bedtime  ceFAZolin   IVPB 2000 milliGRAM(s) IV Intermittent every 8 hours  diltiazem    milliGRAM(s) Oral daily  donepezil 5 milliGRAM(s) Oral at bedtime  melatonin 3 milliGRAM(s) Oral at bedtime  pantoprazole    Tablet 40 milliGRAM(s) Oral before breakfast  rivaroxaban 15 milliGRAM(s) Oral with dinner  senna 2 Tablet(s) Oral at bedtime    MEDICATIONS  (PRN):  acetaminophen     Tablet .. 650 milliGRAM(s) Oral every 6 hours PRN Temp greater or equal to 38C (100.4F), Moderate Pain (4 - 6)  magnesium hydroxide Suspension 30 milliLiter(s) Oral daily PRN Constipation  polyethylene glycol 3350 17 Gram(s) Oral daily PRN Constipation    Pertinent Labs:  Na142 mmol/L Glu 94 mg/dL K+ 4.2 mmol/L Cr  0.97 mg/dL BUN 37 mg/dL<H>  Phos 3.7 mg/dL - Alb 2.9 g/dL<L>     CAPILLARY BLOOD GLUCOSE        Skin: + 3 right leg/ankle edema, sacrum stage I per documentation    Estimated Needs:   [x ] no change since previous assessment  IBW (52.1 kg)  25-30 calories/[id number] calories/day  1.2-1.4 g protein/k-73 g protein/day      Previous Nutrition Diagnosis:    [x ] Malnutrition (severe)    Nutrition Diagnosis is [ x] ongoing, being addressed with provision of oral nutrition supplement/fortified ice cream  [ ] resolved [ ] not applicable     New Nutrition Diagnosis: [ x] not applicable       Interventions:   Recommend  [ ] Change Diet To:  [x ] Nutrition Supplement continue Ensure Plus HP daily, and Magic cup  2x/day to optimize PO intake   [ ] Nutrition Support  [x ] Other: encourage menu selection, provide patient food preferences upon request    Monitoring and Evaluation:   [ x] PO intake [ x ] Tolerance to diet prescription [ x ] weights [ x ] labs[ x ] follow up per protocol  [ ] other:

## 2024-09-14 NOTE — PROGRESS NOTE ADULT - SUBJECTIVE AND OBJECTIVE BOX
Date of Service: 09-14-24 @ 11:48    Patient is a 89y old  Female who presents with a chief complaint of rle infection (13 Sep 2024 13:20)       INTERVAL HPI/OVERNIGHT EVENTS: stable, feels well today, family at bedside    MEDICATIONS  (STANDING):  atorvastatin 10 milliGRAM(s) Oral at bedtime  ceFAZolin   IVPB 2000 milliGRAM(s) IV Intermittent every 8 hours  diltiazem    milliGRAM(s) Oral daily  donepezil 5 milliGRAM(s) Oral at bedtime  melatonin 3 milliGRAM(s) Oral at bedtime  pantoprazole    Tablet 40 milliGRAM(s) Oral before breakfast  rivaroxaban 15 milliGRAM(s) Oral with dinner  senna 2 Tablet(s) Oral at bedtime    MEDICATIONS  (PRN):  acetaminophen     Tablet .. 650 milliGRAM(s) Oral every 6 hours PRN Temp greater or equal to 38C (100.4F), Moderate Pain (4 - 6)  magnesium hydroxide Suspension 30 milliLiter(s) Oral daily PRN Constipation  polyethylene glycol 3350 17 Gram(s) Oral daily PRN Constipation      Allergies    codeine (Other)  penicillin (Flushing; Hypotension; Other)    Intolerances        REVIEW OF SYSTEMS:  CONSTITUTIONAL: No fever, weight loss, or fatigue  EYES: No eye pain, visual disturbances  ENMT:  No difficulty hearing, tinnitus, vertigo; No sinus or throat pain  NECK: No pain or stiffness  RESPIRATORY: No cough, wheezing, chills or hemoptysis; No shortness of breath  CARDIOVASCULAR: No chest pain, palpitations, dizziness  GASTROINTESTINAL: No abdominal or epigastric pain. No nausea, vomiting, or hematemesis; No diarrhea or constipation. No melena or hematochezia.  GENITOURINARY: No dysuria, frequency, hematuria, or incontinence  NEUROLOGICAL: No headaches, memory loss, loss of strength, numbness, or tremors  SKIN: No itching, burning  LYMPH NODES: No enlarged glands  MUSCULOSKELETAL: No joint pain or swelling; No muscle, back, or extremity pain  PSYCHIATRIC: No depression, mood swings  HEME/LYMPH: No easy bruising, or bleeding gums  ALLERGY AND IMMUNOLOGIC: No hives    Vital Signs Last 24 Hrs  T(C): 36.6 (14 Sep 2024 08:35), Max: 37.2 (13 Sep 2024 20:22)  T(F): 97.9 (14 Sep 2024 08:35), Max: 98.9 (13 Sep 2024 20:22)  HR: 56 (14 Sep 2024 08:35) (56 - 80)  BP: 128/53 (14 Sep 2024 08:35) (128/53 - 154/61)  BP(mean): --  RR: 18 (14 Sep 2024 08:35) (18 - 18)  SpO2: 98% (14 Sep 2024 08:35) (95% - 99%)    Parameters below as of 14 Sep 2024 08:35  Patient On (Oxygen Delivery Method): room air        PHYSICAL EXAM:  GENERAL: NAD, well-groomed, well-developed  HEAD:  Atraumatic, Normocephalic  EYES: EOMI, PERRLA, conjunctiva and sclera clear  ENMT: No tonsillar erythema, exudates, or enlargement   NECK: Supple, No JVD  NERVOUS SYSTEM:  Alert & Oriented X3, Good concentration  CHEST/LUNG: Clear to auscultation bilaterally; No rales, rhonchi, wheezing  HEART: Regular rate and rhythm  ABDOMEN: Soft, Nontender, Nondistended; Bowel sounds present  EXTREMITIES:  2+ Peripheral Pulses, rle with less redness and swelling, stable lower leg hematoma   LYMPH: No lymphadenopathy noted  SKIN: No rashes     LABS:                        8.3    10.01 )-----------( 261      ( 14 Sep 2024 06:00 )             26.8     14 Sep 2024 06:00    142    |  108    |  37     ----------------------------<  94     4.2     |  29     |  0.97     Ca    9.1        14 Sep 2024 06:00        Urinalysis Basic - ( 14 Sep 2024 06:00 )    Color: x / Appearance: x / SG: x / pH: x  Gluc: 94 mg/dL / Ketone: x  / Bili: x / Urobili: x   Blood: x / Protein: x / Nitrite: x   Leuk Esterase: x / RBC: x / WBC x   Sq Epi: x / Non Sq Epi: x / Bacteria: x      CAPILLARY BLOOD GLUCOSE                RADIOLOGY & ADDITIONAL TESTS:      Consultant(s) Notes Reviewed:  [x ] YES  [ ] NO    Care Discussed with Consultants/Other Providers [x ] YES  [ ] NO    Advanced care planning discussed with patient and family, advanced care planning forms reviewed, discussed, and completed.  20 minutes spent.

## 2024-09-15 LAB
ANION GAP SERPL CALC-SCNC: 6 MMOL/L — SIGNIFICANT CHANGE UP (ref 5–17)
BUN SERPL-MCNC: 37 MG/DL — HIGH (ref 7–23)
CALCIUM SERPL-MCNC: 9 MG/DL — SIGNIFICANT CHANGE UP (ref 8.4–10.5)
CHLORIDE SERPL-SCNC: 106 MMOL/L — SIGNIFICANT CHANGE UP (ref 96–108)
CO2 SERPL-SCNC: 28 MMOL/L — SIGNIFICANT CHANGE UP (ref 22–31)
CREAT SERPL-MCNC: 0.85 MG/DL — SIGNIFICANT CHANGE UP (ref 0.5–1.3)
EGFR: 65 ML/MIN/1.73M2 — SIGNIFICANT CHANGE UP
GLUCOSE SERPL-MCNC: 100 MG/DL — HIGH (ref 70–99)
HCT VFR BLD CALC: 27.2 % — LOW (ref 34.5–45)
HGB BLD-MCNC: 8.4 G/DL — LOW (ref 11.5–15.5)
MCHC RBC-ENTMCNC: 25.8 PG — LOW (ref 27–34)
MCHC RBC-ENTMCNC: 30.9 GM/DL — LOW (ref 32–36)
MCV RBC AUTO: 83.4 FL — SIGNIFICANT CHANGE UP (ref 80–100)
NRBC # BLD: 0 /100 WBCS — SIGNIFICANT CHANGE UP (ref 0–0)
PLATELET # BLD AUTO: 258 K/UL — SIGNIFICANT CHANGE UP (ref 150–400)
POTASSIUM SERPL-MCNC: 4.5 MMOL/L — SIGNIFICANT CHANGE UP (ref 3.5–5.3)
POTASSIUM SERPL-SCNC: 4.5 MMOL/L — SIGNIFICANT CHANGE UP (ref 3.5–5.3)
RBC # BLD: 3.26 M/UL — LOW (ref 3.8–5.2)
RBC # FLD: 15.7 % — HIGH (ref 10.3–14.5)
SODIUM SERPL-SCNC: 140 MMOL/L — SIGNIFICANT CHANGE UP (ref 135–145)
WBC # BLD: 9.02 K/UL — SIGNIFICANT CHANGE UP (ref 3.8–10.5)
WBC # FLD AUTO: 9.02 K/UL — SIGNIFICANT CHANGE UP (ref 3.8–10.5)

## 2024-09-15 PROCEDURE — 99232 SBSQ HOSP IP/OBS MODERATE 35: CPT

## 2024-09-15 RX ORDER — ALPRAZOLAM 0.25 MG
0.25 TABLET ORAL DAILY
Refills: 0 | Status: DISCONTINUED | OUTPATIENT
Start: 2024-09-15 | End: 2024-09-16

## 2024-09-15 RX ADMIN — Medication 10 MILLIGRAM(S): at 21:12

## 2024-09-15 RX ADMIN — CEFAZOLIN SODIUM 100 MILLIGRAM(S): 2 INJECTION, SOLUTION INTRAVENOUS at 14:56

## 2024-09-15 RX ADMIN — CEFAZOLIN SODIUM 100 MILLIGRAM(S): 2 INJECTION, SOLUTION INTRAVENOUS at 21:11

## 2024-09-15 RX ADMIN — Medication 40 MILLIGRAM(S): at 05:32

## 2024-09-15 RX ADMIN — DILTIAZEM HYDROCHLORIDE 240 MILLIGRAM(S): 5 INJECTION INTRAVENOUS at 14:56

## 2024-09-15 RX ADMIN — CEFAZOLIN SODIUM 100 MILLIGRAM(S): 2 INJECTION, SOLUTION INTRAVENOUS at 05:33

## 2024-09-15 RX ADMIN — Medication 3 MILLIGRAM(S): at 21:11

## 2024-09-15 RX ADMIN — DONEPEZIL HYDROCHLORIDE 5 MILLIGRAM(S): 5 TABLET, FILM COATED ORAL at 21:11

## 2024-09-15 RX ADMIN — RIVAROXABAN 15 MILLIGRAM(S): 10 TABLET, FILM COATED ORAL at 17:24

## 2024-09-15 RX ADMIN — Medication 2 TABLET(S): at 21:11

## 2024-09-15 RX ADMIN — Medication 0.25 MILLIGRAM(S): at 12:26

## 2024-09-15 NOTE — PROGRESS NOTE ADULT - REASON FOR ADMISSION
Medication: lacosamide 100 MG Oral Tab      Date of last refill: 02/12/22024 (#180/0)  [place]     Last office visit: 12/06/2023  Due back to clinic per last office note:  in 6 months   Date next office visit scheduled:    No future appointments.        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 She should continue Keppra and Vimpat. Her last Keppra level was elevated to 88 and we did reduce the dose to 1000mg BID. I will get another level. I am cautious about reducing this any further as she has had several seizure in the past on this same dose and is not have any side effects on this current dose.      Plan:  1. R PCA Stroke  - Continue Eliquis 5mg BID  - Continue Atorvastatin 40mg QHS  - Follow up with PCP and cardiology     Stroke Prevention  - Limit EtOH to no more than 2 drinks per day for men and 1 for women  - Follow  A mediterranean diet  - Abstain from tobacco products  - BP goals <140/90 optimally normotensive 120/80. Use ACEI if possible  - LDL goal < 70     2. Seizures  - Continue Keppra 1000mg BID  - Continue Vimpat 100mg BID        Pawan Blanchard, DO  Neurology   rle infection

## 2024-09-15 NOTE — CHART NOTE - NSCHARTNOTEFT_GEN_A_CORE
Quiet overnight per chart.  No acute events this am per patient.  Ms. Murray personally seen/ examined during daily rounds.  Vitals remain non-suggestive.  RLE with stable small hematoma with intact overlying skin.  Erythema/ induration mild and retracting from previous markings.  Labs reassuring with normal WBCs, stable HgB and no acidosis on chemistry.  Clinically, improving overall.  Surgically, stable for present.  To continue current supportive care.  No General Surgery contraindication to possible discharge tomorrow.  Please note than more than 25 minutes was spent in face to face time with greater than 50% in counseling and coordination of care.   Reviewed with patient in detail, Hospitalist as well as today's RN.

## 2024-09-15 NOTE — PROGRESS NOTE ADULT - SUBJECTIVE AND OBJECTIVE BOX
Date of Service: 09-15-24 @ 12:02    Patient is a 89y old  Female who presents with a chief complaint of rle infection (14 Sep 2024 11:48)       INTERVAL HPI/OVERNIGHT EVENTS: stable, rapid heart rate this am, no symptoms, pt is anxious    MEDICATIONS  (STANDING):  atorvastatin 10 milliGRAM(s) Oral at bedtime  ceFAZolin   IVPB 2000 milliGRAM(s) IV Intermittent every 8 hours  diltiazem    milliGRAM(s) Oral daily  donepezil 5 milliGRAM(s) Oral at bedtime  melatonin 3 milliGRAM(s) Oral at bedtime  pantoprazole    Tablet 40 milliGRAM(s) Oral before breakfast  rivaroxaban 15 milliGRAM(s) Oral with dinner  senna 2 Tablet(s) Oral at bedtime    MEDICATIONS  (PRN):  acetaminophen     Tablet .. 650 milliGRAM(s) Oral every 6 hours PRN Temp greater or equal to 38C (100.4F), Moderate Pain (4 - 6)  ALPRAZolam 0.25 milliGRAM(s) Oral daily PRN anxiety  magnesium hydroxide Suspension 30 milliLiter(s) Oral daily PRN Constipation  polyethylene glycol 3350 17 Gram(s) Oral daily PRN Constipation      Allergies    codeine (Other)  penicillin (Flushing; Hypotension; Other)    Intolerances        REVIEW OF SYSTEMS:  CONSTITUTIONAL: No fever, weight loss, or fatigue  EYES: No eye pain, visual disturbances  ENMT:  No difficulty hearing, tinnitus, vertigo; No sinus or throat pain  NECK: No pain or stiffness  RESPIRATORY: No cough, wheezing, chills or hemoptysis; No shortness of breath  CARDIOVASCULAR: No chest pain, palpitations, dizziness  GASTROINTESTINAL: No abdominal or epigastric pain. No nausea, vomiting, or hematemesis; No diarrhea or constipation. No melena or hematochezia.  GENITOURINARY: No dysuria, frequency, hematuria, or incontinence  NEUROLOGICAL: No headaches, memory loss, loss of strength, numbness, or tremors  SKIN: No itching, burning  LYMPH NODES: No enlarged glands  MUSCULOSKELETAL: No joint pain or swelling; No muscle, back, or extremity pain  PSYCHIATRIC: anxious and nervous  HEME/LYMPH: No easy bruising, or bleeding gums  ALLERGY AND IMMUNOLOGIC: No hives    Vital Signs Last 24 Hrs  T(C): 36.4 (15 Sep 2024 09:17), Max: 37.1 (14 Sep 2024 20:27)  T(F): 97.6 (15 Sep 2024 09:17), Max: 98.7 (14 Sep 2024 20:27)  HR: 98 (15 Sep 2024 11:47) (57 - 98)  BP: 120/64 (15 Sep 2024 11:47) (102/68 - 141/60)  BP(mean): --  RR: 18 (15 Sep 2024 09:17) (18 - 19)  SpO2: 94% (15 Sep 2024 09:17) (94% - 99%)    Parameters below as of 15 Sep 2024 09:17  Patient On (Oxygen Delivery Method): room air        PHYSICAL EXAM:  GENERAL: NAD,cachectic  HEAD:  Atraumatic, Normocephalic  EYES: EOMI, PERRLA, conjunctiva and sclera clear  ENMT: No tonsillar erythema, exudates, or enlargement   NECK: Supple, No JVD  NERVOUS SYSTEM:  Alert & Oriented X3, Good concentration  CHEST/LUNG: Clear to auscultation bilaterally; No rales, rhonchi, wheezing  HEART: irr/irr  ABDOMEN: Soft, Nontender, Nondistended; Bowel sounds present  EXTREMITIES:  2+ Peripheral Pulses   LYMPH: No lymphadenopathy noted  SKIN: No rashes     LABS:                        8.4    9.02  )-----------( 258      ( 15 Sep 2024 06:00 )             27.2     15 Sep 2024 06:00    140    |  106    |  37     ----------------------------<  100    4.5     |  28     |  0.85     Ca    9.0        15 Sep 2024 06:00        Urinalysis Basic - ( 15 Sep 2024 06:00 )    Color: x / Appearance: x / SG: x / pH: x  Gluc: 100 mg/dL / Ketone: x  / Bili: x / Urobili: x   Blood: x / Protein: x / Nitrite: x   Leuk Esterase: x / RBC: x / WBC x   Sq Epi: x / Non Sq Epi: x / Bacteria: x      CAPILLARY BLOOD GLUCOSE                RADIOLOGY & ADDITIONAL TESTS:      Consultant(s) Notes Reviewed:  [ x] YES  [ ] NO    Care Discussed with Consultants/Other Providers [ x] YES  [ ] NO    Advanced care planning discussed with patient and family, advanced care planning forms reviewed, discussed, and completed.  20 minutes spent.

## 2024-09-15 NOTE — PROGRESS NOTE ADULT - SUBJECTIVE AND OBJECTIVE BOX
RUBENS BARKER is a 89yFemale , patient examined and chart reviewed    INTERVAL HPI/ OVERNIGHT EVENTS:   No events. Afebrile.  Anxious.    PAST MEDICAL & SURGICAL HISTORY:  HTN (Hypertension)  MVP (Mitral Valve Prolapse)  last echo 2010  Hypercholesterolemia  Hiatal Hernia  Thyroid Nodule  Constipation  DJD (Degenerative Joint Disease)  shoulders, knees, cervical and lumbar spine  History of Osteoarthritis  Diverticulosis of the Colon  Abdominal Mass  Afib  HLD (hyperlipidemia)  Aortic valve stenosis  CAD (coronary artery disease)  Osteopenia  H/O Shoulder Replacement  right shoulder  get clindamycin before surgery  Parathyroid  surgery  S/P Bunionectomy  Cataract, Other  surgery   bilateral  H/O ovarian cystectomy  2013  H/O knee surgery  S/P cardiac catheterization  2/26/21      For details regarding the patient's social history, family history, and other miscellaneous elements, please refer the initial infectious diseases consultation and/or the admitting history and physical examination for this admission.     ROS:  CONSTITUTIONAL:  Negative fever or chills  EYES:  Negative  blurry vision or double vision  CARDIOVASCULAR:  Negative for chest pain or palpitations  RESPIRATORY:  Negative for cough, wheezing, or SOB   GASTROINTESTINAL:  Negative for nausea, vomiting, diarrhea, constipation, or abdominal pain  GENITOURINARY:  Negative frequency, urgency or dysuria  NEUROLOGIC:  No headache, confusion, dizziness, lightheadedness  All other systems were reviewed and are negative     codeine (Other)  penicillin (Flushing; Hypotension; Other)      Current inpatient medications :    ANTIBIOTICS/RELEVANT:  ceFAZolin   IVPB 2000 milliGRAM(s) IV Intermittent every 8 hours    MEDICATIONS  (STANDING):  atorvastatin 10 milliGRAM(s) Oral at bedtime  diltiazem    milliGRAM(s) Oral daily  donepezil 5 milliGRAM(s) Oral at bedtime  melatonin 3 milliGRAM(s) Oral at bedtime  pantoprazole    Tablet 40 milliGRAM(s) Oral before breakfast  rivaroxaban 15 milliGRAM(s) Oral with dinner  senna 2 Tablet(s) Oral at bedtime    MEDICATIONS  (PRN):  acetaminophen     Tablet .. 650 milliGRAM(s) Oral every 6 hours PRN Temp greater or equal to 38C (100.4F), Moderate Pain (4 - 6)  ALPRAZolam 0.25 milliGRAM(s) Oral daily PRN anxiety  magnesium hydroxide Suspension 30 milliLiter(s) Oral daily PRN Constipation  polyethylene glycol 3350 17 Gram(s) Oral daily PRN Constipation        Objective:  Vital Signs Last 24 Hrs  T(C): 37 (16 Sep 2024 01:17), Max: 37 (16 Sep 2024 01:17)  T(F): 98.6 (16 Sep 2024 01:17), Max: 98.6 (16 Sep 2024 01:17)  HR: 64 (16 Sep 2024 01:17) (58 - 98)  BP: 122/56 (16 Sep 2024 01:17) (107/56 - 141/60)  RR: 18 (16 Sep 2024 01:17) (18 - 18)  SpO2: 97% (16 Sep 2024 01:17) (94% - 99%)    Parameters below as of 16 Sep 2024 01:17  Patient On (Oxygen Delivery Method): room ai      Physical Exam:  General: no acute distress  Neck: supple, trachea midline  Lungs: clear, no wheeze/rhonchi  Cardiovascular: regular rate and rhythm, S1 S2  Abdomen: soft, nontender,  bowel sounds normal  Neurological: alert and oriented x3  Skin: no rash  Extremities: Right leg hematoma with erythema- improving      LABS:                        8.4    9.02  )-----------( 258      ( 15 Sep 2024 06:00 )             27.2   09-15    140  |  106  |  37<H>  ----------------------------<  100<H>  4.5   |  28  |  0.85    Ca    9.0      15 Sep 2024 06:00        MICROBIOLOGY:        RADIOLOGY & ADDITIONAL STUDIES:          Assessment :  89YO F PMH PMH of HTN, HLD, CAD with cardiac stent, A-fib on Xarelto, aortic stenosis sp TAVR, mitral valve prolapse, thyroid nodule aortic valve replacement presented with right leg hematoma sp trauma Noted worsening erythema and swelling of right leg.   Admitted with Right leg hematoma with superimposed cellulitis  Improvement in erythema   MRSA screen neg  Events noted- Had RRT this am due to syncope with vasovagal while in bathroom.  Clinically better now      Plan :   On Ancef  Can change to po Keflex 500mg q8h till 9/19 on Dc  Trend temps and cbc  Elevate leg  Asp precautions  Increase activity  Stable from ID standpoint  Dc planning per primary team      Continue with present regiment.  Appropriate use of antibiotics and adverse effects reviewed.      > 35 minutes were spent in direct patient care reviewing notes, medications ,labs data/ imaging , discussion with multidisciplinary team.    Thank you for allowing me to participate in care of your patient .    Jordan Garcia MD  Infectious Disease  [phone]

## 2024-09-16 ENCOUNTER — TRANSCRIPTION ENCOUNTER (OUTPATIENT)
Age: 89
End: 2024-09-16

## 2024-09-16 VITALS
SYSTOLIC BLOOD PRESSURE: 156 MMHG | OXYGEN SATURATION: 98 % | RESPIRATION RATE: 18 BRPM | DIASTOLIC BLOOD PRESSURE: 64 MMHG | HEART RATE: 70 BPM | TEMPERATURE: 98 F

## 2024-09-16 LAB
ANION GAP SERPL CALC-SCNC: 4 MMOL/L — LOW (ref 5–17)
BUN SERPL-MCNC: 34 MG/DL — HIGH (ref 7–23)
CALCIUM SERPL-MCNC: 9 MG/DL — SIGNIFICANT CHANGE UP (ref 8.4–10.5)
CHLORIDE SERPL-SCNC: 106 MMOL/L — SIGNIFICANT CHANGE UP (ref 96–108)
CO2 SERPL-SCNC: 29 MMOL/L — SIGNIFICANT CHANGE UP (ref 22–31)
CREAT SERPL-MCNC: 0.87 MG/DL — SIGNIFICANT CHANGE UP (ref 0.5–1.3)
EGFR: 63 ML/MIN/1.73M2 — SIGNIFICANT CHANGE UP
GLUCOSE SERPL-MCNC: 94 MG/DL — SIGNIFICANT CHANGE UP (ref 70–99)
HCT VFR BLD CALC: 27.9 % — LOW (ref 34.5–45)
HGB BLD-MCNC: 8.6 G/DL — LOW (ref 11.5–15.5)
MCHC RBC-ENTMCNC: 25.6 PG — LOW (ref 27–34)
MCHC RBC-ENTMCNC: 30.8 GM/DL — LOW (ref 32–36)
MCV RBC AUTO: 83 FL — SIGNIFICANT CHANGE UP (ref 80–100)
NRBC # BLD: 0 /100 WBCS — SIGNIFICANT CHANGE UP (ref 0–0)
PLATELET # BLD AUTO: 268 K/UL — SIGNIFICANT CHANGE UP (ref 150–400)
POTASSIUM SERPL-MCNC: 4.3 MMOL/L — SIGNIFICANT CHANGE UP (ref 3.5–5.3)
POTASSIUM SERPL-SCNC: 4.3 MMOL/L — SIGNIFICANT CHANGE UP (ref 3.5–5.3)
RBC # BLD: 3.36 M/UL — LOW (ref 3.8–5.2)
RBC # FLD: 15.7 % — HIGH (ref 10.3–14.5)
SODIUM SERPL-SCNC: 139 MMOL/L — SIGNIFICANT CHANGE UP (ref 135–145)
WBC # BLD: 9.62 K/UL — SIGNIFICANT CHANGE UP (ref 3.8–10.5)
WBC # FLD AUTO: 9.62 K/UL — SIGNIFICANT CHANGE UP (ref 3.8–10.5)

## 2024-09-16 RX ORDER — CEPHALEXIN 500 MG
1 CAPSULE ORAL
Qty: 12 | Refills: 0
Start: 2024-09-16 | End: 2024-09-19

## 2024-09-16 RX ORDER — CEPHALEXIN 500 MG
500 CAPSULE ORAL EVERY 8 HOURS
Refills: 0 | Status: DISCONTINUED | OUTPATIENT
Start: 2024-09-16 | End: 2024-09-16

## 2024-09-16 RX ORDER — ACETAMINOPHEN 325 MG/1
2 TABLET ORAL
Qty: 0 | Refills: 0 | DISCHARGE
Start: 2024-09-16

## 2024-09-16 RX ADMIN — CEFAZOLIN SODIUM 100 MILLIGRAM(S): 2 INJECTION, SOLUTION INTRAVENOUS at 05:17

## 2024-09-16 RX ADMIN — DILTIAZEM HYDROCHLORIDE 240 MILLIGRAM(S): 5 INJECTION INTRAVENOUS at 05:16

## 2024-09-16 RX ADMIN — Medication 40 MILLIGRAM(S): at 05:17

## 2024-09-16 NOTE — CASE MANAGEMENT PROGRESS NOTE - NSCMPROGRESSNOTE_GEN_ALL_CORE
RN/CM noted pt's case discussed during Interdisciplinary rounds, MD medically cleared pt for transition home today 09/16/2024, family- daughter Maricruz at [phone] aware and agreeable. Pt lives alone, was independent with rolling walker use. Pt's community MD is DR Juju Solo [phone]. Currently pt requires 1 person assistance, family opting for DC home rather than rehab stating that pt has 8H/day of aide services and family assumes care thereafter, and is opting for DC home with home care. Family provided CM the home care choice of [facility] [place] [phone], CM referred case accordingly and notified home care agency of above plan for DC, with request for start of care 09/17/2024. Of note that pt has RLE protective dressing in place and staff on unit has provided family (daughter Maricruz at [phone]) with wound care teachings and will provide family with a few wound care supplies- [facility] [phone] made aware. Family is transporting pt home today 09/16/2024. IMM reviewed. Staff on unit apprised re: all above.

## 2024-09-16 NOTE — DISCHARGE NOTE PROVIDER - HOSPITAL COURSE
Patient is a 89-year-old female with past medical history of A-fib on anticoagulation, CAD, hypertension, hyperlipidemia, mitral valve prolapse, thyroid nodule aortic valve replacement presents with right leg hematoma.  Patient reports a few days ago she possibly hit her right leg causing hematoma.  Patient reports pain and swelling to area.  Patient went to urgent care who referred her to ED for evaluation.  Patient denies fever chest pain shortness of breath nausea vomiting. Pt admitted for rle cellulitis and hematoma, she was evaluated by ID and surgery, appropriate antibiotics were started and no surgical intervention was deemed necessary. She remained stable and show improvement in her rle infection. She is transitioned to oral antibiotics and will fu with surgery as outpt.    >35 minutes spent on discharge   Patient is a 89-year-old female with past medical history of A-fib on anticoagulation, CAD, hypertension, hyperlipidemia, mitral valve prolapse, thyroid nodule aortic valve replacement presents with right leg hematoma.  Patient reports a few days ago she possibly hit her right leg causing hematoma.  Patient reports pain and swelling to area.  Patient went to urgent care who referred her to ED for evaluation.  Patient denies fever chest pain shortness of breath nausea vomiting. Pt admitted for rle cellulitis and hematoma, she was evaluated by ID and surgery, appropriate antibiotics were started and no surgical intervention was deemed necessary. She remained stable and show improvement in her rle infection. She is transitioned to oral antibiotics and will fu with surgery as outpt. RLE with protective DD daily.

## 2024-09-16 NOTE — PROGRESS NOTE ADULT - PROBLEM SELECTOR PLAN 3
due to trauma  sx eval noted - will monitor clinical status  no I and d at present  cold compress  wound care  pain control
pt states with left should pain for a few days  no fall  pain control  fu xray
pt states with left should pain for a few days  no fall  pain control  xray without fracture
due to trauma  sx eval noted - will monitor clinical status  no I and d at present  cold compress  wound care  pain control

## 2024-09-16 NOTE — PROGRESS NOTE ADULT - PROBLEM SELECTOR PLAN 5
stable  episodes of rapid afib nonsustained  continue cardizem  pt anxious  xanax for anxiety  monitor on tele  continue xarelto
stable  episodes of rapid afib nonsustained  continue cardizem  pt anxious  xanax for anxiety  monitor on tele  continue xarelto
stable and rate controlled  continue xarelto
stable and rate controlled  continue xarelto

## 2024-09-16 NOTE — DISCHARGE NOTE PROVIDER - NSDCCPCAREPLAN_GEN_ALL_CORE_FT
PRINCIPAL DISCHARGE DIAGNOSIS  Diagnosis: Cellulitis  Assessment and Plan of Treatment: finish course of abx  fu with sx

## 2024-09-16 NOTE — PROGRESS NOTE ADULT - SUBJECTIVE AND OBJECTIVE BOX
RUBENS BARKER  MRN-[id number] 89y    GENERAL SURGERY/ DR. RODRIGUEZ    MEDICATIONS  (STANDING):  atorvastatin 10 milliGRAM(s) Oral at bedtime  ceFAZolin   IVPB 2000 milliGRAM(s) IV Intermittent every 8 hours  diltiazem    milliGRAM(s) Oral daily  donepezil 5 milliGRAM(s) Oral at bedtime  melatonin 3 milliGRAM(s) Oral at bedtime  pantoprazole    Tablet 40 milliGRAM(s) Oral before breakfast  rivaroxaban 15 milliGRAM(s) Oral with dinner  senna 2 Tablet(s) Oral at bedtime    MEDICATIONS  (PRN):  acetaminophen     Tablet .. 650 milliGRAM(s) Oral every 6 hours PRN Temp greater or equal to 38C (100.4F), Moderate Pain (4 - 6)  ALPRAZolam 0.25 milliGRAM(s) Oral daily PRN anxiety  magnesium hydroxide Suspension 30 milliLiter(s) Oral daily PRN Constipation  polyethylene glycol 3350 17 Gram(s) Oral daily PRN Constipation     Vital Signs Last 24 Hrs  T(C): 37.1 (16 Sep 2024 08:45), Max: 37.1 (16 Sep 2024 08:45)  T(F): 98.7 (16 Sep 2024 08:45), Max: 98.7 (16 Sep 2024 08:45)  HR: 59 (16 Sep 2024 08:45) (59 - 98)  BP: 147/67 (16 Sep 2024 08:45) (107/56 - 166/58)  RR: 18 (16 Sep 2024 08:45) (18 - 18)  SpO2: 98% (16 Sep 2024 08:45) (94% - 99%)    Parameters below as of 16 Sep 2024 08:45  Patient On (Oxygen Delivery Method): room air    09-15-24 @ 07:01  -  09-16-24 @ 07:00  --------------------------------------------------------  IN: 50 mL / OUT: 1000 mL / NET: -950 mL      RIGHT LOWER EXT  A UNCHANGED LATERAL HEMATOMA APPROXIMATELY 4 X 3 CM, NO BLEEDING  NO EXTENSION OF ERYTHEMA BEYOND THE SKIN MARKING  SOFT COMPARTMENTS   PALPABLE DP AND PT PULSES                         8.6    9.62  )-----------( 268      ( 16 Sep 2024 06:45 )             27.9      09-16    139  |  106  |  34<H>  ----------------------------<  94  4.3   |  29  |  0.87    Ca    9.0      16 Sep 2024 06:45                           ASSESSMENT &  PLAN:      RIGHT LOWER EXT HEMATOMA , IMPROVING CELLULITIS    NO SURGICAL INTERVENTION    IV ANTIBIOTIC , ANCEF AS PER ID   ACE WRAP APPLIED   SURGICAL TEAM WILL FOLLOW UP

## 2024-09-16 NOTE — PROGRESS NOTE ADULT - PROBLEM SELECTOR PLAN 2
due to trauma  sx eval noted - will monitor clinical status  no I and d at present  cold compress  wound care  pain control
due to trauma and hematoma  id eval noted- can transition to oral on dc  wound care  trend labs  pt eval noted
due to trauma and hematoma  id eval noted- can transition to oral on dc- tomorrow  wound care  trend labs  pt eval noted
due to trauma  sx eval noted - will monitor clinical status  no I and d at present  cold compress  wound care  pain control
due to trauma and hematoma  change to oral abx today   surgery fu as outpt  trend labs  pt eval noted
due to trauma and hematoma  id eval noted  abs adjusted by id  wound care  trend labs  pt eval noted

## 2024-09-16 NOTE — DISCHARGE NOTE PROVIDER - CARE PROVIDER_API CALL
Missael Sheridan [facility]  Sur[facility]  [address], [address]  [place] [address]  Phone: [phone]  Fax: [phone]  Follow Up Time: 1 week

## 2024-09-16 NOTE — PROGRESS NOTE ADULT - PROBLEM SELECTOR PROBLEM 2
Traumatic hematoma of right lower leg
Cellulitis of right leg
Cellulitis of right leg
Traumatic hematoma of right lower leg
Cellulitis of right leg
Cellulitis of right leg

## 2024-09-16 NOTE — PROGRESS NOTE ADULT - SUBJECTIVE AND OBJECTIVE BOX
Date of Service: 09-16-24 @ 10:02    Patient is a 89y old  Female who presents with a chief complaint of rle infection (16 Sep 2024 08:57)       INTERVAL HPI/OVERNIGHT EVENTS: stable, cleared by ID and surgery for dc    MEDICATIONS  (STANDING):  atorvastatin 10 milliGRAM(s) Oral at bedtime  cephalexin 500 milliGRAM(s) Oral every 8 hours  diltiazem    milliGRAM(s) Oral daily  donepezil 5 milliGRAM(s) Oral at bedtime  melatonin 3 milliGRAM(s) Oral at bedtime  pantoprazole    Tablet 40 milliGRAM(s) Oral before breakfast  rivaroxaban 15 milliGRAM(s) Oral with dinner  senna 2 Tablet(s) Oral at bedtime    MEDICATIONS  (PRN):  acetaminophen     Tablet .. 650 milliGRAM(s) Oral every 6 hours PRN Temp greater or equal to 38C (100.4F), Moderate Pain (4 - 6)  ALPRAZolam 0.25 milliGRAM(s) Oral daily PRN anxiety  magnesium hydroxide Suspension 30 milliLiter(s) Oral daily PRN Constipation  polyethylene glycol 3350 17 Gram(s) Oral daily PRN Constipation      Allergies    codeine (Other)  penicillin (Flushing; Hypotension; Other)    Intolerances        REVIEW OF SYSTEMS:  CONSTITUTIONAL: No fever, weight loss, or fatigue  EYES: No eye pain, visual disturbances  ENMT:  No difficulty hearing, tinnitus, vertigo; No sinus or throat pain  NECK: No pain or stiffness  RESPIRATORY: No cough, wheezing, chills or hemoptysis; No shortness of breath  CARDIOVASCULAR: No chest pain, palpitations, dizziness  GASTROINTESTINAL: No abdominal or epigastric pain. No nausea, vomiting, or hematemesis; No diarrhea or constipation. No melena or hematochezia.  GENITOURINARY: No dysuria, frequency, hematuria, or incontinence  NEUROLOGICAL: No headaches, memory loss, loss of strength, numbness, or tremors  SKIN: No itching, burning  LYMPH NODES: No enlarged glands  MUSCULOSKELETAL: No joint pain or swelling; No muscle, back, or extremity pain  PSYCHIATRIC: No depression, mood swings  HEME/LYMPH: No easy bruising, or bleeding gums  ALLERGY AND IMMUNOLOGIC: No hives    Vital Signs Last 24 Hrs  T(C): 37.1 (16 Sep 2024 08:45), Max: 37.1 (16 Sep 2024 08:45)  T(F): 98.7 (16 Sep 2024 08:45), Max: 98.7 (16 Sep 2024 08:45)  HR: 59 (16 Sep 2024 08:45) (59 - 98)  BP: 147/67 (16 Sep 2024 08:45) (120/64 - 166/58)  BP(mean): --  RR: 18 (16 Sep 2024 08:45) (18 - 18)  SpO2: 98% (16 Sep 2024 08:45) (97% - 99%)    Parameters below as of 16 Sep 2024 08:45  Patient On (Oxygen Delivery Method): room air        PHYSICAL EXAM:  GENERAL: NAD, well-groomed, well-developed  HEAD:  Atraumatic, Normocephalic  EYES: EOMI, PERRLA, conjunctiva and sclera clear  ENMT: No tonsillar erythema, exudates, or enlargement   NECK: Supple, No JVD  NERVOUS SYSTEM:  Alert & Oriented X3, Good concentration  CHEST/LUNG: Clear to auscultation bilaterally; No rales, rhonchi, wheezing  HEART: Regular rate and rhythm  ABDOMEN: Soft, Nontender, Nondistended; Bowel sounds present  EXTREMITIES:  2+ Peripheral Pulses, rle in clean dressing, no swelling   LYMPH: No lymphadenopathy noted  SKIN: No rashes     LABS:                        8.6    9.62  )-----------( 268      ( 16 Sep 2024 06:45 )             27.9     16 Sep 2024 06:45    139    |  106    |  34     ----------------------------<  94     4.3     |  29     |  0.87     Ca    9.0        16 Sep 2024 06:45        Urinalysis Basic - ( 16 Sep 2024 06:45 )    Color: x / Appearance: x / SG: x / pH: x  Gluc: 94 mg/dL / Ketone: x  / Bili: x / Urobili: x   Blood: x / Protein: x / Nitrite: x   Leuk Esterase: x / RBC: x / WBC x   Sq Epi: x / Non Sq Epi: x / Bacteria: x      CAPILLARY BLOOD GLUCOSE                RADIOLOGY & ADDITIONAL TESTS:      Consultant(s) Notes Reviewed:  [x ] YES  [ ] NO    Care Discussed with Consultants/Other Providers [x ] YES  [ ] NO    Advanced care planning discussed with patient and family, advanced care planning forms reviewed, discussed, and completed.  20 minutes spent.

## 2024-09-16 NOTE — PROGRESS NOTE ADULT - PROBLEM/PLAN-2
4/1/2024 IOP GOOD AT 16 OU//PACH NORMAL ,552 BUT HAD LASIK/VISUAL FIELD GOOD COOPERATION OU RT EYE NORMAL-LEFT EYE INFERIOR MISSES/OCT SCANS NORMAL RT EYE-LEFT EYE SOME THINNING SUPERIOR AND INFERIOR     RT EYE TESTING WAS NORMAL-LEFT EYE HAD SOME AREAS OF FIELD WHICH WERE NOT PERFECT  SCHEDULE FOR REPEAT VF AND IOP LEFT EYE IN 3 MONTHS AND SCHEDULE FOR FULL IN 1 YR    This result has been copied by Maria De Jesus Brizuela. The result was interpreted by  on 04/01/2024.  The original documentation can be found under the encounter date 02/13/2024.     LM for patient with results and to schedule 3 MONTHS AND SCHEDULE FOR FULL IN 1 YR  
CC:  Darien Hernández is here today for visual field/OCT monitoring glaucoma suspect status OU.  OK to leave  regarding results at [phone]     
DISPLAY PLAN FREE TEXT

## 2024-09-16 NOTE — DISCHARGE NOTE PROVIDER - DETAILS OF MALNUTRITION DIAGNOSIS/DIAGNOSES
This patient has been assessed with a concern for Malnutrition and was treated during this hospitalization for the following Nutrition diagnosis/diagnoses:     -  09/11/2024: Severe protein-calorie malnutrition   -  09/11/2024: Underweight (BMI < 19)

## 2024-09-16 NOTE — DISCHARGE NOTE PROVIDER - NSDCMRMEDTOKEN_GEN_ALL_CORE_FT
cephalexin 500 mg oral capsule: 1 cap(s) orally every 8 hours  dilTIAZem 240 mg/24 hours oral capsule, extended release: 1 cap(s) orally once a day  donepezil 5 mg oral tablet: 1 tab(s) orally once a day (at bedtime)  methenamine hippurate 1 g oral tablet: 1 tab(s) orally once a day  rivaroxaban 15 mg oral tablet: 1 tab(s) orally once a day (before a meal) with dinner  simvastatin 10 mg oral tablet: 1 tab(s) orally once a day (at bedtime)  Tylenol 325 mg oral tablet: 2 tab(s) orally every 6 hours As needed Temp greater or equal to 38C (100.4F), Moderate Pain (4 - 6)

## 2024-09-16 NOTE — PROGRESS NOTE ADULT - PROBLEM SELECTOR PLAN 1
due to trauma and hematoma  id eval noted  on iv vanco  wound care  trend labs  pt eval noted
syncope while having bm today - gregory vasovagal  continue fluid bolus - bp stable  trend labs  on tele  currently stable  monitor h/h
stable  no further episode  bp stable  trend labs  on tele  currently stable  monitor h/h
due to trauma and hematoma  id eval noted  on iv vanco  fu cultures  wound care  trend labs  pt eval noted

## 2024-09-16 NOTE — PROGRESS NOTE ADULT - SUBJECTIVE AND OBJECTIVE BOX
RUBENS BARKER is a 89yFemale , patient examined and chart reviewed    INTERVAL HPI/ OVERNIGHT EVENTS:   No events. Afebrile.  Anxious. NAD.    PAST MEDICAL & SURGICAL HISTORY:  HTN (Hypertension)  MVP (Mitral Valve Prolapse)  last echo 2010  Hypercholesterolemia  Hiatal Hernia  Thyroid Nodule  Constipation  DJD (Degenerative Joint Disease)  shoulders, knees, cervical and lumbar spine  History of Osteoarthritis  Diverticulosis of the Colon  Abdominal Mass  Afib  HLD (hyperlipidemia)  Aortic valve stenosis  CAD (coronary artery disease)  Osteopenia  H/O Shoulder Replacement  right shoulder  get clindamycin before surgery  Parathyroid  surgery  S/P Bunionectomy  Cataract, Other  surgery   bilateral  H/O ovarian cystectomy  2013  H/O knee surgery  S/P cardiac catheterization  2/26/21      For details regarding the patient's social history, family history, and other miscellaneous elements, please refer the initial infectious diseases consultation and/or the admitting history and physical examination for this admission.     ROS:  CONSTITUTIONAL:  Negative fever or chills  EYES:  Negative  blurry vision or double vision  CARDIOVASCULAR:  Negative for chest pain or palpitations  RESPIRATORY:  Negative for cough, wheezing, or SOB   GASTROINTESTINAL:  Negative for nausea, vomiting, diarrhea, constipation, or abdominal pain  GENITOURINARY:  Negative frequency, urgency or dysuria  NEUROLOGIC:  No headache, confusion, dizziness, lightheadedness  All other systems were reviewed and are negative     codeine (Other)  penicillin (Flushing; Hypotension; Other)      Current inpatient medications :    ANTIBIOTICS/RELEVANT:  cephalexin 500 milliGRAM(s) Oral every 8 hour    MEDICATIONS  (STANDING):  atorvastatin 10 milliGRAM(s) Oral at bedtime  diltiazem    milliGRAM(s) Oral daily  donepezil 5 milliGRAM(s) Oral at bedtime  melatonin 3 milliGRAM(s) Oral at bedtime  pantoprazole    Tablet 40 milliGRAM(s) Oral before breakfast  rivaroxaban 15 milliGRAM(s) Oral with dinner  senna 2 Tablet(s) Oral at bedtime    MEDICATIONS  (PRN):  acetaminophen     Tablet .. 650 milliGRAM(s) Oral every 6 hours PRN Temp greater or equal to 38C (100.4F), Moderate Pain (4 - 6)  ALPRAZolam 0.25 milliGRAM(s) Oral daily PRN anxiety  magnesium hydroxide Suspension 30 milliLiter(s) Oral daily PRN Constipation  polyethylene glycol 3350 17 Gram(s) Oral daily PRN Constipation        Objective:  Vital Signs Last 24 Hrs  T(C): 36.5 (16 Sep 2024 10:51), Max: 37.1 (16 Sep 2024 08:45)  T(F): 97.7 (16 Sep 2024 10:51), Max: 98.7 (16 Sep 2024 08:45)  HR: 70 (16 Sep 2024 10:51) (59 - 70)  BP: 156/64 (16 Sep 2024 10:51) (122/56 - 166/58)  RR: 18 (16 Sep 2024 10:51) (18 - 18)  SpO2: 98% (16 Sep 2024 10:51) (97% - 98%)    Parameters below as of 16 Sep 2024 10:51  Patient On (Oxygen Delivery Method): room air    Physical Exam:  General: no acute distress  Neck: supple, trachea midline  Lungs: clear, no wheeze/rhonchi  Cardiovascular: regular rate and rhythm, S1 S2  Abdomen: soft, nontender,  bowel sounds normal  Neurological: alert and oriented x3  Skin: no rash  Extremities: Right leg hematoma with erythema- improving      LABS:                        8.6    9.62  )-----------( 268      ( 16 Sep 2024 06:45 )             27.9   09-16    139  |  106  |  34<H>  ----------------------------<  94  4.3   |  29  |  0.87    Ca    9.0      16 Sep 2024 06:45    MICROBIOLOGY:        RADIOLOGY & ADDITIONAL STUDIES:          Assessment :  89YO F PMH PMH of HTN, HLD, CAD with cardiac stent, A-fib on Xarelto, aortic stenosis sp TAVR, mitral valve prolapse, thyroid nodule aortic valve replacement presented with right leg hematoma sp trauma Noted worsening erythema and swelling of right leg.   Admitted with Right leg hematoma with superimposed cellulitis  Improvement in erythema   MRSA screen neg  Sp syncope with vasovagal   Clinically better      Plan :   Off Ancef--> to po Keflex 500mg q8h till 9/19  Trend temps and cbc  Elevate leg  Asp precautions  Increase activity  Stable from ID standpoint  Dc planning per primary team      Continue with present regiment.  Appropriate use of antibiotics and adverse effects reviewed.      > 35 minutes were spent in direct patient care reviewing notes, medications ,labs data/ imaging , discussion with multidisciplinary team.    Thank you for allowing me to participate in care of your patient .    Jordan Garcia MD  Infectious Disease  [phone]

## 2024-09-16 NOTE — PROGRESS NOTE ADULT - PROVIDER SPECIALTY LIST ADULT
Infectious Disease
Surgery
Surgery
Infectious Disease
Internal Medicine
Surgery
Surgery
Infectious Disease
Surgery
Internal Medicine

## 2024-09-16 NOTE — PROGRESS NOTE ADULT - NUTRITIONAL ASSESSMENT
This patient has been assessed with a concern for Malnutrition and has been determined to have a diagnosis/diagnoses of Severe protein-calorie malnutrition and Underweight (BMI < 19).    This patient is being managed with:   Diet Regular-  DASH/TLC {Sodium & Cholesterol Restricted}  Entered: Sep  9 2024  8:51PM    The following pending diet order is being considered for treatment of Severe protein-calorie malnutrition and Underweight (BMI < 19):  Diet DASH/TLC-  Sodium & Cholesterol Restricted  Supplement Feeding Modality:  Oral  Ensure Plus High Protein Cans or Servings Per Day:  1       Frequency:  Daily  Entered: Sep 11 2024 12:09PM  

## 2024-09-16 NOTE — PROGRESS NOTE ADULT - PROBLEM SELECTOR PROBLEM 3
Left shoulder pain
Traumatic hematoma of right lower leg
Traumatic hematoma of right lower leg
Left shoulder pain
Traumatic hematoma of right lower leg
Traumatic hematoma of right lower leg

## 2024-09-16 NOTE — PROGRESS NOTE ADULT - PROBLEM SELECTOR PLAN 4
pt states with left should pain for a few days  no fall  pain control  xray without fracture
stable and rate controlled  continue xarelto
pt states with left should pain for a few days  no fall  pain control  xray without fracture
pt states with left should pain for a few days  no fall  pain control  xray without fracture
stable and rate controlled  continue xarelto
pt states with left should pain for a few days  no fall  pain control  xray without fracture

## 2024-09-29 PROCEDURE — 71045 X-RAY EXAM CHEST 1 VIEW: CPT

## 2024-09-29 PROCEDURE — 73590 X-RAY EXAM OF LOWER LEG: CPT

## 2024-09-29 PROCEDURE — 85027 COMPLETE CBC AUTOMATED: CPT

## 2024-09-29 PROCEDURE — 99285 EMERGENCY DEPT VISIT HI MDM: CPT

## 2024-09-29 PROCEDURE — 96365 THER/PROPH/DIAG IV INF INIT: CPT

## 2024-09-29 PROCEDURE — 85025 COMPLETE CBC W/AUTO DIFF WBC: CPT

## 2024-09-29 PROCEDURE — 85652 RBC SED RATE AUTOMATED: CPT

## 2024-09-29 PROCEDURE — 87641 MR-STAPH DNA AMP PROBE: CPT

## 2024-09-29 PROCEDURE — 82553 CREATINE MB FRACTION: CPT

## 2024-09-29 PROCEDURE — 87640 STAPH A DNA AMP PROBE: CPT

## 2024-09-29 PROCEDURE — 97530 THERAPEUTIC ACTIVITIES: CPT

## 2024-09-29 PROCEDURE — 73030 X-RAY EXAM OF SHOULDER: CPT

## 2024-09-29 PROCEDURE — 84443 ASSAY THYROID STIM HORMONE: CPT

## 2024-09-29 PROCEDURE — 84439 ASSAY OF FREE THYROXINE: CPT

## 2024-09-29 PROCEDURE — 80048 BASIC METABOLIC PNL TOTAL CA: CPT

## 2024-09-29 PROCEDURE — 85610 PROTHROMBIN TIME: CPT

## 2024-09-29 PROCEDURE — 80202 ASSAY OF VANCOMYCIN: CPT

## 2024-09-29 PROCEDURE — 97161 PT EVAL LOW COMPLEX 20 MIN: CPT

## 2024-09-29 PROCEDURE — 82962 GLUCOSE BLOOD TEST: CPT

## 2024-09-29 PROCEDURE — 93005 ELECTROCARDIOGRAM TRACING: CPT

## 2024-09-29 PROCEDURE — 84484 ASSAY OF TROPONIN QUANT: CPT

## 2024-09-29 PROCEDURE — 85730 THROMBOPLASTIN TIME PARTIAL: CPT

## 2024-09-29 PROCEDURE — 80053 COMPREHEN METABOLIC PANEL: CPT

## 2024-09-29 PROCEDURE — 97116 GAIT TRAINING THERAPY: CPT

## 2024-09-29 PROCEDURE — 36415 COLL VENOUS BLD VENIPUNCTURE: CPT

## 2024-09-29 PROCEDURE — 83735 ASSAY OF MAGNESIUM: CPT

## 2024-09-29 PROCEDURE — 84100 ASSAY OF PHOSPHORUS: CPT

## 2024-10-14 ENCOUNTER — OUTPATIENT (OUTPATIENT)
Dept: OUTPATIENT SERVICES | Facility: HOSPITAL | Age: 89
LOS: 1 days | Discharge: HOME | End: 2024-10-14
Payer: MEDICARE

## 2024-10-14 ENCOUNTER — APPOINTMENT (OUTPATIENT)
Dept: WOUND CARE | Facility: HOSPITAL | Age: 89
End: 2024-10-14
Payer: MEDICARE

## 2024-10-14 VITALS
SYSTOLIC BLOOD PRESSURE: 135 MMHG | BODY MASS INDEX: 19.51 KG/M2 | HEIGHT: 62 IN | RESPIRATION RATE: 15 BRPM | HEART RATE: 59 BPM | DIASTOLIC BLOOD PRESSURE: 65 MMHG | TEMPERATURE: 97.7 F | WEIGHT: 106 LBS | OXYGEN SATURATION: 97 %

## 2024-10-14 DIAGNOSIS — Z98.890 OTHER SPECIFIED POSTPROCEDURAL STATES: Chronic | ICD-10-CM

## 2024-10-14 DIAGNOSIS — L97.801 NON-PRESSURE CHRONIC ULCER OF OTHER PART OF UNSPECIFIED LOWER LEG LIMITED TO BREAKDOWN OF SKIN: ICD-10-CM

## 2024-10-14 DIAGNOSIS — S81.811D LACERATION W/OUT FOREIGN BODY, RIGHT LOWER LEG, SUBSEQUENT ENCOUNTER: ICD-10-CM

## 2024-10-14 DIAGNOSIS — I48.91 UNSPECIFIED ATRIAL FIBRILLATION: ICD-10-CM

## 2024-10-14 PROCEDURE — 99214 OFFICE O/P EST MOD 30 MIN: CPT

## 2024-10-14 RX ORDER — CEPHALEXIN 500 MG/1
500 CAPSULE ORAL
Qty: 12 | Refills: 0 | Status: DISCONTINUED | COMMUNITY
Start: 2024-09-16
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
## 2024-10-16 DIAGNOSIS — Z98.890 OTHER SPECIFIED POSTPROCEDURAL STATES: ICD-10-CM

## 2024-10-16 DIAGNOSIS — S81.811D LACERATION WITHOUT FOREIGN BODY, RIGHT LOWER LEG, SUBSEQUENT ENCOUNTER: ICD-10-CM

## 2024-10-16 DIAGNOSIS — Z79.899 OTHER LONG TERM (CURRENT) DRUG THERAPY: ICD-10-CM

## 2024-10-16 DIAGNOSIS — Z95.3 PRESENCE OF XENOGENIC HEART VALVE: ICD-10-CM

## 2024-10-16 DIAGNOSIS — M19.90 UNSPECIFIED OSTEOARTHRITIS, UNSPECIFIED SITE: ICD-10-CM

## 2024-10-16 DIAGNOSIS — I48.91 UNSPECIFIED ATRIAL FIBRILLATION: ICD-10-CM

## 2024-10-16 DIAGNOSIS — I34.1 NONRHEUMATIC MITRAL (VALVE) PROLAPSE: ICD-10-CM

## 2024-10-16 DIAGNOSIS — I25.10 ATHEROSCLEROTIC HEART DISEASE OF NATIVE CORONARY ARTERY WITHOUT ANGINA PECTORIS: ICD-10-CM

## 2024-10-16 DIAGNOSIS — D64.9 ANEMIA, UNSPECIFIED: ICD-10-CM

## 2024-10-16 DIAGNOSIS — I35.0 NONRHEUMATIC AORTIC (VALVE) STENOSIS: ICD-10-CM

## 2024-10-16 DIAGNOSIS — M85.80 OTHER SPECIFIED DISORDERS OF BONE DENSITY AND STRUCTURE, UNSPECIFIED SITE: ICD-10-CM
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
## 2024-10-16 DIAGNOSIS — Z82.49 FAMILY HISTORY OF ISCHEMIC HEART DISEASE AND OTHER DISEASES OF THE CIRCULATORY SYSTEM: ICD-10-CM

## 2024-10-16 DIAGNOSIS — I65.29 OCCLUSION AND STENOSIS OF UNSPECIFIED CAROTID ARTERY: ICD-10-CM
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
## 2024-10-17 LAB
-  CLINDAMYCIN: SIGNIFICANT CHANGE UP
-  ERYTHROMYCIN: SIGNIFICANT CHANGE UP
-  GENTAMICIN: SIGNIFICANT CHANGE UP
-  OXACILLIN: SIGNIFICANT CHANGE UP
-  PENICILLIN: SIGNIFICANT CHANGE UP
-  RIFAMPIN: SIGNIFICANT CHANGE UP
-  TETRACYCLINE: SIGNIFICANT CHANGE UP
-  TRIMETHOPRIM/SULFAMETHOXAZOLE: SIGNIFICANT CHANGE UP
-  VANCOMYCIN: SIGNIFICANT CHANGE UP
METHOD TYPE: SIGNIFICANT CHANGE UP

## 2024-10-20 LAB
CULTURE RESULTS: ABNORMAL
ORGANISM # SPEC MICROSCOPIC CNT: ABNORMAL
ORGANISM # SPEC MICROSCOPIC CNT: SIGNIFICANT CHANGE UP
SPECIMEN SOURCE: SIGNIFICANT CHANGE UP

## 2024-10-26 ENCOUNTER — EMERGENCY (EMERGENCY)
Facility: HOSPITAL | Age: 89
LOS: 1 days | Discharge: ANOTHER HOSPITAL | End: 2024-10-26
Attending: STUDENT IN AN ORGANIZED HEALTH CARE EDUCATION/TRAINING PROGRAM | Admitting: STUDENT IN AN ORGANIZED HEALTH CARE EDUCATION/TRAINING PROGRAM
Payer: MEDICARE

## 2024-10-26 ENCOUNTER — INPATIENT (INPATIENT)
Facility: HOSPITAL | Age: 89
LOS: 4 days | Discharge: HOME HEALTH | DRG: 914 | End: 2024-10-31
Attending: SURGERY | Admitting: SURGERY
Payer: MEDICARE

## 2024-10-26 VITALS
TEMPERATURE: 98 F | OXYGEN SATURATION: 99 % | DIASTOLIC BLOOD PRESSURE: 60 MMHG | SYSTOLIC BLOOD PRESSURE: 124 MMHG | HEART RATE: 60 BPM | RESPIRATION RATE: 18 BRPM | WEIGHT: 104.94 LBS | HEIGHT: 63 IN

## 2024-10-26 VITALS
HEIGHT: 62 IN | OXYGEN SATURATION: 99 % | WEIGHT: 104.94 LBS | DIASTOLIC BLOOD PRESSURE: 62 MMHG | SYSTOLIC BLOOD PRESSURE: 179 MMHG | TEMPERATURE: 98 F | RESPIRATION RATE: 20 BRPM | HEART RATE: 58 BPM

## 2024-10-26 VITALS
DIASTOLIC BLOOD PRESSURE: 69 MMHG | RESPIRATION RATE: 16 BRPM | HEART RATE: 53 BPM | OXYGEN SATURATION: 100 % | SYSTOLIC BLOOD PRESSURE: 177 MMHG

## 2024-10-26 DIAGNOSIS — Z98.890 OTHER SPECIFIED POSTPROCEDURAL STATES: Chronic | ICD-10-CM

## 2024-10-26 DIAGNOSIS — T14.8XXA OTHER INJURY OF UNSPECIFIED BODY REGION, INITIAL ENCOUNTER: ICD-10-CM

## 2024-10-26 DIAGNOSIS — Z96.611 PRESENCE OF RIGHT ARTIFICIAL SHOULDER JOINT: Chronic | ICD-10-CM

## 2024-10-26 LAB
ALBUMIN SERPL ELPH-MCNC: 3.4 G/DL — SIGNIFICANT CHANGE UP (ref 3.3–5)
ALBUMIN SERPL ELPH-MCNC: 3.6 G/DL — SIGNIFICANT CHANGE UP (ref 3.3–5.2)
ALP SERPL-CCNC: 131 U/L — HIGH (ref 40–120)
ALP SERPL-CCNC: 139 U/L — HIGH (ref 30–120)
ALT FLD-CCNC: 14 U/L — SIGNIFICANT CHANGE UP
ALT FLD-CCNC: 25 U/L — SIGNIFICANT CHANGE UP (ref 10–60)
ANION GAP SERPL CALC-SCNC: 10 MMOL/L — SIGNIFICANT CHANGE UP (ref 5–17)
ANION GAP SERPL CALC-SCNC: 7 MMOL/L — SIGNIFICANT CHANGE UP (ref 5–17)
APTT BLD: 34.8 SEC — SIGNIFICANT CHANGE UP (ref 24.5–35.6)
APTT BLD: 35.3 SEC — SIGNIFICANT CHANGE UP (ref 24.5–35.6)
APTT BLD: 36 SEC — HIGH (ref 24.5–35.6)
AST SERPL-CCNC: 23 U/L — SIGNIFICANT CHANGE UP
AST SERPL-CCNC: 24 U/L — SIGNIFICANT CHANGE UP (ref 10–40)
BASOPHILS # BLD AUTO: 0.02 K/UL — SIGNIFICANT CHANGE UP (ref 0–0.2)
BASOPHILS # BLD AUTO: 0.02 K/UL — SIGNIFICANT CHANGE UP (ref 0–0.2)
BASOPHILS NFR BLD AUTO: 0.3 % — SIGNIFICANT CHANGE UP (ref 0–2)
BASOPHILS NFR BLD AUTO: 0.3 % — SIGNIFICANT CHANGE UP (ref 0–2)
BILIRUB SERPL-MCNC: 0.3 MG/DL — LOW (ref 0.4–2)
BILIRUB SERPL-MCNC: 0.4 MG/DL — SIGNIFICANT CHANGE UP (ref 0.2–1.2)
BLD GP AB SCN SERPL QL: SIGNIFICANT CHANGE UP
BLD GP AB SCN SERPL QL: SIGNIFICANT CHANGE UP
BUN SERPL-MCNC: 14.6 MG/DL — SIGNIFICANT CHANGE UP (ref 8–20)
BUN SERPL-MCNC: 20 MG/DL — SIGNIFICANT CHANGE UP (ref 7–23)
CALCIUM SERPL-MCNC: 8.4 MG/DL — SIGNIFICANT CHANGE UP (ref 8.4–10.5)
CALCIUM SERPL-MCNC: 9 MG/DL — SIGNIFICANT CHANGE UP (ref 8.4–10.5)
CHLORIDE SERPL-SCNC: 101 MMOL/L — SIGNIFICANT CHANGE UP (ref 96–108)
CHLORIDE SERPL-SCNC: 104 MMOL/L — SIGNIFICANT CHANGE UP (ref 96–108)
CO2 SERPL-SCNC: 26 MMOL/L — SIGNIFICANT CHANGE UP (ref 22–29)
CO2 SERPL-SCNC: 30 MMOL/L — SIGNIFICANT CHANGE UP (ref 22–31)
CREAT SERPL-MCNC: 0.6 MG/DL — SIGNIFICANT CHANGE UP (ref 0.5–1.3)
CREAT SERPL-MCNC: 0.83 MG/DL — SIGNIFICANT CHANGE UP (ref 0.5–1.3)
EGFR: 67 ML/MIN/1.73M2 — SIGNIFICANT CHANGE UP
EGFR: 85 ML/MIN/1.73M2 — SIGNIFICANT CHANGE UP
EOSINOPHIL # BLD AUTO: 0.06 K/UL — SIGNIFICANT CHANGE UP (ref 0–0.5)
EOSINOPHIL # BLD AUTO: 0.08 K/UL — SIGNIFICANT CHANGE UP (ref 0–0.5)
EOSINOPHIL NFR BLD AUTO: 0.9 % — SIGNIFICANT CHANGE UP (ref 0–6)
EOSINOPHIL NFR BLD AUTO: 1.3 % — SIGNIFICANT CHANGE UP (ref 0–6)
ETHANOL SERPL-MCNC: <10 MG/DL — SIGNIFICANT CHANGE UP (ref 0–9)
FIBRINOGEN PPP-MCNC: 281 MG/DL — SIGNIFICANT CHANGE UP (ref 200–450)
GLUCOSE SERPL-MCNC: 87 MG/DL — SIGNIFICANT CHANGE UP (ref 70–99)
GLUCOSE SERPL-MCNC: 96 MG/DL — SIGNIFICANT CHANGE UP (ref 70–99)
HCT VFR BLD CALC: 24.3 % — LOW (ref 34.5–45)
HCT VFR BLD CALC: 25.9 % — LOW (ref 34.5–45)
HCT VFR BLD CALC: 28.5 % — LOW (ref 34.5–45)
HCT VFR BLD CALC: 29 % — LOW (ref 34.5–45)
HGB BLD-MCNC: 7.6 G/DL — LOW (ref 11.5–15.5)
HGB BLD-MCNC: 8 G/DL — LOW (ref 11.5–15.5)
HGB BLD-MCNC: 8.6 G/DL — LOW (ref 11.5–15.5)
HGB BLD-MCNC: 8.9 G/DL — LOW (ref 11.5–15.5)
IMM GRANULOCYTES NFR BLD AUTO: 0.5 % — SIGNIFICANT CHANGE UP (ref 0–0.9)
IMM GRANULOCYTES NFR BLD AUTO: 0.9 % — SIGNIFICANT CHANGE UP (ref 0–0.9)
INR BLD: 0.96 RATIO — SIGNIFICANT CHANGE UP (ref 0.85–1.16)
INR BLD: 1 RATIO — SIGNIFICANT CHANGE UP (ref 0.85–1.16)
INR BLD: 1.14 RATIO — SIGNIFICANT CHANGE UP (ref 0.85–1.16)
LIDOCAIN IGE QN: 26 U/L — SIGNIFICANT CHANGE UP (ref 22–51)
LYMPHOCYTES # BLD AUTO: 0.77 K/UL — LOW (ref 1–3.3)
LYMPHOCYTES # BLD AUTO: 0.79 K/UL — LOW (ref 1–3.3)
LYMPHOCYTES # BLD AUTO: 11.4 % — LOW (ref 13–44)
LYMPHOCYTES # BLD AUTO: 13 % — SIGNIFICANT CHANGE UP (ref 13–44)
MCHC RBC-ENTMCNC: 25.9 PG — LOW (ref 27–34)
MCHC RBC-ENTMCNC: 26.3 PG — LOW (ref 27–34)
MCHC RBC-ENTMCNC: 26.4 PG — LOW (ref 27–34)
MCHC RBC-ENTMCNC: 26.5 PG — LOW (ref 27–34)
MCHC RBC-ENTMCNC: 30.2 G/DL — LOW (ref 32–36)
MCHC RBC-ENTMCNC: 30.7 G/DL — LOW (ref 32–36)
MCHC RBC-ENTMCNC: 30.9 GM/DL — LOW (ref 32–36)
MCHC RBC-ENTMCNC: 31.3 GM/DL — LOW (ref 32–36)
MCV RBC AUTO: 84.7 FL — SIGNIFICANT CHANGE UP (ref 80–100)
MCV RBC AUTO: 85.5 FL — SIGNIFICANT CHANGE UP (ref 80–100)
MCV RBC AUTO: 85.8 FL — SIGNIFICANT CHANGE UP (ref 80–100)
MCV RBC AUTO: 85.8 FL — SIGNIFICANT CHANGE UP (ref 80–100)
MONOCYTES # BLD AUTO: 0.52 K/UL — SIGNIFICANT CHANGE UP (ref 0–0.9)
MONOCYTES # BLD AUTO: 0.6 K/UL — SIGNIFICANT CHANGE UP (ref 0–0.9)
MONOCYTES NFR BLD AUTO: 8.5 % — SIGNIFICANT CHANGE UP (ref 2–14)
MONOCYTES NFR BLD AUTO: 8.9 % — SIGNIFICANT CHANGE UP (ref 2–14)
MRSA PCR RESULT.: SIGNIFICANT CHANGE UP
NEUTROPHILS # BLD AUTO: 4.65 K/UL — SIGNIFICANT CHANGE UP (ref 1.8–7.4)
NEUTROPHILS # BLD AUTO: 5.26 K/UL — SIGNIFICANT CHANGE UP (ref 1.8–7.4)
NEUTROPHILS NFR BLD AUTO: 76.4 % — SIGNIFICANT CHANGE UP (ref 43–77)
NEUTROPHILS NFR BLD AUTO: 77.6 % — HIGH (ref 43–77)
NRBC # BLD: 0 /100 WBCS — SIGNIFICANT CHANGE UP (ref 0–0)
NRBC # BLD: 0 /100 WBCS — SIGNIFICANT CHANGE UP (ref 0–0)
PLATELET # BLD AUTO: 199 K/UL — SIGNIFICANT CHANGE UP (ref 150–400)
PLATELET # BLD AUTO: 199 K/UL — SIGNIFICANT CHANGE UP (ref 150–400)
PLATELET # BLD AUTO: 209 K/UL — SIGNIFICANT CHANGE UP (ref 150–400)
PLATELET # BLD AUTO: 232 K/UL — SIGNIFICANT CHANGE UP (ref 150–400)
POTASSIUM SERPL-MCNC: 4 MMOL/L — SIGNIFICANT CHANGE UP (ref 3.5–5.3)
POTASSIUM SERPL-MCNC: 4.3 MMOL/L — SIGNIFICANT CHANGE UP (ref 3.5–5.3)
POTASSIUM SERPL-SCNC: 4 MMOL/L — SIGNIFICANT CHANGE UP (ref 3.5–5.3)
POTASSIUM SERPL-SCNC: 4.3 MMOL/L — SIGNIFICANT CHANGE UP (ref 3.5–5.3)
PROT SERPL-MCNC: 5.6 G/DL — LOW (ref 6.6–8.7)
PROT SERPL-MCNC: 6.3 G/DL — SIGNIFICANT CHANGE UP (ref 6–8.3)
PROTHROM AB SERPL-ACNC: 10.9 SEC — SIGNIFICANT CHANGE UP (ref 9.9–13.4)
PROTHROM AB SERPL-ACNC: 11.3 SEC — SIGNIFICANT CHANGE UP (ref 9.9–13.4)
PROTHROM AB SERPL-ACNC: 13.2 SEC — SIGNIFICANT CHANGE UP (ref 9.9–13.4)
RBC # BLD: 2.87 M/UL — LOW (ref 3.8–5.2)
RBC # BLD: 3.03 M/UL — LOW (ref 3.8–5.2)
RBC # BLD: 3.32 M/UL — LOW (ref 3.8–5.2)
RBC # BLD: 3.38 M/UL — LOW (ref 3.8–5.2)
RBC # FLD: 19.9 % — HIGH (ref 10.3–14.5)
RBC # FLD: 20 % — HIGH (ref 10.3–14.5)
RBC # FLD: 20.2 % — HIGH (ref 10.3–14.5)
RBC # FLD: 20.3 % — HIGH (ref 10.3–14.5)
S AUREUS DNA NOSE QL NAA+PROBE: SIGNIFICANT CHANGE UP
SODIUM SERPL-SCNC: 137 MMOL/L — SIGNIFICANT CHANGE UP (ref 135–145)
SODIUM SERPL-SCNC: 141 MMOL/L — SIGNIFICANT CHANGE UP (ref 135–145)
WBC # BLD: 6.09 K/UL — SIGNIFICANT CHANGE UP (ref 3.8–10.5)
WBC # BLD: 6.29 K/UL — SIGNIFICANT CHANGE UP (ref 3.8–10.5)
WBC # BLD: 6.36 K/UL — SIGNIFICANT CHANGE UP (ref 3.8–10.5)
WBC # BLD: 6.77 K/UL — SIGNIFICANT CHANGE UP (ref 3.8–10.5)
WBC # FLD AUTO: 6.09 K/UL — SIGNIFICANT CHANGE UP (ref 3.8–10.5)
WBC # FLD AUTO: 6.29 K/UL — SIGNIFICANT CHANGE UP (ref 3.8–10.5)
WBC # FLD AUTO: 6.36 K/UL — SIGNIFICANT CHANGE UP (ref 3.8–10.5)
WBC # FLD AUTO: 6.77 K/UL — SIGNIFICANT CHANGE UP (ref 3.8–10.5)

## 2024-10-26 PROCEDURE — 72191 CT ANGIOGRAPH PELV W/O&W/DYE: CPT | Mod: 26,MC

## 2024-10-26 PROCEDURE — 99285 EMERGENCY DEPT VISIT HI MDM: CPT

## 2024-10-26 PROCEDURE — 71045 X-RAY EXAM CHEST 1 VIEW: CPT | Mod: 26

## 2024-10-26 PROCEDURE — 72125 CT NECK SPINE W/O DYE: CPT | Mod: 26,MC

## 2024-10-26 PROCEDURE — 99222 1ST HOSP IP/OBS MODERATE 55: CPT | Mod: GC

## 2024-10-26 PROCEDURE — 70450 CT HEAD/BRAIN W/O DYE: CPT | Mod: 26,MC

## 2024-10-26 PROCEDURE — 72193 CT PELVIS W/DYE: CPT | Mod: 26,59,MC

## 2024-10-26 PROCEDURE — 99291 CRITICAL CARE FIRST HOUR: CPT

## 2024-10-26 PROCEDURE — 73502 X-RAY EXAM HIP UNI 2-3 VIEWS: CPT | Mod: 26,LT

## 2024-10-26 RX ORDER — PROTHROMBIN COMPLEX CONCENTRATE (HUMAN) 25.5; 16.5; 24; 22; 22; 26 [IU]/ML; [IU]/ML; [IU]/ML; [IU]/ML; [IU]/ML; [IU]/ML
2500 POWDER, FOR SOLUTION INTRAVENOUS ONCE
Refills: 0 | Status: COMPLETED | OUTPATIENT
Start: 2024-10-26 | End: 2024-10-26

## 2024-10-26 RX ORDER — MELATONIN 5 MG
3 TABLET ORAL AT BEDTIME
Refills: 0 | Status: DISCONTINUED | OUTPATIENT
Start: 2024-10-26 | End: 2024-10-31

## 2024-10-26 RX ORDER — CHLORHEXIDINE GLUCONATE 40 MG/ML
1 SOLUTION TOPICAL DAILY
Refills: 0 | Status: DISCONTINUED | OUTPATIENT
Start: 2024-10-26 | End: 2024-10-28

## 2024-10-26 RX ADMIN — PROTHROMBIN COMPLEX CONCENTRATE (HUMAN) 400 INTERNATIONAL UNIT(S): 25.5; 16.5; 24; 22; 22; 26 POWDER, FOR SOLUTION INTRAVENOUS at 16:12

## 2024-10-26 RX ADMIN — Medication 75 MILLILITER(S): at 20:09

## 2024-10-26 NOTE — H&P ADULT - NSICDXPASTMEDICALHX_GEN_ALL_CORE_FT
PAST MEDICAL HISTORY:  Aortic valve stenosis     Atrial fibrillation     CAD (coronary artery disease)     Diverticulosis     DJD (degenerative joint disease)     Hiatal hernia     HLD (hyperlipidemia)     HTN (hypertension)     MVP (mitral valve prolapse)     Osteoarthritis     Thyroid nodule

## 2024-10-26 NOTE — ED ADULT NURSE NOTE - HOW OFTEN DO YOU HAVE A DRINK CONTAINING ALCOHOL?
Instructions provided as documented in the AVS.    LAB:  prc Rapid Influenza Test    Rx Oseltamivir 30 mg (PO) twice daily for 5 days    Anticipatory guidance:       Fluids - rest - analgesics/antiypretic PRN       Smoke-free environment       Environmental control - humidifier - nutrition       May continue breast feeding       May use OTC Rx Saline Nasal Spray followed by gentle nasal &/or oropharyngeal suctioning as directed PRN       Compliance with Rxs as directed including follow-up visit       Good handwashing - safety       Teething care & hygiene       Watchful observation    RTC 3 - 4 days for follow-up or PRN    Discussed findings & plans with parents  Both verbalized understanding & consent    The mother and father indicated understanding of the diagnosis and agreed with the plan of care.   Never

## 2024-10-26 NOTE — ED ADULT NURSE NOTE - NSFALLHARMRISKINTERV_ED_ALL_ED
Assistance OOB with selected safe patient handling equipment if applicable/Assistance with ambulation/Communicate risk of Fall with Harm to all staff, patient, and family/Monitor gait and stability/Provide visual cue: red socks, yellow wristband, yellow gown, etc/Reinforce activity limits and safety measures with patient and family/Bed in lowest position, wheels locked, appropriate side rails in place/Call bell, personal items and telephone in reach/Instruct patient to call for assistance before getting out of bed/chair/stretcher/Non-slip footwear applied when patient is off stretcher/Driver to call system/Physically safe environment - no spills, clutter or unnecessary equipment/Purposeful Proactive Rounding/Room/bathroom lighting operational, light cord in reach

## 2024-10-26 NOTE — ED PROVIDER NOTE - NS ED MD DISPO ADMITTING SERVICE
Note Text (......Xxx Chief Complaint.): This diagnosis correlates with the Other (Free Text): examined genital and perianal skin per patient's request\\nno other lesions identified Render Risk Assessment In Note?: no Detail Level: Zone TRAUMA

## 2024-10-26 NOTE — ED ADULT NURSE NOTE - CAS TRG GEN SKIN CONDITION
You can access the FollowMyHealth Patient Portal offered by [facility] by registering at the following website: [email or web address]. By joining [employer]’s FollowMyHealth portal, you will also be able to view your health information using other applications (apps) compatible with our system.
Warm

## 2024-10-26 NOTE — ED PROVIDER NOTE - WR ORDER DATE AND TIME 1
Patient Outreach Note    Call to pt to notify that ACM had received an email that we could schedule her vaccine with [facility]. She would rather wait until she can get at [facility] or a local pharmacy. Will continue to monitor [facility] site.     Fatuma Brooks RN  Ambulatory     2/22/2021, 09:27 EST       26-Oct-2024 16:59

## 2024-10-26 NOTE — ED ADULT NURSE NOTE - CHIEF COMPLAINT QUOTE
biba transfer from [facility] for left gluteal hematoma. pt  on xarelto. received [facility]. code trauma b activated as per md michaels request for active bleeding secondary to fall yesterday

## 2024-10-26 NOTE — H&P ADULT - NSICDXPASTSURGICALHX_GEN_ALL_CORE_FT
PAST SURGICAL HISTORY:  H/O knee surgery     H/O ovarian cystectomy     H/O parathyroidectomy     S/P cardiac cath     S/P shoulder replacement, right

## 2024-10-26 NOTE — ED PROVIDER NOTE - CLINICAL SUMMARY MEDICAL DECISION MAKING FREE TEXT BOX
H&P as stated. trauma B activated on arrival. primary survey intact, GCS 15. orders placed per trauma team H&P as stated. trauma B activated on arrival. vitals stable on arrival. primary survey intact, GCS 15. orders placed per trauma team H&P as stated. trauma B activated on arrival. vitals stable on arrival. primary survey intact, GCS 15. orders placed per trauma team. patient to be admitted to SICU for further monitoring and management.

## 2024-10-26 NOTE — PATIENT PROFILE ADULT - DEAF OR HARD OF HEARING?
- Not well controlled.  - Prescription sent for Lexapro 10 mg daily. Advised of side effects.  - She is on wait list for talk therapy.   - Recommend follow up in 4-6 weeks.    no

## 2024-10-26 NOTE — H&P ADULT - NSHPPHYSICALEXAM_GEN_ALL_CORE
Constitutional:   Female in no acute distress  HEENT: Head is normocephalic and atraumatic, maxillofacial structures stable, no blood or discharge from nares or oral cavity, no headley sign / racoon eyes, EOMI b/l, pupils [ 2]mm round and reactive to light b/l, no active drainage or redness  Neck:  trachea midline  Respiratory: Breath sounds CTA b/l respirations are unlabored, no accessory muscle use, no conversational dyspnea  Cardiovascular: Regular rate & rhythm, +S1, S2  Chest: Chest wall is non-tender to palpation, no subQ emphysema or crepitus palpated  Gastrointestinal: Abdomen soft, non-tender, non-distended, no rebound tenderness / guarding, no ecchymosis or external signs of abdominal trauma  Extremities: chronic left shin venous stasis ulcer,  moving all extremities spontaneously, no point tenderness or deformity noted to upper or lower extremities b/l  Pelvis: stable  Vascular: 2+ radial, femoral, and DP pulses b/l  Neurological: GCS: 15 (4/5/6). A&O x 3; no gross sensory / motor / coordination deficits  Musculoskeletal: 5/5 strength of upper and lower extremities b/l  Back: + lewft gluteal large hematoma, no C/T/LS spine tenderness to palpation, no step-offs or signs of external trauma to the back

## 2024-10-26 NOTE — ED PROVIDER NOTE - OBJECTIVE STATEMENT
89y female transferred from [facility] 89y female w/ phm of afib on xarelto transferred from [facility] for left gluteal arterial bleed. patient had a fall to her left side last night. complains of left sided buttock and hip pain. denies head strike, LOC, neck pain, back pain. patient given [facility] PTA.

## 2024-10-26 NOTE — ED ADULT NURSE NOTE - NSFALLHARMRISKINTERV_ED_ALL_ED
Assistance OOB with selected safe patient handling equipment if applicable/Assistance with ambulation/Communicate risk of Fall with Harm to all staff, patient, and family/Monitor gait and stability/Provide visual cue: red socks, yellow wristband, yellow gown, etc/Reinforce activity limits and safety measures with patient and family/Bed in lowest position, wheels locked, appropriate side rails in place/Call bell, personal items and telephone in reach/Instruct patient to call for assistance before getting out of bed/chair/stretcher/Non-slip footwear applied when patient is off stretcher/Alpha to call system/Physically safe environment - no spills, clutter or unnecessary equipment/Purposeful Proactive Rounding/Room/bathroom lighting operational, light cord in reach

## 2024-10-26 NOTE — ED PROVIDER NOTE - PROGRESS NOTE DETAILS
[facility]   Patient/family made aware of active hemorrhage currently getting repeat CBC CMP pt will be tx to [facility] given active bleed

## 2024-10-26 NOTE — ED PROVIDER NOTE - CLINICAL SUMMARY MEDICAL DECISION MAKING FREE TEXT BOX
[facility]     89-year-old female status post fall on Xarelto.  Patient only of left hip pain and slipped over last night.  pt has been able to walk on the leg, pt is having an enlarging hematoma of the left hip otherwise no acute complaints     GENERAL: Awake, alert, NAD  LUNGS: non labored breathing   ABDOMEN: Soft, , non tender, non distended, no rebound, no guarding firm hematoma to left hip   EXT: No edema, no calf tenderness, no deformities.  NEURO: A&Ox3. Moving all extremities.  SKIN: Warm and dry. No rash.  PSYCH: Normal affect.    given hx and pe cc for active exvastion pt has bene walking low concern for fx

## 2024-10-26 NOTE — H&P ADULT - ASSESSMENT
89yF, pmh HTN, afib on xarelto, HLD, CAD, presents as trauma B transfer from [facility] after trip and fall, found with left gluteal hematoma.   -admit to SICU  -serial h/h  -cxr  -tertiary survey  -pain control  -seen by Dr. valero

## 2024-10-26 NOTE — H&P ADULT - HISTORY OF PRESENT ILLNESS
89yF pmh afib on xarelto, presents as trauma B tranfer from [facility], patient slipped and fell last night and presented with lip hip pain, was found to have left gluteal hematoma with active extravasation and was transferred to [facility]. patient received kcentra en route to hospital. Upon arrival to [facility],     primary survey intact, GCS 15, secondary survey pertinent findings include left gluteal ecchymosis hematoma. Pt denies cp, sob, n/v/d, vision changes, HA.

## 2024-10-26 NOTE — ED PROVIDER NOTE - ATTENDING CONTRIBUTION TO CARE
I have personally performed a history and physical examination of the patient and discussed management with the resident as well as the patient.  I reviewed the resident's note and agree with the documented findings and plan of care.  I have authored and modified critical sections of the Provider Note, including but not limited to HPI, Physical Exam and MDM.    89y female w/ pmh of afib on xarelto transferred from [facility] for left gluteal arterial bleed.  As per EMS at bedside patient had a fall to her left side last night.  Presented to [facility] with complaints of left sided buttock and hip pain.  Patient was found to have an arterial left gluteal bleed and given Kcentra PTA.  Patient otherwise hemodynamically stable at this time.  Right lower extremity venous stasis ulcer and left gluteal soft hematoma.  Case discussed with Dr. Mehta by myself, images reviewed from [facility].  Will admit to SICU at this time. I have personally performed a history and physical examination of the patient and discussed management with the resident as well as the patient.  I reviewed the resident's note and agree with the documented findings and plan of care.  I have authored and modified critical sections of the Provider Note, including but not limited to HPI, Physical Exam and MDM.    89y female w/ pmh of afib on xarelto transferred from [facility] for left gluteal arterial bleed.  As per EMS at bedside patient had a fall to her left side last night.  Presented to [facility] with complaints of left sided buttock and hip pain.  Patient was found to have an arterial left gluteal bleed and given Kcentra PTA.  Patient otherwise hemodynamically stable at this time.  Right lower extremity venous stasis ulcer and left gluteal soft hematoma.  Case discussed with Dr. Palma by myself, images reviewed from [facility].  Will admit to SICU at this time.

## 2024-10-26 NOTE — ED PROVIDER NOTE - PHYSICAL EXAMINATION
General: Awake, alert, lying in bed in NAD  HEENT: Normocephalic, atraumatic. No scleral icterus or conjunctival injection. EOMI. Moist mucous membranes. Oropharynx clear.   Neck:. Soft and supple.  Cardiac: RRR, Peripheral pulses 2+ and symmetric. No LE edema.  Resp: Lungs CTAB. No accessory muscle use  Abd: Soft, non-tender, non-distended. No guarding, rebound, or rigidity.  Back: Spine midline and non-tender.   Skin: No rashes, abrasions, or lacerations.  Neuro: AO x 4. Moves all extremities symmetrically. Motor strength and sensation grossly intact.  Psych: Appropriate mood and affect General: Awake, alert, lying in bed in NAD  HEENT: Normocephalic, atraumatic. No scleral icterus or conjunctival injection. EOMI. Moist mucous membranes. Oropharynx clear.   Neck:. Soft and supple.  Cardiac: RRR, Peripheral pulses 2+ and symmetric. No LE edema.  Resp: Lungs CTAB. No accessory muscle use  Abd: Soft, non-tender, non-distended. No guarding, rebound, or rigidity.  Back: Spine midline and non-tender.   MKS: left hip and gluteal tenderness  Skin: No rashes, abrasions, or lacerations.  Neuro: AO x 4. Moves all extremities symmetrically. Motor strength and sensation grossly intact.  Psych: Appropriate mood and affect

## 2024-10-26 NOTE — H&P ADULT - ATTENDING COMMENTS
Above assessment noted.  The patient was seen and examined by myself with the surgical PA and resident. The patient arrived as a level B trauma transfer following a fall with a resultant left gluteal hematoma.  The patient was on Eliquis and given KCentra at the sending hospital.  She arrived with pain in the left gluteal area without abdominal or chest pain, and no other complaints.  HEENT NC/AT PERRL EOMI no raccoon eyes, no headley signs, trachea midline, chest with B/L air entry, abdomen soft and nontender, no guarding, no rebound, pelvis nontender, no crepitus, left gluteal swelling and ecchymosis noted consistent with left gluteal hematoma, soft skin viable, no acute deformity or angulation to the extremities.  The patient is to be admitted to the SICU under the trauma service, monitor H/H and monitor skin for viability.

## 2024-10-26 NOTE — ED ADULT TRIAGE NOTE - CHIEF COMPLAINT QUOTE
biba transfer from [facility] for left gluteal hematoma. pt  on xarelto. received k centra. code trauma b activated as per md michaels request for active bleeding secondary to fall yesterday

## 2024-10-26 NOTE — ED ADULT NURSE NOTE - OBJECTIVE STATEMENT
Pt came to ED with c/o left hip pain s/p fall last night. Pt came to ED with c/o left hip pain s/p fall last night. Pt reports being on blood thinner.

## 2024-10-26 NOTE — ED ADULT NURSE NOTE - NSFALLRISKASMT_ED_ALL_ED_DT
Encounter addended by: Chuck Rogel MD on: 4/20/2022 11:58 AM   Actions taken: Pharmacy for encounter modified, Order list changed, Diagnosis association updated 26-Oct-2024 17:52

## 2024-10-27 LAB
ANION GAP SERPL CALC-SCNC: 13 MMOL/L — SIGNIFICANT CHANGE UP (ref 5–17)
ANION GAP SERPL CALC-SCNC: 14 MMOL/L — SIGNIFICANT CHANGE UP (ref 5–17)
ANISOCYTOSIS BLD QL: SLIGHT — SIGNIFICANT CHANGE UP
APPEARANCE UR: CLEAR — SIGNIFICANT CHANGE UP
BACTERIA # UR AUTO: NEGATIVE /HPF — SIGNIFICANT CHANGE UP
BASOPHILS # BLD AUTO: 0.06 K/UL — SIGNIFICANT CHANGE UP (ref 0–0.2)
BASOPHILS NFR BLD AUTO: 0.9 % — SIGNIFICANT CHANGE UP (ref 0–2)
BILIRUB UR-MCNC: NEGATIVE — SIGNIFICANT CHANGE UP
BUN SERPL-MCNC: 13.6 MG/DL — SIGNIFICANT CHANGE UP (ref 8–20)
BUN SERPL-MCNC: 16.4 MG/DL — SIGNIFICANT CHANGE UP (ref 8–20)
CALCIUM SERPL-MCNC: 8.2 MG/DL — LOW (ref 8.4–10.5)
CALCIUM SERPL-MCNC: 8.6 MG/DL — SIGNIFICANT CHANGE UP (ref 8.4–10.5)
CAST: 1 /LPF — SIGNIFICANT CHANGE UP (ref 0–4)
CHLORIDE SERPL-SCNC: 104 MMOL/L — SIGNIFICANT CHANGE UP (ref 96–108)
CHLORIDE SERPL-SCNC: 105 MMOL/L — SIGNIFICANT CHANGE UP (ref 96–108)
CO2 SERPL-SCNC: 23 MMOL/L — SIGNIFICANT CHANGE UP (ref 22–29)
CO2 SERPL-SCNC: 26 MMOL/L — SIGNIFICANT CHANGE UP (ref 22–29)
COLOR SPEC: YELLOW — SIGNIFICANT CHANGE UP
CREAT SERPL-MCNC: 0.61 MG/DL — SIGNIFICANT CHANGE UP (ref 0.5–1.3)
CREAT SERPL-MCNC: 0.62 MG/DL — SIGNIFICANT CHANGE UP (ref 0.5–1.3)
DACRYOCYTES BLD QL SMEAR: SLIGHT — SIGNIFICANT CHANGE UP
DIFF PNL FLD: NEGATIVE — SIGNIFICANT CHANGE UP
EGFR: 85 ML/MIN/1.73M2 — SIGNIFICANT CHANGE UP
EGFR: 85 ML/MIN/1.73M2 — SIGNIFICANT CHANGE UP
ELLIPTOCYTES BLD QL SMEAR: SLIGHT — SIGNIFICANT CHANGE UP
EOSINOPHIL # BLD AUTO: 0.11 K/UL — SIGNIFICANT CHANGE UP (ref 0–0.5)
EOSINOPHIL NFR BLD AUTO: 1.7 % — SIGNIFICANT CHANGE UP (ref 0–6)
GIANT PLATELETS BLD QL SMEAR: PRESENT — SIGNIFICANT CHANGE UP
GLUCOSE SERPL-MCNC: 105 MG/DL — HIGH (ref 70–99)
GLUCOSE SERPL-MCNC: 78 MG/DL — SIGNIFICANT CHANGE UP (ref 70–99)
GLUCOSE UR QL: NEGATIVE MG/DL — SIGNIFICANT CHANGE UP
HCT VFR BLD CALC: 25 % — LOW (ref 34.5–45)
HCT VFR BLD CALC: 27 % — LOW (ref 34.5–45)
HCT VFR BLD CALC: 27.8 % — LOW (ref 34.5–45)
HGB BLD-MCNC: 7.7 G/DL — LOW (ref 11.5–15.5)
HGB BLD-MCNC: 8.2 G/DL — LOW (ref 11.5–15.5)
HGB BLD-MCNC: 8.7 G/DL — LOW (ref 11.5–15.5)
KETONES UR-MCNC: 15 MG/DL
LEUKOCYTE ESTERASE UR-ACNC: NEGATIVE — SIGNIFICANT CHANGE UP
LYMPHOCYTES # BLD AUTO: 0.46 K/UL — LOW (ref 1–3.3)
LYMPHOCYTES # BLD AUTO: 7.1 % — LOW (ref 13–44)
MAGNESIUM SERPL-MCNC: 2.1 MG/DL — SIGNIFICANT CHANGE UP (ref 1.6–2.6)
MAGNESIUM SERPL-MCNC: 2.2 MG/DL — SIGNIFICANT CHANGE UP (ref 1.6–2.6)
MANUAL SMEAR VERIFICATION: SIGNIFICANT CHANGE UP
MCHC RBC-ENTMCNC: 26.3 PG — LOW (ref 27–34)
MCHC RBC-ENTMCNC: 26.6 PG — LOW (ref 27–34)
MCHC RBC-ENTMCNC: 30.8 GM/DL — LOW (ref 32–36)
MCHC RBC-ENTMCNC: 31.3 GM/DL — LOW (ref 32–36)
MCV RBC AUTO: 85 FL — SIGNIFICANT CHANGE UP (ref 80–100)
MCV RBC AUTO: 85.3 FL — SIGNIFICANT CHANGE UP (ref 80–100)
MONOCYTES # BLD AUTO: 0.46 K/UL — SIGNIFICANT CHANGE UP (ref 0–0.9)
MONOCYTES NFR BLD AUTO: 7.1 % — SIGNIFICANT CHANGE UP (ref 2–14)
NEUTROPHILS # BLD AUTO: 5.22 K/UL — SIGNIFICANT CHANGE UP (ref 1.8–7.4)
NEUTROPHILS NFR BLD AUTO: 81.4 % — HIGH (ref 43–77)
NITRITE UR-MCNC: NEGATIVE — SIGNIFICANT CHANGE UP
OVALOCYTES BLD QL SMEAR: SLIGHT — SIGNIFICANT CHANGE UP
PH UR: 6.5 — SIGNIFICANT CHANGE UP (ref 5–8)
PHOSPHATE SERPL-MCNC: 3.2 MG/DL — SIGNIFICANT CHANGE UP (ref 2.4–4.7)
PHOSPHATE SERPL-MCNC: 3.9 MG/DL — SIGNIFICANT CHANGE UP (ref 2.4–4.7)
PLAT MORPH BLD: NORMAL — SIGNIFICANT CHANGE UP
PLATELET # BLD AUTO: 187 K/UL — SIGNIFICANT CHANGE UP (ref 150–400)
PLATELET # BLD AUTO: 196 K/UL — SIGNIFICANT CHANGE UP (ref 150–400)
POIKILOCYTOSIS BLD QL AUTO: SLIGHT — SIGNIFICANT CHANGE UP
POLYCHROMASIA BLD QL SMEAR: SLIGHT — SIGNIFICANT CHANGE UP
POTASSIUM SERPL-MCNC: 3.9 MMOL/L — SIGNIFICANT CHANGE UP (ref 3.5–5.3)
POTASSIUM SERPL-MCNC: 4 MMOL/L — SIGNIFICANT CHANGE UP (ref 3.5–5.3)
POTASSIUM SERPL-SCNC: 3.9 MMOL/L — SIGNIFICANT CHANGE UP (ref 3.5–5.3)
POTASSIUM SERPL-SCNC: 4 MMOL/L — SIGNIFICANT CHANGE UP (ref 3.5–5.3)
PROT UR-MCNC: NEGATIVE MG/DL — SIGNIFICANT CHANGE UP
RBC # BLD: 2.93 M/UL — LOW (ref 3.8–5.2)
RBC # BLD: 3.27 M/UL — LOW (ref 3.8–5.2)
RBC # FLD: 20.5 % — HIGH (ref 10.3–14.5)
RBC # FLD: 21.1 % — HIGH (ref 10.3–14.5)
RBC BLD AUTO: ABNORMAL
RBC CASTS # UR COMP ASSIST: 0 /HPF — SIGNIFICANT CHANGE UP (ref 0–4)
SCHISTOCYTES BLD QL AUTO: SLIGHT — SIGNIFICANT CHANGE UP
SODIUM SERPL-SCNC: 141 MMOL/L — SIGNIFICANT CHANGE UP (ref 135–145)
SODIUM SERPL-SCNC: 144 MMOL/L — SIGNIFICANT CHANGE UP (ref 135–145)
SP GR SPEC: 1.02 — SIGNIFICANT CHANGE UP (ref 1–1.03)
SQUAMOUS # UR AUTO: 0 /HPF — SIGNIFICANT CHANGE UP (ref 0–5)
TROPONIN T, HIGH SENSITIVITY RESULT: 27 NG/L — SIGNIFICANT CHANGE UP (ref 0–51)
TROPONIN T, HIGH SENSITIVITY RESULT: 31 NG/L — SIGNIFICANT CHANGE UP (ref 0–51)
UROBILINOGEN FLD QL: 0.2 MG/DL — SIGNIFICANT CHANGE UP (ref 0.2–1)
VARIANT LYMPHS # BLD: 1.8 % — SIGNIFICANT CHANGE UP (ref 0–6)
WBC # BLD: 6.41 K/UL — SIGNIFICANT CHANGE UP (ref 3.8–10.5)
WBC # BLD: 6.98 K/UL — SIGNIFICANT CHANGE UP (ref 3.8–10.5)
WBC # FLD AUTO: 6.41 K/UL — SIGNIFICANT CHANGE UP (ref 3.8–10.5)
WBC # FLD AUTO: 6.98 K/UL — SIGNIFICANT CHANGE UP (ref 3.8–10.5)
WBC UR QL: 0 /HPF — SIGNIFICANT CHANGE UP (ref 0–5)

## 2024-10-27 PROCEDURE — 93010 ELECTROCARDIOGRAM REPORT: CPT

## 2024-10-27 PROCEDURE — 93010 ELECTROCARDIOGRAM REPORT: CPT | Mod: 76,77

## 2024-10-27 PROCEDURE — 93306 TTE W/DOPPLER COMPLETE: CPT | Mod: 26

## 2024-10-27 PROCEDURE — 99291 CRITICAL CARE FIRST HOUR: CPT

## 2024-10-27 PROCEDURE — 99223 1ST HOSP IP/OBS HIGH 75: CPT

## 2024-10-27 RX ORDER — METOPROLOL TARTRATE 50 MG
5 TABLET ORAL ONCE
Refills: 0 | Status: COMPLETED | OUTPATIENT
Start: 2024-10-27 | End: 2024-10-27

## 2024-10-27 RX ORDER — DILTIAZEM HCL 5 MG/ML
60 VIAL (ML) INTRAVENOUS EVERY 6 HOURS
Refills: 0 | Status: DISCONTINUED | OUTPATIENT
Start: 2024-10-27 | End: 2024-10-31

## 2024-10-27 RX ORDER — HALOPERIDOL DECANOATE 50 MG/ML
2.5 INJECTION INTRAMUSCULAR ONCE
Refills: 0 | Status: COMPLETED | OUTPATIENT
Start: 2024-10-27 | End: 2024-10-27

## 2024-10-27 RX ORDER — ENOXAPARIN SODIUM 40MG/0.4ML
30 SYRINGE (ML) SUBCUTANEOUS EVERY 24 HOURS
Refills: 0 | Status: DISCONTINUED | OUTPATIENT
Start: 2024-10-28 | End: 2024-10-31

## 2024-10-27 RX ORDER — DILTIAZEM HCL 5 MG/ML
10 VIAL (ML) INTRAVENOUS ONCE
Refills: 0 | Status: COMPLETED | OUTPATIENT
Start: 2024-10-27 | End: 2024-10-27

## 2024-10-27 RX ORDER — OLANZAPINE 20 MG/1
5 TABLET ORAL ONCE
Refills: 0 | Status: COMPLETED | OUTPATIENT
Start: 2024-10-27 | End: 2024-10-27

## 2024-10-27 RX ORDER — DONEPEZIL HYDROCHLORIDE 23 MG/1
5 TABLET ORAL AT BEDTIME
Refills: 0 | Status: DISCONTINUED | OUTPATIENT
Start: 2024-10-27 | End: 2024-10-31

## 2024-10-27 RX ORDER — ENOXAPARIN SODIUM 40MG/0.4ML
30 SYRINGE (ML) SUBCUTANEOUS EVERY 24 HOURS
Refills: 0 | Status: DISCONTINUED | OUTPATIENT
Start: 2024-10-27 | End: 2024-10-27

## 2024-10-27 RX ADMIN — Medication 5 MILLIGRAM(S): at 17:26

## 2024-10-27 RX ADMIN — CHLORHEXIDINE GLUCONATE 1 APPLICATION(S): 40 SOLUTION TOPICAL at 12:15

## 2024-10-27 RX ADMIN — Medication 60 MILLIGRAM(S): at 17:27

## 2024-10-27 RX ADMIN — Medication 10 MILLIGRAM(S): at 22:20

## 2024-10-27 RX ADMIN — HALOPERIDOL DECANOATE 2.5 MILLIGRAM(S): 50 INJECTION INTRAMUSCULAR at 13:16

## 2024-10-27 RX ADMIN — Medication 75 MILLILITER(S): at 22:58

## 2024-10-27 RX ADMIN — Medication 5 MILLIGRAM(S): at 17:12

## 2024-10-27 RX ADMIN — Medication 75 MILLILITER(S): at 12:14

## 2024-10-27 RX ADMIN — OLANZAPINE 5 MILLIGRAM(S): 20 TABLET ORAL at 14:46

## 2024-10-27 RX ADMIN — Medication 10 MILLIGRAM(S): at 18:04

## 2024-10-27 RX ADMIN — Medication 3 MILLIGRAM(S): at 22:20

## 2024-10-27 RX ADMIN — DONEPEZIL HYDROCHLORIDE 5 MILLIGRAM(S): 23 TABLET ORAL at 22:21

## 2024-10-27 RX ADMIN — Medication 75 MILLILITER(S): at 17:26

## 2024-10-27 RX ADMIN — Medication 75 MILLILITER(S): at 22:21

## 2024-10-27 NOTE — CONSULT NOTE ADULT - ASSESSMENT
89y/o F hx of afib on xarelto, Aortic valve stenosis, Atrial fibrillation, CAD, Diverticulosis, Hiatal hernia, HLD, HTN, MVP, Thyroid nodule, patient slipped and fell and was having left hip pain, she came to the [facility] ED, he was found to have left gluteal hematoma with active extravasation and was transferred to [facility], patient received kcentra en route to hospital, her HB was noted to be ~8, admitted under SICU, medicine consulted for Jyotsna Traum eval.    Plan:     Fall:     Gluteal hematoma:      89y/o F hx of afib on xarelto, Aortic valve stenosis, Atrial fibrillation, CAD, Diverticulosis, Hiatal hernia, HLD, HTN, MVP, Thyroid nodule, patient slipped and fell and was having left hip pain, she came to the [facility] ED, he was found to have left gluteal hematoma with active extravasation and was transferred to [facility], patient received kcentra en route to hospital, her HB was noted to be ~8, admitted under SICU, medicine consulted for Jyotsna Traum eval.    Plan:     Fall:   Mostly mechanical fall   she never had fall before  EKG to obtain     Gluteal hematoma:   Serial imaging   cbc to monitor   further management as per surgical team     Hx of Afib: Xarelto on hold    Acute blood loss anemia: Hb is 7.7, would type and screen and will transfuse given Hx of CAD and MVP  will monitor CBC    will get home med list for all her medical problem and resume      Geriatric Screening:    Functional Assessment: [ yes ] Independent [ ] Assistance [ ] Total care [ ] Non-ambulatory    [ no] Fall risks identified:    [ ] High risk medications identified: to obtain home med list       [ no ] Increased delirium risk    [ yes ] Delirium and other risks can be reduced by:    -early ambulation    -minimizing "tethers" - IV, oxygen, catheters, etc    -avoiding hypnotics and sedatives    -maintaining hydration/nutrition    -avoid anticholinergics - diphenhydramine, etc    -pain control    -supportive environment    Obtain home meds list and resume

## 2024-10-27 NOTE — CHART NOTE - NSCHARTNOTEFT_GEN_A_CORE
SICU TRANSFER NOTE downgrade accept   -----------------------------  ICU Admission Date: 10/26/24  Transfer Date: 10-27-24 @ 13:40    Admission Diagnosis: Trauma B- trip and fall, L gluteal hematoma ,     Active Problems/injuries:   1. L gluteal hematoma with active extrav.     Procedures: none       Medications  chlorhexidine 2% Cloths 1 Application(s) Topical daily  enoxaparin Injectable 30 milliGRAM(s) SubCutaneous every 24 hours  melatonin 3 milliGRAM(s) Oral at bedtime  multiple electrolytes Injection Type 1 1000 milliLiter(s) IV Continuous <Continuous>      I have discussed this case with Tello VALDOVINOS upon transfer and all questions regarding ICU course were answered.  The following items are to be followed up:    Assesment:   89yF pmh afib on xarelto, presents as trauma B tranfer from [facility], patient slipped and fell last night and presented with lip hip pain, was found to have left gluteal hematoma with active extravasation and was transferred to [facility]. Patient received kcentra en route to hospital. Upon arrival to [facility],  primary survey intact, GCS 15, secondary survey pertinent findings include left gluteal ecchymosis hematoma. patient was admitted to SICU on 10/26/24 for hemodynamic monitoring. h/h remained stable and hemodynamically stable. Syncope workup completed as per willis consult. Patient started on regular diet and IV locked 10/27. Abdominal binder in place around hematoma to keep stable.     Plan:   - trend h/h, transfuse as needed   - reg diet   - consult PT   - Hold xarelto for now, can restart Lov 30 qd tonight  - tertiary done

## 2024-10-27 NOTE — PROGRESS NOTE ADULT - SUBJECTIVE AND OBJECTIVE BOX
INTERVAL EVENTS:    [ ] Due to altered mental status/intubation, subjective information were not able to be obtained from the patient. History was obtained, to the extent possible, from review of the chart and collateral sources of information.    OBJECTIVE:    VITALS:  Vital Signs Last 24 Hrs  T(C): 36.9 (27 Oct 2024 07:00), Max: 37 (26 Oct 2024 20:37)  T(F): 98.4 (27 Oct 2024 07:00), Max: 98.6 (26 Oct 2024 20:37)  HR: 67 (27 Oct 2024 08:00) (55 - 78)  BP: 143/64 (27 Oct 2024 08:00) (124/63 - 179/62)  BP(mean): 88 (27 Oct 2024 08:00) (76 - 104)  RR: 19 (27 Oct 2024 08:00) (13 - 20)  SpO2: 98% (27 Oct 2024 08:00) (91% - 100%)    Parameters below as of 27 Oct 2024 08:00  Patient On (Oxygen Delivery Method): room air        I&O's Summary    26 Oct 2024 07:01  -  27 Oct 2024 07:00  --------------------------------------------------------  IN: 75 mL / OUT: 0 mL / NET: 75 mL    27 Oct 2024 07:01  -  27 Oct 2024 08:47  --------------------------------------------------------  IN: 75 mL / OUT: 0 mL / NET: 75 mL        PHYSICAL EXAM:    Constitutional: NAD, well-groomed, well-developed  HEENT: PERRLA, EOMI, no drainage or redness  Neck: supple,  No JVD  Respiratory: Breath Sounds equal & clear bilaterally to auscultation, no rales/rhonchi/wheezing, no accessory muscle use noted  Cardiovascular: Regular rate, regular rhythm, normal S1, S2; no murmurs or rub  Gastrointestinal: Soft, non-tender, non distended, + bowel sounds.   Extremities: WILEY x 4, no peripheral edema, no cyanosis, no clubbing   Neurological: A+O x 3; speech clear and intact; no sensory, motor  deficits  Skin: warm, dry, well perfused    ACCESS DEVICES:  [ ] Peripheral IV  [ ] Central Venous Line	[ ] R	[ ] L	[ ] IJ	[ ] Fem	[ ] SC	Placed:   [ ] Arterial Line		[ ] R	[ ] L	[ ] Fem	[ ] Rad	[ ] Ax	Placed:   [ ] PICC:					[ ] Mediport  [ ] Urinary Catheter, Date Placed:   [x] Necessity of urinary, arterial, and venous catheters discussed      LABS:  CAPILLARY BLOOD GLUCOSE                              7.7    6.41  )-----------( 187      ( 27 Oct 2024 04:38 )             25.0     10-27    141  |  104  |  13.6  ----------------------------<  78  4.0   |  23.0  |  0.61    Ca    8.2[L]      27 Oct 2024 04:38  Phos  3.2     10-27  Mg     2.1     10-27    TPro  5.6[L]  /  Alb  3.6  /  TBili  0.3[L]  /  DBili  x   /  AST  23  /  ALT  14  /  AlkPhos  131[H]  10-26    PT/INR - ( 26 Oct 2024 22:26 )   PT: 10.9 sec;   INR: 0.96 ratio         PTT - ( 26 Oct 2024 22:26 )  PTT:34.8 sec        Urinalysis Basic - ( 27 Oct 2024 04:38 )    Color: x / Appearance: x / SG: x / pH: x  Gluc: 78 mg/dL / Ketone: x  / Bili: x / Urobili: x   Blood: x / Protein: x / Nitrite: x   Leuk Esterase: x / RBC: x / WBC x   Sq Epi: x / Non Sq Epi: x / Bacteria: x            MEDICATIONS:   MEDICATIONS  (STANDING):  chlorhexidine 2% Cloths 1 Application(s) Topical daily  melatonin 3 milliGRAM(s) Oral at bedtime  multiple electrolytes Injection Type 1 1000 milliLiter(s) (75 mL/Hr) IV Continuous <Continuous>    MEDICATIONS  (PRN):   INTERVAL EVENTS:    -no acute events since admission     OBJECTIVE:    VITALS:  Vital Signs Last 24 Hrs  T(C): 36.9 (27 Oct 2024 07:00), Max: 37 (26 Oct 2024 20:37)  T(F): 98.4 (27 Oct 2024 07:00), Max: 98.6 (26 Oct 2024 20:37)  HR: 67 (27 Oct 2024 08:00) (55 - 78)  BP: 143/64 (27 Oct 2024 08:00) (124/63 - 179/62)  BP(mean): 88 (27 Oct 2024 08:00) (76 - 104)  RR: 19 (27 Oct 2024 08:00) (13 - 20)  SpO2: 98% (27 Oct 2024 08:00) (91% - 100%)    Parameters below as of 27 Oct 2024 08:00  Patient On (Oxygen Delivery Method): room air        I&O's Summary    26 Oct 2024 07:01  -  27 Oct 2024 07:00  --------------------------------------------------------  IN: 75 mL / OUT: 0 mL / NET: 75 mL    27 Oct 2024 07:01  -  27 Oct 2024 08:47  --------------------------------------------------------  IN: 75 mL / OUT: 0 mL / NET: 75 mL        PHYSICAL EXAM:    Constitutional: NAD, AAOx3  Respiratory: Breath Sounds equal, no accessory muscle use noted  Cardiovascular: Regular rate, regular rhythm  Gastrointestinal: Soft, non-tender, non distended.   Back: Left gluteal hematoma, no sign of expanding  Extremities: WILEY x 4, no peripheral edema  Neurological: speech clear and intact; no sensory, motor  deficits  Skin: warm, dry, well perfused    ACCESS DEVICES:  [X] Peripheral IV  [ ] Central Venous Line	[ ] R	[ ] L	[ ] IJ	[ ] Fem	[ ] SC	Placed:   [ ] Arterial Line		[ ] R	[ ] L	[ ] Fem	[ ] Rad	[ ] Ax	Placed:   [ ] PICC:					[ ] Mediport  [ ] Urinary Catheter, Date Placed:   [x] Necessity of urinary, arterial, and venous catheters discussed      LABS:  CAPILLARY BLOOD GLUCOSE                              7.7    6.41  )-----------( 187      ( 27 Oct 2024 04:38 )             25.0     10-27    141  |  104  |  13.6  ----------------------------<  78  4.0   |  23.0  |  0.61    Ca    8.2[L]      27 Oct 2024 04:38  Phos  3.2     10-27  Mg     2.1     10-27    TPro  5.6[L]  /  Alb  3.6  /  TBili  0.3[L]  /  DBili  x   /  AST  23  /  ALT  14  /  AlkPhos  131[H]  10-26    PT/INR - ( 26 Oct 2024 22:26 )   PT: 10.9 sec;   INR: 0.96 ratio         PTT - ( 26 Oct 2024 22:26 )  PTT:34.8 sec        Urinalysis Basic - ( 27 Oct 2024 04:38 )    Color: x / Appearance: x / SG: x / pH: x  Gluc: 78 mg/dL / Ketone: x  / Bili: x / Urobili: x   Blood: x / Protein: x / Nitrite: x   Leuk Esterase: x / RBC: x / WBC x   Sq Epi: x / Non Sq Epi: x / Bacteria: x            MEDICATIONS:   MEDICATIONS  (STANDING):  chlorhexidine 2% Cloths 1 Application(s) Topical daily  melatonin 3 milliGRAM(s) Oral at bedtime  multiple electrolytes Injection Type 1 1000 milliLiter(s) (75 mL/Hr) IV Continuous <Continuous>    MEDICATIONS  (PRN):

## 2024-10-27 NOTE — CHART NOTE - NSCHARTNOTEFT_GEN_A_CORE
Pt with rapid Afib with RVR to 150 bpm. EKG confirms atrial fibrillation and patient with known history of Afib. Pt received Lopressor 5 mg IVP x 2 with improved heart rate. Review of patient's medication reconciliation and pt was taking Diltiazem 240 mg extended release daily. Will remains with stable HR of 105 to 115 bpm with stable BP. Pt is asymptomatic at this time. Will restart Diltiazem and provide IVP dose if warranted. Repeat labs are pending.

## 2024-10-27 NOTE — CHART NOTE - NSCHARTNOTEFT_GEN_A_CORE
Patient's rapid AFib broke with resumption of home diltiazem, currently in NSR, blood pressure stbale.  Repeat labs reviewed, normal. Patient remains HD stable, stable for downgrade tot he surgical floor.

## 2024-10-27 NOTE — CONSULT NOTE ADULT - SUBJECTIVE AND OBJECTIVE BOX
89y/o F hx of afib on xarelto, Aortic valve stenosis, Atrial fibrillation, CAD, Diverticulosis, Hiatal hernia, HLD, HTN, MVP, Thyroid nodule, patient slipped and fell and was having left hip pain, she came to the [facility] ED, he was found to have left gluteal hematoma with active extravasation and was transferred to [facility], patient received kcentra en route to hospital, her HB was noted to be ~8, admitted under SICU, medicine consulted for Jyotsna Traum eval.        Allergies:  	Allergy Status Unknown:       PAST MEDICAL HISTORY:  Aortic valve stenosis     Atrial fibrillation     CAD (coronary artery disease)     Diverticulosis     DJD (degenerative joint disease)     Hiatal hernia     HLD (hyperlipidemia)     HTN (hypertension)     MVP (mitral valve prolapse)     Osteoarthritis     Thyroid nodule.     PAST SURGICAL HISTORY:  H/O knee surgery     H/O ovarian cystectomy     H/O parathyroidectomy     S/P cardiac cath     S/P shoulder replacement, right.     Social History:  Not a smoker, drinker or using any drugs       Vital Signs Last 24 Hrs  T(C): 36.8 (27 Oct 2024 11:00), Max: 37 (26 Oct 2024 20:37)  T(F): 98.3 (27 Oct 2024 11:00), Max: 98.6 (26 Oct 2024 20:37)  HR: 68 (27 Oct 2024 10:00) (55 - 78)  BP: 145/72 (27 Oct 2024 10:00) (124/63 - 179/62)  BP(mean): 91 (27 Oct 2024 10:00) (76 - 104)  RR: 15 (27 Oct 2024 10:00) (13 - 20)  SpO2: 100% (27 Oct 2024 10:00) (91% - 100%)    Parameters below as of 27 Oct 2024 08:00  Patient On (Oxygen Delivery Method): room air        PHYSICAL EXAM:    GENERAL: Elderly female looking comfortable   HEENT: PERRL, +EOMI  NECK: soft, Supple, No JVD  CHEST/LUNG: Clear to auscultate bilaterally; No wheezing  HEART: S1S2+, Regular rate and rhythm; No murmurs  ABDOMEN: Soft, Nontender, Nondistended; Bowel sounds present  EXTREMITIES:  1+ Peripheral Pulses, No edema  SKIN: No rashes or lesions  NEURO: AAOX3  PSYCH: normal mood      LABS:                        7.7    6.41  )-----------( 187      ( 27 Oct 2024 04:38 )             25.0     10-27    141  |  104  |  13.6  ----------------------------<  78  4.0   |  23.0  |  0.61    Ca    8.2[L]      27 Oct 2024 04:38  Phos  3.2     10-27  Mg     2.1     10-27    TPro  5.6[L]  /  Alb  3.6  /  TBili  0.3[L]  /  DBili  x   /  AST  23  /  ALT  14  /  AlkPhos  131[H]  10-26    PT/INR - ( 26 Oct 2024 22:26 )   PT: 10.9 sec;   INR: 0.96 ratio         PTT - ( 26 Oct 2024 22:26 )  PTT:34.8 sec          I&O's Summary    26 Oct 2024 07:01  -  27 Oct 2024 07:00  --------------------------------------------------------  IN: 75 mL / OUT: 0 mL / NET: 75 mL    27 Oct 2024 07:01  -  27 Oct 2024 12:05  --------------------------------------------------------  IN: 225 mL / OUT: 0 mL / NET: 225 mL        MEDICATIONS  (STANDING):  chlorhexidine 2% Cloths 1 Application(s) Topical daily  melatonin 3 milliGRAM(s) Oral at bedtime  multiple electrolytes Injection Type 1 1000 milliLiter(s) (75 mL/Hr) IV Continuous <Continuous>    MEDICATIONS  (PRN):           89y/o F hx of afib on xarelto, Aortic valve stenosis, Atrial fibrillation, CAD, Diverticulosis, Hiatal hernia, HLD, HTN, MVP, Thyroid nodule, patient slipped and fell and was having left hip pain, she came to the [facility] ED, he was found to have left gluteal hematoma with active extravasation and was transferred to [facility], patient received kcentra en route to hospital, her HB was noted to be ~8, admitted under SICU, medicine consulted for Jyotsna Traum eval.   her pain is well controlled, denies fever, chills, chest pain or sob      Allergies:  	Allergy Status Unknown:       PAST MEDICAL HISTORY:  Aortic valve stenosis     Atrial fibrillation     CAD (coronary artery disease)     Diverticulosis     DJD (degenerative joint disease)     Hiatal hernia     HLD (hyperlipidemia)     HTN (hypertension)     MVP (mitral valve prolapse)     Osteoarthritis     Thyroid nodule.     PAST SURGICAL HISTORY:  H/O knee surgery     H/O ovarian cystectomy     H/O parathyroidectomy     S/P cardiac cath     S/P shoulder replacement, right.     Social History:  Not a smoker, drinker or using any drugs       Vital Signs Last 24 Hrs  T(C): 36.8 (27 Oct 2024 11:00), Max: 37 (26 Oct 2024 20:37)  T(F): 98.3 (27 Oct 2024 11:00), Max: 98.6 (26 Oct 2024 20:37)  HR: 68 (27 Oct 2024 10:00) (55 - 78)  BP: 145/72 (27 Oct 2024 10:00) (124/63 - 179/62)  BP(mean): 91 (27 Oct 2024 10:00) (76 - 104)  RR: 15 (27 Oct 2024 10:00) (13 - 20)  SpO2: 100% (27 Oct 2024 10:00) (91% - 100%)    Parameters below as of 27 Oct 2024 08:00  Patient On (Oxygen Delivery Method): room air        PHYSICAL EXAM:    GENERAL: Elderly female looking comfortable   HEENT: PERRL, +EOMI  NECK: soft, Supple, No JVD  CHEST/LUNG: Clear to auscultate bilaterally; No wheezing  HEART: S1S2+, Regular rate and rhythm; No murmurs  ABDOMEN: Soft, Nontender, Nondistended; Bowel sounds present  EXTREMITIES:  1+ Peripheral Pulses, No edema  SKIN: No rashes or lesions  NEURO: AAOX3  PSYCH: normal mood      LABS:                        7.7    6.41  )-----------( 187      ( 27 Oct 2024 04:38 )             25.0     10-27    141  |  104  |  13.6  ----------------------------<  78  4.0   |  23.0  |  0.61    Ca    8.2[L]      27 Oct 2024 04:38  Phos  3.2     10-27  Mg     2.1     10-27    TPro  5.6[L]  /  Alb  3.6  /  TBili  0.3[L]  /  DBili  x   /  AST  23  /  ALT  14  /  AlkPhos  131[H]  10-26    PT/INR - ( 26 Oct 2024 22:26 )   PT: 10.9 sec;   INR: 0.96 ratio         PTT - ( 26 Oct 2024 22:26 )  PTT:34.8 sec          I&O's Summary    26 Oct 2024 07:01  -  27 Oct 2024 07:00  --------------------------------------------------------  IN: 75 mL / OUT: 0 mL / NET: 75 mL    27 Oct 2024 07:01  -  27 Oct 2024 12:05  --------------------------------------------------------  IN: 225 mL / OUT: 0 mL / NET: 225 mL        MEDICATIONS  (STANDING):  chlorhexidine 2% Cloths 1 Application(s) Topical daily  melatonin 3 milliGRAM(s) Oral at bedtime  multiple electrolytes Injection Type 1 1000 milliLiter(s) (75 mL/Hr) IV Continuous <Continuous>    MEDICATIONS  (PRN):

## 2024-10-27 NOTE — CHART NOTE - NSCHARTNOTEFT_GEN_A_CORE
SICU TRANSFER NOTE  -----------------------------  ICU Admission Date: XXXXX  Transfer Date: 10-27-24 @ 23:06    Admission Diagnosis:   L gluteal hematoma with active extravasation    Active Problems/injuries:   L gluteal hematoma with active extravasation  Dementia    Procedures: None    Consultants:  Geriatrics  PT    Medications  atorvastatin 10 milliGRAM(s) Oral at bedtime  chlorhexidine 2% Cloths 1 Application(s) Topical daily  diltiazem    Tablet 60 milliGRAM(s) Oral every 6 hours  donepezil 5 milliGRAM(s) Oral at bedtime  melatonin 3 milliGRAM(s) Oral at bedtime  multiple electrolytes Injection Type 1 1000 milliLiter(s) IV Continuous <Continuous>      [ x ] I attest I have reviewed and reconciled all medications prior to transfer    IV Fluids    Indication: IVL    Antibiotics: None    Indication: XXXXXXX End Date:XXXXXXX      I have discussed this case with xxxxxENTER [email or web address] upon transfer and all questions regarding ICU course were answered.  The following items are to be followed up: SICU TRANSFER NOTE  -----------------------------  ICU Admission Date: 10/26/2024  Transfer Date: 10-27-24 @ 23:06    Admission Diagnosis:   Left gluteal hematoma with active extravasation    Active Problems/injuries:   Left gluteal hematoma with active extravasation  Dementia    Procedures: None    Consultants:  Geriatrics  PT    Medications  atorvastatin 10 milliGRAM(s) Oral at bedtime  chlorhexidine 2% Cloths 1 Application(s) Topical daily  diltiazem    Tablet 60 milliGRAM(s) Oral every 6 hours  donepezil 5 milliGRAM(s) Oral at bedtime  melatonin 3 milliGRAM(s) Oral at bedtime  multiple electrolytes Injection Type 1 1000 milliLiter(s) IV Continuous <Continuous>      [ x ] I attest I have reviewed and reconciled all medications prior to transfer    IV Fluids    Indication: IVL    Antibiotics: None      I have discussed this case with Bridget Chaudhry PA-C with ACS / Trauma upon transfer and all questions regarding ICU course were answered.  The following items are to be followed up:    89yo Female with Afib on Xarelto s/p ground level fall (?syncopal fall) sustained left gluteal hematoma with active extravasation, got Kcentra at OSH, currently no sign of active bleeding     PLAN:    NEURO: pain well controlled, mental status baseline with PMH of Dementia  - Zyprexa 5 mg IM given secondary to agitation with good response after haldol 2.5 mg IVP was ineffective  -  so will avoid further QTc prolonging meds  - F/up AM EKG  - Optimize sleep/wake cycle  - Aricept restarted  - Continue multimodal pain management  - Delirium precaution     RESPIRATORY: no active issue   - Maintain SAO2>92%  - IS, pulm toilet    CARDIOVASCULAR: syncopal fall?  - MAP goal >65  - EKG/Trop/Echo for syncopal work-up  - Jyotsna consult  - held AC for afib, currently afib in rate control  - Continue Diltiazem     GI/NUTRITION: no active issue  - regular diet     GENITOURINARY/RENAL: No active issue  - Monitor I/Os  - Trend BUN/Cr daily  - Trend electrolytes, replete PRN    HEMATOLOGIC: gluteal hematoma  - Hgb remains stable at 8  - Trend CBC q6h  - VTE ppx: SCDs, hold chemical DVT ppx  - Discuss risk benefit of full AC    INFECTIOUS DISEASE:  - Trend WBC, monitor for signs of infection  - UA negative    ENDOCRINE: monitor blood sugar    SKIN: Hematoma  - Continue to Reassess gluteal region daily for signs of skin necrosis    LINES/TUBES/DRAINS: PIVs    DISPO: Pending PT evaluation    CODE STATUS: Full

## 2024-10-27 NOTE — CHART NOTE - NSCHARTNOTEFT_GEN_A_CORE
Tertiary Trauma Survey (TTS)    Date of TTS: 10-27-24 @ 15:42                             Admit Date: 10-26-24 @ 17:11          List Injuries Identified to Date:  1. L gluteal hematoma with active extrav       List Operative and Interventional Radiological Procedures:   none         Physical Exam:    Neuro: GCS 14, confused at baseline.     HEENT: head atraumatic, normocephalic, EOMs intact, trachea midline     Pulm/Chest: no chest wall tenderness. Symmetrical chest rise with inspiration     Cardiac: RRR     GI / Abdomen: soft/ nt/ nd     Musculoskeletal / Extremities: moving all extremities spontaneously. L gluteal hematoma, soft non expanding.     Integumentary: intact, warm.       RADIOLOGICAL FINDINGS REVIEW:    Cxr: Normal heart and mediastinum with transaortic valve replacement. Lungs clear with probable left lower lobe retrocardiac calcified granuloma. Angles sharp. Bones without acute abnormality.    IMPRESSION: No acute parenchymal disease    --- End of Report ---        INCIDENTAL FINDINGS:    [X ] No          [ X] Tertiary exam complete, there are no new injuries identified

## 2024-10-28 ENCOUNTER — APPOINTMENT (OUTPATIENT)
Dept: WOUND CARE | Facility: HOSPITAL | Age: 89
End: 2024-10-28

## 2024-10-28 LAB
ANION GAP SERPL CALC-SCNC: 14 MMOL/L — SIGNIFICANT CHANGE UP (ref 5–17)
BASOPHILS # BLD AUTO: 0.02 K/UL — SIGNIFICANT CHANGE UP (ref 0–0.2)
BASOPHILS NFR BLD AUTO: 0.3 % — SIGNIFICANT CHANGE UP (ref 0–2)
BLD GP AB SCN SERPL QL: SIGNIFICANT CHANGE UP
BUN SERPL-MCNC: 15.9 MG/DL — SIGNIFICANT CHANGE UP (ref 8–20)
CALCIUM SERPL-MCNC: 8.5 MG/DL — SIGNIFICANT CHANGE UP (ref 8.4–10.5)
CHLORIDE SERPL-SCNC: 107 MMOL/L — SIGNIFICANT CHANGE UP (ref 96–108)
CO2 SERPL-SCNC: 23 MMOL/L — SIGNIFICANT CHANGE UP (ref 22–29)
CREAT SERPL-MCNC: 0.58 MG/DL — SIGNIFICANT CHANGE UP (ref 0.5–1.3)
EGFR: 86 ML/MIN/1.73M2 — SIGNIFICANT CHANGE UP
EOSINOPHIL # BLD AUTO: 0.11 K/UL — SIGNIFICANT CHANGE UP (ref 0–0.5)
EOSINOPHIL NFR BLD AUTO: 1.5 % — SIGNIFICANT CHANGE UP (ref 0–6)
GLUCOSE SERPL-MCNC: 79 MG/DL — SIGNIFICANT CHANGE UP (ref 70–99)
HCT VFR BLD CALC: 25.9 % — LOW (ref 34.5–45)
HCT VFR BLD CALC: 30.6 % — LOW (ref 34.5–45)
HGB BLD-MCNC: 7.9 G/DL — LOW (ref 11.5–15.5)
HGB BLD-MCNC: 9.1 G/DL — LOW (ref 11.5–15.5)
IMM GRANULOCYTES NFR BLD AUTO: 0.6 % — SIGNIFICANT CHANGE UP (ref 0–0.9)
LYMPHOCYTES # BLD AUTO: 0.7 K/UL — LOW (ref 1–3.3)
LYMPHOCYTES # BLD AUTO: 9.8 % — LOW (ref 13–44)
MAGNESIUM SERPL-MCNC: 2.4 MG/DL — SIGNIFICANT CHANGE UP (ref 1.6–2.6)
MCHC RBC-ENTMCNC: 26.5 PG — LOW (ref 27–34)
MCHC RBC-ENTMCNC: 26.6 PG — LOW (ref 27–34)
MCHC RBC-ENTMCNC: 29.7 GM/DL — LOW (ref 32–36)
MCHC RBC-ENTMCNC: 30.5 GM/DL — LOW (ref 32–36)
MCV RBC AUTO: 87.2 FL — SIGNIFICANT CHANGE UP (ref 80–100)
MCV RBC AUTO: 89 FL — SIGNIFICANT CHANGE UP (ref 80–100)
MONOCYTES # BLD AUTO: 0.76 K/UL — SIGNIFICANT CHANGE UP (ref 0–0.9)
MONOCYTES NFR BLD AUTO: 10.6 % — SIGNIFICANT CHANGE UP (ref 2–14)
NEUTROPHILS # BLD AUTO: 5.52 K/UL — SIGNIFICANT CHANGE UP (ref 1.8–7.4)
NEUTROPHILS NFR BLD AUTO: 77.2 % — HIGH (ref 43–77)
PHOSPHATE SERPL-MCNC: 3.6 MG/DL — SIGNIFICANT CHANGE UP (ref 2.4–4.7)
PLATELET # BLD AUTO: 182 K/UL — SIGNIFICANT CHANGE UP (ref 150–400)
PLATELET # BLD AUTO: 199 K/UL — SIGNIFICANT CHANGE UP (ref 150–400)
POTASSIUM SERPL-MCNC: 3.9 MMOL/L — SIGNIFICANT CHANGE UP (ref 3.5–5.3)
POTASSIUM SERPL-SCNC: 3.9 MMOL/L — SIGNIFICANT CHANGE UP (ref 3.5–5.3)
RBC # BLD: 2.97 M/UL — LOW (ref 3.8–5.2)
RBC # BLD: 3.44 M/UL — LOW (ref 3.8–5.2)
RBC # FLD: 21.8 % — HIGH (ref 10.3–14.5)
RBC # FLD: 22.1 % — HIGH (ref 10.3–14.5)
SODIUM SERPL-SCNC: 144 MMOL/L — SIGNIFICANT CHANGE UP (ref 135–145)
WBC # BLD: 7.15 K/UL — SIGNIFICANT CHANGE UP (ref 3.8–10.5)
WBC # BLD: 7.96 K/UL — SIGNIFICANT CHANGE UP (ref 3.8–10.5)
WBC # FLD AUTO: 7.15 K/UL — SIGNIFICANT CHANGE UP (ref 3.8–10.5)
WBC # FLD AUTO: 7.96 K/UL — SIGNIFICANT CHANGE UP (ref 3.8–10.5)

## 2024-10-28 PROCEDURE — 99232 SBSQ HOSP IP/OBS MODERATE 35: CPT

## 2024-10-28 RX ORDER — DONEPEZIL HYDROCHLORIDE 23 MG/1
1 TABLET ORAL
Refills: 0 | DISCHARGE

## 2024-10-28 RX ORDER — DILTIAZEM HCL 5 MG/ML
1 VIAL (ML) INTRAVENOUS
Refills: 0 | DISCHARGE

## 2024-10-28 RX ADMIN — Medication 60 MILLIGRAM(S): at 22:39

## 2024-10-28 RX ADMIN — DONEPEZIL HYDROCHLORIDE 5 MILLIGRAM(S): 23 TABLET ORAL at 22:39

## 2024-10-28 RX ADMIN — Medication 60 MILLIGRAM(S): at 09:59

## 2024-10-28 RX ADMIN — Medication 30 MILLIGRAM(S): at 11:39

## 2024-10-28 RX ADMIN — Medication 10 MILLIGRAM(S): at 22:40

## 2024-10-28 NOTE — PROGRESS NOTE ADULT - SUBJECTIVE AND OBJECTIVE BOX
INTERVAL HPI/OVERNIGHT EVENTS/SUBJECTIVE:  Pt seen and examined. Admits to pain in her left hip. Patient was downgraded from unit overnight. Prior to downgrade, patient went into afib , resolved with diltiazem. noted that h/h dropped today.     ICU Vital Signs Last 24 Hrs  T(C): 36.9 (28 Oct 2024 04:00), Max: 37.1 (27 Oct 2024 20:11)  T(F): 98.5 (28 Oct 2024 04:00), Max: 98.8 (27 Oct 2024 20:11)  HR: 86 (28 Oct 2024 04:00) (57 - 145)  BP: 127/55 (28 Oct 2024 04:00) (114/94 - 170/151)  BP(mean): 99 (27 Oct 2024 21:45) (83 - 160)  ABP: --  ABP(mean): --  RR: 17 (28 Oct 2024 04:00) (12 - 25)  SpO2: 98% (28 Oct 2024 04:00) (88% - 100%)    O2 Parameters below as of 28 Oct 2024 04:00  Patient On (Oxygen Delivery Method): room air    I&O's Detail    27 Oct 2024 07:01  -  28 Oct 2024 07:00  --------------------------------------------------------  IN:    multiple electrolytes Injection Type 1.: 1200 mL  Total IN: 1200 mL    OUT:    Voided (mL): 800 mL  Total OUT: 800 mL    Total NET: 400 mL    MEDICATIONS  (STANDING):  atorvastatin 10 milliGRAM(s) Oral at bedtime  diltiazem    Tablet 60 milliGRAM(s) Oral every 6 hours  donepezil 5 milliGRAM(s) Oral at bedtime  enoxaparin Injectable 30 milliGRAM(s) SubCutaneous every 24 hours  melatonin 3 milliGRAM(s) Oral at bedtime    MEDICATIONS  (PRN):      MISC:     PHYSICAL EXAM:    Gen: NAD, laying comfortably  Neurological: aaox3  Pulmonary: non-labored  Gastrointestinal: soft, NTND  Genitourinary: voiding independently  Extremities: WILEY      LABS:  CBC Full  -  ( 28 Oct 2024 04:34 )  WBC Count : 7.15 K/uL  RBC Count : 2.97 M/uL  Hemoglobin : 7.9 g/dL  Hematocrit : 25.9 %  Platelet Count - Automated : 199 K/uL  Mean Cell Volume : 87.2 fl  Mean Cell Hemoglobin : 26.6 pg  Mean Cell Hemoglobin Concentration : 30.5 gm/dL  Auto Neutrophil # : 5.52 K/uL  Auto Lymphocyte # : 0.70 K/uL  Auto Monocyte # : 0.76 K/uL  Auto Eosinophil # : 0.11 K/uL  Auto Basophil # : 0.02 K/uL  Auto Neutrophil % : 77.2 %  Auto Lymphocyte % : 9.8 %  Auto Monocyte % : 10.6 %  Auto Eosinophil % : 1.5 %  Auto Basophil % : 0.3 %    10-28    144  |  107  |  15.9  ----------------------------<  79  3.9   |  23.0  |  0.58    Ca    8.5      28 Oct 2024 04:34  Phos  3.6     10-28  Mg     2.4     10-28    TPro  5.6[L]  /  Alb  3.6  /  TBili  0.3[L]  /  DBili  x   /  AST  23  /  ALT  14  /  AlkPhos  131[H]  10-26    PT/INR - ( 26 Oct 2024 22:26 )   PT: 10.9 sec;   INR: 0.96 ratio         PTT - ( 26 Oct 2024 22:26 )  PTT:34.8 sec  Urinalysis Basic - ( 28 Oct 2024 04:34 )    Color: x / Appearance: x / SG: x / pH: x  Gluc: 79 mg/dL / Ketone: x  / Bili: x / Urobili: x   Blood: x / Protein: x / Nitrite: x   Leuk Esterase: x / RBC: x / WBC x   Sq Epi: x / Non Sq Epi: x / Bacteria: x    LIVER FUNCTIONS - ( 26 Oct 2024 17:15 )  Alb: 3.6 g/dL / Pro: 5.6 g/dL / ALK PHOS: 131 U/L / ALT: 14 U/L / AST: 23 U/L / GGT: x           ASSESSMENT/PLAN:  89yFemale s/p trip and fall on xarelto, with left gluteal hematoma. downgraded from SICU overnight. prior to downgrade, went into afib, resolved with diltiazem.  -h/h drop today, will obtain repeat in 6 hours  -regular diet  -continue home meds  -pain control prn  -dvt ppx  -WBAT, f/u PT  -will hold off on restarting xarelto until h/h stable        INTERVAL HPI/OVERNIGHT EVENTS/SUBJECTIVE:  Pt seen and examined. Admits to pain in her left hip. Patient was downgraded from unit overnight. Prior to downgrade, patient went into afib , resolved with diltiazem. noted that h/h dropped today.     ICU Vital Signs Last 24 Hrs  T(C): 36.9 (28 Oct 2024 04:00), Max: 37.1 (27 Oct 2024 20:11)  T(F): 98.5 (28 Oct 2024 04:00), Max: 98.8 (27 Oct 2024 20:11)  HR: 86 (28 Oct 2024 04:00) (57 - 145)  BP: 127/55 (28 Oct 2024 04:00) (114/94 - 170/151)  BP(mean): 99 (27 Oct 2024 21:45) (83 - 160)  ABP: --  ABP(mean): --  RR: 17 (28 Oct 2024 04:00) (12 - 25)  SpO2: 98% (28 Oct 2024 04:00) (88% - 100%)    O2 Parameters below as of 28 Oct 2024 04:00  Patient On (Oxygen Delivery Method): room air    I&O's Detail    27 Oct 2024 07:01  -  28 Oct 2024 07:00  --------------------------------------------------------  IN:    multiple electrolytes Injection Type 1.: 1200 mL  Total IN: 1200 mL    OUT:    Voided (mL): 800 mL  Total OUT: 800 mL    Total NET: 400 mL    MEDICATIONS  (STANDING):  atorvastatin 10 milliGRAM(s) Oral at bedtime  diltiazem    Tablet 60 milliGRAM(s) Oral every 6 hours  donepezil 5 milliGRAM(s) Oral at bedtime  enoxaparin Injectable 30 milliGRAM(s) SubCutaneous every 24 hours  melatonin 3 milliGRAM(s) Oral at bedtime    MEDICATIONS  (PRN):      MISC:     PHYSICAL EXAM:    Gen: NAD, laying comfortably  Neurological: aaox3  Pulmonary: non-labored  Gastrointestinal: soft, NTND  Genitourinary: voiding independently  Extremities: WILEY      LABS:  CBC Full  -  ( 28 Oct 2024 04:34 )  WBC Count : 7.15 K/uL  RBC Count : 2.97 M/uL  Hemoglobin : 7.9 g/dL  Hematocrit : 25.9 %  Platelet Count - Automated : 199 K/uL  Mean Cell Volume : 87.2 fl  Mean Cell Hemoglobin : 26.6 pg  Mean Cell Hemoglobin Concentration : 30.5 gm/dL  Auto Neutrophil # : 5.52 K/uL  Auto Lymphocyte # : 0.70 K/uL  Auto Monocyte # : 0.76 K/uL  Auto Eosinophil # : 0.11 K/uL  Auto Basophil # : 0.02 K/uL  Auto Neutrophil % : 77.2 %  Auto Lymphocyte % : 9.8 %  Auto Monocyte % : 10.6 %  Auto Eosinophil % : 1.5 %  Auto Basophil % : 0.3 %    10-28    144  |  107  |  15.9  ----------------------------<  79  3.9   |  23.0  |  0.58    Ca    8.5      28 Oct 2024 04:34  Phos  3.6     10-28  Mg     2.4     10-28    TPro  5.6[L]  /  Alb  3.6  /  TBili  0.3[L]  /  DBili  x   /  AST  23  /  ALT  14  /  AlkPhos  131[H]  10-26    PT/INR - ( 26 Oct 2024 22:26 )   PT: 10.9 sec;   INR: 0.96 ratio         PTT - ( 26 Oct 2024 22:26 )  PTT:34.8 sec  Urinalysis Basic - ( 28 Oct 2024 04:34 )    Color: x / Appearance: x / SG: x / pH: x  Gluc: 79 mg/dL / Ketone: x  / Bili: x / Urobili: x   Blood: x / Protein: x / Nitrite: x   Leuk Esterase: x / RBC: x / WBC x   Sq Epi: x / Non Sq Epi: x / Bacteria: x    LIVER FUNCTIONS - ( 26 Oct 2024 17:15 )  Alb: 3.6 g/dL / Pro: 5.6 g/dL / ALK PHOS: 131 U/L / ALT: 14 U/L / AST: 23 U/L / GGT: x           ASSESSMENT/PLAN:  89yFemale s/p trip and fall on xarelto, with left gluteal hematoma. Downgraded from SICU overnight. prior to downgrade, went into afib, resolved with diltiazem.  -h/h drop today, will obtain repeat in 6 hours  -regular diet  -continue home meds  -pain control prn  -dvt ppx  -WBAT, f/u PT  -will hold off on restarting xarelto until h/h stable

## 2024-10-28 NOTE — ADVANCED PRACTICE NURSE CONSULT - RECOMMEDATIONS
89y/o F hx of afib on xarelto, Aortic valve stenosis, Atrial fibrillation, CAD, Diverticulosis, Hiatal hernia, HLD, HTN, MVP, Thyroid nodule, patient slipped and fell and was having left hip pain, she came to the [facility] ED, he was found to have left gluteal hematoma with active extravasation and was transferred to [facility], patient received kcentra en route to hospital, her HB was noted to be ~8, admitted under SICU, medicine consulted for Jyotsna Traum eval.      1. L hip/trochanter hematoma   2. RLE lateral aspect venous ulceration    WOUNDS:  =======  RLE  cleanse with NS  apply aquacell ag+ cut to fit as primary contact layer  cover with allevyn foam dressing  change this dressing Q 3 days.       GENERAL GUIDELINES, AND RECOMMENDATIONS  ==============================  -Carefully check that no tubes are entangled with the bed linens or have contact to patient’s skin. Keep bed sheets smooth and wrinkle free to prevent injury to the skin   - Assure to position pt feet at 20 degrees, then HOB at a 30 degree angle  to limit sliding/friction/shearing, especially in bed bound populations.       Turn and position the patient Q2 hours. Use wedges when turning and positioning to the left/right, notably if patient can only assist minimally.  Place wedges / moldable pillows above the waist for effective sacral offloading. Keep shoulders and hips properly aligned for greater comfort. Place pillow between legs to support bony prominences and reduce friction/rubbing.  Apply waffle/sacral cushion for sacral offloading, both in bed and in chair. if limited mobility, maintain full fowlers chair seated position for <2 hours at a time.   Keep the pt’s heels protected and elevated off the surface of the bed. Place what the patient can tolerate: pillows, moldable pillows, offloading heel boots      This was a 25  minute visit, with greater than 50% counseling. My findings and suggestions that may benefit the pt were disscussed with the family/caregiver if present at bedside and with pt permission,  shift RN at bedside, in addition to overseeing team/provider via chat and or telephone.     Attending on file for patient notified was Douglas Palma     Wound Care will continue to follow the patient? : NO     Continue to monitor skin integrity as per policy,  and call or re-consult Wound Care with any acute changes or lack of progression of current recommendations. Wound care rceommendations are generally for shift RN dressing changes, unless otherwise specified. Wound Care IS NOT continuing to follow the patient unless otherwise specified as noted above.     Gus SCHULZN, RN, CWCN  Wound Ostomy Nurse Educator   [facility]  Please call/message over Teams and/or Email for clarification/contact.  (E): [email or web address]

## 2024-10-28 NOTE — CHART NOTE - NSCHARTNOTESELECT_GEN_ALL_CORE
Nutrition Services
tertiary/Event Note
Event Note
SICU transfer/Transfer Note
downgrade note accept/Event Note

## 2024-10-28 NOTE — PROGRESS NOTE ADULT - SUBJECTIVE AND OBJECTIVE BOX
RUBENS BARKER    [id number]    89y      Female    Patient is a 89y old  Female who presents with a chief complaint of     INTERVAL HPI/OVERNIGHT EVENTS:    Patient is doing ok, he denies fever, chills, chest pain, sob, dizziness       Vital Signs Last 24 Hrs  T(C): 36.8 (28 Oct 2024 09:00), Max: 37.1 (27 Oct 2024 20:11)  T(F): 98.2 (28 Oct 2024 09:00), Max: 98.8 (27 Oct 2024 20:11)  HR: 76 (28 Oct 2024 09:00) (57 - 145)  BP: 147/70 (28 Oct 2024 09:00) (114/94 - 170/151)  BP(mean): 99 (27 Oct 2024 21:45) (88 - 160)  RR: 18 (28 Oct 2024 09:00) (12 - 25)  SpO2: 99% (28 Oct 2024 09:00) (88% - 100%)    Parameters below as of 28 Oct 2024 09:00  Patient On (Oxygen Delivery Method): room air        PHYSICAL EXAM:    GENERAL: Elderly female looking comfortable   HEENT: PERRL, +EOMI  NECK: soft, Supple, No JVD  CHEST/LUNG: Clear to auscultate bilaterally; No wheezing  HEART: S1S2+, Regular rate and rhythm; No murmurs  ABDOMEN: Soft, Nontender, Nondistended; Bowel sounds present  EXTREMITIES:  1+ Peripheral Pulses, No edema  SKIN: No rashes or lesions  NEURO: AAOX3  PSYCH: normal mood    LABS:                        7.9    7.15  )-----------( 199      ( 28 Oct 2024 04:34 )             25.9     10-28    144  |  107  |  15.9  ----------------------------<  79  3.9   |  23.0  |  0.58    Ca    8.5      28 Oct 2024 04:34  Phos  3.6     10-28  Mg     2.4     10-28    TPro  5.6[L]  /  Alb  3.6  /  TBili  0.3[L]  /  DBili  x   /  AST  23  /  ALT  14  /  AlkPhos  131[H]  10-26    PT/INR - ( 26 Oct 2024 22:26 )   PT: 10.9 sec;   INR: 0.96 ratio         PTT - ( 26 Oct 2024 22:26 )  PTT:34.8 sec    I&O's Summary    27 Oct 2024 07:01  -  28 Oct 2024 07:00  --------------------------------------------------------  IN: 1200 mL / OUT: 800 mL / NET: 400 mL        MEDICATIONS  (STANDING):  atorvastatin 10 milliGRAM(s) Oral at bedtime  diltiazem    Tablet 60 milliGRAM(s) Oral every 6 hours  donepezil 5 milliGRAM(s) Oral at bedtime  enoxaparin Injectable 30 milliGRAM(s) SubCutaneous every 24 hours  melatonin 3 milliGRAM(s) Oral at bedtime    MEDICATIONS  (PRN):

## 2024-10-28 NOTE — ADVANCED PRACTICE NURSE CONSULT - ASSESSMENT
ORTEGA SCORE  Last ortega score is : ---13  19+: pt is NOT at risk for developing pressure injuries  15-18: pt is AT RISK for developing pressure injuries   13-14: pt is AT MODERATE RISK for developing pressure injuries   10-12: pt is AT HIGH RISK for developing pressure injuries   6-9: pt is AT VERY HIGH RISK for developing pressure injuries     PHYSICAL EXAM  Is pt AOx?: x1-2  Bed bound?: yes  Incontinent?: yes though indwelling sunshine to gravity drainage in place   Mattress/ bed active?: low airloss   Can pt assist with turn or dependent?: fully dependent   Active pressure injury prevention measures? : heels offloaded with pillows     SKIN CHECK  - thin appearing female  -there is mild b/l LE non pitting edema, with palpable Dp 2+ but diminshed PT   -over the RLE lateral aspect is a full thickness ulceration, granulating wound bed that is rising above the level of the skin, with serosanguinous drainage. On removal of prior dressing there was yellow/green discharge. The wound bed measures ~ 4x2cm. There was no periwound erythema, malodor, purulence.   -over the R hip/trochanter region is large hematoma, ecchymosis in blue shade that is non uniform in appearance, and skin remains intact. Held by reed per shift RN.

## 2024-10-28 NOTE — CHART NOTE - NSCHARTNOTEFT_GEN_A_CORE
Called to bedside by RN that patient seemed to be slurring words and slightly more confused since prior. Patient seen and examined at bedside with RN and patient's son. Patient AAOx2, able to be reoriented. Patient speaking in full sentences, without slurring speech. Patient neuro intact. Follows all commands. NIH 0.  Vitals stable. Explained to patient's son that patient is likely drowsy and intermittently confused to situation because of previous narcotics and haldol given. Currently has hypoactive delirium that will improve with supportive care. All questions were answered. Will continue to monitor.

## 2024-10-28 NOTE — PHYSICAL THERAPY INITIAL EVALUATION ADULT - ADDITIONAL COMMENTS
Pt and son at bedside reporting pt lives in a [address] condo community. No stairs to enter and chair lift to living area. Pt was modified independent with a rollator PTA and also owns a grab bar at her toilet.

## 2024-10-28 NOTE — ADVANCED PRACTICE NURSE CONSULT - REASON FOR CONSULT
Wound Care was consulted for evaluation of the following:  -RLE venous ulcer.     Pt seen at bedside today:  - moaning in bed, begins to answer questions but then immediately falls back asleep. Able to be roused from sleep intermittently. per bedside shift RN normal mentation at baseline is AOx1-2. Pt begins becoming agitated when examining the lower extremities reporting pain.

## 2024-10-29 LAB
ANION GAP SERPL CALC-SCNC: 12 MMOL/L — SIGNIFICANT CHANGE UP (ref 5–17)
BASOPHILS # BLD AUTO: 0.02 K/UL — SIGNIFICANT CHANGE UP (ref 0–0.2)
BASOPHILS NFR BLD AUTO: 0.3 % — SIGNIFICANT CHANGE UP (ref 0–2)
BUN SERPL-MCNC: 26.7 MG/DL — HIGH (ref 8–20)
CALCIUM SERPL-MCNC: 8.2 MG/DL — LOW (ref 8.4–10.5)
CHLORIDE SERPL-SCNC: 106 MMOL/L — SIGNIFICANT CHANGE UP (ref 96–108)
CO2 SERPL-SCNC: 24 MMOL/L — SIGNIFICANT CHANGE UP (ref 22–29)
CREAT SERPL-MCNC: 0.66 MG/DL — SIGNIFICANT CHANGE UP (ref 0.5–1.3)
EGFR: 83 ML/MIN/1.73M2 — SIGNIFICANT CHANGE UP
EOSINOPHIL # BLD AUTO: 0.22 K/UL — SIGNIFICANT CHANGE UP (ref 0–0.5)
EOSINOPHIL NFR BLD AUTO: 3.2 % — SIGNIFICANT CHANGE UP (ref 0–6)
GLUCOSE SERPL-MCNC: 93 MG/DL — SIGNIFICANT CHANGE UP (ref 70–99)
HCT VFR BLD CALC: 24.5 % — LOW (ref 34.5–45)
HGB BLD-MCNC: 7.6 G/DL — LOW (ref 11.5–15.5)
IMM GRANULOCYTES NFR BLD AUTO: 0.7 % — SIGNIFICANT CHANGE UP (ref 0–0.9)
LYMPHOCYTES # BLD AUTO: 0.82 K/UL — LOW (ref 1–3.3)
LYMPHOCYTES # BLD AUTO: 12 % — LOW (ref 13–44)
MAGNESIUM SERPL-MCNC: 2.2 MG/DL — SIGNIFICANT CHANGE UP (ref 1.6–2.6)
MCHC RBC-ENTMCNC: 26.5 PG — LOW (ref 27–34)
MCHC RBC-ENTMCNC: 31 GM/DL — LOW (ref 32–36)
MCV RBC AUTO: 85.4 FL — SIGNIFICANT CHANGE UP (ref 80–100)
MONOCYTES # BLD AUTO: 0.7 K/UL — SIGNIFICANT CHANGE UP (ref 0–0.9)
MONOCYTES NFR BLD AUTO: 10.2 % — SIGNIFICANT CHANGE UP (ref 2–14)
NEUTROPHILS # BLD AUTO: 5.04 K/UL — SIGNIFICANT CHANGE UP (ref 1.8–7.4)
NEUTROPHILS NFR BLD AUTO: 73.6 % — SIGNIFICANT CHANGE UP (ref 43–77)
PHOSPHATE SERPL-MCNC: 3.7 MG/DL — SIGNIFICANT CHANGE UP (ref 2.4–4.7)
PLATELET # BLD AUTO: 186 K/UL — SIGNIFICANT CHANGE UP (ref 150–400)
POTASSIUM SERPL-MCNC: 4 MMOL/L — SIGNIFICANT CHANGE UP (ref 3.5–5.3)
POTASSIUM SERPL-SCNC: 4 MMOL/L — SIGNIFICANT CHANGE UP (ref 3.5–5.3)
RBC # BLD: 2.87 M/UL — LOW (ref 3.8–5.2)
RBC # FLD: 22.3 % — HIGH (ref 10.3–14.5)
SODIUM SERPL-SCNC: 142 MMOL/L — SIGNIFICANT CHANGE UP (ref 135–145)
WBC # BLD: 6.85 K/UL — SIGNIFICANT CHANGE UP (ref 3.8–10.5)
WBC # FLD AUTO: 6.85 K/UL — SIGNIFICANT CHANGE UP (ref 3.8–10.5)

## 2024-10-29 PROCEDURE — 99232 SBSQ HOSP IP/OBS MODERATE 35: CPT

## 2024-10-29 PROCEDURE — 73030 X-RAY EXAM OF SHOULDER: CPT | Mod: 26,LT

## 2024-10-29 PROCEDURE — 99232 SBSQ HOSP IP/OBS MODERATE 35: CPT | Mod: GC

## 2024-10-29 RX ORDER — POLYETHYLENE GLYCOL 3350 17 G/17G
17 POWDER, FOR SOLUTION ORAL DAILY
Refills: 0 | Status: DISCONTINUED | OUTPATIENT
Start: 2024-10-29 | End: 2024-10-31

## 2024-10-29 RX ORDER — SENNA 187 MG
2 TABLET ORAL AT BEDTIME
Refills: 0 | Status: DISCONTINUED | OUTPATIENT
Start: 2024-10-29 | End: 2024-10-31

## 2024-10-29 RX ADMIN — Medication 60 MILLIGRAM(S): at 15:40

## 2024-10-29 RX ADMIN — Medication 30 MILLIGRAM(S): at 11:04

## 2024-10-29 RX ADMIN — Medication 60 MILLIGRAM(S): at 21:10

## 2024-10-29 RX ADMIN — Medication 60 MILLIGRAM(S): at 11:04

## 2024-10-29 RX ADMIN — DONEPEZIL HYDROCHLORIDE 5 MILLIGRAM(S): 23 TABLET ORAL at 21:10

## 2024-10-29 RX ADMIN — Medication 2 TABLET(S): at 21:10

## 2024-10-29 RX ADMIN — Medication 3 MILLIGRAM(S): at 21:10

## 2024-10-29 RX ADMIN — Medication 10 MILLIGRAM(S): at 21:10

## 2024-10-29 NOTE — PROGRESS NOTE ADULT - SUBJECTIVE AND OBJECTIVE BOX
CC: Left Gluteal hematoma    HPI:  89y/o F hx of afib on xarelto, Aortic valve stenosis, Atrial fibrillation, CAD, Diverticulosis, Hiatal hernia, HLD, HTN, MVP, Thyroid nodule, patient slipped and fell and was having left hip pain, she came to the [facility] ED, he was found to have left gluteal hematoma with active extravasation and was transferred to [facility], patient received kcentra en route to hospital, her HB was noted to be ~8, admitted under SICU, medicine consulted for Jyotsna Traum eval.     INTERVAL HPI/OVERNIGHT EVENTS:  Patient seen and examined sitting up in bed.  Patient complaining of left shoulder pain and left hip pain.  Patient denies any headache, dizziness, SOB, CP, abdominal pain, nausea.  Noriega placed 10/28/24 as per RN for urinary retention.      Vital Signs Last 24 Hrs  T(C): 36.7 (29 Oct 2024 09:00), Max: 36.8 (28 Oct 2024 21:00)  T(F): 98.1 (29 Oct 2024 09:00), Max: 98.3 (28 Oct 2024 21:00)  HR: 58 (29 Oct 2024 09:00) (55 - 78)  BP: 131/61 (29 Oct 2024 09:00) (124/58 - 142/56)  BP(mean): --  RR: 18 (29 Oct 2024 09:00) (17 - 18)  SpO2: 96% (29 Oct 2024 09:00) (96% - 100%)    Parameters below as of 29 Oct 2024 09:00  Patient On (Oxygen Delivery Method): room air      I&O's Detail    28 Oct 2024 07:01  -  29 Oct 2024 07:00  --------------------------------------------------------  IN:  Total IN: 0 mL    OUT:    Indwelling Catheter - Urethral (mL): 1050 mL  Total OUT: 1050 mL    Total NET: -1050 mL      PHYSICAL EXAM:  GENERAL: NAD  HEAD:  Atraumatic, Normocephalic  NECK: Supple, No JVD, Normal thyroid  NERVOUS SYSTEM:  Alert & Oriented X3, forgetful, Good concentration; generalized weakness  CHEST/LUNG: Clear to auscultation bilaterally  HEART: Regular rate and rhythm; No murmurs, rubs, or gallops  ABDOMEN: Soft, Nontender, Nondistended; Bowel sounds present  EXTREMITIES:  2+ Peripheral Pulses, Left hip with tenderness to palpation  SKIN: No rashes or lesions                                7.6    6.85  )-----------( 186      ( 29 Oct 2024 05:40 )             24.5     29 Oct 2024 05:40    142    |  106    |  26.7   ----------------------------<  93     4.0     |  24.0   |  0.66     Ca    8.2        29 Oct 2024 05:40  Phos  3.7       29 Oct 2024 05:40  Mg     2.2       29 Oct 2024 05:40      Urinalysis Basic - ( 29 Oct 2024 05:40 )    Color: x / Appearance: x / SG: x / pH: x  Gluc: 93 mg/dL / Ketone: x  / Bili: x / Urobili: x   Blood: x / Protein: x / Nitrite: x   Leuk Esterase: x / RBC: x / WBC x   Sq Epi: x / Non Sq Epi: x / Bacteria: x        MEDICATIONS  (STANDING):  atorvastatin 10 milliGRAM(s) Oral at bedtime  diltiazem    Tablet 60 milliGRAM(s) Oral every 6 hours  donepezil 5 milliGRAM(s) Oral at bedtime  enoxaparin Injectable 30 milliGRAM(s) SubCutaneous every 24 hours  melatonin 3 milliGRAM(s) Oral at bedtime    MEDICATIONS  (PRN):      RADIOLOGY & ADDITIONAL TESTS:  < from: CT Angio Pelvis w/ IV Cont (10.26.24 @ 15:19) >  ACC: [id number] EXAM:  CT ANGIO PELVIS ONLY (W)AW IC   ORDERED BY: MERNA LI     PROCEDURE DATE:  10/26/2024          INTERPRETATION:  CLINICAL INFORMATION: Left buttock hematoma. Concern for   active hemorrhage.    COMPARISON: CT pelvis 10/26/2024.    CONTRAST/COMPLICATIONS:  IV Contrast: Omnipaque 350  60 cc administered   40 cc discarded  Oral Contrast: NONE  Complications: None reported at time of study completion    PROCEDURE:  CT Angiography of the Pelvis.  Noncontrast, arterial, and venous phase images were acquired.  Sagittal and coronal reformats were performed.    FINDINGS:  BLADDER: Within normal limits. Filled with contrast, presumably renally   excreted.  REPRODUCTIVE ORGANS: Thickened appearance of the endometrial complex to   10 mm.  LYMPH NODES: No pelvic lymphadenopathy.    VISUALIZED PORTIONS:  ABDOMINAL ORGANS: Subcentimeter hypodense foci in both kidneys that are   too small to characterize.  BOWEL: Colonic diverticulosis.  PERITONEUM/RETROPERITONEUM: Within normal limits.  VESSELS: Atherosclerotic changes.  ABDOMINAL WALL: Left gluteal subcutaneous hematoma measuring 8.0 x 4.4 x   5.8 cm with additional ill-defined surrounding contusion or edema,   similar in size to the prior CT when remeasured in similarfashion. Focus   of active hemorrhage within the anterior/deeper aspect of the hematoma   (series 5 image 102). Small fat-containing right inguinal hernia.  BONES: Degenerative changes. Osseous demineralization.    IMPRESSION:  *  Left gluteal subcutaneous hematoma with active hemorrhage.  *  Mildly thickened appearance of the endometrial complex. Suggest   further evaluation with pelvic ultrasound.    Findings were discussed with Dr. LI  10/26/2024 3:36 PM by Dr. Lucas.    --- End of Report ---            DAMARI LUCAS MD; Attending Radiologist  This document has been electronically signed. Oct 26 2024  3:38PM    < end of copied text >

## 2024-10-29 NOTE — PROGRESS NOTE ADULT - SUBJECTIVE AND OBJECTIVE BOX
HPI/OVERNIGHT EVENTS: Patient seen and examined at bedside this AM. No overnight events. No complaints.     Vital Signs Last 24 Hrs  T(C): 36.7 (29 Oct 2024 09:00), Max: 36.8 (28 Oct 2024 21:00)  T(F): 98.1 (29 Oct 2024 09:00), Max: 98.3 (28 Oct 2024 21:00)  HR: 58 (29 Oct 2024 09:00) (55 - 78)  BP: 131/61 (29 Oct 2024 09:00) (124/58 - 142/56)  BP(mean): --  RR: 18 (29 Oct 2024 09:00) (17 - 18)  SpO2: 96% (29 Oct 2024 09:00) (96% - 100%)    Parameters below as of 29 Oct 2024 09:00  Patient On (Oxygen Delivery Method): room air        I&O's Detail    28 Oct 2024 07:01  -  29 Oct 2024 07:00  --------------------------------------------------------  IN:  Total IN: 0 mL    OUT:    Indwelling Catheter - Urethral (mL): 1050 mL  Total OUT: 1050 mL    Total NET: -1050 mL            PHYSICAL EXAM:    Gen: NAD, laying comfortably  Neurological: aaox3  Pulmonary: non-labored  Gastrointestinal: soft, NTND  Genitourinary: voiding independently  Extremities: WILEY    LABS:                        7.6    6.85  )-----------( 186      ( 29 Oct 2024 05:40 )             24.5     10-29    142  |  106  |  26.7[H]  ----------------------------<  93  4.0   |  24.0  |  0.66    Ca    8.2[L]      29 Oct 2024 05:40  Phos  3.7     10-29  Mg     2.2     10-29        Urinalysis Basic - ( 29 Oct 2024 05:40 )    Color: x / Appearance: x / SG: x / pH: x  Gluc: 93 mg/dL / Ketone: x  / Bili: x / Urobili: x   Blood: x / Protein: x / Nitrite: x   Leuk Esterase: x / RBC: x / WBC x   Sq Epi: x / Non Sq Epi: x / Bacteria: x        MEDICATIONS  (STANDING):  atorvastatin 10 milliGRAM(s) Oral at bedtime  diltiazem    Tablet 60 milliGRAM(s) Oral every 6 hours  donepezil 5 milliGRAM(s) Oral at bedtime  enoxaparin Injectable 30 milliGRAM(s) SubCutaneous every 24 hours  melatonin 3 milliGRAM(s) Oral at bedtime    MEDICATIONS  (PRN):      MICRO:   Cultures     STUDIES:   EKG, CXR, U/S, CT, MRI

## 2024-10-30 ENCOUNTER — TRANSCRIPTION ENCOUNTER (OUTPATIENT)
Age: 89
End: 2024-10-30

## 2024-10-30 DIAGNOSIS — Z71.89 OTHER SPECIFIED COUNSELING: ICD-10-CM

## 2024-10-30 LAB
ANION GAP SERPL CALC-SCNC: 14 MMOL/L — SIGNIFICANT CHANGE UP (ref 5–17)
BASOPHILS # BLD AUTO: 0.02 K/UL — SIGNIFICANT CHANGE UP (ref 0–0.2)
BASOPHILS NFR BLD AUTO: 0.2 % — SIGNIFICANT CHANGE UP (ref 0–2)
BUN SERPL-MCNC: 38.8 MG/DL — HIGH (ref 8–20)
CALCIUM SERPL-MCNC: 9 MG/DL — SIGNIFICANT CHANGE UP (ref 8.4–10.5)
CHLORIDE SERPL-SCNC: 107 MMOL/L — SIGNIFICANT CHANGE UP (ref 96–108)
CO2 SERPL-SCNC: 21 MMOL/L — LOW (ref 22–29)
CREAT SERPL-MCNC: 0.76 MG/DL — SIGNIFICANT CHANGE UP (ref 0.5–1.3)
EGFR: 74 ML/MIN/1.73M2 — SIGNIFICANT CHANGE UP
EOSINOPHIL # BLD AUTO: 0.16 K/UL — SIGNIFICANT CHANGE UP (ref 0–0.5)
EOSINOPHIL NFR BLD AUTO: 1.6 % — SIGNIFICANT CHANGE UP (ref 0–6)
GLUCOSE SERPL-MCNC: 112 MG/DL — HIGH (ref 70–99)
HCT VFR BLD CALC: 27.8 % — LOW (ref 34.5–45)
HGB BLD-MCNC: 8.5 G/DL — LOW (ref 11.5–15.5)
IMM GRANULOCYTES NFR BLD AUTO: 0.7 % — SIGNIFICANT CHANGE UP (ref 0–0.9)
LYMPHOCYTES # BLD AUTO: 1.33 K/UL — SIGNIFICANT CHANGE UP (ref 1–3.3)
LYMPHOCYTES # BLD AUTO: 13.5 % — SIGNIFICANT CHANGE UP (ref 13–44)
MCHC RBC-ENTMCNC: 26.4 PG — LOW (ref 27–34)
MCHC RBC-ENTMCNC: 30.6 G/DL — LOW (ref 32–36)
MCV RBC AUTO: 86.3 FL — SIGNIFICANT CHANGE UP (ref 80–100)
MONOCYTES # BLD AUTO: 0.93 K/UL — HIGH (ref 0–0.9)
MONOCYTES NFR BLD AUTO: 9.4 % — SIGNIFICANT CHANGE UP (ref 2–14)
NEUTROPHILS # BLD AUTO: 7.34 K/UL — SIGNIFICANT CHANGE UP (ref 1.8–7.4)
NEUTROPHILS NFR BLD AUTO: 74.6 % — SIGNIFICANT CHANGE UP (ref 43–77)
PLATELET # BLD AUTO: 212 K/UL — SIGNIFICANT CHANGE UP (ref 150–400)
POTASSIUM SERPL-MCNC: 4.2 MMOL/L — SIGNIFICANT CHANGE UP (ref 3.5–5.3)
POTASSIUM SERPL-SCNC: 4.2 MMOL/L — SIGNIFICANT CHANGE UP (ref 3.5–5.3)
RBC # BLD: 3.22 M/UL — LOW (ref 3.8–5.2)
RBC # FLD: 22.7 % — HIGH (ref 10.3–14.5)
SODIUM SERPL-SCNC: 142 MMOL/L — SIGNIFICANT CHANGE UP (ref 135–145)
WBC # BLD: 9.85 K/UL — SIGNIFICANT CHANGE UP (ref 3.8–10.5)
WBC # FLD AUTO: 9.85 K/UL — SIGNIFICANT CHANGE UP (ref 3.8–10.5)

## 2024-10-30 PROCEDURE — 99223 1ST HOSP IP/OBS HIGH 75: CPT

## 2024-10-30 PROCEDURE — 99232 SBSQ HOSP IP/OBS MODERATE 35: CPT

## 2024-10-30 RX ORDER — ACETAMINOPHEN 500 MG
650 TABLET ORAL EVERY 4 HOURS
Refills: 0 | Status: DISCONTINUED | OUTPATIENT
Start: 2024-10-30 | End: 2024-10-31

## 2024-10-30 RX ORDER — SIMVASTATIN 80 MG/1
1 TABLET, FILM COATED ORAL
Refills: 0 | DISCHARGE

## 2024-10-30 RX ORDER — METHENAMINE MANDELATE 1 G
1 TABLET ORAL
Refills: 0 | DISCHARGE

## 2024-10-30 RX ORDER — ACETAMINOPHEN 500 MG
975 TABLET ORAL EVERY 6 HOURS
Refills: 0 | Status: DISCONTINUED | OUTPATIENT
Start: 2024-10-30 | End: 2024-10-30

## 2024-10-30 RX ORDER — ACETAMINOPHEN 500 MG
1000 TABLET ORAL EVERY 6 HOURS
Refills: 0 | Status: DISCONTINUED | OUTPATIENT
Start: 2024-10-30 | End: 2024-10-30

## 2024-10-30 RX ORDER — CEPHALEXIN 125 MG/5ML
1 SUSPENSION, RECONSTITUTED, ORAL (ML) ORAL
Refills: 0 | DISCHARGE

## 2024-10-30 RX ORDER — RIVAROXABAN 20 MG/1
1 TABLET, FILM COATED ORAL
Refills: 0 | DISCHARGE

## 2024-10-30 RX ADMIN — Medication 650 MILLIGRAM(S): at 08:58

## 2024-10-30 RX ADMIN — Medication 60 MILLIGRAM(S): at 21:26

## 2024-10-30 RX ADMIN — Medication 500 MILLILITER(S): at 08:02

## 2024-10-30 RX ADMIN — Medication 60 MILLIGRAM(S): at 03:20

## 2024-10-30 RX ADMIN — DONEPEZIL HYDROCHLORIDE 5 MILLIGRAM(S): 23 TABLET ORAL at 21:26

## 2024-10-30 RX ADMIN — Medication 60 MILLIGRAM(S): at 16:18

## 2024-10-30 RX ADMIN — Medication 2 TABLET(S): at 21:25

## 2024-10-30 RX ADMIN — Medication 3 MILLIGRAM(S): at 21:26

## 2024-10-30 RX ADMIN — Medication 10 MILLIGRAM(S): at 21:25

## 2024-10-30 RX ADMIN — Medication 60 MILLIGRAM(S): at 08:59

## 2024-10-30 RX ADMIN — Medication 650 MILLIGRAM(S): at 09:58

## 2024-10-30 RX ADMIN — POLYETHYLENE GLYCOL 3350 17 GRAM(S): 17 POWDER, FOR SOLUTION ORAL at 10:57

## 2024-10-30 RX ADMIN — Medication 650 MILLIGRAM(S): at 21:25

## 2024-10-30 RX ADMIN — Medication 30 MILLIGRAM(S): at 10:57

## 2024-10-30 RX ADMIN — Medication 650 MILLIGRAM(S): at 22:25

## 2024-10-30 NOTE — DISCHARGE NOTE PROVIDER - NSFOLLOWUPCLINICS_GEN_ALL_ED_FT
[facility] Acute Care Surgery  Acute Care Surgery  [address] [place] [id number]  Phone: [phone]  Fax:

## 2024-10-30 NOTE — DISCHARGE NOTE PROVIDER - NSDCCPCAREPLAN_GEN_ALL_CORE_FT
PRINCIPAL DISCHARGE DIAGNOSIS  Diagnosis: Traumatic hematoma  Assessment and Plan of Treatment: Follow Up: Please call to make an appointment w/ Acute Care Surgery clinic 10-14 days after discharge. Also, please call to make an appointment with your primary care physician and cardiologist as per your usual schedule.   Activity: Avoid physical activity and refrain from heavy lifting >10-15 pounds.   Diet: May continue regular diet.  Medications: Please take all home medications as prescribed by your primary care doctor EXCEPT XARELTO. DO NOT restart your Xarelto. You are encouraged to take over-the-counter tylenol and/or ibuprofen for pain relief when you feel your pain.  Patient is advised to RETURN TO THE EMERGENCY DEPARTMENT for any of the following - worsening pain, fever/chills, nausea/vomiting, alterned mental status, chest pain, shortness of breath, or any other new/worsening symptoms.

## 2024-10-30 NOTE — DISCHARGE NOTE PROVIDER - NSDCFUSCHEDAPPT_GEN_ALL_CORE_FT
Phillip Rodriguez Physician [facility]  CARDIOLOGY [address] [facility]  Scheduled Appointment: 01/13/2025

## 2024-10-30 NOTE — CONSULT NOTE ADULT - PROBLEM SELECTOR RECOMMENDATION 9
.  - pAF on Xarelto as OP  - several falls in past few months including this admission, found with gluteal hematoma  - patient is poor historian   - discussed with son Willie per patient request. We are recommending discontinuing AC as risk is greater than benefit due to fall risk. Willie verbalizes understanding of risk vs benefit and is agreeable to this but would like to discuss with his brother and sister. He believe they will likely agree.   - Would not restart Xarelto on D/C.   - patient should follow up with Dr. Munoz as OP within 1 week of D/C to discuss candidacy for LAAO occlusion device      No further inpatient cardiac work up at this time.  Will sign off.  Please reconsult if needed.

## 2024-10-30 NOTE — CONSULT NOTE ADULT - NS ATTEND AMEND GEN_ALL_CORE FT
89 y/o F with PMH pAfib (on Xarelto 15mg), mitral valve prolapse, CAD s/p PCI mLAD (3/2020 with rotational atherectomy, balloon angioplasty), TAVR, GIB, Anemia a/w L gluteal hematoma 2/2 fall. Admits to frequent falls. Risks/benefits of AC discussed with family; they are planning a family meeting, but will likely opt to d/c AC at this time. Recommend outpatient Watchman evaluation.     No further inpatient cardiac workup planned  Please reconsult cardiology with any further questions or concerns

## 2024-10-30 NOTE — DISCHARGE NOTE PROVIDER - NSDCMRMEDTOKEN_GEN_ALL_CORE_FT
DilTIAZem (Eqv-Cardizem CD) 240 mg/24 hours oral capsule, extended release: 1 cap(s) orally once a day  donepezil 5 mg oral tablet: 1 tab(s) orally once a day (at bedtime)  donepezil 5 mg oral tablet: 1 tab(s) orally once a day (at bedtime)  methenamine hippurate 1 g oral tablet: 1 tab(s) orally once a day  simvastatin 10 mg oral tablet: 1 tab(s) orally once a day (at bedtime)  Tylenol 325 mg oral tablet: 2 tab(s) orally every 6 hours As needed Temp greater or equal to 38C (100.4F), Moderate Pain (4 - 6)

## 2024-10-30 NOTE — PROGRESS NOTE ADULT - ATTENDING COMMENTS
89yF s/p trip and fall on xarelto, with left gluteal hematoma with a recent crit drop which on repeat was 30.   Pan as above  Xarelto to restart once stable  Thigh appears bruised but overlying skin viable     Wil Arteaga MD FACS  Acute and Critical Care Surgery  Director of Emergency General Surgery @ [facility]  Associate  - General Surgery @ [facility].
Gluteal hematoma resolving, overlying skin alive  Home meds  Rate controlled, BP stable  Would consider stopping systemic anticoagulation given fall risk - f/u with cardiologist  d/c planning    Wil Arteaga MD FACS  Acute and Critical Care Surgery  Director of Emergency General Surgery @ [facility]  Associate  - General Surgery @ [facility].
L thigh hematoma wound stable  Neck pain likely muscular, pain is not midline or over bony prominences.   Agree with consultation f/u  Dispo to KANU Arteaga MD FACS  Acute and Critical Care Surgery  Director of Emergency General Surgery @ [facility]  Associate  - General Surgery @ [facility].

## 2024-10-30 NOTE — DISCHARGE NOTE PROVIDER - HOSPITAL COURSE
HPI: 89y F PMHx of afib on xarelto, presents as trauma B transfer from [facility], patient slipped and fell last night and presented with left hip pain, was found to have left gluteal hematoma with active extravasation and was transferred to [facility]. Patient received kcentra en route to hospital. Upon arrival to [facility], primary survey intact, GCS 15, secondary survey pertinent findings include left gluteal ecchymosis hematoma. Pt denies cp, sob, n/v/d, vision changes, HA.     Hospital Course: Patient was found to have left gluteal hematoma with active extravasation and received Kcentra prior to arriving at [facility]. Patient was admitted to the SICU service under Dr. Palma. Xarelto was held. Cardiology was consulted and patient is not recommended to restart AC upon discharge. Patient had episode of afib in the SICU which was resolved with Diltiazem. Hgb remained stable and patient was stable for downgrade to floor level of care. Geriatric tertiary exam was performed by hospitalist during stay. Chemical DVT ppx was started and hgb continued to remain stable. PT evaluated patient and recommended KANU, however family requested to take patient home. Patient was tolerating regular diet well, voiding spontaneously and pain was well controlled at time of discharge. Patient was stable for discharge home w/ family.

## 2024-10-30 NOTE — CONSULT NOTE ADULT - SUBJECTIVE AND OBJECTIVE BOX
[facility]                                              CARDIOLOGY AT [address], [place] [address]                                             Telephone: [phone]. Fax:[phone]                                                       CARDIOLOGY CONSULTATION NOTE                                                                                             History obtained by: Patient and medical record  Community Cardiologist: Dr. Youssef   obtained: Yes [  ] No [ x ]  Reason for Consultation: anticoagulation discussion   Available out pt records reviewed: Yes [ x ] No [  ]    Chief complaint:    Patient is a 89y old  Female who presents with a chief complaint of     HPI:  89 y/o PMHx pAfib (on Xarelto 15mg), mitral valve prolapse, CAD s/p PCI mLAD (3/2020 with rotational atherectomy, balloon angioplasty), TAVR, GIB, Anemia who presents to [facility] ED transfer [facility] due to slip and fall and found to have left gluteal hematoma with active extravasation. Patient received Kcentra en route to hospital. Cardiology now consulted for anticoagulation discussion. Per OP cardiology notes, patient has been off Xarelto intermittently due to previous hx of GIB. This appears to be the second fall since Dec 2023. Needs risk vs benefit discussion for continuing Xarelto. Denies chest pain, back pain, headache, dizziness, SOB, BRITO, diaphoresis, syncope or N/V.      CARDIAC TESTING    24 Day Monitor -  AF 9%, APC 2%, VPC<1%   Carotid Doppler  - mild plaque  Event monitor -3/14 - isolated VPCs correlated with palpitation. One auto detected episode of rapid atrial fibrillation    EK24, af    Stress Test: 5/10, no Ischemia, 10.1mets, EF=63%    Echo: , mod MR, TAVR, mild-mod AI, LVH, I DD    Cardiac Cath: , 60% prox, 80% mid LAD, 20% Circ, 50% osteal RCA    Stent: 3/1/21, YAMILKA to LAD    Cardiac Surg: 3/24/21, TAVR    PAST MEDICAL HISTORY  HTN (Hypertension)  MVP (Mitral Valve Prolapse)  Hypercholesterolemia  Hiatal Hernia  Thyroid Nodule  Constipation  DJD (Degenerative Joint Disease)  History of Osteoarthritis  Diverticulosis of the Colon  Abdominal Mass  Afib  HLD (hyperlipidemia)  Aortic valve stenosis  CAD (coronary artery disease)  Osteopenia  Atrial fibrillation  Aortic valve stenosis  CAD (coronary artery disease)  Diverticulosis  DJD (degenerative joint disease)  Hiatal hernia  HLD (hyperlipidemia)  HTN (hypertension)  MVP (mitral valve prolapse)  Osteoarthritis  Thyroid nodule        PAST SURGICAL HISTORY  H/O Shoulder Replacement  Parathyroid  S/P Bunionectomy  Cataract, Other  H/O ovarian cystectomy  H/O knee surgery  S/P cardiac catheterization  H/O knee surgery  H/O ovarian cystectomy  S/P shoulder replacement, right  H/O parathyroidectomy  S/P cardiac cath      SOCIAL HISTORY:  Denies smoking/alcohol/drugs  CIGARETTES:     ALCOHOL:  DRUGS:    FAMILY HISTORY:    Family History of Cardiovascular Disease:  Yes [  ] No [  ]  Coronary Artery Disease in first degree relative: Yes [  ] No [  ]  Sudden Cardiac Death in First degree relative: Yes [  ] No [  ]    HOME MEDICATIONS:  cephalexin 500 mg oral capsule: 1 cap(s) orally every 8 hours (28 Oct 2024 10:24)  DilTIAZem (Eqv-Cardizem CD) 240 mg/24 hours oral capsule, extended release: 1 cap(s) orally once a day (28 Oct 2024 10:24)  dilTIAZem 240 mg/24 hours oral capsule, extended release: 1 cap(s) orally once a day (09 Sep 2024 19:41)  donepezil 5 mg oral tablet: 1 tab(s) orally once a day (at bedtime) (09 Sep 2024 19:41)  donepezil 5 mg oral tablet: 1 tab(s) orally once a day (at bedtime) (28 Oct 2024 10:24)  methenamine hippurate 1 g oral tablet: 1 tab(s) orally once a day (28 Oct 2024 10:24)  methenamine hippurate 1 g oral tablet: 1 tab(s) orally once a day (09 Sep 2024 19:41)  rivaroxaban 15 mg oral tablet: 1 tab(s) orally once a day (28 Oct 2024 10:24)  rivaroxaban 15 mg oral tablet: 1 tab(s) orally once a day (before a meal) with dinner (09 Sep 2024 19:41)  simvastatin 10 mg oral tablet: 1 tab(s) orally once a day (at bedtime) (28 Oct 2024 10:24)  simvastatin 10 mg oral tablet: 1 tab(s) orally once a day (at bedtime) (09 Sep 2024 19:41)  Tylenol 325 mg oral tablet: 2 tab(s) orally every 6 hours As needed Temp greater or equal to 38C (100.4F), Moderate Pain (4 - 6) (16 Sep 2024 10:05)      CURRENT CARDIAC MEDICATIONS:  diltiazem    Tablet 60 milliGRAM(s) Oral every 6 hours      CURRENT OTHER MEDICATIONS:  acetaminophen     Tablet .. 650 milliGRAM(s) Oral every 4 hours, Stop order after: 4 Doses PRN Mild Pain (1 - 3), Moderate Pain (4 - 6)  donepezil 5 milliGRAM(s) Oral at bedtime  melatonin 3 milliGRAM(s) Oral at bedtime  polyethylene glycol 3350 17 Gram(s) Oral daily PRN Constipation  senna 2 Tablet(s) Oral at bedtime  atorvastatin 10 milliGRAM(s) Oral at bedtime  enoxaparin Injectable 30 milliGRAM(s) SubCutaneous every 24 hours      ALLERGIES:   penicillin (Flushing; Hypotension; Other)  codeine (Other)      REVIEW OF SYMPTOMS:   CONSTITUTIONAL: No fever, no chills, no weight loss, no weight gain, no fatigue   ENMT:  No vertigo; No sinus or throat pain  NECK: No pain or stiffness  CARDIOVASCULAR: No chest pain, no dyspnea, no syncope/presyncope, no palpitations, no dizziness, no Orthopnea, no Paroxsymal nocturnal dyspnea  RESPIRATORY: no Shortness of breath, no cough, no wheezing  : No dysuria, no hematuria   GI: No dark color stool, no nausea, no diarrhea, no constipation, no abdominal pain   NEURO: No headache, no slurred speech   MUSCULOSKELETAL: No joint pain or swelling; No muscle, back, or extremity pain  PSYCH: No agitation, no anxiety.    ALL OTHER REVIEW OF SYSTEMS ARE NEGATIVE.    VITAL SIGNS:  T(C): 36.6 (10-30-24 @ 12:52), Max: 37.2 (10-30-24 @ 03:00)  T(F): 97.8 (10-30-24 @ 12:52), Max: 98.9 (10-30-24 @ 03:00)  HR: 60 (10-30-24 @ 12:52) (60 - 90)  BP: 115/55 (10-30-24 @ 12:52) (115/55 - 134/73)  RR: 18 (10-30-24 @ 12:52) (17 - 18)  SpO2: 94% (10-30-24 @ 12:52) (92% - 96%)    INTAKE AND OUTPUT:     10-29 @ 07:01  -  10-30 @ 07:00  --------------------------------------------------------  IN: 430 mL / OUT: 300 mL / NET: 130 mL        PHYSICAL EXAM:  Constitutional: Comfortable . No acute distress.   HEENT: Atraumatic and normocephalic , neck is supple . no JVD. No carotid bruit.  CNS: A&Ox3. No focal deficits.   Respiratory: CTAB, unlabored   Cardiovascular: RRR normal s1 s2. No murmur. No rubs or gallop.  Gastrointestinal: Soft, non-tender. +Bowel sounds.   Extremities: 2+ Peripheral Pulses, No clubbing, cyanosis, or edema  Psychiatric: Calm . no agitation.   Skin: Warm and dry, no ulcers on extremities     LABS:                            8.5    9.85  )-----------( 212      ( 30 Oct 2024 05:25 )             27.8     10-    142  |  107  |  38.8[H]  ----------------------------<  112[H]  4.2   |  21.0[L]  |  0.76    Ca    9.0      30 Oct 2024 05:25  Phos  3.7     10-29  Mg     2.2     10-29        Urinalysis Basic - ( 30 Oct 2024 05:25 )    Color: x / Appearance: x / SG: x / pH: x  Gluc: 112 mg/dL / Ketone: x  / Bili: x / Urobili: x   Blood: x / Protein: x / Nitrite: x   Leuk Esterase: x / RBC: x / WBC x   Sq Epi: x / Non Sq Epi: x / Bacteria: x              INTERPRETATION OF TELEMETRY:     ECG: Afib  Prior ECG: Yes [  ] No [  ]    RADIOLOGY & ADDITIONAL STUDIES:    X-ray:    CT scan:   MRI:   US:

## 2024-10-30 NOTE — CONSULT NOTE ADULT - ASSESSMENT
89 y/o PMHx pAfib (on Xarelto 15mg), mitral valve prolapse, CAD s/p PCI mLAD (3/2020 with rotational atherectomy, balloon angioplasty), TAVR, GIB, Anemia who presents to [facility] ED transfer from [facility] due to slip and fall and found to have left gluteal hematoma with active extravasation. Patient received Kcentra en route to hospital. Cardiology now consulted for anticoagulation discussion. Per OP cardiology notes, patient has been off Xarelto intermittently due to previous hx of GIB. This appears to be the second fall since Dec 2023. Needs risk vs benefit discussion for continuing Xarelto. Denies chest pain, back pain, headache, dizziness, SOB, BRITO, diaphoresis, syncope or N/V.

## 2024-10-30 NOTE — PROGRESS NOTE ADULT - SUBJECTIVE AND OBJECTIVE BOX
HPI/OVERNIGHT EVENTS:  NAEON. Patient complains of neck pain with positioning of pillow. Pain is otherwise well controlled. Thigh soft. Hemodynamically well.     MEDICATIONS  (STANDING):  atorvastatin 10 milliGRAM(s) Oral at bedtime  diltiazem    Tablet 60 milliGRAM(s) Oral every 6 hours  donepezil 5 milliGRAM(s) Oral at bedtime  enoxaparin Injectable 30 milliGRAM(s) SubCutaneous every 24 hours  melatonin 3 milliGRAM(s) Oral at bedtime  senna 2 Tablet(s) Oral at bedtime    MEDICATIONS  (PRN):  acetaminophen     Tablet .. 650 milliGRAM(s) Oral every 4 hours PRN Mild Pain (1 - 3), Moderate Pain (4 - 6)  polyethylene glycol 3350 17 Gram(s) Oral daily PRN Constipation      Vital Signs Last 24 Hrs  T(C): 36.8 (30 Oct 2024 08:44), Max: 37.2 (30 Oct 2024 03:00)  T(F): 98.3 (30 Oct 2024 08:44), Max: 98.9 (30 Oct 2024 03:00)  HR: 61 (30 Oct 2024 08:44) (61 - 90)  BP: 123/65 (30 Oct 2024 08:44) (108/52 - 134/73)  BP(mean): --  RR: 18 (30 Oct 2024 08:44) (17 - 18)  SpO2: 95% (30 Oct 2024 08:44) (92% - 99%)    Parameters below as of 30 Oct 2024 08:44  Patient On (Oxygen Delivery Method): room air        Constitutional: patient resting comfortably in bed, in no acute distress  Respiratory: respirations are unlabored, no accessory muscle use, no conversational dyspnea  Cardiovascular: regular rate & rhythm  Gastrointestinal: Abdomen soft, non-tender, non-distended, no rebound tenderness / guarding  Neurological: A&O x 3  Musculoskeletal: L thigh ecchymosis on lateral thigh, no overlying skin necrosis /compromise       I&O's Detail    29 Oct 2024 07:01  -  30 Oct 2024 07:00  --------------------------------------------------------  IN:    Oral Fluid: 430 mL  Total IN: 430 mL    OUT:    Voided (mL): 300 mL  Total OUT: 300 mL    Total NET: 130 mL          LABS:                        8.5    9.85  )-----------( 212      ( 30 Oct 2024 05:25 )             27.8     10-30    142  |  107  |  38.8[H]  ----------------------------<  112[H]  4.2   |  21.0[L]  |  0.76    Ca    9.0      30 Oct 2024 05:25  Phos  3.7     10-29  Mg     2.2     10-29        Urinalysis Basic - ( 30 Oct 2024 05:25 )    Color: x / Appearance: x / SG: x / pH: x  Gluc: 112 mg/dL / Ketone: x  / Bili: x / Urobili: x   Blood: x / Protein: x / Nitrite: x   Leuk Esterase: x / RBC: x / WBC x   Sq Epi: x / Non Sq Epi: x / Bacteria: x

## 2024-10-30 NOTE — DISCHARGE NOTE PROVIDER - NSDCFUADDINST_GEN_ALL_CORE_FT
Wound care for Right Lower Extremity:     cleanse with NS  apply aquacell ag+ cut to fit as primary contact layer  cover with allevyn foam dressing  change this dressing Q 3 days.

## 2024-10-31 ENCOUNTER — TRANSCRIPTION ENCOUNTER (OUTPATIENT)
Age: 89
End: 2024-10-31

## 2024-10-31 VITALS
TEMPERATURE: 98 F | HEART RATE: 70 BPM | SYSTOLIC BLOOD PRESSURE: 113 MMHG | RESPIRATION RATE: 18 BRPM | OXYGEN SATURATION: 95 % | DIASTOLIC BLOOD PRESSURE: 63 MMHG

## 2024-10-31 PROCEDURE — 99232 SBSQ HOSP IP/OBS MODERATE 35: CPT

## 2024-10-31 RX ADMIN — Medication 60 MILLIGRAM(S): at 09:05

## 2024-10-31 RX ADMIN — Medication 60 MILLIGRAM(S): at 03:12

## 2024-10-31 RX ADMIN — Medication 650 MILLIGRAM(S): at 15:33

## 2024-10-31 RX ADMIN — Medication 30 MILLIGRAM(S): at 09:05

## 2024-10-31 RX ADMIN — Medication 60 MILLIGRAM(S): at 14:36

## 2024-10-31 RX ADMIN — Medication 650 MILLIGRAM(S): at 14:36

## 2024-10-31 NOTE — DISCHARGE NOTE NURSING/CASE MANAGEMENT/SOCIAL WORK - PATIENT PORTAL LINK FT
You can access the FollowMyHealth Patient Portal offered by [facility] by registering at the following website: [email or web address]. By joining [employer]’s FollowMyHealth portal, you will also be able to view your health information using other applications (apps) compatible with our system. show

## 2024-10-31 NOTE — PROGRESS NOTE ADULT - ASSESSMENT
89y/o F hx of Afib on Xarelto, Aortic valve stenosis, Atrial fibrillation, CAD, Diverticulosis, Hiatal hernia, HLD, HTN, MVP, Thyroid nodule, patient slipped and fell and was having left hip pain, she came to the [facility] ED, he was found to have left gluteal hematoma with active extravasation and was transferred to [facility], patient received kcentra en route to hospital, her HB was noted to be ~8, admitted under SICU, medicine consulted for Jyotsna Traum eval.    Plan:     Fall:   Mostly mechanical fall   she never had fall before  EKG no acute changes     Gluteal hematoma:   Serial imaging   cbc to monitor   further management as per surgical team     Acute blood loss anemia: Hb is 7.6, would type and screen and would transfuse given Hx of CAD and MVP  will monitor CBC    Hx of HLD: will continue with atorvastatin 10 milliGRAM(s) Oral at bedtime    Hx of CAD: would resume aspirin once ok from Surgery team, TTE is done and looks ok     Hx of Afib: will continue with  Diltiazem 60 milliGRAM(s) Oral every 6 hours, Xarelto on hold for now.    Patient on donepezil 5 milliGRAM(s) Oral at bedtime, not sure if she has Hx of Dementia.     Geriatric Screening:    Functional Assessment: [ yes ] Independent [ ] Assistance [ ] Total care [ ] Non-ambulatory    [ no] Fall risks identified:    [ ] High risk medications identified: to obtain home med list       [ no ] Increased delirium risk    [ yes ] Delirium and other risks can be reduced by:    -early ambulation    -minimizing "tethers" - IV, oxygen, catheters, etc    -avoiding hypnotics and sedatives    -maintaining hydration/nutrition    -avoid anticholinergics - diphenhydramine, etc    -pain control    -supportive environment    Medicine is signing off, please call as needed     
A: 89 y/o F s/p trip and fall on xarelto (held) , with left gluteal hematoma. Improving, hemodynamically well       Plan:   - Continue compression of L thigh   - Jyotsna consulted, appreciate recs   - Follow up CARDS final plan on A/C   - DVT ppx   - PT: KANU   - CM/dispo planning 
89y/o F hx of afib on xarelto, Aortic valve stenosis, Atrial fibrillation, CAD, Diverticulosis, Hiatal hernia, HLD, HTN, MVP, Thyroid nodule, patient slipped and fell and was having left hip pain, she came to the [facility] ED, he was found to have left gluteal hematoma with active extravasation and was transferred to [facility], patient received kcentra en route to hospital, her HB was noted to be ~8, admitted under SICU, medicine consulted for Jyotsna Traum eval.    Plan:     Fall:   Mostly mechanical fall   she never had fall before  EKG no acute changes     Gluteal hematoma:   Serial imaging   cbc to monitor   further management as per surgical team     Hx of Afib: Xarelto on hold    Acute blood loss anemia: Hb is 7.7, would type and screen and will transfuse given Hx of CAD and MVP  will monitor CBC    Hx of HLD: will continue with atorvastatin 10 milliGRAM(s) Oral at bedtime    Hx of CAD: would resume aspirin once ok from Surgery team, TTE is done and looks ok     Hx of Afib: will continue with  Diltiazem 60 milliGRAM(s) Oral every 6 hours, would resume home dose     Patient on donepezil 5 milliGRAM(s) Oral at bedtime, not sure if she has Hx of Dementia.     Geriatric Screening:    Functional Assessment: [ yes ] Independent [ ] Assistance [ ] Total care [ ] Non-ambulatory    [ no] Fall risks identified:    [ ] High risk medications identified: to obtain home med list       [ no ] Increased delirium risk    [ yes ] Delirium and other risks can be reduced by:    -early ambulation    -minimizing "tethers" - IV, oxygen, catheters, etc    -avoiding hypnotics and sedatives    -maintaining hydration/nutrition    -avoid anticholinergics - diphenhydramine, etc    -pain control    -supportive environment    Medicine is signing off, please call as needed     
89F s/p trip and fall on xarelto found with left gluteal hematoma with active extrav on CT      - Continue compression of L thigh   - Follow up CARDS final plan on A/C   - DVT ppx   - PT: KANU but family wants to take home   - CM/dispo planning     
ASSESSMENT/PLAN:  89yFemale s/p trip and fall on xarelto, with left gluteal hematoma. Downgraded from SICU overnight. prior to downgrade, went into afib, resolved with diltiazem.      Plan:   -regular diet  -continue home meds  -pain control prn  -dvt ppx  -WBAT, PT : KANU   - will defer AC to cardiology  
ASSESSMENT:  89yo Female with Afib on xarelto s/p ground level fall (?syncopal fall) sustained left gluteal hematoma with active extravasation, got Kcentra at OSH, currently no sign of active bleeding     PLAN:    NEURO: pain well controlled, mental status baseline  -continue multimodal pain management  -Delirium precaution     RESPIRATORY: no active issue   - Maintain SAO2>92%    CARDIOVASCULAR: syncopal fall?  - MAP goal >65  - EKG/Trop/Echo for syncopal work-up  -Jyotsna consult  -held AC for afib, currently afib in rate control    GI/NUTRITION: no active issue  -regular diet     GENITOURINARY/RENAL: No active issue  - Monitor I/Os  - Trend BUN/Cr daily  - Trend electrolytes, replete PRN    HEMATOLOGIC: gluteal hematoma  - Trend CBC q6h  - VTE ppx: SCDs, hold chemical DVT ppx  -keep pressure at left gluteal    INFECTIOUS DISEASE:  - Trend WBC, monitor for signs of infection    ENDOCRINE: monitor blood sugar    MUSCULOSKELETAL: PT    LINES/TUBES/DRAINS: PIVs    DISPO: if H/H stable, pt can be downgraded to floor    CODE STATUS: Full    Patient required critical care management and is at risk for life threatening decompensation  I provided ___55mins_____of non-continuous care to the patient.

## 2024-10-31 NOTE — PROGRESS NOTE ADULT - SUBJECTIVE AND OBJECTIVE BOX
INTERVAL HPI/OVERNIGHT EVENTS: asleep when seen, easily accusable no complaints, mildly confused      MEDICATIONS  (STANDING):  atorvastatin 10 milliGRAM(s) Oral at bedtime  bisacodyl Suppository 10 milliGRAM(s) Rectal once  diltiazem    Tablet 60 milliGRAM(s) Oral every 6 hours  donepezil 5 milliGRAM(s) Oral at bedtime  enoxaparin Injectable 30 milliGRAM(s) SubCutaneous every 24 hours  melatonin 3 milliGRAM(s) Oral at bedtime  senna 2 Tablet(s) Oral at bedtime    MEDICATIONS  (PRN):  acetaminophen     Tablet .. 650 milliGRAM(s) Oral every 4 hours PRN Mild Pain (1 - 3), Moderate Pain (4 - 6)  polyethylene glycol 3350 17 Gram(s) Oral daily PRN Constipation      Vital Signs Last 24 Hrs  T(C): 36.4 (31 Oct 2024 08:51), Max: 37.3 (30 Oct 2024 21:27)  T(F): 97.6 (31 Oct 2024 08:51), Max: 99.2 (30 Oct 2024 21:27)  HR: 100 (31 Oct 2024 08:51) (60 - 100)  BP: 107/63 (31 Oct 2024 08:51) (107/63 - 126/58)  BP(mean): --  RR: 17 (31 Oct 2024 08:51) (17 - 18)  SpO2: 95% (31 Oct 2024 08:51) (94% - 96%)    Parameters below as of 31 Oct 2024 08:51  Patient On (Oxygen Delivery Method): room air        PHYSICAL EXAM:      Constitutional: NAD    Respiratory: no accessory muscle use or conversational dyspnea    Cardiovascular: RRR    Gastrointestinal: soft, NT/ND    Extremities: WILEY        I&O's Detail    30 Oct 2024 07:01  -  31 Oct 2024 07:00  --------------------------------------------------------  IN:    Oral Fluid: 560 mL  Total IN: 560 mL    OUT:    Voided (mL): 1200 mL  Total OUT: 1200 mL    Total NET: -640 mL          LABS:                        8.5    9.85  )-----------( 212      ( 30 Oct 2024 05:25 )             27.8     10-30    142  |  107  |  38.8[H]  ----------------------------<  112[H]  4.2   |  21.0[L]  |  0.76    Ca    9.0      30 Oct 2024 05:25        Urinalysis Basic - ( 30 Oct 2024 05:25 )    Color: x / Appearance: x / SG: x / pH: x  Gluc: 112 mg/dL / Ketone: x  / Bili: x / Urobili: x   Blood: x / Protein: x / Nitrite: x   Leuk Esterase: x / RBC: x / WBC x   Sq Epi: x / Non Sq Epi: x / Bacteria: x        RADIOLOGY & ADDITIONAL STUDIES:

## 2024-10-31 NOTE — DISCHARGE NOTE NURSING/CASE MANAGEMENT/SOCIAL WORK - FINANCIAL ASSISTANCE
[facility] provides services at a reduced cost to those who are determined to be eligible through [facility]’s financial assistance program. Information regarding [facility]’s financial assistance program can be found by going to [email or web address] or by calling [phone].

## 2024-10-31 NOTE — PROGRESS NOTE ADULT - NS ATTEND AMEND GEN_ALL_CORE FT
Overlying skin still appears well   Full AC per outpt cards  Dispo pending     Wil Arteaga MD FACS  Acute and Critical Care Surgery  Director of Emergency General Surgery @ [facility]  Associate  - General Surgery @ [facility].

## 2024-11-20 PROCEDURE — 85610 PROTHROMBIN TIME: CPT

## 2024-11-20 PROCEDURE — 80053 COMPREHEN METABOLIC PANEL: CPT

## 2024-11-20 PROCEDURE — 70450 CT HEAD/BRAIN W/O DYE: CPT | Mod: MC

## 2024-11-20 PROCEDURE — 85730 THROMBOPLASTIN TIME PARTIAL: CPT

## 2024-11-20 PROCEDURE — G0463: CPT

## 2024-11-20 PROCEDURE — 85025 COMPLETE CBC W/AUTO DIFF WBC: CPT

## 2024-11-20 PROCEDURE — 72125 CT NECK SPINE W/O DYE: CPT | Mod: MC

## 2024-11-20 PROCEDURE — 87186 SC STD MICRODIL/AGAR DIL: CPT

## 2024-11-20 PROCEDURE — 86850 RBC ANTIBODY SCREEN: CPT

## 2024-11-20 PROCEDURE — 72193 CT PELVIS W/DYE: CPT | Mod: XU,MC

## 2024-11-20 PROCEDURE — 99285 EMERGENCY DEPT VISIT HI MDM: CPT | Mod: 25

## 2024-11-20 PROCEDURE — 73502 X-RAY EXAM HIP UNI 2-3 VIEWS: CPT

## 2024-11-20 PROCEDURE — 36415 COLL VENOUS BLD VENIPUNCTURE: CPT

## 2024-11-20 PROCEDURE — 72191 CT ANGIOGRAPH PELV W/O&W/DYE: CPT | Mod: MC

## 2024-11-20 PROCEDURE — 96374 THER/PROPH/DIAG INJ IV PUSH: CPT | Mod: XU

## 2024-11-20 PROCEDURE — 85027 COMPLETE CBC AUTOMATED: CPT

## 2024-11-20 PROCEDURE — 87070 CULTURE OTHR SPECIMN AEROBIC: CPT

## 2024-11-20 PROCEDURE — 86900 BLOOD TYPING SEROLOGIC ABO: CPT

## 2024-11-20 PROCEDURE — 86901 BLOOD TYPING SEROLOGIC RH(D): CPT

## 2024-11-20 PROCEDURE — 87077 CULTURE AEROBIC IDENTIFY: CPT

## 2024-11-26 PROCEDURE — 84100 ASSAY OF PHOSPHORUS: CPT

## 2024-11-26 PROCEDURE — 73030 X-RAY EXAM OF SHOULDER: CPT

## 2024-11-26 PROCEDURE — 85025 COMPLETE CBC W/AUTO DIFF WBC: CPT

## 2024-11-26 PROCEDURE — 85610 PROTHROMBIN TIME: CPT

## 2024-11-26 PROCEDURE — 85018 HEMOGLOBIN: CPT

## 2024-11-26 PROCEDURE — 85730 THROMBOPLASTIN TIME PARTIAL: CPT

## 2024-11-26 PROCEDURE — 71045 X-RAY EXAM CHEST 1 VIEW: CPT

## 2024-11-26 PROCEDURE — 83690 ASSAY OF LIPASE: CPT

## 2024-11-26 PROCEDURE — 93005 ELECTROCARDIOGRAM TRACING: CPT

## 2024-11-26 PROCEDURE — 93306 TTE W/DOPPLER COMPLETE: CPT

## 2024-11-26 PROCEDURE — 87640 STAPH A DNA AMP PROBE: CPT

## 2024-11-26 PROCEDURE — 36415 COLL VENOUS BLD VENIPUNCTURE: CPT

## 2024-11-26 PROCEDURE — 86901 BLOOD TYPING SEROLOGIC RH(D): CPT

## 2024-11-26 PROCEDURE — 81001 URINALYSIS AUTO W/SCOPE: CPT

## 2024-11-26 PROCEDURE — 80048 BASIC METABOLIC PNL TOTAL CA: CPT

## 2024-11-26 PROCEDURE — 87641 MR-STAPH DNA AMP PROBE: CPT

## 2024-11-26 PROCEDURE — 85027 COMPLETE CBC AUTOMATED: CPT

## 2024-11-26 PROCEDURE — 99285 EMERGENCY DEPT VISIT HI MDM: CPT

## 2024-11-26 PROCEDURE — 85014 HEMATOCRIT: CPT

## 2024-11-26 PROCEDURE — 80307 DRUG TEST PRSMV CHEM ANLYZR: CPT

## 2024-11-26 PROCEDURE — 86850 RBC ANTIBODY SCREEN: CPT

## 2024-11-26 PROCEDURE — 83735 ASSAY OF MAGNESIUM: CPT

## 2024-11-26 PROCEDURE — 86900 BLOOD TYPING SEROLOGIC ABO: CPT

## 2024-11-26 PROCEDURE — 84484 ASSAY OF TROPONIN QUANT: CPT

## 2024-11-26 PROCEDURE — 85384 FIBRINOGEN ACTIVITY: CPT

## 2024-11-26 PROCEDURE — 80053 COMPREHEN METABOLIC PANEL: CPT

## 2024-11-26 PROCEDURE — 83605 ASSAY OF LACTIC ACID: CPT

## 2024-12-18 NOTE — ED PROVIDER NOTE - BIRTH SEX
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  I reviewed the option of revision carpal tunnel surgery along with placement of a scar barrier.  I discussed the major risks and benefits of that surgery.  Patient was interested in proceeding with this and will contact the office to set up an exact surgical date.  Surgery will be done at the location of her convenience, under sedation and local.    Continue with the current splint.  Contact my office with any additional concerns in the meantime.        HISTORY OF PRESENT ILLNESS       Patient returns today, 6 weeks after last visit, having obtained NMUS to evaluate her right carpal tunnel for evidence of recurrent compression after remote carpal tunnel release surgery by me in December of 2022.  She has been using her night splint over the last 6 weeks and that has been improving her nocturnal symptoms.  Daytime symptoms are still bothersome.  No other new concerns.      PHYSICAL EXAM       She remains well-developed, well-nourished female in no acute distress.  She appears her stated age and has a pleasant affect.  Skin of the right hand and wrist remains intact with no erythema, ecchymosis, or diffuse swelling.  Normal skin drag and coloration.  Incision is well-healed and nontender.  Negative Tinel and Phalen.  Durkan remains equivocal today.  Negative reverse Phalen.  5/5 APB and hand intrinsics with no wasting.  Sensation intact to light touch in all distributions.  Capillary refill less than 2 seconds.      IMAGING / LABS / EMGs    NMUS from November 13 was reviewed and is significantly abnormal.  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  Interestingly, there is not an associated median artery on the Doppler.  CSA is  significantly increased at 38.5.  There is decreased echogenicity and decreased glide suggesting tethering by scar.  There is a mild notch sign indicating recurrent compression.  No other abnormal masses.  WFR size ratio is clearly abnormal at 6.3.      SURGICAL INDICATION    I reviewed the options for further management of this condition and the likely success rates of each.  The patient feels that they have maximized the benefits of conservative care, and they do want to go on to surgery.    I reviewed the major risks of surgery including infection; scarring; damage to nerves, tendons, or vessels; stiffness; failure to relieve symptoms, recurrent symptoms, pillar pain, scar barrier material reaction, and wound healing problems, as well as anesthesia risks.  I answered their questions to their satisfaction.  They were given my contact information and will contact the office when they are ready to schedule an exact surgical date.  Surgery will be posted as follows :    Dx :          Right carpal tunnel syndrome  ICD-10 :      G56.01  Procedure :     Revision right carpal tunnel release with possible scar barrier placement  CPT :        95999  Anesth :    MAC  Location :   [facility]  Duration :    60 minutes  Specials :    Tawana 10 x 40 scar barrier  PAT :       No  Post-Op Visit :    10-15 days        Electronically Signed      IVETTE Ulrich MD      Orthopaedic Hand Surgery      [phone]   Female

## 2025-01-13 ENCOUNTER — NON-APPOINTMENT (OUTPATIENT)
Age: 89
End: 2025-01-13

## 2025-01-13 ENCOUNTER — APPOINTMENT (OUTPATIENT)
Dept: CARDIOLOGY | Facility: CLINIC | Age: 89
End: 2025-01-13
Payer: MEDICARE

## 2025-01-13 VITALS
WEIGHT: 108 LBS | OXYGEN SATURATION: 97 % | HEIGHT: 62 IN | HEART RATE: 62 BPM | SYSTOLIC BLOOD PRESSURE: 138 MMHG | BODY MASS INDEX: 19.88 KG/M2 | DIASTOLIC BLOOD PRESSURE: 74 MMHG

## 2025-01-13 DIAGNOSIS — I48.91 UNSPECIFIED ATRIAL FIBRILLATION: ICD-10-CM

## 2025-01-13 PROBLEM — K57.90 DIVERTICULOSIS OF INTESTINE, PART UNSPECIFIED, WITHOUT PERFORATION OR ABSCESS WITHOUT BLEEDING: Chronic | Status: ACTIVE | Noted: 2024-10-26

## 2025-01-13 PROBLEM — E78.5 HYPERLIPIDEMIA, UNSPECIFIED: Chronic | Status: ACTIVE | Noted: 2024-10-26

## 2025-01-13 PROBLEM — I35.0 NONRHEUMATIC AORTIC (VALVE) STENOSIS: Chronic | Status: ACTIVE | Noted: 2024-10-26

## 2025-01-13 PROBLEM — K44.9 DIAPHRAGMATIC HERNIA WITHOUT OBSTRUCTION OR GANGRENE: Chronic | Status: ACTIVE | Noted: 2024-10-26

## 2025-01-13 PROBLEM — M19.90 UNSPECIFIED OSTEOARTHRITIS, UNSPECIFIED SITE: Chronic | Status: ACTIVE | Noted: 2024-10-26

## 2025-01-13 PROBLEM — I25.10 ATHEROSCLEROTIC HEART DISEASE OF NATIVE CORONARY ARTERY WITHOUT ANGINA PECTORIS: Chronic | Status: ACTIVE | Noted: 2024-10-26

## 2025-01-13 PROBLEM — I34.1 NONRHEUMATIC MITRAL (VALVE) PROLAPSE: Chronic | Status: ACTIVE | Noted: 2024-10-26

## 2025-01-13 PROBLEM — I10 ESSENTIAL (PRIMARY) HYPERTENSION: Chronic | Status: ACTIVE | Noted: 2024-10-26

## 2025-01-13 PROBLEM — E04.1 NONTOXIC SINGLE THYROID NODULE: Chronic | Status: ACTIVE | Noted: 2024-10-26

## 2025-01-13 PROCEDURE — 99215 OFFICE O/P EST HI 40 MIN: CPT

## 2025-01-13 PROCEDURE — G2211 COMPLEX E/M VISIT ADD ON: CPT

## 2025-01-13 PROCEDURE — 93000 ELECTROCARDIOGRAM COMPLETE: CPT

## 2025-02-16 NOTE — PATIENT PROFILE ADULT. - FUNCTIONAL SCREEN CURRENT LEVEL: COMMUNICATION, MLM
Progress Note    Patient: Cnidy Chambers Date: 2/16/2025   female, 43 year old  Admit Date: 2/13/2025   Attending: Justine Brown MD      Interval History  No acute events overnight    Subjective:  Cindy reports left sided flank pain and spasms. She reports history of kidney stones, has never needed intervention.     Physical Exam:  Visit Vitals  /86 (BP Location: RUE - Right upper extremity, Patient Position: Supine)   Pulse (!) 60   Temp 98.2 °F (36.8 °C) (Oral)   Resp 18   Ht 5' 1\" (1.549 m)   Wt 102.9 kg (226 lb 13.7 oz)   LMP 04/11/2024 Comment: 5 day cycle   SpO2 98%   BMI 42.86 kg/m²     General:  Appears comfortable laying in bed  Cardiovascular:  RRR, no murmur  Chest:  Regular effort, CTAB  Abdomen:  Soft, normal bowel sounds, minimal tenderness in LLQ  Extremities:  No pedal edema  Neurologic: alert and coherent without FND    Labs:  Recent Labs   Lab 02/16/25  0625 02/15/25  0654 02/14/25  0509 02/13/25  0158   WBC 7.5 10.0 12.9* 21.9*   RBC 3.17* 3.40* 3.47* 4.40   HGB 9.7* 10.6* 10.8* 13.5   HCT 28.6* 30.4* 31.1* 38.9    222 197 249   SEG 62 75  --  82     Recent Labs   Lab 02/16/25  0625 02/15/25  0654 02/14/25  0509 02/13/25  0611   SODIUM 140 142 140 137   POTASSIUM 3.7 3.7 3.8 3.7   CHLORIDE 105 104 104 102   CO2 24 23 27 23   BUN 6 6 6 7   CREATININE 0.55 0.53 0.56 0.63   GLUCOSE 78 94 109* 145*   CALCIUM 8.6 8.6 8.6 8.2*   ALBUMIN 2.4*  --  2.6* 2.9*   AST 16  --  15 19   GPT 14  --  15 17   BILIRUBIN 0.6  --  1.1* 1.8*   ALKPT 65  --  76 75       Assessment & Plan:     Proximal sigmoid nonobstructing colitis with focal perforation without fluid collection, present on admission, associated with sepsis, not severe sepsis or septic shock, present on admission   Overall symptoms improving, leukocytosis resolved. General surgery recommending continued conservative management.   - Clear liquid diet  - Decrease IVF  - Zosyn  - PRN analgesics   - Encourage  ambulation     Left flank pain  History of nephrolithiasis   2/13 CT A/P without findings suspicious for urolithiasis or urinary tract obstruction. UA with blood. Possibly recently passed stone.  - Recheck UA without culture  - PRN analgesics    Influenza A  Respiratory status stable.   - Continue Tamiflu    Chronic arthralgias   Noted to have mildly elevated rheumatoid factor, elevated sedimentation rate, ESR, CCP. Negative VIOLETA. All with unclear significance. Was on prednisone for which patient discontinued early due to no improvement.  - Recommend repeat testing after acute inflammation and infection, defer to PCP    GERD  - PPI    Co-morbidities:  - BMI 42.86    FEN: clears  PPx: SCDs  Dispo:  - Code status Full  - No guardian/POAHC  - Discharge pending improvement in abdominal pain and leukocytosis, toleration of diet    Admission Status: Observation, anticipate 2 more days       Justine Brown MD  Hospitalist  2/16/2025  12:09 PM               (0) understands/communicates without difficulty

## 2025-04-07 ENCOUNTER — APPOINTMENT (OUTPATIENT)
Dept: CARDIOLOGY | Facility: CLINIC | Age: 89
End: 2025-04-07

## 2025-04-17 ENCOUNTER — NON-APPOINTMENT (OUTPATIENT)
Age: 89
End: 2025-04-17

## 2025-06-18 ENCOUNTER — EMERGENCY (EMERGENCY)
Facility: HOSPITAL | Age: 89
LOS: 1 days | End: 2025-06-18
Attending: STUDENT IN AN ORGANIZED HEALTH CARE EDUCATION/TRAINING PROGRAM | Admitting: STUDENT IN AN ORGANIZED HEALTH CARE EDUCATION/TRAINING PROGRAM
Payer: MEDICARE

## 2025-06-18 VITALS
TEMPERATURE: 98 F | HEIGHT: 62 IN | HEART RATE: 60 BPM | RESPIRATION RATE: 18 BRPM | WEIGHT: 112.44 LBS | SYSTOLIC BLOOD PRESSURE: 123 MMHG | OXYGEN SATURATION: 97 % | DIASTOLIC BLOOD PRESSURE: 64 MMHG

## 2025-06-18 VITALS
DIASTOLIC BLOOD PRESSURE: 65 MMHG | SYSTOLIC BLOOD PRESSURE: 126 MMHG | RESPIRATION RATE: 17 BRPM | HEART RATE: 63 BPM | OXYGEN SATURATION: 98 % | TEMPERATURE: 99 F

## 2025-06-18 DIAGNOSIS — Z98.890 OTHER SPECIFIED POSTPROCEDURAL STATES: Chronic | ICD-10-CM

## 2025-06-18 DIAGNOSIS — Z96.611 PRESENCE OF RIGHT ARTIFICIAL SHOULDER JOINT: Chronic | ICD-10-CM

## 2025-06-18 LAB
ALBUMIN SERPL ELPH-MCNC: 3.4 G/DL — SIGNIFICANT CHANGE UP (ref 3.3–5)
ALP SERPL-CCNC: 153 U/L — HIGH (ref 30–120)
ALT FLD-CCNC: 76 U/L — HIGH (ref 10–60)
ANION GAP SERPL CALC-SCNC: 7 MMOL/L — SIGNIFICANT CHANGE UP (ref 5–17)
AST SERPL-CCNC: 59 U/L — HIGH (ref 10–40)
BASOPHILS # BLD AUTO: 0.03 K/UL — SIGNIFICANT CHANGE UP (ref 0–0.2)
BASOPHILS NFR BLD AUTO: 0.3 % — SIGNIFICANT CHANGE UP (ref 0–2)
BILIRUB SERPL-MCNC: 0.4 MG/DL — SIGNIFICANT CHANGE UP (ref 0.2–1.2)
BUN SERPL-MCNC: 33 MG/DL — HIGH (ref 7–23)
CALCIUM SERPL-MCNC: 8.8 MG/DL — SIGNIFICANT CHANGE UP (ref 8.4–10.5)
CHLORIDE SERPL-SCNC: 104 MMOL/L — SIGNIFICANT CHANGE UP (ref 96–108)
CO2 SERPL-SCNC: 30 MMOL/L — SIGNIFICANT CHANGE UP (ref 22–31)
CREAT SERPL-MCNC: 1.05 MG/DL — SIGNIFICANT CHANGE UP (ref 0.5–1.3)
EGFR: 50 ML/MIN/1.73M2 — LOW
EGFR: 50 ML/MIN/1.73M2 — LOW
EOSINOPHIL # BLD AUTO: 0.04 K/UL — SIGNIFICANT CHANGE UP (ref 0–0.5)
EOSINOPHIL NFR BLD AUTO: 0.4 % — SIGNIFICANT CHANGE UP (ref 0–6)
GLUCOSE SERPL-MCNC: 98 MG/DL — SIGNIFICANT CHANGE UP (ref 70–99)
HCT VFR BLD CALC: 35.8 % — SIGNIFICANT CHANGE UP (ref 34.5–45)
HGB BLD-MCNC: 11.4 G/DL — LOW (ref 11.5–15.5)
IMM GRANULOCYTES # BLD AUTO: 0.07 K/UL — SIGNIFICANT CHANGE UP (ref 0–0.07)
IMM GRANULOCYTES NFR BLD AUTO: 0.7 % — SIGNIFICANT CHANGE UP (ref 0–0.9)
LYMPHOCYTES # BLD AUTO: 0.96 K/UL — LOW (ref 1–3.3)
LYMPHOCYTES NFR BLD AUTO: 9.2 % — LOW (ref 13–44)
MAGNESIUM SERPL-MCNC: 2.4 MG/DL — SIGNIFICANT CHANGE UP (ref 1.6–2.6)
MCHC RBC-ENTMCNC: 29.4 PG — SIGNIFICANT CHANGE UP (ref 27–34)
MCHC RBC-ENTMCNC: 31.8 G/DL — LOW (ref 32–36)
MCV RBC AUTO: 92.3 FL — SIGNIFICANT CHANGE UP (ref 80–100)
MONOCYTES # BLD AUTO: 1.05 K/UL — HIGH (ref 0–0.9)
MONOCYTES NFR BLD AUTO: 10.1 % — SIGNIFICANT CHANGE UP (ref 2–14)
NEUTROPHILS # BLD AUTO: 8.28 K/UL — HIGH (ref 1.8–7.4)
NEUTROPHILS NFR BLD AUTO: 79.3 % — HIGH (ref 43–77)
NRBC # BLD AUTO: 0 K/UL — SIGNIFICANT CHANGE UP (ref 0–0)
NRBC # FLD: 0 K/UL — SIGNIFICANT CHANGE UP (ref 0–0)
NRBC BLD AUTO-RTO: 0 /100 WBCS — SIGNIFICANT CHANGE UP (ref 0–0)
NT-PROBNP SERPL-SCNC: 1056 PG/ML — HIGH (ref 0–450)
PLATELET # BLD AUTO: 180 K/UL — SIGNIFICANT CHANGE UP (ref 150–400)
PMV BLD: 10.3 FL — SIGNIFICANT CHANGE UP (ref 7–13)
POTASSIUM SERPL-MCNC: 4.3 MMOL/L — SIGNIFICANT CHANGE UP (ref 3.5–5.3)
POTASSIUM SERPL-SCNC: 4.3 MMOL/L — SIGNIFICANT CHANGE UP (ref 3.5–5.3)
PROT SERPL-MCNC: 6.4 G/DL — SIGNIFICANT CHANGE UP (ref 6–8.3)
RBC # BLD: 3.88 M/UL — SIGNIFICANT CHANGE UP (ref 3.8–5.2)
RBC # FLD: 14.7 % — HIGH (ref 10.3–14.5)
SODIUM SERPL-SCNC: 141 MMOL/L — SIGNIFICANT CHANGE UP (ref 135–145)
WBC # BLD: 10.43 K/UL — SIGNIFICANT CHANGE UP (ref 3.8–10.5)
WBC # FLD AUTO: 10.43 K/UL — SIGNIFICANT CHANGE UP (ref 3.8–10.5)

## 2025-06-18 PROCEDURE — 83880 ASSAY OF NATRIURETIC PEPTIDE: CPT

## 2025-06-18 PROCEDURE — 72131 CT LUMBAR SPINE W/O DYE: CPT

## 2025-06-18 PROCEDURE — 99284 EMERGENCY DEPT VISIT MOD MDM: CPT

## 2025-06-18 PROCEDURE — 36415 COLL VENOUS BLD VENIPUNCTURE: CPT

## 2025-06-18 PROCEDURE — 72128 CT CHEST SPINE W/O DYE: CPT

## 2025-06-18 PROCEDURE — 85025 COMPLETE CBC W/AUTO DIFF WBC: CPT

## 2025-06-18 PROCEDURE — 93010 ELECTROCARDIOGRAM REPORT: CPT

## 2025-06-18 PROCEDURE — 70450 CT HEAD/BRAIN W/O DYE: CPT | Mod: 26

## 2025-06-18 PROCEDURE — 73030 X-RAY EXAM OF SHOULDER: CPT | Mod: 26,50

## 2025-06-18 PROCEDURE — 99285 EMERGENCY DEPT VISIT HI MDM: CPT | Mod: 25

## 2025-06-18 PROCEDURE — 70450 CT HEAD/BRAIN W/O DYE: CPT

## 2025-06-18 PROCEDURE — 72125 CT NECK SPINE W/O DYE: CPT

## 2025-06-18 PROCEDURE — 73030 X-RAY EXAM OF SHOULDER: CPT

## 2025-06-18 PROCEDURE — 80053 COMPREHEN METABOLIC PANEL: CPT

## 2025-06-18 PROCEDURE — 72128 CT CHEST SPINE W/O DYE: CPT | Mod: 26

## 2025-06-18 PROCEDURE — 71250 CT THORAX DX C-: CPT | Mod: 26

## 2025-06-18 PROCEDURE — 93005 ELECTROCARDIOGRAM TRACING: CPT

## 2025-06-18 PROCEDURE — 71250 CT THORAX DX C-: CPT

## 2025-06-18 PROCEDURE — 72131 CT LUMBAR SPINE W/O DYE: CPT | Mod: 26

## 2025-06-18 PROCEDURE — 83735 ASSAY OF MAGNESIUM: CPT

## 2025-06-18 PROCEDURE — 72125 CT NECK SPINE W/O DYE: CPT | Mod: 26

## 2025-06-18 RX ORDER — ACETAMINOPHEN 500 MG/5ML
975 LIQUID (ML) ORAL ONCE
Refills: 0 | Status: COMPLETED | OUTPATIENT
Start: 2025-06-18 | End: 2025-06-18

## 2025-06-18 RX ADMIN — Medication 975 MILLIGRAM(S): at 14:53

## 2025-07-03 NOTE — ED ADULT NURSE NOTE - IN THE PAST 12 MONTHS HAVE YOU USED DRUGS OTHER THAN THOSE REQUIRED FOR MEDICAL REASON?
Try using Voltaren gel over the counter    Can also try capsacian cream, which has more of numbing effect.  
No

## 2025-08-04 ENCOUNTER — EMERGENCY (EMERGENCY)
Facility: HOSPITAL | Age: 89
LOS: 1 days | End: 2025-08-04
Attending: STUDENT IN AN ORGANIZED HEALTH CARE EDUCATION/TRAINING PROGRAM | Admitting: STUDENT IN AN ORGANIZED HEALTH CARE EDUCATION/TRAINING PROGRAM
Payer: MEDICARE

## 2025-08-04 VITALS
RESPIRATION RATE: 19 BRPM | OXYGEN SATURATION: 97 % | DIASTOLIC BLOOD PRESSURE: 86 MMHG | TEMPERATURE: 98 F | SYSTOLIC BLOOD PRESSURE: 125 MMHG | HEART RATE: 126 BPM | HEIGHT: 62 IN | WEIGHT: 113.98 LBS

## 2025-08-04 VITALS
TEMPERATURE: 98 F | SYSTOLIC BLOOD PRESSURE: 120 MMHG | RESPIRATION RATE: 18 BRPM | OXYGEN SATURATION: 98 % | HEART RATE: 84 BPM | DIASTOLIC BLOOD PRESSURE: 60 MMHG

## 2025-08-04 DIAGNOSIS — Z98.890 OTHER SPECIFIED POSTPROCEDURAL STATES: Chronic | ICD-10-CM

## 2025-08-04 DIAGNOSIS — Z96.611 PRESENCE OF RIGHT ARTIFICIAL SHOULDER JOINT: Chronic | ICD-10-CM

## 2025-08-04 LAB
ACANTHOCYTES BLD QL SMEAR: SLIGHT — SIGNIFICANT CHANGE UP
ALBUMIN SERPL ELPH-MCNC: 2.9 G/DL — LOW (ref 3.3–5)
ALP SERPL-CCNC: 208 U/L — HIGH (ref 40–120)
ALT FLD-CCNC: 64 U/L — SIGNIFICANT CHANGE UP (ref 12–78)
ANION GAP SERPL CALC-SCNC: 10 MMOL/L — SIGNIFICANT CHANGE UP (ref 5–17)
APPEARANCE UR: CLEAR — SIGNIFICANT CHANGE UP
AST SERPL-CCNC: 102 U/L — HIGH (ref 15–37)
BASOPHILS # BLD AUTO: 0.03 K/UL — SIGNIFICANT CHANGE UP (ref 0–0.2)
BASOPHILS # BLD MANUAL: 0 K/UL — SIGNIFICANT CHANGE UP (ref 0–0.2)
BASOPHILS NFR BLD AUTO: 0.2 % — SIGNIFICANT CHANGE UP (ref 0–2)
BASOPHILS NFR BLD MANUAL: 0 % — SIGNIFICANT CHANGE UP (ref 0–2)
BILIRUB SERPL-MCNC: 0.5 MG/DL — SIGNIFICANT CHANGE UP (ref 0.2–1.2)
BILIRUB UR-MCNC: NEGATIVE — SIGNIFICANT CHANGE UP
BUN SERPL-MCNC: 25 MG/DL — HIGH (ref 7–23)
CALCIUM SERPL-MCNC: 8.7 MG/DL — SIGNIFICANT CHANGE UP (ref 8.5–10.1)
CHLORIDE SERPL-SCNC: 111 MMOL/L — HIGH (ref 96–108)
CO2 SERPL-SCNC: 21 MMOL/L — LOW (ref 22–31)
COLOR SPEC: YELLOW — SIGNIFICANT CHANGE UP
CREAT SERPL-MCNC: 1 MG/DL — SIGNIFICANT CHANGE UP (ref 0.5–1.3)
DIFF PNL FLD: NEGATIVE — SIGNIFICANT CHANGE UP
EGFR: 53 ML/MIN/1.73M2 — LOW
EGFR: 53 ML/MIN/1.73M2 — LOW
ELLIPTOCYTES BLD QL SMEAR: SLIGHT — SIGNIFICANT CHANGE UP
EOSINOPHIL # BLD AUTO: 0 K/UL — SIGNIFICANT CHANGE UP (ref 0–0.5)
EOSINOPHIL # BLD MANUAL: 0 K/UL — SIGNIFICANT CHANGE UP (ref 0–0.5)
EOSINOPHIL NFR BLD AUTO: 0 % — SIGNIFICANT CHANGE UP (ref 0–6)
EOSINOPHIL NFR BLD MANUAL: 0 % — SIGNIFICANT CHANGE UP (ref 0–6)
EPI CELLS # UR: SIGNIFICANT CHANGE UP
FLUAV AG NPH QL: SIGNIFICANT CHANGE UP
FLUBV AG NPH QL: SIGNIFICANT CHANGE UP
GLUCOSE SERPL-MCNC: 116 MG/DL — HIGH (ref 70–99)
GLUCOSE UR QL: NEGATIVE MG/DL — SIGNIFICANT CHANGE UP
HCT VFR BLD CALC: 34.8 % — SIGNIFICANT CHANGE UP (ref 34.5–45)
HGB BLD-MCNC: 10.9 G/DL — LOW (ref 11.5–15.5)
IMM GRANULOCYTES # BLD AUTO: 0.12 K/UL — HIGH (ref 0–0.07)
IMM GRANULOCYTES NFR BLD AUTO: 0.7 % — SIGNIFICANT CHANGE UP (ref 0–0.9)
KETONES UR QL: NEGATIVE MG/DL — SIGNIFICANT CHANGE UP
LACTATE SERPL-SCNC: 1.5 MMOL/L — SIGNIFICANT CHANGE UP (ref 0.7–2)
LACTATE SERPL-SCNC: 2.2 MMOL/L — HIGH (ref 0.7–2)
LEUKOCYTE ESTERASE UR-ACNC: ABNORMAL
LYMPHOCYTES # BLD AUTO: 0.1 K/UL — LOW (ref 1–3.3)
LYMPHOCYTES # BLD MANUAL: 0.16 K/UL — LOW (ref 1–3.3)
LYMPHOCYTES NFR BLD AUTO: 0.6 % — LOW (ref 13–44)
LYMPHOCYTES NFR BLD MANUAL: 1 % — LOW (ref 13–44)
MAGNESIUM SERPL-MCNC: 2.1 MG/DL — SIGNIFICANT CHANGE UP (ref 1.6–2.6)
MANUAL SMEAR VERIFICATION: SIGNIFICANT CHANGE UP
MCHC RBC-ENTMCNC: 29.1 PG — SIGNIFICANT CHANGE UP (ref 27–34)
MCHC RBC-ENTMCNC: 31.3 G/DL — LOW (ref 32–36)
MCV RBC AUTO: 93 FL — SIGNIFICANT CHANGE UP (ref 80–100)
MICROCYTES BLD QL: ABNORMAL
MONOCYTES # BLD AUTO: 0.13 K/UL — SIGNIFICANT CHANGE UP (ref 0–0.9)
MONOCYTES # BLD MANUAL: 0.16 K/UL — SIGNIFICANT CHANGE UP (ref 0–0.9)
MONOCYTES NFR BLD AUTO: 0.8 % — LOW (ref 2–14)
MONOCYTES NFR BLD MANUAL: 1 % — LOW (ref 2–14)
NEUTROPHILS # BLD AUTO: 15.73 K/UL — HIGH (ref 1.8–7.4)
NEUTROPHILS # BLD MANUAL: 15.79 K/UL — HIGH (ref 1.8–7.4)
NEUTROPHILS NFR BLD AUTO: 97.7 % — HIGH (ref 43–77)
NEUTROPHILS NFR BLD MANUAL: 98 % — HIGH (ref 43–77)
NITRITE UR-MCNC: NEGATIVE — SIGNIFICANT CHANGE UP
NRBC # BLD AUTO: 0 K/UL — SIGNIFICANT CHANGE UP (ref 0–0)
NRBC # FLD: 0 K/UL — SIGNIFICANT CHANGE UP (ref 0–0)
NRBC BLD AUTO-RTO: 0 /100 WBCS — SIGNIFICANT CHANGE UP (ref 0–0)
PH UR: 5.5 — SIGNIFICANT CHANGE UP (ref 5–8)
PLAT MORPH BLD: NORMAL — SIGNIFICANT CHANGE UP
PLATELET # BLD AUTO: 222 K/UL — SIGNIFICANT CHANGE UP (ref 150–400)
PMV BLD: 10 FL — SIGNIFICANT CHANGE UP (ref 7–13)
POTASSIUM SERPL-MCNC: 3.9 MMOL/L — SIGNIFICANT CHANGE UP (ref 3.5–5.3)
POTASSIUM SERPL-SCNC: 3.9 MMOL/L — SIGNIFICANT CHANGE UP (ref 3.5–5.3)
PROT SERPL-MCNC: 6.1 G/DL — SIGNIFICANT CHANGE UP (ref 6–8.3)
PROT UR-MCNC: SIGNIFICANT CHANGE UP MG/DL
RBC # BLD: 3.74 M/UL — LOW (ref 3.8–5.2)
RBC # FLD: 15.4 % — HIGH (ref 10.3–14.5)
RBC BLD AUTO: SIGNIFICANT CHANGE UP
RBC CASTS # UR COMP ASSIST: 0 /HPF — SIGNIFICANT CHANGE UP (ref 0–4)
RSV RNA NPH QL NAA+NON-PROBE: SIGNIFICANT CHANGE UP
SARS-COV-2 RNA SPEC QL NAA+PROBE: SIGNIFICANT CHANGE UP
SODIUM SERPL-SCNC: 142 MMOL/L — SIGNIFICANT CHANGE UP (ref 135–145)
SOURCE RESPIRATORY: SIGNIFICANT CHANGE UP
SP GR SPEC: 1.05 — HIGH (ref 1–1.03)
UROBILINOGEN FLD QL: 0.2 MG/DL — SIGNIFICANT CHANGE UP (ref 0.2–1)
WBC # BLD: 16.11 K/UL — HIGH (ref 3.8–10.5)
WBC # FLD AUTO: 16.11 K/UL — HIGH (ref 3.8–10.5)
WBC UR QL: 25 /HPF — HIGH (ref 0–5)

## 2025-08-04 PROCEDURE — 96375 TX/PRO/DX INJ NEW DRUG ADDON: CPT

## 2025-08-04 PROCEDURE — 36415 COLL VENOUS BLD VENIPUNCTURE: CPT

## 2025-08-04 PROCEDURE — 87086 URINE CULTURE/COLONY COUNT: CPT

## 2025-08-04 PROCEDURE — 83735 ASSAY OF MAGNESIUM: CPT

## 2025-08-04 PROCEDURE — 87637 SARSCOV2&INF A&B&RSV AMP PRB: CPT

## 2025-08-04 PROCEDURE — 87040 BLOOD CULTURE FOR BACTERIA: CPT

## 2025-08-04 PROCEDURE — 81001 URINALYSIS AUTO W/SCOPE: CPT

## 2025-08-04 PROCEDURE — 85025 COMPLETE CBC W/AUTO DIFF WBC: CPT

## 2025-08-04 PROCEDURE — 74177 CT ABD & PELVIS W/CONTRAST: CPT

## 2025-08-04 PROCEDURE — 80053 COMPREHEN METABOLIC PANEL: CPT

## 2025-08-04 PROCEDURE — 99285 EMERGENCY DEPT VISIT HI MDM: CPT

## 2025-08-04 PROCEDURE — 74177 CT ABD & PELVIS W/CONTRAST: CPT | Mod: 26

## 2025-08-04 PROCEDURE — 87077 CULTURE AEROBIC IDENTIFY: CPT

## 2025-08-04 PROCEDURE — 83605 ASSAY OF LACTIC ACID: CPT

## 2025-08-04 PROCEDURE — 93010 ELECTROCARDIOGRAM REPORT: CPT

## 2025-08-04 PROCEDURE — 99285 EMERGENCY DEPT VISIT HI MDM: CPT | Mod: 25

## 2025-08-04 PROCEDURE — 87150 DNA/RNA AMPLIFIED PROBE: CPT | Mod: XU

## 2025-08-04 PROCEDURE — 87186 SC STD MICRODIL/AGAR DIL: CPT

## 2025-08-04 PROCEDURE — 96374 THER/PROPH/DIAG INJ IV PUSH: CPT | Mod: XU

## 2025-08-04 PROCEDURE — 93005 ELECTROCARDIOGRAM TRACING: CPT

## 2025-08-04 RX ORDER — CEFPODOXIME PROXETIL 200 MG/1
1 TABLET, FILM COATED ORAL
Qty: 20 | Refills: 0
Start: 2025-08-04 | End: 2025-08-13

## 2025-08-04 RX ORDER — LIDOCAINE HCL/PF 10 MG/ML
20 VIAL (ML) INJECTION ONCE
Refills: 0 | Status: DISCONTINUED | OUTPATIENT
Start: 2025-08-04 | End: 2025-08-04

## 2025-08-04 RX ORDER — ACETAMINOPHEN 500 MG/5ML
1000 LIQUID (ML) ORAL ONCE
Refills: 0 | Status: COMPLETED | OUTPATIENT
Start: 2025-08-04 | End: 2025-08-04

## 2025-08-04 RX ORDER — CEFTRIAXONE 500 MG/1
1000 INJECTION, POWDER, FOR SOLUTION INTRAMUSCULAR; INTRAVENOUS ONCE
Refills: 0 | Status: COMPLETED | OUTPATIENT
Start: 2025-08-04 | End: 2025-08-04

## 2025-08-04 RX ORDER — CIPROFLOXACIN HCL 250 MG
400 TABLET ORAL ONCE
Refills: 0 | Status: DISCONTINUED | OUTPATIENT
Start: 2025-08-04 | End: 2025-08-04

## 2025-08-04 RX ORDER — KETOROLAC TROMETHAMINE 30 MG/ML
15 INJECTION, SOLUTION INTRAMUSCULAR; INTRAVENOUS ONCE
Refills: 0 | Status: DISCONTINUED | OUTPATIENT
Start: 2025-08-04 | End: 2025-08-04

## 2025-08-04 RX ADMIN — KETOROLAC TROMETHAMINE 15 MILLIGRAM(S): 30 INJECTION, SOLUTION INTRAMUSCULAR; INTRAVENOUS at 16:52

## 2025-08-04 RX ADMIN — Medication 400 MILLIGRAM(S): at 10:57

## 2025-08-04 RX ADMIN — Medication 1000 MILLILITER(S): at 13:55

## 2025-08-04 RX ADMIN — Medication 500 MILLILITER(S): at 10:56

## 2025-08-04 RX ADMIN — CEFTRIAXONE 100 MILLIGRAM(S): 500 INJECTION, POWDER, FOR SOLUTION INTRAMUSCULAR; INTRAVENOUS at 16:16

## 2025-08-06 LAB
-  AMOXICILLIN/CLAVULANIC ACID: SIGNIFICANT CHANGE UP
-  AMPICILLIN/SULBACTAM: SIGNIFICANT CHANGE UP
-  AMPICILLIN: SIGNIFICANT CHANGE UP
-  AZTREONAM: SIGNIFICANT CHANGE UP
-  CEFAZOLIN: SIGNIFICANT CHANGE UP
-  CEFEPIME: SIGNIFICANT CHANGE UP
-  CEFOXITIN: SIGNIFICANT CHANGE UP
-  CEFTRIAXONE: SIGNIFICANT CHANGE UP
-  CEFUROXIME: SIGNIFICANT CHANGE UP
-  CIPROFLOXACIN: SIGNIFICANT CHANGE UP
-  ERTAPENEM: SIGNIFICANT CHANGE UP
-  GENTAMICIN: SIGNIFICANT CHANGE UP
-  IMIPENEM: SIGNIFICANT CHANGE UP
-  LEVOFLOXACIN: SIGNIFICANT CHANGE UP
-  MEROPENEM: SIGNIFICANT CHANGE UP
-  NITROFURANTOIN: SIGNIFICANT CHANGE UP
-  PIPERACILLIN/TAZOBACTAM: SIGNIFICANT CHANGE UP
-  TIGECYCLINE: SIGNIFICANT CHANGE UP
-  TOBRAMYCIN: SIGNIFICANT CHANGE UP
-  TRIMETHOPRIM/SULFAMETHOXAZOLE: SIGNIFICANT CHANGE UP
CULTURE RESULTS: ABNORMAL
METHOD TYPE: SIGNIFICANT CHANGE UP
ORGANISM # SPEC MICROSCOPIC CNT: ABNORMAL
ORGANISM # SPEC MICROSCOPIC CNT: SIGNIFICANT CHANGE UP
SPECIMEN SOURCE: SIGNIFICANT CHANGE UP

## 2025-08-07 LAB
-  BLOOD PCR PANEL: SIGNIFICANT CHANGE UP
GRAM STN FLD: ABNORMAL
GRAM STN FLD: ABNORMAL
METHOD TYPE: SIGNIFICANT CHANGE UP

## 2025-08-08 LAB
CULTURE RESULTS: ABNORMAL
ORGANISM # SPEC MICROSCOPIC CNT: ABNORMAL
ORGANISM # SPEC MICROSCOPIC CNT: SIGNIFICANT CHANGE UP
SPECIMEN SOURCE: SIGNIFICANT CHANGE UP
SPECIMEN SOURCE: SIGNIFICANT CHANGE UP

## 2025-08-24 ENCOUNTER — INPATIENT (INPATIENT)
Facility: HOSPITAL | Age: 89
LOS: 3 days | Discharge: HOME | DRG: 948 | End: 2025-08-28
Attending: FAMILY MEDICINE | Admitting: FAMILY MEDICINE
Payer: MEDICARE

## 2025-08-24 VITALS
WEIGHT: 110.01 LBS | HEART RATE: 106 BPM | HEIGHT: 62 IN | RESPIRATION RATE: 18 BRPM | SYSTOLIC BLOOD PRESSURE: 106 MMHG | TEMPERATURE: 98 F | OXYGEN SATURATION: 96 % | DIASTOLIC BLOOD PRESSURE: 66 MMHG

## 2025-08-24 DIAGNOSIS — Z98.890 OTHER SPECIFIED POSTPROCEDURAL STATES: Chronic | ICD-10-CM

## 2025-08-24 DIAGNOSIS — R53.1 WEAKNESS: ICD-10-CM

## 2025-08-24 DIAGNOSIS — Z96.611 PRESENCE OF RIGHT ARTIFICIAL SHOULDER JOINT: Chronic | ICD-10-CM

## 2025-08-24 LAB
ALBUMIN SERPL ELPH-MCNC: 2.5 G/DL — LOW (ref 3.3–5)
ALP SERPL-CCNC: 530 U/L — HIGH (ref 40–120)
ALT FLD-CCNC: 68 U/L — SIGNIFICANT CHANGE UP (ref 12–78)
ANION GAP SERPL CALC-SCNC: 7 MMOL/L — SIGNIFICANT CHANGE UP (ref 5–17)
APPEARANCE UR: CLEAR — SIGNIFICANT CHANGE UP
APTT BLD: 30.4 SEC — SIGNIFICANT CHANGE UP (ref 26.1–36.8)
AST SERPL-CCNC: 45 U/L — HIGH (ref 15–37)
BACTERIA # UR AUTO: ABNORMAL /HPF
BASOPHILS # BLD AUTO: 0.03 K/UL — SIGNIFICANT CHANGE UP (ref 0–0.2)
BASOPHILS NFR BLD AUTO: 0.3 % — SIGNIFICANT CHANGE UP (ref 0–2)
BILIRUB SERPL-MCNC: 0.5 MG/DL — SIGNIFICANT CHANGE UP (ref 0.2–1.2)
BILIRUB UR-MCNC: NEGATIVE — SIGNIFICANT CHANGE UP
BUN SERPL-MCNC: 28 MG/DL — HIGH (ref 7–23)
CALCIUM SERPL-MCNC: 8.6 MG/DL — SIGNIFICANT CHANGE UP (ref 8.5–10.1)
CHLORIDE SERPL-SCNC: 107 MMOL/L — SIGNIFICANT CHANGE UP (ref 96–108)
CO2 SERPL-SCNC: 23 MMOL/L — SIGNIFICANT CHANGE UP (ref 22–31)
COLOR SPEC: YELLOW — SIGNIFICANT CHANGE UP
CREAT SERPL-MCNC: 0.96 MG/DL — SIGNIFICANT CHANGE UP (ref 0.5–1.3)
DIFF PNL FLD: NEGATIVE — SIGNIFICANT CHANGE UP
EGFR: 56 ML/MIN/1.73M2 — LOW
EGFR: 56 ML/MIN/1.73M2 — LOW
EOSINOPHIL # BLD AUTO: 0.01 K/UL — SIGNIFICANT CHANGE UP (ref 0–0.5)
EOSINOPHIL NFR BLD AUTO: 0.1 % — SIGNIFICANT CHANGE UP (ref 0–6)
EPI CELLS # UR: SIGNIFICANT CHANGE UP
FLUAV AG NPH QL: SIGNIFICANT CHANGE UP
FLUBV AG NPH QL: SIGNIFICANT CHANGE UP
GLUCOSE SERPL-MCNC: 163 MG/DL — HIGH (ref 70–99)
GLUCOSE UR QL: NEGATIVE MG/DL — SIGNIFICANT CHANGE UP
HCT VFR BLD CALC: 36.1 % — SIGNIFICANT CHANGE UP (ref 34.5–45)
HGB BLD-MCNC: 11.8 G/DL — SIGNIFICANT CHANGE UP (ref 11.5–15.5)
IMM GRANULOCYTES # BLD AUTO: 0.1 K/UL — HIGH (ref 0–0.07)
IMM GRANULOCYTES NFR BLD AUTO: 0.8 % — SIGNIFICANT CHANGE UP (ref 0–0.9)
INR BLD: 1.05 RATIO — SIGNIFICANT CHANGE UP (ref 0.85–1.16)
KETONES UR QL: ABNORMAL MG/DL
LACTATE SERPL-SCNC: 1.4 MMOL/L — SIGNIFICANT CHANGE UP (ref 0.7–2)
LEUKOCYTE ESTERASE UR-ACNC: ABNORMAL
LYMPHOCYTES # BLD AUTO: 0.5 K/UL — LOW (ref 1–3.3)
LYMPHOCYTES NFR BLD AUTO: 4.2 % — LOW (ref 13–44)
MCHC RBC-ENTMCNC: 29.3 PG — SIGNIFICANT CHANGE UP (ref 27–34)
MCHC RBC-ENTMCNC: 32.7 G/DL — SIGNIFICANT CHANGE UP (ref 32–36)
MCV RBC AUTO: 89.6 FL — SIGNIFICANT CHANGE UP (ref 80–100)
MONOCYTES # BLD AUTO: 1.23 K/UL — HIGH (ref 0–0.9)
MONOCYTES NFR BLD AUTO: 10.3 % — SIGNIFICANT CHANGE UP (ref 2–14)
NEUTROPHILS # BLD AUTO: 10.13 K/UL — HIGH (ref 1.8–7.4)
NEUTROPHILS NFR BLD AUTO: 84.3 % — HIGH (ref 43–77)
NITRITE UR-MCNC: NEGATIVE — SIGNIFICANT CHANGE UP
NRBC # BLD AUTO: 0 K/UL — SIGNIFICANT CHANGE UP (ref 0–0)
NRBC # FLD: 0 K/UL — SIGNIFICANT CHANGE UP (ref 0–0)
NRBC BLD AUTO-RTO: 0 /100 WBCS — SIGNIFICANT CHANGE UP (ref 0–0)
PH UR: 5.5 — SIGNIFICANT CHANGE UP (ref 5–8)
PLATELET # BLD AUTO: 395 K/UL — SIGNIFICANT CHANGE UP (ref 150–400)
PMV BLD: 9.3 FL — SIGNIFICANT CHANGE UP (ref 7–13)
POTASSIUM SERPL-MCNC: 4.1 MMOL/L — SIGNIFICANT CHANGE UP (ref 3.5–5.3)
POTASSIUM SERPL-SCNC: 4.1 MMOL/L — SIGNIFICANT CHANGE UP (ref 3.5–5.3)
PROT SERPL-MCNC: 6.3 G/DL — SIGNIFICANT CHANGE UP (ref 6–8.3)
PROT UR-MCNC: 30 MG/DL
PROTHROM AB SERPL-ACNC: 12.2 SEC — SIGNIFICANT CHANGE UP (ref 9.9–13.4)
RBC # BLD: 4.03 M/UL — SIGNIFICANT CHANGE UP (ref 3.8–5.2)
RBC # FLD: 15.6 % — HIGH (ref 10.3–14.5)
RBC CASTS # UR COMP ASSIST: 1 /HPF — SIGNIFICANT CHANGE UP (ref 0–4)
RSV RNA NPH QL NAA+NON-PROBE: SIGNIFICANT CHANGE UP
SARS-COV-2 RNA SPEC QL NAA+PROBE: SIGNIFICANT CHANGE UP
SODIUM SERPL-SCNC: 137 MMOL/L — SIGNIFICANT CHANGE UP (ref 135–145)
SOURCE RESPIRATORY: SIGNIFICANT CHANGE UP
SP GR SPEC: 1.02 — SIGNIFICANT CHANGE UP (ref 1–1.03)
TROPONIN I, HIGH SENSITIVITY RESULT: 13.6 NG/L — SIGNIFICANT CHANGE UP
UROBILINOGEN FLD QL: 0.2 MG/DL — SIGNIFICANT CHANGE UP (ref 0.2–1)
WBC # BLD: 12 K/UL — HIGH (ref 3.8–10.5)
WBC # FLD AUTO: 12 K/UL — HIGH (ref 3.8–10.5)
WBC UR QL: 1 /HPF — SIGNIFICANT CHANGE UP (ref 0–5)

## 2025-08-24 PROCEDURE — 71045 X-RAY EXAM CHEST 1 VIEW: CPT | Mod: 26

## 2025-08-24 PROCEDURE — 71045 X-RAY EXAM CHEST 1 VIEW: CPT

## 2025-08-24 PROCEDURE — 93005 ELECTROCARDIOGRAM TRACING: CPT

## 2025-08-24 PROCEDURE — 71250 CT THORAX DX C-: CPT | Mod: 26

## 2025-08-24 PROCEDURE — 71250 CT THORAX DX C-: CPT

## 2025-08-24 PROCEDURE — 85610 PROTHROMBIN TIME: CPT

## 2025-08-24 PROCEDURE — 99285 EMERGENCY DEPT VISIT HI MDM: CPT

## 2025-08-24 PROCEDURE — 36415 COLL VENOUS BLD VENIPUNCTURE: CPT

## 2025-08-24 PROCEDURE — 70450 CT HEAD/BRAIN W/O DYE: CPT

## 2025-08-24 PROCEDURE — 85025 COMPLETE CBC W/AUTO DIFF WBC: CPT

## 2025-08-24 PROCEDURE — 84484 ASSAY OF TROPONIN QUANT: CPT

## 2025-08-24 PROCEDURE — 80053 COMPREHEN METABOLIC PANEL: CPT

## 2025-08-24 PROCEDURE — 70450 CT HEAD/BRAIN W/O DYE: CPT | Mod: 26

## 2025-08-24 PROCEDURE — 87637 SARSCOV2&INF A&B&RSV AMP PRB: CPT

## 2025-08-24 PROCEDURE — 83605 ASSAY OF LACTIC ACID: CPT

## 2025-08-24 PROCEDURE — 87040 BLOOD CULTURE FOR BACTERIA: CPT

## 2025-08-24 PROCEDURE — 81001 URINALYSIS AUTO W/SCOPE: CPT

## 2025-08-24 PROCEDURE — 85730 THROMBOPLASTIN TIME PARTIAL: CPT

## 2025-08-24 RX ORDER — IPRATROPIUM BROMIDE AND ALBUTEROL SULFATE .5; 2.5 MG/3ML; MG/3ML
3 SOLUTION RESPIRATORY (INHALATION) EVERY 6 HOURS
Refills: 0 | Status: DISCONTINUED | OUTPATIENT
Start: 2025-08-24 | End: 2025-08-25

## 2025-08-24 RX ORDER — DOCUSATE SODIUM 100 MG
1 CAPSULE ORAL
Refills: 0 | DISCHARGE

## 2025-08-24 RX ORDER — ATORVASTATIN CALCIUM 80 MG/1
10 TABLET, FILM COATED ORAL AT BEDTIME
Refills: 0 | Status: DISCONTINUED | OUTPATIENT
Start: 2025-08-24 | End: 2025-08-28

## 2025-08-24 RX ORDER — AZITHROMYCIN 250 MG
CAPSULE ORAL
Refills: 0 | Status: DISCONTINUED | OUTPATIENT
Start: 2025-08-24 | End: 2025-08-27

## 2025-08-24 RX ORDER — AZITHROMYCIN 250 MG
500 CAPSULE ORAL EVERY 24 HOURS
Refills: 0 | Status: DISCONTINUED | OUTPATIENT
Start: 2025-08-25 | End: 2025-08-27

## 2025-08-24 RX ORDER — SERTRALINE 100 MG/1
1 TABLET, FILM COATED ORAL
Refills: 0 | DISCHARGE

## 2025-08-24 RX ORDER — CEFTRIAXONE 500 MG/1
1000 INJECTION, POWDER, FOR SOLUTION INTRAMUSCULAR; INTRAVENOUS ONCE
Refills: 0 | Status: COMPLETED | OUTPATIENT
Start: 2025-08-24 | End: 2025-08-24

## 2025-08-24 RX ORDER — SERTRALINE 100 MG/1
25 TABLET, FILM COATED ORAL DAILY
Refills: 0 | Status: DISCONTINUED | OUTPATIENT
Start: 2025-08-24 | End: 2025-08-28

## 2025-08-24 RX ORDER — HEPARIN SODIUM 1000 [USP'U]/ML
5000 INJECTION INTRAVENOUS; SUBCUTANEOUS EVERY 8 HOURS
Refills: 0 | Status: DISCONTINUED | OUTPATIENT
Start: 2025-08-24 | End: 2025-08-27

## 2025-08-24 RX ORDER — DONEPEZIL HYDROCHLORIDE 5 MG/1
5 TABLET ORAL AT BEDTIME
Refills: 0 | Status: DISCONTINUED | OUTPATIENT
Start: 2025-08-24 | End: 2025-08-28

## 2025-08-24 RX ORDER — CYST/ALA/Q10/PHOS.SER/DHA/BROC 100-20-50
1 POWDER (GRAM) ORAL
Refills: 0 | DISCHARGE

## 2025-08-24 RX ORDER — DILTIAZEM HYDROCHLORIDE 240 MG/1
240 TABLET, EXTENDED RELEASE ORAL DAILY
Refills: 0 | Status: DISCONTINUED | OUTPATIENT
Start: 2025-08-24 | End: 2025-08-25

## 2025-08-24 RX ORDER — AZITHROMYCIN 250 MG
500 CAPSULE ORAL ONCE
Refills: 0 | Status: COMPLETED | OUTPATIENT
Start: 2025-08-24 | End: 2025-08-24

## 2025-08-24 RX ORDER — CEFTRIAXONE 500 MG/1
1000 INJECTION, POWDER, FOR SOLUTION INTRAMUSCULAR; INTRAVENOUS EVERY 24 HOURS
Refills: 0 | Status: DISCONTINUED | OUTPATIENT
Start: 2025-08-25 | End: 2025-08-27

## 2025-08-24 RX ADMIN — CEFTRIAXONE 1000 MILLIGRAM(S): 500 INJECTION, POWDER, FOR SOLUTION INTRAMUSCULAR; INTRAVENOUS at 12:47

## 2025-08-24 RX ADMIN — ATORVASTATIN CALCIUM 10 MILLIGRAM(S): 80 TABLET, FILM COATED ORAL at 22:06

## 2025-08-24 RX ADMIN — Medication 1550 MILLILITER(S): at 11:55

## 2025-08-24 RX ADMIN — HEPARIN SODIUM 5000 UNIT(S): 1000 INJECTION INTRAVENOUS; SUBCUTANEOUS at 22:06

## 2025-08-24 RX ADMIN — CEFTRIAXONE 100 MILLIGRAM(S): 500 INJECTION, POWDER, FOR SOLUTION INTRAMUSCULAR; INTRAVENOUS at 12:21

## 2025-08-24 RX ADMIN — DONEPEZIL HYDROCHLORIDE 5 MILLIGRAM(S): 5 TABLET ORAL at 22:06

## 2025-08-24 RX ADMIN — Medication 250 MILLIGRAM(S): at 15:25

## 2025-08-25 LAB
ALBUMIN SERPL ELPH-MCNC: 2.3 G/DL — LOW (ref 3.3–5)
ALP SERPL-CCNC: 641 U/L — HIGH (ref 40–120)
ALT FLD-CCNC: 70 U/L — SIGNIFICANT CHANGE UP (ref 12–78)
ANION GAP SERPL CALC-SCNC: 8 MMOL/L — SIGNIFICANT CHANGE UP (ref 5–17)
AST SERPL-CCNC: 53 U/L — HIGH (ref 15–37)
BILIRUB SERPL-MCNC: 0.3 MG/DL — SIGNIFICANT CHANGE UP (ref 0.2–1.2)
BUN SERPL-MCNC: 28 MG/DL — HIGH (ref 7–23)
CALCIUM SERPL-MCNC: 8.6 MG/DL — SIGNIFICANT CHANGE UP (ref 8.5–10.1)
CHLORIDE SERPL-SCNC: 106 MMOL/L — SIGNIFICANT CHANGE UP (ref 96–108)
CO2 SERPL-SCNC: 23 MMOL/L — SIGNIFICANT CHANGE UP (ref 22–31)
CREAT SERPL-MCNC: 0.82 MG/DL — SIGNIFICANT CHANGE UP (ref 0.5–1.3)
EGFR: 67 ML/MIN/1.73M2 — SIGNIFICANT CHANGE UP
EGFR: 67 ML/MIN/1.73M2 — SIGNIFICANT CHANGE UP
GLUCOSE SERPL-MCNC: 106 MG/DL — HIGH (ref 70–99)
HCT VFR BLD CALC: 35.7 % — SIGNIFICANT CHANGE UP (ref 34.5–45)
HGB BLD-MCNC: 11.4 G/DL — LOW (ref 11.5–15.5)
LDH SERPL L TO P-CCNC: 332 U/L — HIGH (ref 50–242)
MCHC RBC-ENTMCNC: 28.9 PG — SIGNIFICANT CHANGE UP (ref 27–34)
MCHC RBC-ENTMCNC: 31.9 G/DL — LOW (ref 32–36)
MCV RBC AUTO: 90.6 FL — SIGNIFICANT CHANGE UP (ref 80–100)
NRBC # BLD AUTO: 0 K/UL — SIGNIFICANT CHANGE UP (ref 0–0)
NRBC # FLD: 0 K/UL — SIGNIFICANT CHANGE UP (ref 0–0)
NRBC BLD AUTO-RTO: 0 /100 WBCS — SIGNIFICANT CHANGE UP (ref 0–0)
NT-PROBNP SERPL-SCNC: 2897 PG/ML — HIGH (ref 0–450)
PLATELET # BLD AUTO: 428 K/UL — HIGH (ref 150–400)
PMV BLD: 9.8 FL — SIGNIFICANT CHANGE UP (ref 7–13)
POTASSIUM SERPL-MCNC: 4.1 MMOL/L — SIGNIFICANT CHANGE UP (ref 3.5–5.3)
POTASSIUM SERPL-SCNC: 4.1 MMOL/L — SIGNIFICANT CHANGE UP (ref 3.5–5.3)
PROT SERPL-MCNC: 6.3 G/DL — SIGNIFICANT CHANGE UP (ref 6–8.3)
RBC # BLD: 3.94 M/UL — SIGNIFICANT CHANGE UP (ref 3.8–5.2)
RBC # FLD: 15.6 % — HIGH (ref 10.3–14.5)
SODIUM SERPL-SCNC: 137 MMOL/L — SIGNIFICANT CHANGE UP (ref 135–145)
WBC # BLD: 12.95 K/UL — HIGH (ref 3.8–10.5)
WBC # FLD AUTO: 12.95 K/UL — HIGH (ref 3.8–10.5)

## 2025-08-25 PROCEDURE — 71045 X-RAY EXAM CHEST 1 VIEW: CPT

## 2025-08-25 PROCEDURE — 94640 AIRWAY INHALATION TREATMENT: CPT

## 2025-08-25 PROCEDURE — 94760 N-INVAS EAR/PLS OXIMETRY 1: CPT

## 2025-08-25 PROCEDURE — 70450 CT HEAD/BRAIN W/O DYE: CPT

## 2025-08-25 PROCEDURE — 81001 URINALYSIS AUTO W/SCOPE: CPT

## 2025-08-25 PROCEDURE — 83615 LACTATE (LD) (LDH) ENZYME: CPT

## 2025-08-25 PROCEDURE — 84484 ASSAY OF TROPONIN QUANT: CPT

## 2025-08-25 PROCEDURE — 83880 ASSAY OF NATRIURETIC PEPTIDE: CPT

## 2025-08-25 PROCEDURE — 93010 ELECTROCARDIOGRAM REPORT: CPT

## 2025-08-25 PROCEDURE — 85610 PROTHROMBIN TIME: CPT

## 2025-08-25 PROCEDURE — 83605 ASSAY OF LACTIC ACID: CPT

## 2025-08-25 PROCEDURE — 93005 ELECTROCARDIOGRAM TRACING: CPT

## 2025-08-25 PROCEDURE — 85730 THROMBOPLASTIN TIME PARTIAL: CPT

## 2025-08-25 PROCEDURE — 87637 SARSCOV2&INF A&B&RSV AMP PRB: CPT

## 2025-08-25 PROCEDURE — 99223 1ST HOSP IP/OBS HIGH 75: CPT

## 2025-08-25 PROCEDURE — 71250 CT THORAX DX C-: CPT

## 2025-08-25 PROCEDURE — 87040 BLOOD CULTURE FOR BACTERIA: CPT

## 2025-08-25 PROCEDURE — 36415 COLL VENOUS BLD VENIPUNCTURE: CPT

## 2025-08-25 PROCEDURE — 93970 EXTREMITY STUDY: CPT | Mod: 26

## 2025-08-25 PROCEDURE — 80053 COMPREHEN METABOLIC PANEL: CPT

## 2025-08-25 PROCEDURE — 85027 COMPLETE CBC AUTOMATED: CPT

## 2025-08-25 PROCEDURE — 93970 EXTREMITY STUDY: CPT

## 2025-08-25 PROCEDURE — 85025 COMPLETE CBC W/AUTO DIFF WBC: CPT

## 2025-08-25 RX ORDER — ACETAMINOPHEN 500 MG/5ML
650 LIQUID (ML) ORAL EVERY 6 HOURS
Refills: 0 | Status: DISCONTINUED | OUTPATIENT
Start: 2025-08-25 | End: 2025-08-28

## 2025-08-25 RX ORDER — DILTIAZEM HYDROCHLORIDE 240 MG/1
360 TABLET, EXTENDED RELEASE ORAL DAILY
Refills: 0 | Status: DISCONTINUED | OUTPATIENT
Start: 2025-08-26 | End: 2025-08-28

## 2025-08-25 RX ORDER — CYST/ALA/Q10/PHOS.SER/DHA/BROC 100-20-50
1 POWDER (GRAM) ORAL DAILY
Refills: 0 | Status: DISCONTINUED | OUTPATIENT
Start: 2025-08-25 | End: 2025-08-28

## 2025-08-25 RX ORDER — DILTIAZEM HYDROCHLORIDE 240 MG/1
120 TABLET, EXTENDED RELEASE ORAL ONCE
Refills: 0 | Status: COMPLETED | OUTPATIENT
Start: 2025-08-25 | End: 2025-08-25

## 2025-08-25 RX ORDER — HALOPERIDOL 10 MG/1
1 TABLET ORAL EVERY 6 HOURS
Refills: 0 | Status: DISCONTINUED | OUTPATIENT
Start: 2025-08-25 | End: 2025-08-28

## 2025-08-25 RX ORDER — DILTIAZEM HYDROCHLORIDE 240 MG/1
300 TABLET, EXTENDED RELEASE ORAL DAILY
Refills: 0 | Status: DISCONTINUED | OUTPATIENT
Start: 2025-08-26 | End: 2025-08-25

## 2025-08-25 RX ORDER — ALPRAZOLAM 0.5 MG
0.25 TABLET, EXTENDED RELEASE 24 HR ORAL THREE TIMES A DAY
Refills: 0 | Status: DISCONTINUED | OUTPATIENT
Start: 2025-08-25 | End: 2025-08-28

## 2025-08-25 RX ADMIN — DONEPEZIL HYDROCHLORIDE 5 MILLIGRAM(S): 5 TABLET ORAL at 21:51

## 2025-08-25 RX ADMIN — SERTRALINE 25 MILLIGRAM(S): 100 TABLET, FILM COATED ORAL at 12:01

## 2025-08-25 RX ADMIN — DILTIAZEM HYDROCHLORIDE 240 MILLIGRAM(S): 240 TABLET, EXTENDED RELEASE ORAL at 05:34

## 2025-08-25 RX ADMIN — HEPARIN SODIUM 5000 UNIT(S): 1000 INJECTION INTRAVENOUS; SUBCUTANEOUS at 21:50

## 2025-08-25 RX ADMIN — Medication 1000 UNIT(S): at 12:02

## 2025-08-25 RX ADMIN — Medication 1 TABLET(S): at 12:02

## 2025-08-25 RX ADMIN — Medication 0.25 MILLIGRAM(S): at 16:33

## 2025-08-25 RX ADMIN — HEPARIN SODIUM 5000 UNIT(S): 1000 INJECTION INTRAVENOUS; SUBCUTANEOUS at 13:57

## 2025-08-25 RX ADMIN — HEPARIN SODIUM 5000 UNIT(S): 1000 INJECTION INTRAVENOUS; SUBCUTANEOUS at 05:34

## 2025-08-25 RX ADMIN — Medication 0.25 MILLIGRAM(S): at 20:51

## 2025-08-25 RX ADMIN — IPRATROPIUM BROMIDE AND ALBUTEROL SULFATE 3 MILLILITER(S): .5; 2.5 SOLUTION RESPIRATORY (INHALATION) at 08:10

## 2025-08-25 RX ADMIN — ATORVASTATIN CALCIUM 10 MILLIGRAM(S): 80 TABLET, FILM COATED ORAL at 21:50

## 2025-08-25 RX ADMIN — DILTIAZEM HYDROCHLORIDE 120 MILLIGRAM(S): 240 TABLET, EXTENDED RELEASE ORAL at 17:28

## 2025-08-25 RX ADMIN — CEFTRIAXONE 100 MILLIGRAM(S): 500 INJECTION, POWDER, FOR SOLUTION INTRAMUSCULAR; INTRAVENOUS at 12:01

## 2025-08-25 RX ADMIN — Medication 250 MILLIGRAM(S): at 13:57

## 2025-08-26 ENCOUNTER — RESULT REVIEW (OUTPATIENT)
Age: 89
End: 2025-08-26

## 2025-08-26 LAB
ALBUMIN FLD-MCNC: 2.1 G/DL — SIGNIFICANT CHANGE UP
B PERT IGG+IGM PNL SER: ABNORMAL
COLOR FLD: YELLOW — SIGNIFICANT CHANGE UP
FLUID INTAKE SUBSTANCE CLASS: SIGNIFICANT CHANGE UP
GLUCOSE FLD-MCNC: 108 MG/DL — SIGNIFICANT CHANGE UP
GRAM STN FLD: SIGNIFICANT CHANGE UP
HCT VFR BLD CALC: 31.9 % — LOW (ref 34.5–45)
HGB BLD-MCNC: 10.5 G/DL — LOW (ref 11.5–15.5)
LDH SERPL L TO P-CCNC: 199 U/L — SIGNIFICANT CHANGE UP
LEGIONELLA AG UR QL: NEGATIVE — SIGNIFICANT CHANGE UP
LYMPHOCYTES # FLD: 14 % — SIGNIFICANT CHANGE UP
MCHC RBC-ENTMCNC: 29 PG — SIGNIFICANT CHANGE UP (ref 27–34)
MCHC RBC-ENTMCNC: 32.9 G/DL — SIGNIFICANT CHANGE UP (ref 32–36)
MCV RBC AUTO: 88.1 FL — SIGNIFICANT CHANGE UP (ref 80–100)
MESOTHL CELL # FLD: SIGNIFICANT CHANGE UP
MONOS+MACROS # FLD: 7 % — SIGNIFICANT CHANGE UP
NEUTROPHILS-BODY FLUID: 79 % — SIGNIFICANT CHANGE UP
NRBC # BLD AUTO: 0 K/UL — SIGNIFICANT CHANGE UP (ref 0–0)
NRBC # FLD: 0 K/UL — SIGNIFICANT CHANGE UP (ref 0–0)
NRBC BLD AUTO-RTO: 0 /100 WBCS — SIGNIFICANT CHANGE UP (ref 0–0)
PH FLD: 7.6 — SIGNIFICANT CHANGE UP
PLATELET # BLD AUTO: 316 K/UL — SIGNIFICANT CHANGE UP (ref 150–400)
PMV BLD: 9.4 FL — SIGNIFICANT CHANGE UP (ref 7–13)
PROT FLD-MCNC: 3.1 G/DL — SIGNIFICANT CHANGE UP
RBC # BLD: 3.62 M/UL — LOW (ref 3.8–5.2)
RBC # FLD: 15.5 % — HIGH (ref 10.3–14.5)
RCV VOL RI: 3000 CELLS/UL — SIGNIFICANT CHANGE UP
S PNEUM AG UR QL: NEGATIVE — SIGNIFICANT CHANGE UP
SPECIMEN SOURCE: SIGNIFICANT CHANGE UP
TOTAL NUCLEATED CELL COUNT, BODY FLUID: 3410 CELLS/UL — SIGNIFICANT CHANGE UP
TUBE TYPE: SIGNIFICANT CHANGE UP
WBC # BLD: 9.69 K/UL — SIGNIFICANT CHANGE UP (ref 3.8–10.5)
WBC # FLD AUTO: 9.69 K/UL — SIGNIFICANT CHANGE UP (ref 3.8–10.5)
WBC COUNT.: 3391 CELLS/UL — SIGNIFICANT CHANGE UP

## 2025-08-26 PROCEDURE — 32555 ASPIRATE PLEURA W/ IMAGING: CPT

## 2025-08-26 PROCEDURE — 93306 TTE W/DOPPLER COMPLETE: CPT | Mod: 26

## 2025-08-26 PROCEDURE — 71045 X-RAY EXAM CHEST 1 VIEW: CPT | Mod: 26

## 2025-08-26 PROCEDURE — 87070 CULTURE OTHR SPECIMN AEROBIC: CPT

## 2025-08-26 PROCEDURE — 89051 BODY FLUID CELL COUNT: CPT

## 2025-08-26 PROCEDURE — 70450 CT HEAD/BRAIN W/O DYE: CPT

## 2025-08-26 PROCEDURE — 71250 CT THORAX DX C-: CPT

## 2025-08-26 PROCEDURE — 83615 LACTATE (LD) (LDH) ENZYME: CPT

## 2025-08-26 PROCEDURE — 84157 ASSAY OF PROTEIN OTHER: CPT

## 2025-08-26 PROCEDURE — 83986 ASSAY PH BODY FLUID NOS: CPT

## 2025-08-26 PROCEDURE — 85027 COMPLETE CBC AUTOMATED: CPT

## 2025-08-26 PROCEDURE — 94760 N-INVAS EAR/PLS OXIMETRY 1: CPT

## 2025-08-26 PROCEDURE — 93005 ELECTROCARDIOGRAM TRACING: CPT

## 2025-08-26 PROCEDURE — 97166 OT EVAL MOD COMPLEX 45 MIN: CPT

## 2025-08-26 PROCEDURE — 88112 CYTOPATH CELL ENHANCE TECH: CPT | Mod: 26

## 2025-08-26 PROCEDURE — 87102 FUNGUS ISOLATION CULTURE: CPT

## 2025-08-26 PROCEDURE — 85610 PROTHROMBIN TIME: CPT

## 2025-08-26 PROCEDURE — C1729: CPT

## 2025-08-26 PROCEDURE — 80053 COMPREHEN METABOLIC PANEL: CPT

## 2025-08-26 PROCEDURE — 36415 COLL VENOUS BLD VENIPUNCTURE: CPT

## 2025-08-26 PROCEDURE — 83880 ASSAY OF NATRIURETIC PEPTIDE: CPT

## 2025-08-26 PROCEDURE — 85730 THROMBOPLASTIN TIME PARTIAL: CPT

## 2025-08-26 PROCEDURE — 81001 URINALYSIS AUTO W/SCOPE: CPT

## 2025-08-26 PROCEDURE — 97162 PT EVAL MOD COMPLEX 30 MIN: CPT

## 2025-08-26 PROCEDURE — 82945 GLUCOSE OTHER FLUID: CPT

## 2025-08-26 PROCEDURE — 84484 ASSAY OF TROPONIN QUANT: CPT

## 2025-08-26 PROCEDURE — 87899 AGENT NOS ASSAY W/OPTIC: CPT

## 2025-08-26 PROCEDURE — 87637 SARSCOV2&INF A&B&RSV AMP PRB: CPT

## 2025-08-26 PROCEDURE — 87040 BLOOD CULTURE FOR BACTERIA: CPT

## 2025-08-26 PROCEDURE — 99233 SBSQ HOSP IP/OBS HIGH 50: CPT

## 2025-08-26 PROCEDURE — 32555 ASPIRATE PLEURA W/ IMAGING: CPT | Mod: RT

## 2025-08-26 PROCEDURE — 94640 AIRWAY INHALATION TREATMENT: CPT

## 2025-08-26 PROCEDURE — 72040 X-RAY EXAM NECK SPINE 2-3 VW: CPT

## 2025-08-26 PROCEDURE — 85025 COMPLETE CBC W/AUTO DIFF WBC: CPT

## 2025-08-26 PROCEDURE — 93970 EXTREMITY STUDY: CPT

## 2025-08-26 PROCEDURE — 88305 TISSUE EXAM BY PATHOLOGIST: CPT | Mod: 26

## 2025-08-26 PROCEDURE — 82042 OTHER SOURCE ALBUMIN QUAN EA: CPT

## 2025-08-26 PROCEDURE — 71045 X-RAY EXAM CHEST 1 VIEW: CPT

## 2025-08-26 PROCEDURE — 83605 ASSAY OF LACTIC ACID: CPT

## 2025-08-26 PROCEDURE — 72040 X-RAY EXAM NECK SPINE 2-3 VW: CPT | Mod: 26

## 2025-08-26 RX ORDER — FUROSEMIDE 10 MG/ML
20 INJECTION INTRAMUSCULAR; INTRAVENOUS EVERY 24 HOURS
Refills: 0 | Status: DISCONTINUED | OUTPATIENT
Start: 2025-08-26 | End: 2025-08-28

## 2025-08-26 RX ORDER — LIDOCAINE HYDROCHLORIDE 20 MG/ML
1 JELLY TOPICAL DAILY
Refills: 0 | Status: DISCONTINUED | OUTPATIENT
Start: 2025-08-26 | End: 2025-08-28

## 2025-08-26 RX ADMIN — HEPARIN SODIUM 5000 UNIT(S): 1000 INJECTION INTRAVENOUS; SUBCUTANEOUS at 05:55

## 2025-08-26 RX ADMIN — ATORVASTATIN CALCIUM 10 MILLIGRAM(S): 80 TABLET, FILM COATED ORAL at 22:12

## 2025-08-26 RX ADMIN — LIDOCAINE HYDROCHLORIDE 1 PATCH: 20 JELLY TOPICAL at 13:53

## 2025-08-26 RX ADMIN — FUROSEMIDE 20 MILLIGRAM(S): 10 INJECTION INTRAMUSCULAR; INTRAVENOUS at 13:52

## 2025-08-26 RX ADMIN — Medication 250 MILLIGRAM(S): at 13:12

## 2025-08-26 RX ADMIN — Medication 650 MILLIGRAM(S): at 14:30

## 2025-08-26 RX ADMIN — HEPARIN SODIUM 5000 UNIT(S): 1000 INJECTION INTRAVENOUS; SUBCUTANEOUS at 22:15

## 2025-08-26 RX ADMIN — DONEPEZIL HYDROCHLORIDE 5 MILLIGRAM(S): 5 TABLET ORAL at 22:12

## 2025-08-26 RX ADMIN — Medication 0.25 MILLIGRAM(S): at 18:30

## 2025-08-26 RX ADMIN — HEPARIN SODIUM 5000 UNIT(S): 1000 INJECTION INTRAVENOUS; SUBCUTANEOUS at 13:13

## 2025-08-26 RX ADMIN — Medication 1000 UNIT(S): at 12:10

## 2025-08-26 RX ADMIN — CEFTRIAXONE 100 MILLIGRAM(S): 500 INJECTION, POWDER, FOR SOLUTION INTRAMUSCULAR; INTRAVENOUS at 12:10

## 2025-08-26 RX ADMIN — SERTRALINE 25 MILLIGRAM(S): 100 TABLET, FILM COATED ORAL at 12:11

## 2025-08-26 RX ADMIN — DILTIAZEM HYDROCHLORIDE 360 MILLIGRAM(S): 240 TABLET, EXTENDED RELEASE ORAL at 05:55

## 2025-08-26 RX ADMIN — Medication 1 TABLET(S): at 12:10

## 2025-08-26 RX ADMIN — Medication 650 MILLIGRAM(S): at 13:50

## 2025-08-27 LAB
ANION GAP SERPL CALC-SCNC: 8 MMOL/L — SIGNIFICANT CHANGE UP (ref 5–17)
BUN SERPL-MCNC: 24 MG/DL — HIGH (ref 7–23)
CALCIUM SERPL-MCNC: 8.1 MG/DL — LOW (ref 8.5–10.1)
CHLORIDE SERPL-SCNC: 107 MMOL/L — SIGNIFICANT CHANGE UP (ref 96–108)
CO2 SERPL-SCNC: 25 MMOL/L — SIGNIFICANT CHANGE UP (ref 22–31)
CREAT SERPL-MCNC: 0.56 MG/DL — SIGNIFICANT CHANGE UP (ref 0.5–1.3)
EGFR: 86 ML/MIN/1.73M2 — SIGNIFICANT CHANGE UP
EGFR: 86 ML/MIN/1.73M2 — SIGNIFICANT CHANGE UP
GLUCOSE SERPL-MCNC: 105 MG/DL — HIGH (ref 70–99)
HCT VFR BLD CALC: 34.5 % — SIGNIFICANT CHANGE UP (ref 34.5–45)
HGB BLD-MCNC: 10.9 G/DL — LOW (ref 11.5–15.5)
MCHC RBC-ENTMCNC: 28 PG — SIGNIFICANT CHANGE UP (ref 27–34)
MCHC RBC-ENTMCNC: 31.6 G/DL — LOW (ref 32–36)
MCV RBC AUTO: 88.7 FL — SIGNIFICANT CHANGE UP (ref 80–100)
NRBC # BLD AUTO: 0 K/UL — SIGNIFICANT CHANGE UP (ref 0–0)
NRBC # FLD: 0 K/UL — SIGNIFICANT CHANGE UP (ref 0–0)
NRBC BLD AUTO-RTO: 0 /100 WBCS — SIGNIFICANT CHANGE UP (ref 0–0)
PLATELET # BLD AUTO: 326 K/UL — SIGNIFICANT CHANGE UP (ref 150–400)
PMV BLD: 9.2 FL — SIGNIFICANT CHANGE UP (ref 7–13)
POTASSIUM SERPL-MCNC: 3.7 MMOL/L — SIGNIFICANT CHANGE UP (ref 3.5–5.3)
POTASSIUM SERPL-SCNC: 3.7 MMOL/L — SIGNIFICANT CHANGE UP (ref 3.5–5.3)
RBC # BLD: 3.89 M/UL — SIGNIFICANT CHANGE UP (ref 3.8–5.2)
RBC # FLD: 15.2 % — HIGH (ref 10.3–14.5)
SODIUM SERPL-SCNC: 140 MMOL/L — SIGNIFICANT CHANGE UP (ref 135–145)
WBC # BLD: 8.43 K/UL — SIGNIFICANT CHANGE UP (ref 3.8–10.5)
WBC # FLD AUTO: 8.43 K/UL — SIGNIFICANT CHANGE UP (ref 3.8–10.5)

## 2025-08-27 PROCEDURE — 99232 SBSQ HOSP IP/OBS MODERATE 35: CPT

## 2025-08-27 RX ORDER — CEFUROXIME SODIUM 1.5 G
500 VIAL (EA) INJECTION EVERY 12 HOURS
Refills: 0 | Status: DISCONTINUED | OUTPATIENT
Start: 2025-08-28 | End: 2025-08-28

## 2025-08-27 RX ORDER — APIXABAN 2.5 MG/1
2.5 TABLET, FILM COATED ORAL
Refills: 0 | Status: DISCONTINUED | OUTPATIENT
Start: 2025-08-28 | End: 2025-08-28

## 2025-08-27 RX ORDER — CEFTRIAXONE 500 MG/1
1000 INJECTION, POWDER, FOR SOLUTION INTRAMUSCULAR; INTRAVENOUS ONCE
Refills: 0 | Status: COMPLETED | OUTPATIENT
Start: 2025-08-27 | End: 2025-08-27

## 2025-08-27 RX ADMIN — LIDOCAINE HYDROCHLORIDE 1 PATCH: 20 JELLY TOPICAL at 20:05

## 2025-08-27 RX ADMIN — DILTIAZEM HYDROCHLORIDE 360 MILLIGRAM(S): 240 TABLET, EXTENDED RELEASE ORAL at 05:56

## 2025-08-27 RX ADMIN — Medication 650 MILLIGRAM(S): at 22:21

## 2025-08-27 RX ADMIN — ATORVASTATIN CALCIUM 10 MILLIGRAM(S): 80 TABLET, FILM COATED ORAL at 21:22

## 2025-08-27 RX ADMIN — LIDOCAINE HYDROCHLORIDE 1 PATCH: 20 JELLY TOPICAL at 11:34

## 2025-08-27 RX ADMIN — Medication 0.25 MILLIGRAM(S): at 15:48

## 2025-08-27 RX ADMIN — Medication 1 TABLET(S): at 11:34

## 2025-08-27 RX ADMIN — Medication 650 MILLIGRAM(S): at 23:31

## 2025-08-27 RX ADMIN — SERTRALINE 25 MILLIGRAM(S): 100 TABLET, FILM COATED ORAL at 11:34

## 2025-08-27 RX ADMIN — CEFTRIAXONE 100 MILLIGRAM(S): 500 INJECTION, POWDER, FOR SOLUTION INTRAMUSCULAR; INTRAVENOUS at 08:57

## 2025-08-27 RX ADMIN — FUROSEMIDE 20 MILLIGRAM(S): 10 INJECTION INTRAMUSCULAR; INTRAVENOUS at 13:39

## 2025-08-27 RX ADMIN — LIDOCAINE HYDROCHLORIDE 1 PATCH: 20 JELLY TOPICAL at 23:44

## 2025-08-27 RX ADMIN — HEPARIN SODIUM 5000 UNIT(S): 1000 INJECTION INTRAVENOUS; SUBCUTANEOUS at 05:56

## 2025-08-27 RX ADMIN — DONEPEZIL HYDROCHLORIDE 5 MILLIGRAM(S): 5 TABLET ORAL at 21:22

## 2025-08-27 RX ADMIN — Medication 1000 UNIT(S): at 11:34

## 2025-08-28 ENCOUNTER — TRANSCRIPTION ENCOUNTER (OUTPATIENT)
Age: 89
End: 2025-08-28

## 2025-08-28 VITALS
RESPIRATION RATE: 18 BRPM | TEMPERATURE: 98 F | DIASTOLIC BLOOD PRESSURE: 72 MMHG | OXYGEN SATURATION: 95 % | HEART RATE: 103 BPM | SYSTOLIC BLOOD PRESSURE: 114 MMHG

## 2025-08-28 LAB
ANION GAP SERPL CALC-SCNC: 8 MMOL/L — SIGNIFICANT CHANGE UP (ref 5–17)
BUN SERPL-MCNC: 25 MG/DL — HIGH (ref 7–23)
CALCIUM SERPL-MCNC: 8.6 MG/DL — SIGNIFICANT CHANGE UP (ref 8.5–10.1)
CHLORIDE SERPL-SCNC: 104 MMOL/L — SIGNIFICANT CHANGE UP (ref 96–108)
CO2 SERPL-SCNC: 28 MMOL/L — SIGNIFICANT CHANGE UP (ref 22–31)
CREAT SERPL-MCNC: 0.65 MG/DL — SIGNIFICANT CHANGE UP (ref 0.5–1.3)
EGFR: 83 ML/MIN/1.73M2 — SIGNIFICANT CHANGE UP
EGFR: 83 ML/MIN/1.73M2 — SIGNIFICANT CHANGE UP
GLUCOSE SERPL-MCNC: 112 MG/DL — HIGH (ref 70–99)
HCT VFR BLD CALC: 36.2 % — SIGNIFICANT CHANGE UP (ref 34.5–45)
HGB BLD-MCNC: 11.5 G/DL — SIGNIFICANT CHANGE UP (ref 11.5–15.5)
MCHC RBC-ENTMCNC: 28.5 PG — SIGNIFICANT CHANGE UP (ref 27–34)
MCHC RBC-ENTMCNC: 31.8 G/DL — LOW (ref 32–36)
MCV RBC AUTO: 89.6 FL — SIGNIFICANT CHANGE UP (ref 80–100)
NON-GYNECOLOGICAL CYTOLOGY STUDY: SIGNIFICANT CHANGE UP
NRBC # BLD AUTO: 0 K/UL — SIGNIFICANT CHANGE UP (ref 0–0)
NRBC # FLD: 0 K/UL — SIGNIFICANT CHANGE UP (ref 0–0)
NRBC BLD AUTO-RTO: 0 /100 WBCS — SIGNIFICANT CHANGE UP (ref 0–0)
PLATELET # BLD AUTO: 352 K/UL — SIGNIFICANT CHANGE UP (ref 150–400)
PMV BLD: 9.8 FL — SIGNIFICANT CHANGE UP (ref 7–13)
POTASSIUM SERPL-MCNC: 3.5 MMOL/L — SIGNIFICANT CHANGE UP (ref 3.5–5.3)
POTASSIUM SERPL-SCNC: 3.5 MMOL/L — SIGNIFICANT CHANGE UP (ref 3.5–5.3)
RBC # BLD: 4.04 M/UL — SIGNIFICANT CHANGE UP (ref 3.8–5.2)
RBC # FLD: 15.1 % — HIGH (ref 10.3–14.5)
SODIUM SERPL-SCNC: 140 MMOL/L — SIGNIFICANT CHANGE UP (ref 135–145)
WBC # BLD: 8.7 K/UL — SIGNIFICANT CHANGE UP (ref 3.8–10.5)
WBC # FLD AUTO: 8.7 K/UL — SIGNIFICANT CHANGE UP (ref 3.8–10.5)

## 2025-08-28 PROCEDURE — 87637 SARSCOV2&INF A&B&RSV AMP PRB: CPT

## 2025-08-28 PROCEDURE — 83605 ASSAY OF LACTIC ACID: CPT

## 2025-08-28 PROCEDURE — 85027 COMPLETE CBC AUTOMATED: CPT

## 2025-08-28 PROCEDURE — 87075 CULTR BACTERIA EXCEPT BLOOD: CPT

## 2025-08-28 PROCEDURE — 89051 BODY FLUID CELL COUNT: CPT

## 2025-08-28 PROCEDURE — 88305 TISSUE EXAM BY PATHOLOGIST: CPT

## 2025-08-28 PROCEDURE — 81001 URINALYSIS AUTO W/SCOPE: CPT

## 2025-08-28 PROCEDURE — 93306 TTE W/DOPPLER COMPLETE: CPT

## 2025-08-28 PROCEDURE — 71045 X-RAY EXAM CHEST 1 VIEW: CPT

## 2025-08-28 PROCEDURE — 72040 X-RAY EXAM NECK SPINE 2-3 VW: CPT

## 2025-08-28 PROCEDURE — 85730 THROMBOPLASTIN TIME PARTIAL: CPT

## 2025-08-28 PROCEDURE — 82042 OTHER SOURCE ALBUMIN QUAN EA: CPT

## 2025-08-28 PROCEDURE — 99285 EMERGENCY DEPT VISIT HI MDM: CPT | Mod: 25

## 2025-08-28 PROCEDURE — C1729: CPT

## 2025-08-28 PROCEDURE — 36415 COLL VENOUS BLD VENIPUNCTURE: CPT

## 2025-08-28 PROCEDURE — 87070 CULTURE OTHR SPECIMN AEROBIC: CPT

## 2025-08-28 PROCEDURE — 83880 ASSAY OF NATRIURETIC PEPTIDE: CPT

## 2025-08-28 PROCEDURE — 87102 FUNGUS ISOLATION CULTURE: CPT

## 2025-08-28 PROCEDURE — 84484 ASSAY OF TROPONIN QUANT: CPT

## 2025-08-28 PROCEDURE — 99233 SBSQ HOSP IP/OBS HIGH 50: CPT

## 2025-08-28 PROCEDURE — 85610 PROTHROMBIN TIME: CPT

## 2025-08-28 PROCEDURE — 87040 BLOOD CULTURE FOR BACTERIA: CPT

## 2025-08-28 PROCEDURE — 71250 CT THORAX DX C-: CPT

## 2025-08-28 PROCEDURE — 84157 ASSAY OF PROTEIN OTHER: CPT

## 2025-08-28 PROCEDURE — 94760 N-INVAS EAR/PLS OXIMETRY 1: CPT

## 2025-08-28 PROCEDURE — 83986 ASSAY PH BODY FLUID NOS: CPT

## 2025-08-28 PROCEDURE — 83615 LACTATE (LD) (LDH) ENZYME: CPT

## 2025-08-28 PROCEDURE — 80048 BASIC METABOLIC PNL TOTAL CA: CPT

## 2025-08-28 PROCEDURE — 97116 GAIT TRAINING THERAPY: CPT

## 2025-08-28 PROCEDURE — 85025 COMPLETE CBC W/AUTO DIFF WBC: CPT

## 2025-08-28 PROCEDURE — 88112 CYTOPATH CELL ENHANCE TECH: CPT

## 2025-08-28 PROCEDURE — 32555 ASPIRATE PLEURA W/ IMAGING: CPT

## 2025-08-28 PROCEDURE — 94640 AIRWAY INHALATION TREATMENT: CPT

## 2025-08-28 PROCEDURE — 97530 THERAPEUTIC ACTIVITIES: CPT

## 2025-08-28 PROCEDURE — 97110 THERAPEUTIC EXERCISES: CPT

## 2025-08-28 PROCEDURE — 87205 SMEAR GRAM STAIN: CPT

## 2025-08-28 PROCEDURE — 93970 EXTREMITY STUDY: CPT

## 2025-08-28 PROCEDURE — 87899 AGENT NOS ASSAY W/OPTIC: CPT

## 2025-08-28 PROCEDURE — 70450 CT HEAD/BRAIN W/O DYE: CPT

## 2025-08-28 PROCEDURE — 96365 THER/PROPH/DIAG IV INF INIT: CPT

## 2025-08-28 PROCEDURE — 97162 PT EVAL MOD COMPLEX 30 MIN: CPT

## 2025-08-28 PROCEDURE — 96375 TX/PRO/DX INJ NEW DRUG ADDON: CPT

## 2025-08-28 PROCEDURE — 82945 GLUCOSE OTHER FLUID: CPT

## 2025-08-28 PROCEDURE — 80053 COMPREHEN METABOLIC PANEL: CPT

## 2025-08-28 PROCEDURE — 97166 OT EVAL MOD COMPLEX 45 MIN: CPT

## 2025-08-28 PROCEDURE — 87015 SPECIMEN INFECT AGNT CONCNTJ: CPT

## 2025-08-28 PROCEDURE — 93005 ELECTROCARDIOGRAM TRACING: CPT

## 2025-08-28 RX ORDER — FUROSEMIDE 10 MG/ML
1 INJECTION INTRAMUSCULAR; INTRAVENOUS
Qty: 30 | Refills: 0
Start: 2025-08-28 | End: 2025-09-26

## 2025-08-28 RX ORDER — LIDOCAINE HYDROCHLORIDE 20 MG/ML
1 JELLY TOPICAL
Qty: 0 | Refills: 0 | DISCHARGE
Start: 2025-08-28

## 2025-08-28 RX ORDER — APIXABAN 2.5 MG/1
1 TABLET, FILM COATED ORAL
Qty: 0 | Refills: 0 | DISCHARGE
Start: 2025-08-28

## 2025-08-28 RX ORDER — CEFUROXIME SODIUM 1.5 G
1 VIAL (EA) INJECTION
Qty: 0 | Refills: 0 | DISCHARGE
Start: 2025-08-28

## 2025-08-28 RX ORDER — ALPRAZOLAM 0.5 MG
1 TABLET, EXTENDED RELEASE 24 HR ORAL
Qty: 0 | Refills: 0 | DISCHARGE
Start: 2025-08-28

## 2025-08-28 RX ORDER — METOPROLOL SUCCINATE 50 MG/1
1 TABLET, EXTENDED RELEASE ORAL
Qty: 0 | Refills: 0 | DISCHARGE
Start: 2025-08-28

## 2025-08-28 RX ORDER — ACETAMINOPHEN 500 MG/5ML
2 LIQUID (ML) ORAL
Qty: 0 | Refills: 0 | DISCHARGE

## 2025-08-28 RX ORDER — METOPROLOL SUCCINATE 50 MG/1
12.5 TABLET, EXTENDED RELEASE ORAL
Refills: 0 | Status: DISCONTINUED | OUTPATIENT
Start: 2025-08-28 | End: 2025-08-28

## 2025-08-28 RX ADMIN — Medication 500 MILLIGRAM(S): at 08:23

## 2025-08-28 RX ADMIN — DILTIAZEM HYDROCHLORIDE 360 MILLIGRAM(S): 240 TABLET, EXTENDED RELEASE ORAL at 06:00

## 2025-08-28 RX ADMIN — SERTRALINE 25 MILLIGRAM(S): 100 TABLET, FILM COATED ORAL at 13:46

## 2025-08-28 RX ADMIN — Medication 1000 UNIT(S): at 13:46

## 2025-08-28 RX ADMIN — LIDOCAINE HYDROCHLORIDE 1 PATCH: 20 JELLY TOPICAL at 13:46

## 2025-08-28 RX ADMIN — Medication 1 TABLET(S): at 13:46

## 2025-08-28 RX ADMIN — FUROSEMIDE 20 MILLIGRAM(S): 10 INJECTION INTRAMUSCULAR; INTRAVENOUS at 13:45

## 2025-08-28 RX ADMIN — Medication 0.25 MILLIGRAM(S): at 14:23

## 2025-08-28 RX ADMIN — APIXABAN 2.5 MILLIGRAM(S): 2.5 TABLET, FILM COATED ORAL at 05:59

## 2025-08-29 LAB
CULTURE RESULTS: SIGNIFICANT CHANGE UP
CULTURE RESULTS: SIGNIFICANT CHANGE UP
SPECIMEN SOURCE: SIGNIFICANT CHANGE UP
SPECIMEN SOURCE: SIGNIFICANT CHANGE UP

## 2025-08-31 LAB
CULTURE RESULTS: SIGNIFICANT CHANGE UP
SPECIMEN SOURCE: SIGNIFICANT CHANGE UP

## 2025-09-11 ENCOUNTER — APPOINTMENT (OUTPATIENT)
Dept: CARDIOLOGY | Facility: CLINIC | Age: 89
End: 2025-09-11